# Patient Record
Sex: MALE | Race: WHITE | NOT HISPANIC OR LATINO | Employment: OTHER | URBAN - NONMETROPOLITAN AREA
[De-identification: names, ages, dates, MRNs, and addresses within clinical notes are randomized per-mention and may not be internally consistent; named-entity substitution may affect disease eponyms.]

---

## 2017-02-01 ENCOUNTER — LAB (OUTPATIENT)
Dept: LAB | Facility: HOSPITAL | Age: 62
End: 2017-02-01

## 2017-02-01 DIAGNOSIS — K74.69 OTHER CIRRHOSIS OF LIVER (HCC): ICD-10-CM

## 2017-02-01 DIAGNOSIS — K74.69 OTHER CIRRHOSIS OF LIVER (HCC): Primary | ICD-10-CM

## 2017-02-01 DIAGNOSIS — B18.2 CHRONIC HEPATITIS C WITHOUT HEPATIC COMA (HCC): ICD-10-CM

## 2017-02-01 LAB
ALBUMIN SERPL-MCNC: 4.3 G/DL (ref 3.4–4.8)
ALBUMIN/GLOB SERPL: 1.5 G/DL (ref 1.1–1.8)
ALP SERPL-CCNC: 53 U/L (ref 38–126)
ALT SERPL W P-5'-P-CCNC: 23 U/L (ref 21–72)
ANION GAP SERPL CALCULATED.3IONS-SCNC: 9 MMOL/L (ref 5–15)
AST SERPL-CCNC: 34 U/L (ref 17–59)
BILIRUB SERPL-MCNC: 0.8 MG/DL (ref 0.2–1.3)
BUN BLD-MCNC: 10 MG/DL (ref 7–21)
BUN/CREAT SERPL: 9.7 (ref 7–25)
CALCIUM SPEC-SCNC: 9.1 MG/DL (ref 8.4–10.2)
CHLORIDE SERPL-SCNC: 98 MMOL/L (ref 95–110)
CO2 SERPL-SCNC: 31 MMOL/L (ref 22–31)
CREAT BLD-MCNC: 1.03 MG/DL (ref 0.7–1.3)
DEPRECATED RDW RBC AUTO: 44.3 FL (ref 35.1–43.9)
ERYTHROCYTE [DISTWIDTH] IN BLOOD BY AUTOMATED COUNT: 13.5 % (ref 11.5–14.5)
GFR SERPL CREATININE-BSD FRML MDRD: 74 ML/MIN/1.73 (ref 56–130)
GLOBULIN UR ELPH-MCNC: 2.9 GM/DL (ref 2.3–3.5)
GLUCOSE BLD-MCNC: 115 MG/DL (ref 60–100)
HCT VFR BLD AUTO: 47.5 % (ref 39–49)
HGB BLD-MCNC: 16.6 G/DL (ref 13.7–17.3)
INR PPP: 1.08 (ref 0.8–1.2)
MCH RBC QN AUTO: 31.5 PG (ref 26.5–34)
MCHC RBC AUTO-ENTMCNC: 34.9 G/DL (ref 31.5–36.3)
MCV RBC AUTO: 90.1 FL (ref 80–98)
PLATELET # BLD AUTO: 126 10*3/MM3 (ref 150–450)
PMV BLD AUTO: 11.5 FL (ref 8–12)
POTASSIUM BLD-SCNC: 3.6 MMOL/L (ref 3.5–5.1)
PROT SERPL-MCNC: 7.2 G/DL (ref 6.3–8.6)
PROTHROMBIN TIME: 14 SECONDS (ref 11.1–15.3)
RBC # BLD AUTO: 5.27 10*6/MM3 (ref 4.37–5.74)
SODIUM BLD-SCNC: 138 MMOL/L (ref 137–145)
WBC NRBC COR # BLD: 7.42 10*3/MM3 (ref 3.2–9.8)

## 2017-02-01 PROCEDURE — 36415 COLL VENOUS BLD VENIPUNCTURE: CPT

## 2017-02-01 PROCEDURE — 85027 COMPLETE CBC AUTOMATED: CPT | Performed by: PHYSICIAN ASSISTANT

## 2017-02-01 PROCEDURE — 82105 ALPHA-FETOPROTEIN SERUM: CPT | Performed by: PHYSICIAN ASSISTANT

## 2017-02-01 PROCEDURE — 82977 ASSAY OF GGT: CPT | Performed by: PHYSICIAN ASSISTANT

## 2017-02-01 PROCEDURE — 82247 BILIRUBIN TOTAL: CPT | Performed by: PHYSICIAN ASSISTANT

## 2017-02-01 PROCEDURE — 83010 ASSAY OF HAPTOGLOBIN QUANT: CPT | Performed by: PHYSICIAN ASSISTANT

## 2017-02-01 PROCEDURE — 85610 PROTHROMBIN TIME: CPT | Performed by: PHYSICIAN ASSISTANT

## 2017-02-01 PROCEDURE — 84460 ALANINE AMINO (ALT) (SGPT): CPT | Performed by: PHYSICIAN ASSISTANT

## 2017-02-01 PROCEDURE — 83883 ASSAY NEPHELOMETRY NOT SPEC: CPT | Performed by: PHYSICIAN ASSISTANT

## 2017-02-01 PROCEDURE — 80053 COMPREHEN METABOLIC PANEL: CPT | Performed by: PHYSICIAN ASSISTANT

## 2017-02-03 LAB — SPECIMEN STATUS: NORMAL

## 2017-02-04 LAB — AFP-TM SERPL-MCNC: 2.6 NG/ML (ref 0–8.3)

## 2017-02-06 LAB
A2 MACROGLOB SERPL-MCNC: 483 MG/DL (ref 110–276)
ALT SERPL W P-5'-P-CCNC: 12 IU/L (ref 0–55)
APO A-I SERPL-MCNC: 108 MG/DL (ref 101–178)
BILIRUB SERPL-MCNC: 0.6 MG/DL (ref 0–1.2)
FIBROSIS SCORING:: ABNORMAL
FIBROSIS STAGE SERPL QL: ABNORMAL
GGT SERPL-CCNC: 11 IU/L (ref 0–65)
HAPTOGLOB SERPL-MCNC: 75 MG/DL (ref 34–200)
HCV AB SER QL: ABNORMAL
LABORATORY COMMENT REPORT: ABNORMAL
LIMITATIONS:: ABNORMAL
LIVER FIBR SCORE SERPL CALC.FIBROSURE: 0.77 (ref 0–0.21)
NECROINFLAMM ACTIVITY SCORING:: ABNORMAL
NECROINFLAMMATORY ACT GRADE SERPL QL: ABNORMAL
NECROINFLAMMATORY ACT SCORE SERPL: 0.07 (ref 0–0.17)

## 2017-02-27 ENCOUNTER — OFFICE VISIT (OUTPATIENT)
Dept: GASTROENTEROLOGY | Facility: CLINIC | Age: 62
End: 2017-02-27

## 2017-02-27 VITALS
WEIGHT: 181.8 LBS | HEART RATE: 90 BPM | BODY MASS INDEX: 32.21 KG/M2 | DIASTOLIC BLOOD PRESSURE: 97 MMHG | SYSTOLIC BLOOD PRESSURE: 163 MMHG | HEIGHT: 63 IN

## 2017-02-27 DIAGNOSIS — Z86.010 HISTORY OF COLON POLYPS: ICD-10-CM

## 2017-02-27 DIAGNOSIS — K74.60 HEPATIC CIRRHOSIS, UNSPECIFIED HEPATIC CIRRHOSIS TYPE (HCC): ICD-10-CM

## 2017-02-27 DIAGNOSIS — D69.6 THROMBOCYTOPENIA (HCC): ICD-10-CM

## 2017-02-27 DIAGNOSIS — K74.00 HEPATIC FIBROSIS: Primary | ICD-10-CM

## 2017-02-27 PROCEDURE — 99214 OFFICE O/P EST MOD 30 MIN: CPT | Performed by: PHYSICIAN ASSISTANT

## 2017-04-12 ENCOUNTER — TELEPHONE (OUTPATIENT)
Dept: GASTROENTEROLOGY | Facility: CLINIC | Age: 62
End: 2017-04-12

## 2017-04-12 ENCOUNTER — HOSPITAL ENCOUNTER (OUTPATIENT)
Dept: ULTRASOUND IMAGING | Facility: HOSPITAL | Age: 62
Discharge: HOME | End: 2017-04-12
Admitting: PHYSICIAN ASSISTANT

## 2017-04-12 ENCOUNTER — OFFICE VISIT (OUTPATIENT)
Dept: GASTROENTEROLOGY | Facility: CLINIC | Age: 62
End: 2017-04-12

## 2017-04-12 VITALS
WEIGHT: 178.1 LBS | DIASTOLIC BLOOD PRESSURE: 82 MMHG | SYSTOLIC BLOOD PRESSURE: 152 MMHG | HEART RATE: 66 BPM | BODY MASS INDEX: 31.55 KG/M2 | HEIGHT: 63 IN

## 2017-04-12 DIAGNOSIS — K74.60 HEPATIC CIRRHOSIS, UNSPECIFIED HEPATIC CIRRHOSIS TYPE (HCC): ICD-10-CM

## 2017-04-12 DIAGNOSIS — K74.69 OTHER CIRRHOSIS OF LIVER (HCC): Primary | ICD-10-CM

## 2017-04-12 PROCEDURE — 99213 OFFICE O/P EST LOW 20 MIN: CPT | Performed by: PHYSICIAN ASSISTANT

## 2017-04-12 PROCEDURE — 76705 ECHO EXAM OF ABDOMEN: CPT

## 2017-04-12 NOTE — TELEPHONE ENCOUNTER
----- Message from Deny Heller PA-C sent at 4/12/2017  5:12 PM CDT -----  Please call the patient regarding his abnormal result.

## 2017-04-12 NOTE — TELEPHONE ENCOUNTER
Patient has been contacted and made aware of his Ultrasound results. Patient voiced understanding.

## 2017-04-12 NOTE — PROGRESS NOTES
Chief Complaint   Patient presents with   • Cirrhosis   • Heartburn   • Hepatitis C       ENDO PROCEDURE ORDERED:    Subjective    Wong Patrick is a 62 y.o. male. he is here today for follow-up.    History of Present Illness    Patient seen on a recheck of his GERD, chronic hepatitis C, cirrhosis.  Last seen 2/27/17.  Genotype 1A.  F4.  He completed 12 weeks Harvoni on 2/25/16.  He currently denies abdominal pain, GERD is well-controlled on Prilosec.  No nausea, vomiting, dysphagia.  Bowels are moving without blood.  Weight is down 4 pounds since last visit.  Last colonoscopy showed colon polyps on 6/3/13.    Patient had right upper quadrant ultrasound today which showed cholelithiasis, otherwise normal-appearing liver.    A/P: Cirrhosis post treatment.  He will be due for hepatoma screening in October.  We'll plan follow-up with laboratories prior in 3 months.     The following portions of the patient's history were reviewed and updated as appropriate:   Past Medical History:   Diagnosis Date   • Abnormal weight loss    • Acquired thrombocytopenia    • Adenomatous polyp of colon    • Anxiety    • Biliary calculus     denies N/V, abd pain   • Chronic hepatitis C    • Cirrhosis of liver    • Colon polyp    • Elevated levels of transaminase & lactic acid dehydrogenase    • Generalized abdominal pain     known gallstones   • GERD (gastroesophageal reflux disease)    • Hypertension    • IBS (irritable bowel syndrome)    • Impacted cerumen    • Kidney disease     US suggest renal dz, elevated alpha 2 macroglobulins   • Nausea and vomiting    • Patient noncompliance, general    • Rectal hemorrhage    • Villous adenoma of colon    • Wheezing      Past Surgical History:   Procedure Laterality Date   • COLONOSCOPY  06/03/2013   • ENDOSCOPY AND COLONOSCOPY     • ESOPHAGOSCOPY / EGD      with tube   • UPPER GASTROINTESTINAL ENDOSCOPY  06/03/2013     Family History   Problem Relation Age of Onset   • Heart disease Other   "  • Diabetes Other        Allergies   Allergen Reactions   • Penicillins      Social History     Social History   • Marital status:      Spouse name: N/A   • Number of children: N/A   • Years of education: N/A     Social History Main Topics   • Smoking status: Current Every Day Smoker     Packs/day: 1.00     Types: Cigarettes   • Smokeless tobacco: Never Used   • Alcohol use No   • Drug use: No   • Sexual activity: Not Asked     Other Topics Concern   • None     Social History Narrative       Current Outpatient Prescriptions:   •  albuterol (PROVENTIL HFA;VENTOLIN HFA) 108 (90 BASE) MCG/ACT inhaler, Inhale 2 puffs 2 (two) times a day as needed for wheezing., Disp: , Rfl:   •  busPIRone (BUSPAR) 15 MG tablet, Take 15 mg by mouth 2 (two) times a day., Disp: , Rfl:   •  losartan (COZAAR) 50 MG tablet, Take 50 mg by mouth daily., Disp: , Rfl:   •  meclizine (ANTIVERT) 25 MG tablet, Take 25 mg by mouth 2 (Two) Times a Day As Needed., Disp: , Rfl:   •  metoprolol succinate XL (TOPROL-XL) 50 MG 24 hr tablet, Take 50 mg by mouth daily., Disp: , Rfl:   •  omeprazole (PriLOSEC) 20 MG capsule, Take 20 mg by mouth daily., Disp: , Rfl:   •  QUEtiapine (SEROquel) 100 MG tablet, Take 100 mg by mouth every night., Disp: , Rfl:   Review of Systems  Review of Systems       Objective    /82 (BP Location: Left arm)  Pulse 66  Ht 63\" (160 cm)  Wt 178 lb 1.6 oz (80.8 kg)  BMI 31.55 kg/m2  Physical Exam   Constitutional: He is oriented to person, place, and time. He appears well-developed and well-nourished. No distress.   HENT:   Head: Normocephalic and atraumatic.   Eyes: EOM are normal. Pupils are equal, round, and reactive to light.   Neck: Normal range of motion.   Cardiovascular: Normal rate, regular rhythm and normal heart sounds.    Pulmonary/Chest: Effort normal and breath sounds normal.   Abdominal: Soft. Bowel sounds are normal. He exhibits no shifting dullness, no distension, no abdominal bruit, no ascites " and no mass. There is no hepatosplenomegaly. There is no tenderness. There is no rigidity, no rebound, no guarding and no CVA tenderness. No hernia. Hernia confirmed negative in the ventral area.   obese   Musculoskeletal: Normal range of motion.   Neurological: He is alert and oriented to person, place, and time.   Skin: Skin is warm and dry.   Psychiatric: He has a normal mood and affect. His behavior is normal. Judgment and thought content normal.   Nursing note and vitals reviewed.    Assessment/Plan      1. Other cirrhosis of liver    2. Hepatic cirrhosis, unspecified hepatic cirrhosis type    .   Wong was seen today for cirrhosis, heartburn and hepatitis c.    Diagnoses and all orders for this visit:    Other cirrhosis of liver  Comments:  F4  Orders:  -     Hepatic Function Panel; Future  -     Protime-INR; Future    Hepatic cirrhosis, unspecified hepatic cirrhosis type  -     Hepatic Function Panel; Future  -     Protime-INR; Future        Orders placed during this encounter include:  Orders Placed This Encounter   Procedures   • Hepatic Function Panel     Due before follow up in July     Standing Status:   Future     Standing Expiration Date:   7/28/2017   • Protime-INR     Due before follow up in July     Standing Status:   Future     Standing Expiration Date:   7/28/2017       Medications prescribed:  No orders of the defined types were placed in this encounter.      Requested Prescriptions      No prescriptions requested or ordered in this encounter       Review and/or summary of lab tests, radiology, procedures, medications. Review and summary of old records and obtaining of history. The risks and benefits of my recommendations, as well as other treatment options were discussed with the patient today. Questions were answered.    Follow-up: Return in about 3 months (around 7/12/2017), or if symptoms worsen or fail to improve, for lab prior.     * Surgery not found *      This document has been  electronically signed by Deny Heller PA-C on April 24, 2017 5:46 PM      Results for orders placed or performed in visit on 02/01/17   Specimen Status Report   Result Value Ref Range    Specimen Status Comment    HCV FibroSURE   Result Value Ref Range    Fibrosis Score 0.77 (H) 0.00 - 0.21    Fibrosis Stage Comment     Necroinflammat Activity Score 0.07 0.00 - 0.17    Necroinflammat Activity Grade A0-No activity     Alpha 2-Macroglobulins, Qn 483 (H) 110 - 276 mg/dL    Haptoglobin 75 34 - 200 mg/dL    Apolipoprotein A-1 108 101 - 178 mg/dL    Total Bilirubin 0.6 0.0 - 1.2 mg/dL    GGT 11 0 - 65 IU/L    ALT (SGPT) 12 0 - 55 IU/L    HCV Qual Interp Comment     Fibrosis Scoring: Comment     Necroinflamm Activity Scoring: Comment     Limitations: Comment     Comment Comment    AFP Tumor Marker   Result Value Ref Range    AFP Tumor Marker 2.6 0.0 - 8.3 ng/mL   Protime-INR   Result Value Ref Range    Protime 14.0 11.1 - 15.3 Seconds    INR 1.08 0.80 - 1.20   CBC (No Diff)   Result Value Ref Range    WBC 7.42 3.20 - 9.80 10*3/mm3    RBC 5.27 4.37 - 5.74 10*6/mm3    Hemoglobin 16.6 13.7 - 17.3 g/dL    Hematocrit 47.5 39.0 - 49.0 %    MCV 90.1 80.0 - 98.0 fL    MCH 31.5 26.5 - 34.0 pg    MCHC 34.9 31.5 - 36.3 g/dL    RDW 13.5 11.5 - 14.5 %    RDW-SD 44.3 (H) 35.1 - 43.9 fl    MPV 11.5 8.0 - 12.0 fL    Platelets 126 (L) 150 - 450 10*3/mm3   Comprehensive Metabolic Panel   Result Value Ref Range    Glucose 115 (H) 60 - 100 mg/dL    BUN 10 7 - 21 mg/dL    Creatinine 1.03 0.70 - 1.30 mg/dL    Sodium 138 137 - 145 mmol/L    Potassium 3.6 3.5 - 5.1 mmol/L    Chloride 98 95 - 110 mmol/L    CO2 31.0 22.0 - 31.0 mmol/L    Calcium 9.1 8.4 - 10.2 mg/dL    Total Protein 7.2 6.3 - 8.6 g/dL    Albumin 4.30 3.40 - 4.80 g/dL    ALT (SGPT) 23 21 - 72 U/L    AST (SGOT) 34 17 - 59 U/L    Alkaline Phosphatase 53 38 - 126 U/L    Total Bilirubin 0.8 0.2 - 1.3 mg/dL    eGFR Non  Amer 74 56 - 130 mL/min/1.73    Globulin 2.9 2.3 - 3.5  gm/dL    A/G Ratio 1.5 1.1 - 1.8 g/dL    BUN/Creatinine Ratio 9.7 7.0 - 25.0    Anion Gap 9.0 5.0 - 15.0 mmol/L   Results for orders placed or performed in visit on 07/13/16    COLONOSCOPY   Result Value Ref Range     Colonoscopy completed    Results for orders placed or performed during the hospital encounter of 06/03/16   AFP tumor marker   Result Value Ref Range    AFP Tumor Marker 3.3 0.0 - 8.3 ng/mL   Hepatitis C RNA, quantitative, PCR (graph)   Result Value Ref Range    Hepatitis C Quantitation HCV Not Detected IU/mL    Test Information Comment    CBC and Differential   Result Value Ref Range    WBC 8.2 3.2 - 9.8 x1000/uL    RBC 5.46 4.37 - 5.74 altaf/mm3    Hemoglobin 17.3 13.7 - 17.3 gm/dl    Hematocrit 49.2 (H) 39.0 - 49.0 %    MCV 90.1 80.0 - 98.0 fl    MCH 31.7 26.0 - 34.0 pg    MCHC 35.2 31.5 - 36.3 gm/dl    RDW 13.9 11.5 - 14.5 %    Platelets 134 (L) 150 - 450 x1000/mm3    MPV 12.3 (H) 8.0 - 12.0 fl    Neutrophil Rel % 47.2 37.0 - 80.0 %    Lymphocyte Rel % 40.0 10.0 - 50.0 %    Monocyte Rel % 10.5 0.0 - 12.0 %    Eosinophil Rel % 1.6 0.0 - 7.0 %    Basophil Rel % 0.5 0.0 - 2.0 %    Immature Granulocyte Rel % 0.20 0.00 - 0.50 %    Neutrophils Absolute 3.88 2.00 - 8.60 x1000/uL    Lymphocytes Absolute 3.29 0.60 - 4.20 x1000/uL    Monocytes Absolute 0.86 0.00 - 0.90 x1000/uL    Eosinophils Absolute 0.13 0.00 - 0.70 x1000/uL    Basophils Absolute 0.04 0.00 - 0.20 x1000/uL    Immature Granulocytes Absolute 0.020 0.005 - 0.022 x1000/uL    nRBC 0.0 0.0 - 0.2 %    nRBC 0.000 x1000/uL   Comprehensive metabolic panel   Result Value Ref Range    Sodium 143 137 - 145 mmol/L    Potassium 4.6 3.5 - 5.1 mmol/L    Chloride 100 95 - 110 mmol/L    CO2 31 22 - 31 mmol/L    Anion Gap 12.0 5.0 - 15.0 mmol/L    Glucose 109 (H) 60 - 100 mg/dl    BUN 14 7 - 21 mg/dl    Creatinine 1.1 0.7 - 1.3 mg/dl    GFR MDRD Non  69 56 - 130 mL/min/1.73 sq.M    GFR MDRD  83 56 - 130 mL/min/1.73 sq.M     Calcium 9.4 8.4 - 10.2 mg/dl    Total Protein 7.0 6.3 - 8.6 gm/dl    Albumin 4.2 3.4 - 4.8 gm/dl    Total Bilirubin 0.7 0.2 - 1.3 mg/dl    Alkaline Phosphatase 51 38 - 126 U/L    AST (SGOT) 22 17 - 59 U/L    ALT (SGPT) 33 21 - 72 U/L   Results for orders placed or performed during the hospital encounter of 03/03/16   CBC and Differential   Result Value Ref Range    WBC 7.4 3.2 - 9.8 x1000/uL    RBC 5.32 4.37 - 5.74 altaf/mm3    Hemoglobin 16.8 13.7 - 17.3 gm/dl    Hematocrit 48.0 39.0 - 49.0 %    MCV 90.2 80.0 - 98.0 fl    MCH 31.6 26.0 - 34.0 pg    MCHC 35.0 31.5 - 36.3 gm/dl    RDW 13.8 11.5 - 14.5 %    Platelets 118 (L) 150 - 450 x1000/mm3    MPV 12.2 (H) 8.0 - 12.0 fl    Neutrophil Rel % 48.4 37.0 - 80.0 %    Lymphocyte Rel % 41.5 10.0 - 50.0 %    Monocyte Rel % 8.2 0.0 - 12.0 %    Eosinophil Rel % 1.2 0.0 - 7.0 %    Basophil Rel % 0.4 0.0 - 2.0 %    Immature Granulocyte Rel % 0.30 0.00 - 0.50 %    Neutrophils Absolute 3.59 2.00 - 8.60 x1000/uL    Lymphocytes Absolute 3.08 0.60 - 4.20 x1000/uL    Monocytes Absolute 0.61 0.00 - 0.90 x1000/uL    Eosinophils Absolute 0.09 0.00 - 0.70 x1000/uL    Basophils Absolute 0.03 0.00 - 0.20 x1000/uL    Immature Granulocytes Absolute 0.020 0.005 - 0.022 x1000/uL     *Note: Due to a large number of results and/or encounters for the requested time period, some results have not been displayed. A complete set of results can be found in Results Review.       Some portions of this note have been dictated using voice recognition software and may contain errors and/or omissions.

## 2017-07-20 ENCOUNTER — OFFICE VISIT (OUTPATIENT)
Dept: GASTROENTEROLOGY | Facility: CLINIC | Age: 62
End: 2017-07-20

## 2017-07-20 ENCOUNTER — LAB (OUTPATIENT)
Dept: LAB | Facility: HOSPITAL | Age: 62
End: 2017-07-20

## 2017-07-20 VITALS
WEIGHT: 176.9 LBS | DIASTOLIC BLOOD PRESSURE: 82 MMHG | SYSTOLIC BLOOD PRESSURE: 128 MMHG | HEART RATE: 76 BPM | BODY MASS INDEX: 31.34 KG/M2 | HEIGHT: 63 IN

## 2017-07-20 DIAGNOSIS — K21.00 GASTROESOPHAGEAL REFLUX DISEASE WITH ESOPHAGITIS: ICD-10-CM

## 2017-07-20 DIAGNOSIS — K74.60 HEPATIC CIRRHOSIS, UNSPECIFIED HEPATIC CIRRHOSIS TYPE (HCC): Primary | ICD-10-CM

## 2017-07-20 DIAGNOSIS — E71.30 DISORDER OF FATTY-ACID METABOLISM: ICD-10-CM

## 2017-07-20 DIAGNOSIS — K74.69 OTHER CIRRHOSIS OF LIVER (HCC): ICD-10-CM

## 2017-07-20 DIAGNOSIS — B18.2 CHRONIC HEPATITIS C WITHOUT HEPATIC COMA (HCC): ICD-10-CM

## 2017-07-20 DIAGNOSIS — K74.60 HEPATIC CIRRHOSIS, UNSPECIFIED HEPATIC CIRRHOSIS TYPE (HCC): ICD-10-CM

## 2017-07-20 LAB
ALBUMIN SERPL-MCNC: 4.3 G/DL (ref 3.4–4.8)
ALP SERPL-CCNC: 60 U/L (ref 38–126)
ALT SERPL W P-5'-P-CCNC: 32 U/L (ref 21–72)
AST SERPL-CCNC: 24 U/L (ref 17–59)
BILIRUB CONJ SERPL-MCNC: 0 MG/DL (ref 0–0.3)
BILIRUB INDIRECT SERPL-MCNC: 0.1 MG/DL (ref 0–1.1)
BILIRUB SERPL-MCNC: 0.3 MG/DL (ref 0.2–1.3)
INR PPP: 0.93 (ref 0.8–1.2)
PROT SERPL-MCNC: 7 G/DL (ref 6.3–8.6)
PROTHROMBIN TIME: 12.4 SECONDS (ref 11.1–15.3)

## 2017-07-20 PROCEDURE — 36415 COLL VENOUS BLD VENIPUNCTURE: CPT

## 2017-07-20 PROCEDURE — 80076 HEPATIC FUNCTION PANEL: CPT

## 2017-07-20 PROCEDURE — 85610 PROTHROMBIN TIME: CPT

## 2017-07-20 PROCEDURE — 99214 OFFICE O/P EST MOD 30 MIN: CPT | Performed by: PHYSICIAN ASSISTANT

## 2017-07-20 RX ORDER — LORATADINE 10 MG/1
10 TABLET ORAL DAILY
Refills: 3 | COMMUNITY
Start: 2017-05-11

## 2017-07-20 RX ORDER — FLUTICASONE PROPIONATE 50 MCG
SPRAY, SUSPENSION (ML) NASAL
Refills: 0 | COMMUNITY
Start: 2017-05-11 | End: 2018-04-23

## 2017-10-16 ENCOUNTER — HOSPITAL ENCOUNTER (OUTPATIENT)
Dept: ULTRASOUND IMAGING | Facility: HOSPITAL | Age: 62
Discharge: HOME | End: 2017-10-16
Admitting: PHYSICIAN ASSISTANT

## 2017-10-16 DIAGNOSIS — B18.2 CHRONIC HEPATITIS C WITHOUT HEPATIC COMA (HCC): ICD-10-CM

## 2017-10-16 DIAGNOSIS — K74.69 OTHER CIRRHOSIS OF LIVER (HCC): ICD-10-CM

## 2017-10-16 DIAGNOSIS — K74.60 HEPATIC CIRRHOSIS, UNSPECIFIED HEPATIC CIRRHOSIS TYPE (HCC): ICD-10-CM

## 2017-10-16 PROCEDURE — 76705 ECHO EXAM OF ABDOMEN: CPT

## 2017-10-23 ENCOUNTER — OFFICE VISIT (OUTPATIENT)
Dept: GASTROENTEROLOGY | Facility: CLINIC | Age: 62
End: 2017-10-23

## 2017-10-23 ENCOUNTER — LAB (OUTPATIENT)
Dept: LAB | Facility: HOSPITAL | Age: 62
End: 2017-10-23

## 2017-10-23 VITALS
HEART RATE: 73 BPM | SYSTOLIC BLOOD PRESSURE: 129 MMHG | DIASTOLIC BLOOD PRESSURE: 83 MMHG | BODY MASS INDEX: 31.63 KG/M2 | WEIGHT: 178.5 LBS | HEIGHT: 63 IN

## 2017-10-23 DIAGNOSIS — K21.00 GASTROESOPHAGEAL REFLUX DISEASE WITH ESOPHAGITIS: ICD-10-CM

## 2017-10-23 DIAGNOSIS — B18.2 CHRONIC HEPATITIS C WITHOUT HEPATIC COMA (HCC): ICD-10-CM

## 2017-10-23 DIAGNOSIS — E71.30 DISORDER OF FATTY-ACID METABOLISM: ICD-10-CM

## 2017-10-23 DIAGNOSIS — K74.60 HEPATIC CIRRHOSIS, UNSPECIFIED HEPATIC CIRRHOSIS TYPE (HCC): Primary | ICD-10-CM

## 2017-10-23 DIAGNOSIS — K74.69 OTHER CIRRHOSIS OF LIVER (HCC): ICD-10-CM

## 2017-10-23 DIAGNOSIS — K80.20 GALLSTONES: ICD-10-CM

## 2017-10-23 DIAGNOSIS — K74.60 HEPATIC CIRRHOSIS, UNSPECIFIED HEPATIC CIRRHOSIS TYPE (HCC): ICD-10-CM

## 2017-10-23 LAB
ALBUMIN SERPL-MCNC: 4.6 G/DL (ref 3.4–4.8)
ALBUMIN/GLOB SERPL: 1.4 G/DL (ref 1.1–1.8)
ALP SERPL-CCNC: 55 U/L (ref 38–126)
ALT SERPL W P-5'-P-CCNC: 26 U/L (ref 21–72)
ANION GAP SERPL CALCULATED.3IONS-SCNC: 15 MMOL/L (ref 5–15)
AST SERPL-CCNC: 28 U/L (ref 17–59)
BASOPHILS # BLD AUTO: 0.04 10*3/MM3 (ref 0–0.2)
BASOPHILS NFR BLD AUTO: 0.4 % (ref 0–2)
BILIRUB SERPL-MCNC: 0.5 MG/DL (ref 0.2–1.3)
BUN BLD-MCNC: 16 MG/DL (ref 7–21)
BUN/CREAT SERPL: 15.2 (ref 7–25)
CALCIUM SPEC-SCNC: 9.8 MG/DL (ref 8.4–10.2)
CHLORIDE SERPL-SCNC: 98 MMOL/L (ref 95–110)
CO2 SERPL-SCNC: 32 MMOL/L (ref 22–31)
CREAT BLD-MCNC: 1.05 MG/DL (ref 0.7–1.3)
DEPRECATED RDW RBC AUTO: 44.3 FL (ref 35.1–43.9)
EOSINOPHIL # BLD AUTO: 0.16 10*3/MM3 (ref 0–0.7)
EOSINOPHIL NFR BLD AUTO: 1.7 % (ref 0–7)
ERYTHROCYTE [DISTWIDTH] IN BLOOD BY AUTOMATED COUNT: 13.4 % (ref 11.5–14.5)
GFR SERPL CREATININE-BSD FRML MDRD: 72 ML/MIN/1.73 (ref 49–113)
GLOBULIN UR ELPH-MCNC: 3.2 GM/DL (ref 2.3–3.5)
GLUCOSE BLD-MCNC: 102 MG/DL (ref 60–100)
HCT VFR BLD AUTO: 49.9 % (ref 39–49)
HGB BLD-MCNC: 17.3 G/DL (ref 13.7–17.3)
IMM GRANULOCYTES # BLD: 0.03 10*3/MM3 (ref 0–0.02)
IMM GRANULOCYTES NFR BLD: 0.3 % (ref 0–0.5)
INR PPP: 0.99 (ref 0.8–1.2)
LYMPHOCYTES # BLD AUTO: 3.61 10*3/MM3 (ref 0.6–4.2)
LYMPHOCYTES NFR BLD AUTO: 37.5 % (ref 10–50)
MCH RBC QN AUTO: 31.7 PG (ref 26.5–34)
MCHC RBC AUTO-ENTMCNC: 34.7 G/DL (ref 31.5–36.3)
MCV RBC AUTO: 91.6 FL (ref 80–98)
MONOCYTES # BLD AUTO: 0.88 10*3/MM3 (ref 0–0.9)
MONOCYTES NFR BLD AUTO: 9.1 % (ref 0–12)
NEUTROPHILS # BLD AUTO: 4.9 10*3/MM3 (ref 2–8.6)
NEUTROPHILS NFR BLD AUTO: 51 % (ref 37–80)
PLATELET # BLD AUTO: 157 10*3/MM3 (ref 150–450)
PMV BLD AUTO: 12.1 FL (ref 8–12)
POTASSIUM BLD-SCNC: 4.7 MMOL/L (ref 3.5–5.1)
PROT SERPL-MCNC: 7.8 G/DL (ref 6.3–8.6)
PROTHROMBIN TIME: 13 SECONDS (ref 11.1–15.3)
RBC # BLD AUTO: 5.45 10*6/MM3 (ref 4.37–5.74)
SODIUM BLD-SCNC: 145 MMOL/L (ref 137–145)
WBC NRBC COR # BLD: 9.62 10*3/MM3 (ref 3.2–9.8)

## 2017-10-23 PROCEDURE — 99213 OFFICE O/P EST LOW 20 MIN: CPT | Performed by: PHYSICIAN ASSISTANT

## 2017-10-23 PROCEDURE — 82105 ALPHA-FETOPROTEIN SERUM: CPT | Performed by: PHYSICIAN ASSISTANT

## 2017-10-23 PROCEDURE — 82247 BILIRUBIN TOTAL: CPT | Performed by: PHYSICIAN ASSISTANT

## 2017-10-23 PROCEDURE — 85610 PROTHROMBIN TIME: CPT | Performed by: PHYSICIAN ASSISTANT

## 2017-10-23 PROCEDURE — 83883 ASSAY NEPHELOMETRY NOT SPEC: CPT | Performed by: PHYSICIAN ASSISTANT

## 2017-10-23 PROCEDURE — 36415 COLL VENOUS BLD VENIPUNCTURE: CPT

## 2017-10-23 PROCEDURE — 85025 COMPLETE CBC W/AUTO DIFF WBC: CPT | Performed by: PHYSICIAN ASSISTANT

## 2017-10-23 PROCEDURE — 84460 ALANINE AMINO (ALT) (SGPT): CPT | Performed by: PHYSICIAN ASSISTANT

## 2017-10-23 PROCEDURE — 83010 ASSAY OF HAPTOGLOBIN QUANT: CPT | Performed by: PHYSICIAN ASSISTANT

## 2017-10-23 PROCEDURE — 80053 COMPREHEN METABOLIC PANEL: CPT | Performed by: PHYSICIAN ASSISTANT

## 2017-10-23 PROCEDURE — 82977 ASSAY OF GGT: CPT | Performed by: PHYSICIAN ASSISTANT

## 2017-10-24 LAB — AFP-TM SERPL-MCNC: 3.3 NG/ML (ref 0–8.3)

## 2017-10-26 LAB
A2 MACROGLOB SERPL-MCNC: 519 MG/DL (ref 110–276)
ALT SERPL W P-5'-P-CCNC: 18 IU/L (ref 0–55)
APO A-I SERPL-MCNC: 141 MG/DL (ref 101–178)
BILIRUB SERPL-MCNC: 0.4 MG/DL (ref 0–1.2)
FIBROSIS STAGE SERPL QL: ABNORMAL
GGT SERPL-CCNC: 16 IU/L (ref 0–65)
HAPTOGLOB SERPL-MCNC: 88 MG/DL (ref 34–200)
LABORATORY COMMENT REPORT: ABNORMAL
LIMITATIONS:: ABNORMAL

## 2018-04-23 ENCOUNTER — OFFICE VISIT (OUTPATIENT)
Dept: GASTROENTEROLOGY | Facility: CLINIC | Age: 63
End: 2018-04-23

## 2018-04-23 VITALS
WEIGHT: 175 LBS | HEART RATE: 87 BPM | SYSTOLIC BLOOD PRESSURE: 136 MMHG | BODY MASS INDEX: 29.88 KG/M2 | HEIGHT: 64 IN | DIASTOLIC BLOOD PRESSURE: 82 MMHG

## 2018-04-23 DIAGNOSIS — K21.00 GASTROESOPHAGEAL REFLUX DISEASE WITH ESOPHAGITIS: ICD-10-CM

## 2018-04-23 DIAGNOSIS — E71.30 DISORDER OF FATTY-ACID METABOLISM: ICD-10-CM

## 2018-04-23 DIAGNOSIS — Z86.19 HISTORY OF HEPATITIS C: ICD-10-CM

## 2018-04-23 DIAGNOSIS — K76.0 FATTY (CHANGE OF) LIVER, NOT ELSEWHERE CLASSIFIED: ICD-10-CM

## 2018-04-23 DIAGNOSIS — K80.20 GALLSTONES: ICD-10-CM

## 2018-04-23 DIAGNOSIS — K74.60 HEPATIC CIRRHOSIS, UNSPECIFIED HEPATIC CIRRHOSIS TYPE (HCC): Primary | ICD-10-CM

## 2018-04-23 PROCEDURE — 99214 OFFICE O/P EST MOD 30 MIN: CPT | Performed by: PHYSICIAN ASSISTANT

## 2018-04-23 NOTE — PATIENT INSTRUCTIONS
MyPlate from [employer]  The general, healthful diet is based on the 2010 Dietary Guidelines for Americans. The amount of food you need to eat from each food group depends on your age, sex, and level of physical activity and can be individualized by a dietitian. Go to ChooseMyPlate.gov for more information.  What do I need to know about the MyPlate plan?  · Enjoy your food, but eat less.  · Avoid oversized portions.  ¨ ½ of your plate should include fruits and vegetables.  ¨ ¼ of your plate should be grains.  ¨ ¼ of your plate should be protein.  Grains   · Make at least half of your grains whole grains.  · For a 2,000 calorie daily food plan, eat 6 oz every day.  · 1 oz is about 1 slice bread, 1 cup cereal, or ½ cup cooked rice, cereal, or pasta.  Vegetables   · Make half your plate fruits and vegetables.  · For a 2,000 calorie daily food plan, eat 2½ cups every day.  · 1 cup is about 1 cup raw or cooked vegetables or vegetable juice or 2 cups raw leafy greens.  Fruits   · Make half your plate fruits and vegetables.  · For a 2,000 calorie daily food plan, eat 2 cups every day.  · 1 cup is about 1 cup fruit or 100% fruit juice or ½ cup dried fruit.  Protein   · For a 2,000 calorie daily food plan, eat 5½ oz every day.  · 1 oz is about 1 oz meat, poultry, or fish, ¼ cup cooked beans, 1 egg, 1 Tbsp peanut butter, or ½ oz nuts or seeds.  Dairy   · Switch to fat-free or low-fat (1%) milk.  · For a 2,000 calorie daily food plan, eat 3 cups every day.  · 1 cup is about 1 cup milk or yogurt or soy milk (soy beverage), 1½ oz natural cheese, or 2 oz processed cheese.  Fats, Oils, and Empty Calories   · Only small amounts of oils are recommended.  · Empty calories are calories from solid fats or added sugars.  · Compare sodium in foods like soup, bread, and frozen meals. Choose the foods with lower numbers.  · Drink water instead of sugary drinks.  What foods can I eat?  Grains   Whole grains such as whole wheat, quinoa, millet,  and bulgur. Bread, rolls, and pasta made from whole grains. Brown or wild rice. Hot or cold cereals made from whole grains and without added sugar.  Vegetables   All fresh vegetables, especially fresh red, dark green, or orange vegetables. Peas and beans. Low-sodium frozen or canned vegetables prepared without added salt. Low-sodium vegetable juices.  Fruits   All fresh, frozen, and dried fruits. Canned fruit packed in water or fruit juice without added sugar. Fruit juices without added sugar.  Meats and Other Protein Sources   Boiled, baked, or grilled lean meat trimmed of fat. Skinless poultry. Fresh seafood and shellfish. Canned seafood packed in water. Unsalted nuts and unsalted nut butters. Tofu. Dried beans and pea. Eggs.  Dairy   Low-fat or fat-free milk, yogurt, and cheeses.  Sweets and Desserts   Frozen desserts made from low-fat milk.  Fats and Oils   Olive, peanut, and canola oils and margarine. Salad dressing and mayonnaise made from these oils.  Other   Soups and casseroles made from allowed ingredients and without added fat or salt.  The items listed above may not be a complete list of recommended foods or beverages. Contact your dietitian for more options.   What foods are not recommended?  Grains   Sweetened, low-fiber cereals. Packaged baked goods. Snack crackers and chips. Cheese crackers, butter crackers, and biscuits. Frozen waffles, sweet breads, doughnuts, pastries, packaged baking mixes, pancakes, cakes, and cookies.  Vegetables   Regular canned or frozen vegetables or vegetables prepared with salt. Canned tomatoes. Canned tomato sauce. Fried vegetables. Vegetables in cream sauce or cheese sauce.  Fruits   Fruits packed in syrup or made with added sugar.  Meats and Other Protein Sources   Marbled or fatty meats such as ribs. Poultry with skin. Fried meats, poultry, eggs, or fish. Sausages, hot dogs, and deli meats such as pastrami, bologna, or salami.  Dairy   Whole milk, cream, cheeses made  from whole milk, sour cream. Ice cream or yogurt made from whole milk or with added sugar.  Beverages   For adults, no more than one alcoholic drink per day. Regular soft drinks or other sugary beverages. Juice drinks.  Sweets and Desserts   Sugary or fatty desserts, candy, and other sweets.  Fats and Oils   Solid shortening or partially hydrogenated oils. Solid margarine. Margarine that contains trans fats. Butter.  The items listed above may not be a complete list of foods and beverages to avoid. Contact your dietitian for more information.   This information is not intended to replace advice given to you by your health care provider. Make sure you discuss any questions you have with your health care provider.  Document Released: 01/06/2009 Document Revised: 05/25/2017 Document Reviewed: 11/26/2014  [employer] Interactive Patient Education © 2017 [employer]  BMI for Adults  Body mass index (BMI) is a number that is calculated from a person's weight and height. In most adults, the number is used to find how much of an adult's weight is made up of fat. BMI is not as accurate as a direct measure of body fat.  How is BMI calculated?  BMI is calculated by dividing weight in kilograms by height in meters squared. It can also be calculated by dividing weight in pounds by height in inches squared, then multiplying the resulting number by 703. Charts are available to help you find your BMI quickly and easily without doing this calculation.  How is BMI interpreted?  Health care professionals use BMI charts to identify whether an adult is underweight, at a normal weight, or overweight based on the following guidelines:  · Underweight: BMI less than 18.5.  · Normal weight: BMI between 18.5 and 24.9.  · Overweight: BMI between 25 and 29.9.  · Obese: BMI of 30 and above.  BMI is usually interpreted the same for males and females.  Weight includes both fat and muscle, so someone with a muscular build, such as an athlete, may  have a BMI that is higher than 24.9. In cases like these, BMI may not accurately depict body fat. To determine if excess body fat is the cause of a BMI of 25 or higher, further assessments may need to be done by a health care provider.  Why is BMI a useful tool?  BMI is used to identify a possible weight problem that may be related to a medical problem or may increase the risk for medical problems. BMI can also be used to promote changes to reach a healthy weight.  This information is not intended to replace advice given to you by your health care provider. Make sure you discuss any questions you have with your health care provider.  Document Released: 08/29/2005 Document Revised: 04/27/2017 Document Reviewed: 05/15/2015  [employer] Interactive Patient Education © 2017 [employer]

## 2018-04-23 NOTE — PROGRESS NOTES
Chief Complaint   Patient presents with   • Hepatic Fibrosis   • Hx Of Hepatitis C       ENDO PROCEDURE ORDERED:    Subjective    Wong Patrick is a 63 y.o. male. he is here today for follow-up.    History of Present Illness    The patient is seen on a recheck of his hepatic fibrosis, cirrhosis, asymptomatic gallstones, history of hepatitis C and GERD. Last seen 10/23/2017. Genotype 1A/F4. HCV FibroSure could not be calculated at his last visit. He did not return to have that repeated before this appointment. He states that he has been doing fairly well. He denied abdominal pain. His GERD is well controlled on Prilosec 20 mg daily. He denied nausea, vomiting, dysphagia. Bowel movements are regular without blood or mucus. Weight is down 3.8 pounds since last visit. Last colonoscopy 06/03/2013.    Laboratories from by his primary care done on 11/09/2017 showed CBC, CMP, PSA, cholesterol panel.    ASSESSMENT/PLAN: Patient with cirrhosis secondary to hepatitis C, his last documented study showed cirrhosis. He is due for hepatoma screening. Recommend right upper quadrant ultrasound, AFP, GARVEY, FibroSure, INR, LFTs. Will see him in followup after the above, further pending clinical course and the results of the above.       The following portions of the patient's history were reviewed and updated as appropriate:   Past Medical History:   Diagnosis Date   • Abnormal weight loss    • Acquired thrombocytopenia    • Adenomatous polyp of colon    • Anxiety    • Biliary calculus     denies N/V, abd pain   • Chronic hepatitis C    • Cirrhosis of liver    • Colon polyp    • Elevated levels of transaminase & lactic acid dehydrogenase    • Generalized abdominal pain     known gallstones   • GERD (gastroesophageal reflux disease)    • Hypertension    • IBS (irritable bowel syndrome)    • Impacted cerumen    • Kidney disease     US suggest renal dz, elevated alpha 2 macroglobulins   • Nausea and vomiting    • Patient  "noncompliance, general    • Rectal hemorrhage    • Villous adenoma of colon    • Wheezing      Past Surgical History:   Procedure Laterality Date   • COLONOSCOPY  06/03/2013   • ENDOSCOPY AND COLONOSCOPY     • ESOPHAGOSCOPY / EGD      with tube   • UPPER GASTROINTESTINAL ENDOSCOPY  06/03/2013     Family History   Problem Relation Age of Onset   • Heart disease Other    • Diabetes Other        Allergies   Allergen Reactions   • Penicillins Hives     Social History     Social History   • Marital status:      Social History Main Topics   • Smoking status: Current Every Day Smoker     Packs/day: 1.00     Types: Cigarettes   • Smokeless tobacco: Never Used   • Alcohol use No   • Drug use: No     Other Topics Concern   • Not on file       Current Outpatient Prescriptions:   •  albuterol (PROVENTIL HFA;VENTOLIN HFA) 108 (90 BASE) MCG/ACT inhaler, Inhale 2 puffs 2 (two) times a day as needed for wheezing., Disp: , Rfl:   •  busPIRone (BUSPAR) 15 MG tablet, Take 15 mg by mouth 2 (two) times a day., Disp: , Rfl:   •  loratadine (CLARITIN) 10 MG tablet, TAKE 1 TABLET BY MOUTH EVERY DAY, Disp: , Rfl: 3  •  losartan (COZAAR) 50 MG tablet, Take 50 mg by mouth daily., Disp: , Rfl:   •  meclizine (ANTIVERT) 25 MG tablet, Take 25 mg by mouth 2 (Two) Times a Day As Needed., Disp: , Rfl:   •  omeprazole (PriLOSEC) 20 MG capsule, Take 20 mg by mouth daily., Disp: , Rfl:   •  QUEtiapine (SEROquel) 100 MG tablet, Take 100 mg by mouth every night., Disp: , Rfl:   Review of Systems  Review of Systems       Objective    /82 (BP Location: Left arm)   Pulse 87   Ht 162.6 cm (64\")   Wt 79.4 kg (175 lb)   BMI 30.04 kg/m²   Physical Exam   Constitutional: He is oriented to person, place, and time. He appears well-developed and well-nourished. No distress.   HENT:   Head: Normocephalic and atraumatic.   Eyes: EOM are normal. Pupils are equal, round, and reactive to light.   Neck: Normal range of motion.   Cardiovascular: Normal " rate, regular rhythm and normal heart sounds.    Pulmonary/Chest: Effort normal and breath sounds normal.   Abdominal: Soft. Bowel sounds are normal. He exhibits no shifting dullness, no distension, no abdominal bruit, no ascites and no mass. There is no hepatosplenomegaly. There is tenderness. There is no rigidity, no rebound, no guarding and no CVA tenderness. No hernia. Hernia confirmed negative in the ventral area.   obese   Musculoskeletal: Normal range of motion.   Neurological: He is alert and oriented to person, place, and time.   Skin: Skin is warm and dry.   Psychiatric: He has a normal mood and affect. His behavior is normal. Judgment and thought content normal.   Nursing note and vitals reviewed.    Assessment/Plan      1. Hepatic cirrhosis, unspecified hepatic cirrhosis type    2. Gastroesophageal reflux disease with esophagitis    3. Gallstones    4. History of hepatitis C    5. Fatty (change of) liver, not elsewhere classified     6. Disorder of fatty-acid metabolism     .   Wong was seen today for hepatic fibrosis and hx of hepatitis c.    Diagnoses and all orders for this visit:    Hepatic cirrhosis, unspecified hepatic cirrhosis type  -     AFP Tumor Marker  -     Protime-INR  -     GARVEY Fibrosure  -     US Liver  -     Hepatic Function Panel    Gastroesophageal reflux disease with esophagitis    Gallstones    History of hepatitis C  -     AFP Tumor Marker  -     Protime-INR  -     GARVEY Fibrosure  -     US Liver  -     Hepatic Function Panel    Fatty (change of) liver, not elsewhere classified   -     GARVEY Fibrosure    Disorder of fatty-acid metabolism   -     GARVEY Fibrosure        Orders placed during this encounter include:  Orders Placed This Encounter   Procedures   • US Liver     Scheduling Instructions:      RUQ     Order Specific Question:   Reason for Exam:     Answer:   cirrhosis     Order Specific Question:   Will this be performed with Elastography? ([facility] and [facility]ORIN[facility] ONLY)      Answer:   No   • AFP Tumor Marker   • Protime-INR   • GARVEY Fibrosure   • Hepatic Function Panel       Medications prescribed:  No orders of the defined types were placed in this encounter.    Discontinued Medications       Reason for Discontinue    fluticasone (FLONASE) 50 MCG/ACT nasal spray *Therapy completed    metoprolol succinate XL (TOPROL-XL) 50 MG 24 hr tablet Discontinued by another clinician        Requested Prescriptions      No prescriptions requested or ordered in this encounter       Review and/or summary of lab tests, radiology, procedures, medications. Review and summary of old records and obtaining of history. The risks and benefits of my recommendations, as well as other treatment options were discussed with the patient today. Questions were answered.    Follow-up: Return in about 1 month (around 5/23/2018), or if symptoms worsen or fail to improve.     * Surgery not found *      This document has been electronically signed by Deny Heller PA-C on April 23, 2018 6:29 PM      Results for orders placed or performed in visit on 10/23/17   HCV FibroSURE   Result Value Ref Range    Fibrosis Stage Comment     Alpha 2-Macroglobulins, Qn 519 (H) 110 - 276 mg/dL    Haptoglobin 88 34 - 200 mg/dL    Apolipoprotein A-1 141 101 - 178 mg/dL    Total Bilirubin 0.4 0.0 - 1.2 mg/dL    GGT 16 0 - 65 IU/L    ALT (SGPT) 18 0 - 55 IU/L    Limitations: Comment     Comment Comment    CBC Auto Differential   Result Value Ref Range    WBC 9.62 3.20 - 9.80 10*3/mm3    RBC 5.45 4.37 - 5.74 10*6/mm3    Hemoglobin 17.3 13.7 - 17.3 g/dL    Hematocrit 49.9 (H) 39.0 - 49.0 %    MCV 91.6 80.0 - 98.0 fL    MCH 31.7 26.5 - 34.0 pg    MCHC 34.7 31.5 - 36.3 g/dL    RDW 13.4 11.5 - 14.5 %    RDW-SD 44.3 (H) 35.1 - 43.9 fl    MPV 12.1 (H) 8.0 - 12.0 fL    Platelets 157 150 - 450 10*3/mm3    Neutrophil % 51.0 37.0 - 80.0 %    Lymphocyte % 37.5 10.0 - 50.0 %    Monocyte % 9.1 0.0 - 12.0 %    Eosinophil % 1.7 0.0 - 7.0 %    Basophil %  0.4 0.0 - 2.0 %    Immature Grans % 0.3 0.0 - 0.5 %    Neutrophils, Absolute 4.90 2.00 - 8.60 10*3/mm3    Lymphocytes, Absolute 3.61 0.60 - 4.20 10*3/mm3    Monocytes, Absolute 0.88 0.00 - 0.90 10*3/mm3    Eosinophils, Absolute 0.16 0.00 - 0.70 10*3/mm3    Basophils, Absolute 0.04 0.00 - 0.20 10*3/mm3    Immature Grans, Absolute 0.03 (H) 0.00 - 0.02 10*3/mm3   AFP Tumor Marker   Result Value Ref Range    AFP Tumor Marker 3.3 0.0 - 8.3 ng/mL   Protime-INR   Result Value Ref Range    Protime 13.0 11.1 - 15.3 Seconds    INR 0.99 0.80 - 1.20   Comprehensive Metabolic Panel   Result Value Ref Range    Glucose 102 (H) 60 - 100 mg/dL    BUN 16 7 - 21 mg/dL    Creatinine 1.05 0.70 - 1.30 mg/dL    Sodium 145 137 - 145 mmol/L    Potassium 4.7 3.5 - 5.1 mmol/L    Chloride 98 95 - 110 mmol/L    CO2 32.0 (H) 22.0 - 31.0 mmol/L    Calcium 9.8 8.4 - 10.2 mg/dL    Total Protein 7.8 6.3 - 8.6 g/dL    Albumin 4.60 3.40 - 4.80 g/dL    ALT (SGPT) 26 21 - 72 U/L    AST (SGOT) 28 17 - 59 U/L    Alkaline Phosphatase 55 38 - 126 U/L    Total Bilirubin 0.5 0.2 - 1.3 mg/dL    eGFR Non  Amer 72 49 - 113 mL/min/1.73    Globulin 3.2 2.3 - 3.5 gm/dL    A/G Ratio 1.4 1.1 - 1.8 g/dL    BUN/Creatinine Ratio 15.2 7.0 - 25.0    Anion Gap 15.0 5.0 - 15.0 mmol/L   Results for orders placed or performed in visit on 07/20/17   Protime-INR   Result Value Ref Range    Protime 12.4 11.1 - 15.3 Seconds    INR 0.93 0.80 - 1.20   Hepatic Function Panel   Result Value Ref Range    Total Protein 7.0 6.3 - 8.6 g/dL    Albumin 4.30 3.40 - 4.80 g/dL    ALT (SGPT) 32 21 - 72 U/L    AST (SGOT) 24 17 - 59 U/L    Alkaline Phosphatase 60 38 - 126 U/L    Total Bilirubin 0.3 0.2 - 1.3 mg/dL    Bilirubin, Direct 0.0 0.0 - 0.3 mg/dL    Bilirubin, Indirect 0.1 0.0 - 1.1 mg/dL   Results for orders placed or performed in visit on 02/01/17   Specimen Status Report   Result Value Ref Range    Specimen Status Comment    HCV FibroSURE   Result Value Ref Range    Fibrosis  Score 0.77 (H) 0.00 - 0.21    Fibrosis Stage Comment     Necroinflammat Activity Score 0.07 0.00 - 0.17    Necroinflammat Activity Grade A0-No activity     Alpha 2-Macroglobulins, Qn 483 (H) 110 - 276 mg/dL    Haptoglobin 75 34 - 200 mg/dL    Apolipoprotein A-1 108 101 - 178 mg/dL    Total Bilirubin 0.6 0.0 - 1.2 mg/dL    GGT 11 0 - 65 IU/L    ALT (SGPT) 12 0 - 55 IU/L    HCV Qual Interp Comment     Fibrosis Scoring: Comment     Necroinflamm Activity Scoring: Comment     Limitations: Comment     Comment Comment    AFP Tumor Marker   Result Value Ref Range    AFP Tumor Marker 2.6 0.0 - 8.3 ng/mL   Protime-INR   Result Value Ref Range    Protime 14.0 11.1 - 15.3 Seconds    INR 1.08 0.80 - 1.20   CBC (No Diff)   Result Value Ref Range    WBC 7.42 3.20 - 9.80 10*3/mm3    RBC 5.27 4.37 - 5.74 10*6/mm3    Hemoglobin 16.6 13.7 - 17.3 g/dL    Hematocrit 47.5 39.0 - 49.0 %    MCV 90.1 80.0 - 98.0 fL    MCH 31.5 26.5 - 34.0 pg    MCHC 34.9 31.5 - 36.3 g/dL    RDW 13.5 11.5 - 14.5 %    RDW-SD 44.3 (H) 35.1 - 43.9 fl    MPV 11.5 8.0 - 12.0 fL    Platelets 126 (L) 150 - 450 10*3/mm3   Comprehensive Metabolic Panel   Result Value Ref Range    Glucose 115 (H) 60 - 100 mg/dL    BUN 10 7 - 21 mg/dL    Creatinine 1.03 0.70 - 1.30 mg/dL    Sodium 138 137 - 145 mmol/L    Potassium 3.6 3.5 - 5.1 mmol/L    Chloride 98 95 - 110 mmol/L    CO2 31.0 22.0 - 31.0 mmol/L    Calcium 9.1 8.4 - 10.2 mg/dL    Total Protein 7.2 6.3 - 8.6 g/dL    Albumin 4.30 3.40 - 4.80 g/dL    ALT (SGPT) 23 21 - 72 U/L    AST (SGOT) 34 17 - 59 U/L    Alkaline Phosphatase 53 38 - 126 U/L    Total Bilirubin 0.8 0.2 - 1.3 mg/dL    eGFR Non  Amer 74 56 - 130 mL/min/1.73    Globulin 2.9 2.3 - 3.5 gm/dL    A/G Ratio 1.5 1.1 - 1.8 g/dL    BUN/Creatinine Ratio 9.7 7.0 - 25.0    Anion Gap 9.0 5.0 - 15.0 mmol/L     *Note: Due to a large number of results and/or encounters for the requested time period, some results have not been displayed. A complete set of results  can be found in Results Review.       Some portions of this note have been dictated using voice recognition software and may contain errors and/or omissions.

## 2018-05-09 RX ORDER — OMEPRAZOLE 20 MG/1
20 CAPSULE, DELAYED RELEASE ORAL DAILY
Qty: 30 CAPSULE | Refills: 1 | Status: SHIPPED | OUTPATIENT
Start: 2018-05-09 | End: 2018-06-08 | Stop reason: SDUPTHER

## 2018-05-14 ENCOUNTER — APPOINTMENT (OUTPATIENT)
Dept: LAB | Facility: HOSPITAL | Age: 63
End: 2018-05-14

## 2018-05-14 ENCOUNTER — HOSPITAL ENCOUNTER (OUTPATIENT)
Dept: ULTRASOUND IMAGING | Facility: HOSPITAL | Age: 63
Discharge: HOME | End: 2018-05-14
Admitting: PHYSICIAN ASSISTANT

## 2018-05-14 LAB
ALBUMIN SERPL-MCNC: 4.5 G/DL (ref 3.4–4.8)
ALP SERPL-CCNC: 57 U/L (ref 38–126)
ALT SERPL W P-5'-P-CCNC: 29 U/L (ref 21–72)
AST SERPL-CCNC: 17 U/L (ref 17–59)
BILIRUB CONJ SERPL-MCNC: 0 MG/DL (ref 0–0.3)
BILIRUB INDIRECT SERPL-MCNC: 0.5 MG/DL (ref 0–1.1)
BILIRUB SERPL-MCNC: 0.8 MG/DL (ref 0.2–1.3)
INR PPP: 1.03 (ref 0.8–1.2)
PROT SERPL-MCNC: 7.4 G/DL (ref 6.3–8.6)
PROTHROMBIN TIME: 13.3 SECONDS (ref 11.1–15.3)

## 2018-05-14 PROCEDURE — 82977 ASSAY OF GGT: CPT | Performed by: PHYSICIAN ASSISTANT

## 2018-05-14 PROCEDURE — 83883 ASSAY NEPHELOMETRY NOT SPEC: CPT | Performed by: PHYSICIAN ASSISTANT

## 2018-05-14 PROCEDURE — 36415 COLL VENOUS BLD VENIPUNCTURE: CPT | Performed by: PHYSICIAN ASSISTANT

## 2018-05-14 PROCEDURE — 80076 HEPATIC FUNCTION PANEL: CPT | Performed by: PHYSICIAN ASSISTANT

## 2018-05-14 PROCEDURE — 84478 ASSAY OF TRIGLYCERIDES: CPT | Performed by: PHYSICIAN ASSISTANT

## 2018-05-14 PROCEDURE — 84450 TRANSFERASE (AST) (SGOT): CPT | Performed by: PHYSICIAN ASSISTANT

## 2018-05-14 PROCEDURE — 82947 ASSAY GLUCOSE BLOOD QUANT: CPT | Performed by: PHYSICIAN ASSISTANT

## 2018-05-14 PROCEDURE — 84460 ALANINE AMINO (ALT) (SGPT): CPT | Performed by: PHYSICIAN ASSISTANT

## 2018-05-14 PROCEDURE — 82247 BILIRUBIN TOTAL: CPT | Performed by: PHYSICIAN ASSISTANT

## 2018-05-14 PROCEDURE — 83010 ASSAY OF HAPTOGLOBIN QUANT: CPT | Performed by: PHYSICIAN ASSISTANT

## 2018-05-14 PROCEDURE — 82465 ASSAY BLD/SERUM CHOLESTEROL: CPT | Performed by: PHYSICIAN ASSISTANT

## 2018-05-14 PROCEDURE — 85610 PROTHROMBIN TIME: CPT | Performed by: PHYSICIAN ASSISTANT

## 2018-05-14 PROCEDURE — 82105 ALPHA-FETOPROTEIN SERUM: CPT | Performed by: PHYSICIAN ASSISTANT

## 2018-05-14 PROCEDURE — 82172 ASSAY OF APOLIPOPROTEIN: CPT | Performed by: PHYSICIAN ASSISTANT

## 2018-05-14 PROCEDURE — 76705 ECHO EXAM OF ABDOMEN: CPT

## 2018-05-15 LAB — AFP-TM SERPL-MCNC: 2.6 NG/ML (ref 0–8.3)

## 2018-05-16 LAB
A2 MACROGLOB SERPL-MCNC: 495 MG/DL (ref 110–276)
ALT SERPL W P-5'-P-CCNC: 15 IU/L (ref 0–55)
APO A-I SERPL-MCNC: 121 MG/DL (ref 101–178)
AST SERPL W P-5'-P-CCNC: 16 IU/L (ref 0–40)
BILIRUB SERPL-MCNC: 0.6 MG/DL (ref 0–1.2)
CHOLEST SERPL-MCNC: 144 MG/DL (ref 100–199)
FIBROSIS SCORING:: ABNORMAL
FIBROSIS STAGE SERPL QL: ABNORMAL
GGT SERPL-CCNC: 13 IU/L (ref 0–65)
GLUCOSE SERPL-MCNC: 95 MG/DL (ref 65–99)
HAPTOGLOB SERPL-MCNC: 83 MG/DL (ref 34–200)
INTERPRETATIONS: (REFERENCE): ABNORMAL
LABORATORY COMMENT REPORT: ABNORMAL
LIMITATIONS: (REFERENCE): ABNORMAL
LIVER FIBR SCORE SERPL CALC.FIBROSURE: 0.76 (ref 0–0.21)
NASH GRADE (REFERENCE): ABNORMAL
NASH SCORE (REFERENCE): 0.25
NASH SCORING (REFERENCE): ABNORMAL
STEATOSIS GRADE (REFERENCE): ABNORMAL
STEATOSIS GRADING (REFERENCE): ABNORMAL
STEATOSIS SCORE (REFERENCE): 0.24 (ref 0–0.3)
TRIGL SERPL-MCNC: 150 MG/DL (ref 0–149)
WEIGHT: (REFERENCE): 175 LBS

## 2018-06-06 ENCOUNTER — OFFICE VISIT (OUTPATIENT)
Dept: GASTROENTEROLOGY | Facility: CLINIC | Age: 63
End: 2018-06-06

## 2018-06-06 VITALS
HEIGHT: 64 IN | DIASTOLIC BLOOD PRESSURE: 86 MMHG | HEART RATE: 75 BPM | WEIGHT: 178.8 LBS | BODY MASS INDEX: 30.52 KG/M2 | SYSTOLIC BLOOD PRESSURE: 134 MMHG

## 2018-06-06 DIAGNOSIS — K74.60 HEPATIC CIRRHOSIS, UNSPECIFIED HEPATIC CIRRHOSIS TYPE (HCC): Primary | ICD-10-CM

## 2018-06-06 DIAGNOSIS — K21.00 GASTROESOPHAGEAL REFLUX DISEASE WITH ESOPHAGITIS: ICD-10-CM

## 2018-06-06 DIAGNOSIS — Z86.010 HISTORY OF COLON POLYPS: ICD-10-CM

## 2018-06-06 DIAGNOSIS — Z86.19 HISTORY OF HEPATITIS C: ICD-10-CM

## 2018-06-06 DIAGNOSIS — K80.20 GALLSTONES: ICD-10-CM

## 2018-06-06 PROCEDURE — 99213 OFFICE O/P EST LOW 20 MIN: CPT | Performed by: PHYSICIAN ASSISTANT

## 2018-06-06 RX ORDER — SODIUM, POTASSIUM,MAG SULFATES 17.5-3.13G
1 SOLUTION, RECONSTITUTED, ORAL ORAL ONCE
Qty: 1 BOTTLE | Refills: 0 | Status: SHIPPED | OUTPATIENT
Start: 2018-06-06 | End: 2018-06-07

## 2018-06-06 RX ORDER — FLUTICASONE PROPIONATE 50 MCG
2 SPRAY, SUSPENSION (ML) NASAL DAILY
COMMUNITY
End: 2020-01-01

## 2018-06-06 RX ORDER — DEXTROSE AND SODIUM CHLORIDE 5; .45 G/100ML; G/100ML
30 INJECTION, SOLUTION INTRAVENOUS CONTINUOUS PRN
Status: CANCELLED | OUTPATIENT
Start: 2018-08-22

## 2018-06-06 NOTE — PROGRESS NOTES
Chief Complaint   Patient presents with   • Hepatic Cirrhosis   • Heartburn   • Fatty Liver   • Hx Of Hepatitis C       ENDO PROCEDURE ORDERED: COLON, hx colon polyps    Subjective    Wong Patrick is a 63 y.o. male. he is here today for follow-up.    History of Present Illness    Patient seen on a recheck of her cirrhosis, GERD, fatty liver, history of hepatitis C. Last seen 04/23/2018, F4. Patient currently denies abdominal pain, GERD is well controlled on Prilosec 20 mg daily, he denied nausea, vomiting, dysphagia, bowel movements are regular without blood or mucus. Weight is up 3.8 pounds since last visit. Last colonoscopy 06/03/2013 did show polyps.       Studies on 05/14/2018, abdominal ultrasound of the right upper quadrant showed irregularity of the liver consistent with cirrhosis, gallstones, 4.2 mm common bile duct, 1.9 cm right renal cyst, this is consistent with previous findings. LFTs were normal, AFP was normal. INR 1.03. GARVEY FibroSure 0.76/F4, inflammation 0.24/S0, 0.25/N0.       A/P: Patient due for a screening colonoscopy with history of colon polyps. This will be scheduled. Will plan follow-up in 6 months with CBC, CMP, AFP, INR, right upper quadrant ultrasound prior. He will continue on the Prilosec. Further pending clinical course and the results of the above.             The following portions of the patient's history were reviewed and updated as appropriate:   Past Medical History:   Diagnosis Date   • Abnormal weight loss    • Acquired thrombocytopenia    • Adenomatous polyp of colon    • Anxiety    • Biliary calculus     denies N/V, abd pain   • Chronic hepatitis C    • Cirrhosis of liver    • Colon polyp    • Elevated levels of transaminase & lactic acid dehydrogenase    • Generalized abdominal pain     known gallstones   • GERD (gastroesophageal reflux disease)    • Hypertension    • IBS (irritable bowel syndrome)    • Impacted cerumen    • Kidney disease     US suggest renal dz,  "elevated alpha 2 macroglobulins   • Nausea and vomiting    • Patient noncompliance, general    • Rectal hemorrhage    • Villous adenoma of colon    • Wheezing      Past Surgical History:   Procedure Laterality Date   • COLONOSCOPY  06/03/2013   • ENDOSCOPY AND COLONOSCOPY     • ESOPHAGOSCOPY / EGD      with tube   • UPPER GASTROINTESTINAL ENDOSCOPY  06/03/2013     Family History   Problem Relation Age of Onset   • Heart disease Other    • Diabetes Other        Allergies   Allergen Reactions   • Penicillins Hives     Social History     Social History   • Marital status:      Social History Main Topics   • Smoking status: Current Every Day Smoker     Packs/day: 1.00     Types: Cigarettes   • Smokeless tobacco: Never Used   • Alcohol use No   • Drug use: No     Other Topics Concern   • Not on file       Current Outpatient Prescriptions:   •  albuterol (PROVENTIL HFA;VENTOLIN HFA) 108 (90 BASE) MCG/ACT inhaler, Inhale 2 puffs 2 (two) times a day as needed for wheezing., Disp: , Rfl:   •  busPIRone (BUSPAR) 15 MG tablet, Take 15 mg by mouth 2 (two) times a day., Disp: , Rfl:   •  fluticasone (FLONASE) 50 MCG/ACT nasal spray, 2 sprays into each nostril Daily., Disp: , Rfl:   •  loratadine (CLARITIN) 10 MG tablet, TAKE 1 TABLET BY MOUTH EVERY DAY, Disp: , Rfl: 3  •  losartan (COZAAR) 50 MG tablet, Take 50 mg by mouth daily., Disp: , Rfl:   •  meclizine (ANTIVERT) 25 MG tablet, Take 25 mg by mouth 2 (Two) Times a Day As Needed., Disp: , Rfl:   •  omeprazole (priLOSEC) 20 MG capsule, Take 1 capsule by mouth Daily., Disp: 30 capsule, Rfl: 1  •  QUEtiapine (SEROquel) 100 MG tablet, Take 100 mg by mouth every night., Disp: , Rfl:   •  polyethylene glycol (GoLYTELY) 236 g solution, AS DIRECTED PER INSTRUCTION SHEET FOR COLONOSCOPY, Disp: 4000 mL, Rfl: 0  Review of Systems  Review of Systems       Objective    /86 (BP Location: Left arm)   Pulse 75   Ht 162.6 cm (64\")   Wt 81.1 kg (178 lb 12.8 oz)   BMI 30.69 " kg/m²   Physical Exam   Constitutional: He is oriented to person, place, and time. He appears well-developed and well-nourished. No distress.   HENT:   Head: Normocephalic and atraumatic.   Eyes: EOM are normal. Pupils are equal, round, and reactive to light.   Neck: Normal range of motion.   Cardiovascular: Normal rate, regular rhythm and normal heart sounds.    Pulmonary/Chest: Effort normal and breath sounds normal.   Abdominal: Soft. Bowel sounds are normal. He exhibits no shifting dullness, no distension, no abdominal bruit, no ascites and no mass. There is no hepatosplenomegaly. There is tenderness. There is no rigidity, no rebound, no guarding and no CVA tenderness. No hernia. Hernia confirmed negative in the ventral area.   obese   Musculoskeletal: Normal range of motion.   Neurological: He is alert and oriented to person, place, and time.   Skin: Skin is warm and dry.   Psychiatric: He has a normal mood and affect. His behavior is normal. Judgment and thought content normal.   Nursing note and vitals reviewed.    Assessment/Plan      1. Hepatic cirrhosis, unspecified hepatic cirrhosis type    2. Gastroesophageal reflux disease with esophagitis    3. Gallstones    4. History of hepatitis C    5. History of colon polyps    .   Wong was seen today for hepatic cirrhosis, heartburn, fatty liver and hx of hepatitis c.    Diagnoses and all orders for this visit:    Hepatic cirrhosis, unspecified hepatic cirrhosis type  Comments:  F4  Orders:  -     CBC Auto Differential; Future  -     Comprehensive Metabolic Panel; Future  -     AFP Tumor Marker; Future  -     Protime-INR; Future  -     US Abdomen Limited; Future    Gastroesophageal reflux disease with esophagitis  -     CBC Auto Differential; Future  -     Comprehensive Metabolic Panel; Future  -     AFP Tumor Marker; Future  -     Protime-INR; Future  -     US Abdomen Limited; Future    Gallstones  -     CBC Auto Differential; Future  -     Comprehensive  Metabolic Panel; Future  -     AFP Tumor Marker; Future  -     Protime-INR; Future  -     US Abdomen Limited; Future    History of hepatitis C  -     CBC Auto Differential; Future  -     Comprehensive Metabolic Panel; Future  -     AFP Tumor Marker; Future  -     Protime-INR; Future  -     US Abdomen Limited; Future    History of colon polyps  -     Case Request; Standing  -     dextrose 5 % and sodium chloride 0.45 % infusion; Infuse 30 mL/hr into a venous catheter Continuous As Needed (Start Prior to Procedure).  -     Case Request    Other orders  -     Discontinue: sodium-potassium-magnesium sulfates (SUPREP BOWEL PREP KIT) 17.5-3.13-1.6 GM/180ML solution oral solution; Take 1 bottle by mouth 1 (One) Time for 1 dose. Take as per instruction sheet for colonoscopy prep.  -     Obtain Informed Consent; Standing  -     POC Glucose Once; Standing        Orders placed during this encounter include:  Orders Placed This Encounter   Procedures   • US Abdomen Limited     Due in December     Standing Status:   Future     Standing Expiration Date:   12/28/2018     Scheduling Instructions:      RUQ--schedule in December     Order Specific Question:   Reason for Exam:     Answer:   cirrhosis   • CBC Auto Differential     Due in December     Standing Status:   Future     Standing Expiration Date:   12/28/2018   • Comprehensive Metabolic Panel     Due in December     Standing Status:   Future     Standing Expiration Date:   12/28/2018   • AFP Tumor Marker     Due in December     Standing Status:   Future     Standing Expiration Date:   12/28/2018   • Protime-INR     Due in December     Standing Status:   Future     Standing Expiration Date:   12/28/2018       Medications prescribed:  No orders of the defined types were placed in this encounter.      Requested Prescriptions      No prescriptions requested or ordered in this encounter       Review and/or summary of lab tests, radiology, procedures, medications. Review and summary  of old records and obtaining of history. The risks and benefits of my recommendations, as well as other treatment options were discussed with the patient today. Questions were answered.    Follow-up: Return in about 6 months (around 12/6/2018), or if symptoms worsen or fail to improve, for lab/US prior.     COLONOSCOPY (N/A)      This document has been electronically signed by Deny Heller PA-C on June 7, 2018 6:04 PM      Results for orders placed or performed in visit on 04/23/18   GARVEY Fibrosure   Result Value Ref Range    Fibrosis Score (References) 0.76 (H) 0.00 - 0.21    Fibrosis Stage (Reference) Comment     Steatosis Score (Reference) 0.24 0.00 - 0.30    Steatosis Grade (Reference) Comment     GARVEY Score (Reference) 0.25 0.25    Garvey Grade (Reference) Comment     Height: (Reference) 64 in    Weight: (Reference) 175 LBS    Alpha 2-Macroglobulins, Qn 495 (H) 110 - 276 mg/dL    Haptoglobin 83 34 - 200 mg/dL    Apolipoprotein A-1 121 101 - 178 mg/dL    Total Bilirubin 0.6 0.0 - 1.2 mg/dL    GGT 13 0 - 65 IU/L    ALT (SGPT) 15 0 - 55 IU/L    AST (SGOT) P5P (Reference) 16 0 - 40 IU/L    Cholesterol, Total (Reference) 144 100 - 199 mg/dL    Glucose, Serum (Reference) 95 65 - 99 mg/dL    Triglycerides 150 (H) 0 - 149 mg/dL    Interpretations: (Reference) Comment     Fibrosis Scoring: Comment     Steatosis Grading (Reference) Comment     Garvey Scoring (Reference) Comment     Limitations: (Reference) Comment     Comment (Reference) Comment    AFP Tumor Marker   Result Value Ref Range    AFP Tumor Marker 2.6 0.0 - 8.3 ng/mL   Protime-INR   Result Value Ref Range    Protime 13.3 11.1 - 15.3 Seconds    INR 1.03 0.80 - 1.20   Hepatic Function Panel   Result Value Ref Range    Total Protein 7.4 6.3 - 8.6 g/dL    Albumin 4.50 3.40 - 4.80 g/dL    ALT (SGPT) 29 21 - 72 U/L    AST (SGOT) 17 17 - 59 U/L    Alkaline Phosphatase 57 38 - 126 U/L    Total Bilirubin 0.8 0.2 - 1.3 mg/dL    Bilirubin, Direct 0.0 0.0 - 0.3 mg/dL     Bilirubin, Indirect 0.5 0.0 - 1.1 mg/dL   Results for orders placed or performed in visit on 10/23/17   HCV FibroSURE   Result Value Ref Range    Fibrosis Stage Comment     Alpha 2-Macroglobulins, Qn 519 (H) 110 - 276 mg/dL    Haptoglobin 88 34 - 200 mg/dL    Apolipoprotein A-1 141 101 - 178 mg/dL    Total Bilirubin 0.4 0.0 - 1.2 mg/dL    GGT 16 0 - 65 IU/L    ALT (SGPT) 18 0 - 55 IU/L    Limitations: Comment     Comment Comment    CBC Auto Differential   Result Value Ref Range    WBC 9.62 3.20 - 9.80 10*3/mm3    RBC 5.45 4.37 - 5.74 10*6/mm3    Hemoglobin 17.3 13.7 - 17.3 g/dL    Hematocrit 49.9 (H) 39.0 - 49.0 %    MCV 91.6 80.0 - 98.0 fL    MCH 31.7 26.5 - 34.0 pg    MCHC 34.7 31.5 - 36.3 g/dL    RDW 13.4 11.5 - 14.5 %    RDW-SD 44.3 (H) 35.1 - 43.9 fl    MPV 12.1 (H) 8.0 - 12.0 fL    Platelets 157 150 - 450 10*3/mm3    Neutrophil % 51.0 37.0 - 80.0 %    Lymphocyte % 37.5 10.0 - 50.0 %    Monocyte % 9.1 0.0 - 12.0 %    Eosinophil % 1.7 0.0 - 7.0 %    Basophil % 0.4 0.0 - 2.0 %    Immature Grans % 0.3 0.0 - 0.5 %    Neutrophils, Absolute 4.90 2.00 - 8.60 10*3/mm3    Lymphocytes, Absolute 3.61 0.60 - 4.20 10*3/mm3    Monocytes, Absolute 0.88 0.00 - 0.90 10*3/mm3    Eosinophils, Absolute 0.16 0.00 - 0.70 10*3/mm3    Basophils, Absolute 0.04 0.00 - 0.20 10*3/mm3    Immature Grans, Absolute 0.03 (H) 0.00 - 0.02 10*3/mm3   AFP Tumor Marker   Result Value Ref Range    AFP Tumor Marker 3.3 0.0 - 8.3 ng/mL   Protime-INR   Result Value Ref Range    Protime 13.0 11.1 - 15.3 Seconds    INR 0.99 0.80 - 1.20   Comprehensive Metabolic Panel   Result Value Ref Range    Glucose 102 (H) 60 - 100 mg/dL    BUN 16 7 - 21 mg/dL    Creatinine 1.05 0.70 - 1.30 mg/dL    Sodium 145 137 - 145 mmol/L    Potassium 4.7 3.5 - 5.1 mmol/L    Chloride 98 95 - 110 mmol/L    CO2 32.0 (H) 22.0 - 31.0 mmol/L    Calcium 9.8 8.4 - 10.2 mg/dL    Total Protein 7.8 6.3 - 8.6 g/dL    Albumin 4.60 3.40 - 4.80 g/dL    ALT (SGPT) 26 21 - 72 U/L    AST (SGOT)  28 17 - 59 U/L    Alkaline Phosphatase 55 38 - 126 U/L    Total Bilirubin 0.5 0.2 - 1.3 mg/dL    eGFR Non  Amer 72 49 - 113 mL/min/1.73    Globulin 3.2 2.3 - 3.5 gm/dL    A/G Ratio 1.4 1.1 - 1.8 g/dL    BUN/Creatinine Ratio 15.2 7.0 - 25.0    Anion Gap 15.0 5.0 - 15.0 mmol/L   Results for orders placed or performed in visit on 07/20/17   Protime-INR   Result Value Ref Range    Protime 12.4 11.1 - 15.3 Seconds    INR 0.93 0.80 - 1.20   Hepatic Function Panel   Result Value Ref Range    Total Protein 7.0 6.3 - 8.6 g/dL    Albumin 4.30 3.40 - 4.80 g/dL    ALT (SGPT) 32 21 - 72 U/L    AST (SGOT) 24 17 - 59 U/L    Alkaline Phosphatase 60 38 - 126 U/L    Total Bilirubin 0.3 0.2 - 1.3 mg/dL    Bilirubin, Direct 0.0 0.0 - 0.3 mg/dL    Bilirubin, Indirect 0.1 0.0 - 1.1 mg/dL   Results for orders placed or performed in visit on 02/01/17   Specimen Status Report   Result Value Ref Range    Specimen Status Comment      *Note: Due to a large number of results and/or encounters for the requested time period, some results have not been displayed. A complete set of results can be found in Results Review.       Some portions of this note have been dictated using voice recognition software and may contain errors and/or omissions.

## 2018-06-06 NOTE — PATIENT INSTRUCTIONS
MyPlate from [employer]  The general, healthful diet is based on the 2010 Dietary Guidelines for Americans. The amount of food you need to eat from each food group depends on your age, sex, and level of physical activity and can be individualized by a dietitian. Go to ChooseMyPlate.gov for more information.  What do I need to know about the MyPlate plan?  · Enjoy your food, but eat less.  · Avoid oversized portions.  ¨ ½ of your plate should include fruits and vegetables.  ¨ ¼ of your plate should be grains.  ¨ ¼ of your plate should be protein.  Grains   · Make at least half of your grains whole grains.  · For a 2,000 calorie daily food plan, eat 6 oz every day.  · 1 oz is about 1 slice bread, 1 cup cereal, or ½ cup cooked rice, cereal, or pasta.  Vegetables   · Make half your plate fruits and vegetables.  · For a 2,000 calorie daily food plan, eat 2½ cups every day.  · 1 cup is about 1 cup raw or cooked vegetables or vegetable juice or 2 cups raw leafy greens.  Fruits   · Make half your plate fruits and vegetables.  · For a 2,000 calorie daily food plan, eat 2 cups every day.  · 1 cup is about 1 cup fruit or 100% fruit juice or ½ cup dried fruit.  Protein   · For a 2,000 calorie daily food plan, eat 5½ oz every day.  · 1 oz is about 1 oz meat, poultry, or fish, ¼ cup cooked beans, 1 egg, 1 Tbsp peanut butter, or ½ oz nuts or seeds.  Dairy   · Switch to fat-free or low-fat (1%) milk.  · For a 2,000 calorie daily food plan, eat 3 cups every day.  · 1 cup is about 1 cup milk or yogurt or soy milk (soy beverage), 1½ oz natural cheese, or 2 oz processed cheese.  Fats, Oils, and Empty Calories   · Only small amounts of oils are recommended.  · Empty calories are calories from solid fats or added sugars.  · Compare sodium in foods like soup, bread, and frozen meals. Choose the foods with lower numbers.  · Drink water instead of sugary drinks.  What foods can I eat?  Grains   Whole grains such as whole wheat, quinoa, millet,  and bulgur. Bread, rolls, and pasta made from whole grains. Brown or wild rice. Hot or cold cereals made from whole grains and without added sugar.  Vegetables   All fresh vegetables, especially fresh red, dark green, or orange vegetables. Peas and beans. Low-sodium frozen or canned vegetables prepared without added salt. Low-sodium vegetable juices.  Fruits   All fresh, frozen, and dried fruits. Canned fruit packed in water or fruit juice without added sugar. Fruit juices without added sugar.  Meats and Other Protein Sources   Boiled, baked, or grilled lean meat trimmed of fat. Skinless poultry. Fresh seafood and shellfish. Canned seafood packed in water. Unsalted nuts and unsalted nut butters. Tofu. Dried beans and pea. Eggs.  Dairy   Low-fat or fat-free milk, yogurt, and cheeses.  Sweets and Desserts   Frozen desserts made from low-fat milk.  Fats and Oils   Olive, peanut, and canola oils and margarine. Salad dressing and mayonnaise made from these oils.  Other   Soups and casseroles made from allowed ingredients and without added fat or salt.  The items listed above may not be a complete list of recommended foods or beverages. Contact your dietitian for more options.   What foods are not recommended?  Grains   Sweetened, low-fiber cereals. Packaged baked goods. Snack crackers and chips. Cheese crackers, butter crackers, and biscuits. Frozen waffles, sweet breads, doughnuts, pastries, packaged baking mixes, pancakes, cakes, and cookies.  Vegetables   Regular canned or frozen vegetables or vegetables prepared with salt. Canned tomatoes. Canned tomato sauce. Fried vegetables. Vegetables in cream sauce or cheese sauce.  Fruits   Fruits packed in syrup or made with added sugar.  Meats and Other Protein Sources   Marbled or fatty meats such as ribs. Poultry with skin. Fried meats, poultry, eggs, or fish. Sausages, hot dogs, and deli meats such as pastrami, bologna, or salami.  Dairy   Whole milk, cream, cheeses made  from whole milk, sour cream. Ice cream or yogurt made from whole milk or with added sugar.  Beverages   For adults, no more than one alcoholic drink per day. Regular soft drinks or other sugary beverages. Juice drinks.  Sweets and Desserts   Sugary or fatty desserts, candy, and other sweets.  Fats and Oils   Solid shortening or partially hydrogenated oils. Solid margarine. Margarine that contains trans fats. Butter.  The items listed above may not be a complete list of foods and beverages to avoid. Contact your dietitian for more information.   This information is not intended to replace advice given to you by your health care provider. Make sure you discuss any questions you have with your health care provider.  Document Released: 01/06/2009 Document Revised: 05/25/2017 Document Reviewed: 11/26/2014  [employer] Interactive Patient Education © 2017 [employer]  BMI for Adults  Body mass index (BMI) is a number that is calculated from a person's weight and height. In most adults, the number is used to find how much of an adult's weight is made up of fat. BMI is not as accurate as a direct measure of body fat.  How is BMI calculated?  BMI is calculated by dividing weight in kilograms by height in meters squared. It can also be calculated by dividing weight in pounds by height in inches squared, then multiplying the resulting number by 703. Charts are available to help you find your BMI quickly and easily without doing this calculation.  How is BMI interpreted?  Health care professionals use BMI charts to identify whether an adult is underweight, at a normal weight, or overweight based on the following guidelines:  · Underweight: BMI less than 18.5.  · Normal weight: BMI between 18.5 and 24.9.  · Overweight: BMI between 25 and 29.9.  · Obese: BMI of 30 and above.  BMI is usually interpreted the same for males and females.  Weight includes both fat and muscle, so someone with a muscular build, such as an athlete, may  have a BMI that is higher than 24.9. In cases like these, BMI may not accurately depict body fat. To determine if excess body fat is the cause of a BMI of 25 or higher, further assessments may need to be done by a health care provider.  Why is BMI a useful tool?  BMI is used to identify a possible weight problem that may be related to a medical problem or may increase the risk for medical problems. BMI can also be used to promote changes to reach a healthy weight.  This information is not intended to replace advice given to you by your health care provider. Make sure you discuss any questions you have with your health care provider.  Document Released: 08/29/2005 Document Revised: 04/27/2017 Document Reviewed: 05/15/2015  [employer] Interactive Patient Education © 2017 [employer]

## 2018-06-07 PROBLEM — Z86.010 HISTORY OF COLON POLYPS: Status: ACTIVE | Noted: 2018-06-07

## 2018-06-11 RX ORDER — OMEPRAZOLE 20 MG/1
CAPSULE, DELAYED RELEASE ORAL
Qty: 30 CAPSULE | Refills: 5 | Status: SHIPPED | OUTPATIENT
Start: 2018-06-11 | End: 2018-11-20 | Stop reason: SDUPTHER

## 2018-08-22 ENCOUNTER — ANESTHESIA (OUTPATIENT)
Dept: GASTROENTEROLOGY | Facility: HOSPITAL | Age: 63
End: 2018-08-22

## 2018-08-22 ENCOUNTER — HOSPITAL ENCOUNTER (OUTPATIENT)
Facility: HOSPITAL | Age: 63
Setting detail: HOSPITAL OUTPATIENT SURGERY
Discharge: HOME | End: 2018-08-22
Attending: INTERNAL MEDICINE | Admitting: INTERNAL MEDICINE

## 2018-08-22 ENCOUNTER — ANESTHESIA EVENT (OUTPATIENT)
Dept: GASTROENTEROLOGY | Facility: HOSPITAL | Age: 63
End: 2018-08-22

## 2018-08-22 VITALS
OXYGEN SATURATION: 98 % | BODY MASS INDEX: 29.32 KG/M2 | RESPIRATION RATE: 19 BRPM | HEART RATE: 72 BPM | WEIGHT: 171.74 LBS | DIASTOLIC BLOOD PRESSURE: 55 MMHG | TEMPERATURE: 97.2 F | SYSTOLIC BLOOD PRESSURE: 107 MMHG | HEIGHT: 64 IN

## 2018-08-22 DIAGNOSIS — Z86.010 HISTORY OF COLON POLYPS: ICD-10-CM

## 2018-08-22 PROCEDURE — 88305 TISSUE EXAM BY PATHOLOGIST: CPT | Performed by: PATHOLOGY

## 2018-08-22 PROCEDURE — 25010000002 PROPOFOL 10 MG/ML EMULSION: Performed by: NURSE ANESTHETIST, CERTIFIED REGISTERED

## 2018-08-22 PROCEDURE — 45385 COLONOSCOPY W/LESION REMOVAL: CPT | Performed by: INTERNAL MEDICINE

## 2018-08-22 PROCEDURE — 88305 TISSUE EXAM BY PATHOLOGIST: CPT | Performed by: INTERNAL MEDICINE

## 2018-08-22 RX ORDER — DEXTROSE AND SODIUM CHLORIDE 5; .45 G/100ML; G/100ML
30 INJECTION, SOLUTION INTRAVENOUS CONTINUOUS PRN
Status: DISCONTINUED | OUTPATIENT
Start: 2018-08-22 | End: 2018-08-22 | Stop reason: HOSPADM

## 2018-08-22 RX ORDER — PROPOFOL 10 MG/ML
VIAL (ML) INTRAVENOUS AS NEEDED
Status: DISCONTINUED | OUTPATIENT
Start: 2018-08-22 | End: 2018-08-22 | Stop reason: SURG

## 2018-08-22 RX ADMIN — PROPOFOL 30 MG: 10 INJECTION, EMULSION INTRAVENOUS at 10:16

## 2018-08-22 RX ADMIN — DEXTROSE AND SODIUM CHLORIDE 30 ML/HR: 5; 450 INJECTION, SOLUTION INTRAVENOUS at 09:48

## 2018-08-22 RX ADMIN — PROPOFOL 30 MG: 10 INJECTION, EMULSION INTRAVENOUS at 10:20

## 2018-08-22 RX ADMIN — PROPOFOL 30 MG: 10 INJECTION, EMULSION INTRAVENOUS at 10:14

## 2018-08-22 RX ADMIN — PROPOFOL 90 MG: 10 INJECTION, EMULSION INTRAVENOUS at 10:12

## 2018-08-22 RX ADMIN — PROPOFOL 20 MG: 10 INJECTION, EMULSION INTRAVENOUS at 10:18

## 2018-08-22 RX ADMIN — PROPOFOL 30 MG: 10 INJECTION, EMULSION INTRAVENOUS at 10:25

## 2018-08-22 NOTE — ANESTHESIA POSTPROCEDURE EVALUATION
Patient: Wong Patrick    Procedure Summary     Date:  08/22/18 Room / Location:  [facility] ENDOSCOPY 1 / [facility] ENDOSCOPY    Anesthesia Start:  1010 Anesthesia Stop:  1028    Procedure:  COLONOSCOPY (N/A ) Diagnosis:       History of colon polyps      (History of colon polyps [Z86.010])    Surgeon:  Charles Pham MD Provider:  Rabia Louis CRNA    Anesthesia Type:  MAC ASA Status:  3          Anesthesia Type: MAC  Last vitals  BP   129/77 (08/22/18 0925)   Temp   97.3 °F (36.3 °C) (08/22/18 0925)   Pulse   86 (08/22/18 0925)   Resp   18 (08/22/18 0925)     SpO2   97 % (08/22/18 0925)     Post Anesthesia Care and Evaluation    Patient location during evaluation: bedside  Patient participation: complete - patient participated  Level of consciousness: sleepy but conscious  Pain management: adequate  Airway patency: patent  Anesthetic complications: No anesthetic complications  PONV Status: none  Cardiovascular status: acceptable  Respiratory status: acceptable  Hydration status: acceptable

## 2018-08-22 NOTE — H&P
Progress Notes  Encounter Date:8/22/2018  Charles samuel   Gastroenterology   Expand All Collapse All    []Manual[]Template  []Copied  Chief Complaint   Patient presents with   • Hepatic Cirrhosis   • Heartburn   • Fatty Liver   • Hx Of Hepatitis C         ENDO PROCEDURE ORDERED: COLON, hx colon polyps        Subjective       Wong Patrick is a 63 y.o. male. he is here today for follow-up.     History of Present Illness     Patient seen on a recheck of her cirrhosis, GERD, fatty liver, history of hepatitis C. Last seen 04/23/2018, F4. Patient currently denies abdominal pain, GERD is well controlled on Prilosec 20 mg daily, he denied nausea, vomiting, dysphagia, bowel movements are regular without blood or mucus. Weight is up 3.8 pounds since last visit. Last colonoscopy 06/03/2013 did show polyps.        Studies on 05/14/2018, abdominal ultrasound of the right upper quadrant showed irregularity of the liver consistent with cirrhosis, gallstones, 4.2 mm common bile duct, 1.9 cm right renal cyst, this is consistent with previous findings. LFTs were normal, AFP was normal. INR 1.03. GARVEY FibroSure 0.76/F4, inflammation 0.24/S0, 0.25/N0.        A/P: Patient due for a screening colonoscopy with history of colon polyps. This will be scheduled. Will plan follow-up in 6 months with CBC, CMP, AFP, INR, right upper quadrant ultrasound prior. He will continue on the Prilosec. Further pending clinical course and the results of the above.           The following portions of the patient's history were reviewed and updated as appropriate:   Medical History        Past Medical History:   Diagnosis Date   • Abnormal weight loss     • Acquired thrombocytopenia     • Adenomatous polyp of colon     • Anxiety     • Biliary calculus       denies N/V, abd pain   • Chronic hepatitis C     • Cirrhosis of liver     • Colon polyp     • Elevated levels of transaminase & lactic acid dehydrogenase     • Generalized abdominal pain       known  gallstones   • GERD (gastroesophageal reflux disease)     • Hypertension     • IBS (irritable bowel syndrome)     • Impacted cerumen     • Kidney disease       US suggest renal dz, elevated alpha 2 macroglobulins   • Nausea and vomiting     • Patient noncompliance, general     • Rectal hemorrhage     • Villous adenoma of colon     • Wheezing           Surgical History         Past Surgical History:   Procedure Laterality Date   • COLONOSCOPY   06/03/2013   • ENDOSCOPY AND COLONOSCOPY       • ESOPHAGOSCOPY / EGD         with tube   • UPPER GASTROINTESTINAL ENDOSCOPY   06/03/2013               Family History   Problem Relation Age of Onset   • Heart disease Other     • Diabetes Other             Allergies   Allergen Reactions   • Penicillins Hives      Social History   Social History           Social History   • Marital status:             Social History Main Topics   • Smoking status: Current Every Day Smoker       Packs/day: 1.00       Types: Cigarettes   • Smokeless tobacco: Never Used   • Alcohol use No   • Drug use: No           Other Topics Concern   • Not on file            Current Outpatient Prescriptions:   •  albuterol (PROVENTIL HFA;VENTOLIN HFA) 108 (90 BASE) MCG/ACT inhaler, Inhale 2 puffs 2 (two) times a day as needed for wheezing., Disp: , Rfl:   •  busPIRone (BUSPAR) 15 MG tablet, Take 15 mg by mouth 2 (two) times a day., Disp: , Rfl:   •  fluticasone (FLONASE) 50 MCG/ACT nasal spray, 2 sprays into each nostril Daily., Disp: , Rfl:   •  loratadine (CLARITIN) 10 MG tablet, TAKE 1 TABLET BY MOUTH EVERY DAY, Disp: , Rfl: 3  •  losartan (COZAAR) 50 MG tablet, Take 50 mg by mouth daily., Disp: , Rfl:   •  meclizine (ANTIVERT) 25 MG tablet, Take 25 mg by mouth 2 (Two) Times a Day As Needed., Disp: , Rfl:   •  omeprazole (priLOSEC) 20 MG capsule, Take 1 capsule by mouth Daily., Disp: 30 capsule, Rfl: 1  •  QUEtiapine (SEROquel) 100 MG tablet, Take 100 mg by mouth every night., Disp: , Rfl:   •   "polyethylene glycol (GoLYTELY) 236 g solution, AS DIRECTED PER INSTRUCTION SHEET FOR COLONOSCOPY, Disp: 4000 mL, Rfl: 0  Review of Systems  Review of Systems              Objective       /86 (BP Location: Left arm)   Pulse 75   Ht 162.6 cm (64\")   Wt 81.1 kg (178 lb 12.8 oz)   BMI 30.69 kg/m²   Physical Exam   Constitutional: He is oriented to person, place, and time. He appears well-developed and well-nourished. No distress.   HENT:   Head: Normocephalic and atraumatic.   Eyes: EOM are normal. Pupils are equal, round, and reactive to light.   Neck: Normal range of motion.   Cardiovascular: Normal rate, regular rhythm and normal heart sounds.    Pulmonary/Chest: Effort normal and breath sounds normal.   Abdominal: Soft. Bowel sounds are normal. He exhibits no shifting dullness, no distension, no abdominal bruit, no ascites and no mass. There is no hepatosplenomegaly. There is tenderness. There is no rigidity, no rebound, no guarding and no CVA tenderness. No hernia. Hernia confirmed negative in the ventral area.   obese   Musculoskeletal: Normal range of motion.   Neurological: He is alert and oriented to person, place, and time.   Skin: Skin is warm and dry.   Psychiatric: He has a normal mood and affect. His behavior is normal. Judgment and thought content normal.   Nursing note and vitals reviewed.        Assessment/Plan          1. Hepatic cirrhosis, unspecified hepatic cirrhosis type    2. Gastroesophageal reflux disease with esophagitis    3. Gallstones    4. History of hepatitis C    5. History of colon polyps    .   Wong was seen today for hepatic cirrhosis, heartburn, fatty liver and hx of hepatitis c.     Diagnoses and all orders for this visit:     Hepatic cirrhosis, unspecified hepatic cirrhosis type  Comments:  F4  Orders:  -     CBC Auto Differential; Future  -     Comprehensive Metabolic Panel; Future  -     AFP Tumor Marker; Future  -     Protime-INR; Future  -     US Abdomen Limited; " Future     Gastroesophageal reflux disease with esophagitis  -     CBC Auto Differential; Future  -     Comprehensive Metabolic Panel; Future  -     AFP Tumor Marker; Future  -     Protime-INR; Future  -     US Abdomen Limited; Future     Gallstones  -     CBC Auto Differential; Future  -     Comprehensive Metabolic Panel; Future  -     AFP Tumor Marker; Future  -     Protime-INR; Future  -     US Abdomen Limited; Future     History of hepatitis C  -     CBC Auto Differential; Future  -     Comprehensive Metabolic Panel; Future  -     AFP Tumor Marker; Future  -     Protime-INR; Future  -     US Abdomen Limited; Future     History of colon polyps  -     Case Request; Standing  -     dextrose 5 % and sodium chloride 0.45 % infusion; Infuse 30 mL/hr into a venous catheter Continuous As Needed (Start Prior to Procedure).  -     Case Request     Other orders  -     Discontinue: sodium-potassium-magnesium sulfates (SUPREP BOWEL PREP KIT) 17.5-3.13-1.6 GM/180ML solution oral solution; Take 1 bottle by mouth 1 (One) Time for 1 dose. Take as per instruction sheet for colonoscopy prep.  -     Obtain Informed Consent; Standing  -     POC Glucose Once; Standing           Orders placed during this encounter include:         Orders Placed This Encounter   Procedures   • US Abdomen Limited       Due in December       Standing Status:   Future       Standing Expiration Date:   12/28/2018       Scheduling Instructions:         RUQ--schedule in December       Order Specific Question:   Reason for Exam:       Answer:   cirrhosis   • CBC Auto Differential       Due in December       Standing Status:   Future       Standing Expiration Date:   12/28/2018   • Comprehensive Metabolic Panel       Due in December       Standing Status:   Future       Standing Expiration Date:   12/28/2018   • AFP Tumor Marker       Due in December       Standing Status:   Future       Standing Expiration Date:   12/28/2018   • Protime-INR       Due in  December       Standing Status:   Future       Standing Expiration Date:   12/28/2018         Medications prescribed:  No orders of the defined types were placed in this encounter.        Requested Prescriptions        No prescriptions requested or ordered in this encounter         Review and/or summary of lab tests, radiology, procedures, medications. Review and summary of old records and obtaining of history. The risks and benefits of my recommendations, as well as other treatment options were discussed with the patient today. Questions were answered.     Follow-up: Return in about 6 months (around 12/6/2018), or if symptoms worsen or fail to improve, for lab/US prior.     COLONOSCOPY (N/A)              This document has been electronically signed by Charles Pham MD on August 22, 2018 9:35 AM            Results for orders placed or performed in visit on 04/23/18   GARVEY Fibrosure   Result Value Ref Range     Fibrosis Score (References) 0.76 (H) 0.00 - 0.21     Fibrosis Stage (Reference) Comment       Steatosis Score (Reference) 0.24 0.00 - 0.30     Steatosis Grade (Reference) Comment       GARVEY Score (Reference) 0.25 0.25     Garvey Grade (Reference) Comment       Height: (Reference) 64 in     Weight: (Reference) 175 LBS     Alpha 2-Macroglobulins, Qn 495 (H) 110 - 276 mg/dL     Haptoglobin 83 34 - 200 mg/dL     Apolipoprotein A-1 121 101 - 178 mg/dL     Total Bilirubin 0.6 0.0 - 1.2 mg/dL     GGT 13 0 - 65 IU/L     ALT (SGPT) 15 0 - 55 IU/L     AST (SGOT) P5P (Reference) 16 0 - 40 IU/L     Cholesterol, Total (Reference) 144 100 - 199 mg/dL     Glucose, Serum (Reference) 95 65 - 99 mg/dL     Triglycerides 150 (H) 0 - 149 mg/dL     Interpretations: (Reference) Comment       Fibrosis Scoring: Comment       Steatosis Grading (Reference) Comment       Garvey Scoring (Reference) Comment       Limitations: (Reference) Comment       Comment (Reference) Comment     AFP Tumor Marker   Result Value Ref Range     AFP Tumor  Marker 2.6 0.0 - 8.3 ng/mL   Protime-INR   Result Value Ref Range     Protime 13.3 11.1 - 15.3 Seconds     INR 1.03 0.80 - 1.20   Hepatic Function Panel   Result Value Ref Range     Total Protein 7.4 6.3 - 8.6 g/dL     Albumin 4.50 3.40 - 4.80 g/dL     ALT (SGPT) 29 21 - 72 U/L     AST (SGOT) 17 17 - 59 U/L     Alkaline Phosphatase 57 38 - 126 U/L     Total Bilirubin 0.8 0.2 - 1.3 mg/dL     Bilirubin, Direct 0.0 0.0 - 0.3 mg/dL     Bilirubin, Indirect 0.5 0.0 - 1.1 mg/dL   Results for orders placed or performed in visit on 10/23/17   HCV FibroSURE   Result Value Ref Range     Fibrosis Stage Comment       Alpha 2-Macroglobulins, Qn 519 (H) 110 - 276 mg/dL     Haptoglobin 88 34 - 200 mg/dL     Apolipoprotein A-1 141 101 - 178 mg/dL     Total Bilirubin 0.4 0.0 - 1.2 mg/dL     GGT 16 0 - 65 IU/L     ALT (SGPT) 18 0 - 55 IU/L     Limitations: Comment       Comment Comment     CBC Auto Differential   Result Value Ref Range     WBC 9.62 3.20 - 9.80 10*3/mm3     RBC 5.45 4.37 - 5.74 10*6/mm3     Hemoglobin 17.3 13.7 - 17.3 g/dL     Hematocrit 49.9 (H) 39.0 - 49.0 %     MCV 91.6 80.0 - 98.0 fL     MCH 31.7 26.5 - 34.0 pg     MCHC 34.7 31.5 - 36.3 g/dL     RDW 13.4 11.5 - 14.5 %     RDW-SD 44.3 (H) 35.1 - 43.9 fl     MPV 12.1 (H) 8.0 - 12.0 fL     Platelets 157 150 - 450 10*3/mm3     Neutrophil % 51.0 37.0 - 80.0 %     Lymphocyte % 37.5 10.0 - 50.0 %     Monocyte % 9.1 0.0 - 12.0 %     Eosinophil % 1.7 0.0 - 7.0 %     Basophil % 0.4 0.0 - 2.0 %     Immature Grans % 0.3 0.0 - 0.5 %     Neutrophils, Absolute 4.90 2.00 - 8.60 10*3/mm3     Lymphocytes, Absolute 3.61 0.60 - 4.20 10*3/mm3     Monocytes, Absolute 0.88 0.00 - 0.90 10*3/mm3     Eosinophils, Absolute 0.16 0.00 - 0.70 10*3/mm3     Basophils, Absolute 0.04 0.00 - 0.20 10*3/mm3     Immature Grans, Absolute 0.03 (H) 0.00 - 0.02 10*3/mm3   AFP Tumor Marker   Result Value Ref Range     AFP Tumor Marker 3.3 0.0 - 8.3 ng/mL   Protime-INR   Result Value Ref Range     Protime  13.0 11.1 - 15.3 Seconds     INR 0.99 0.80 - 1.20   Comprehensive Metabolic Panel   Result Value Ref Range     Glucose 102 (H) 60 - 100 mg/dL     BUN 16 7 - 21 mg/dL     Creatinine 1.05 0.70 - 1.30 mg/dL     Sodium 145 137 - 145 mmol/L     Potassium 4.7 3.5 - 5.1 mmol/L     Chloride 98 95 - 110 mmol/L     CO2 32.0 (H) 22.0 - 31.0 mmol/L     Calcium 9.8 8.4 - 10.2 mg/dL     Total Protein 7.8 6.3 - 8.6 g/dL     Albumin 4.60 3.40 - 4.80 g/dL     ALT (SGPT) 26 21 - 72 U/L     AST (SGOT) 28 17 - 59 U/L     Alkaline Phosphatase 55 38 - 126 U/L     Total Bilirubin 0.5 0.2 - 1.3 mg/dL     eGFR Non  Amer 72 49 - 113 mL/min/1.73     Globulin 3.2 2.3 - 3.5 gm/dL     A/G Ratio 1.4 1.1 - 1.8 g/dL     BUN/Creatinine Ratio 15.2 7.0 - 25.0     Anion Gap 15.0 5.0 - 15.0 mmol/L   Results for orders placed or performed in visit on 07/20/17   Protime-INR   Result Value Ref Range     Protime 12.4 11.1 - 15.3 Seconds     INR 0.93 0.80 - 1.20   Hepatic Function Panel   Result Value Ref Range     Total Protein 7.0 6.3 - 8.6 g/dL     Albumin 4.30 3.40 - 4.80 g/dL     ALT (SGPT) 32 21 - 72 U/L     AST (SGOT) 24 17 - 59 U/L     Alkaline Phosphatase 60 38 - 126 U/L     Total Bilirubin 0.3 0.2 - 1.3 mg/dL     Bilirubin, Direct 0.0 0.0 - 0.3 mg/dL     Bilirubin, Indirect 0.1 0.0 - 1.1 mg/dL   Results for orders placed or performed in visit on 02/01/17   Specimen Status Report   Result Value Ref Range     Specimen Status Comment        *Note: Due to a large number of results and/or encounters for the requested time period, some results have not been displayed. A complete set of results can be found in Results Review.         Some portions of this note have been dictated using voice recognition software and may contain errors and/or omissions.           Office Visit on 6/6/2018            Revision History            Detailed Report

## 2018-08-22 NOTE — DISCHARGE INSTRUCTIONS
Outpatient Instructions for Monitored Anesthesia Care (MAC)    1. You will be released from the hospital in the care of a responsible adult who should remain with you for at least 6 hours.    2. You are at an increased risk for falls following anesthesia. Use care when changing from a lying to a sitting position. Use your assistive devices ( example: cane, walker or family member).    3. You must NOT drive a car, climb high places such as a ladder, or operate equipment such as electric knives,stoves etc...for at least 12 hours. If you are dizzy for longer than 24 hours, notify your doctor.    4. DO NOT drink any alcoholic beverages for at least 24 hours. Anesthesia may impair your judgement.    5. If you smoke, do not smoke alone due to increased risk of burns/fires.    6. DO NOT undertake any legally binding commitment for at least 24 hours. Anesthesia may impair your judgement.   Colon Polyps  Polyps are tissue growths inside the body. Polyps can grow in many places, including the large intestine (colon). A polyp may be a round bump or a mushroom-shaped growth. You could have one polyp or several.  Most colon polyps are noncancerous (benign). However, some colon polyps can become cancerous over time.  What are the causes?  The exact cause of colon polyps is not known.  What increases the risk?  This condition is more likely to develop in people who:  · Have a family history of colon cancer or colon polyps.  · Are older than 50 or older than 45 if they are .  · Have inflammatory bowel disease, such as ulcerative colitis or Crohn disease.  · Are overweight.  · Smoke cigarettes.  · Do not get enough exercise.  · Drink too much alcohol.  · Eat a diet that is:  ? High in fat and red meat.  ? Low in fiber.  · Had childhood cancer that was treated with abdominal radiation.    What are the signs or symptoms?  Most polyps do not cause symptoms. If you have symptoms, they may include:  · Blood coming from  your rectum when having a bowel movement.  · Blood in your stool. The stool may look dark red or black.  · A change in bowel habits, such as constipation or diarrhea.    How is this diagnosed?  This condition is diagnosed with a colonoscopy. This is a procedure that uses a lighted, flexible scope to look at the inside of your colon.  How is this treated?  Treatment for this condition involves removing any polyps that are found. Those polyps will then be tested for cancer. If cancer is found, your health care provider will talk to you about options for colon cancer treatment.  Follow these instructions at home:  Diet  · Eat plenty of fiber, such as fruits, vegetables, and whole grains.  · Eat foods that are high in calcium and vitamin D, such as milk, cheese, yogurt, eggs, liver, fish, and broccoli.  · Limit foods high in fat, red meats, and processed meats, such as hot dogs, sausage, smith, and lunch meats.  · Maintain a healthy weight, or lose weight if recommended by your health care provider.  General instructions  · Do not smoke cigarettes.  · Do not drink alcohol excessively.  · Keep all follow-up visits as told by your health care provider. This is important. This includes keeping regularly scheduled colonoscopies. Talk to your health care provider about when you need a colonoscopy.  · Exercise every day or as told by your health care provider.  Contact a health care provider if:  · You have new or worsening bleeding during a bowel movement.  · You have new or increased blood in your stool.  · You have a change in bowel habits.  · You unexpectedly lose weight.  This information is not intended to replace advice given to you by your health care provider. Make sure you discuss any questions you have with your health care provider.  Document Released: 09/13/2005 Document Revised: 05/25/2017 Document Reviewed: 11/07/2016  [employer] Interactive Patient Education © 2018 [employer]    Colonoscopy, Adult, Care  After  This sheet gives you information about how to care for yourself after your procedure. Your doctor may also give you more specific instructions. If you have problems or questions, call your doctor.  Follow these instructions at home:  General instructions    · For the first 24 hours after the procedure:  ? Do not drive or use machinery.  ? Do not sign important documents.  ? Do not drink alcohol.  ? Do your daily activities more slowly than normal.  ? Eat foods that are soft and easy to digest.  ? Rest often.  · Take over-the-counter or prescription medicines only as told by your doctor.  · It is up to you to get the results of your procedure. Ask your doctor, or the department performing the procedure, when your results will be ready.  To help cramping and bloating:  · Try walking around.  · Put heat on your belly (abdomen) as told by your doctor. Use a heat source that your doctor recommends, such as a moist heat pack or a heating pad.  ? Put a towel between your skin and the heat source.  ? Leave the heat on for 20-30 minutes.  ? Remove the heat if your skin turns bright red. This is especially important if you cannot feel pain, heat, or cold. You can get burned.  Eating and drinking  · Drink enough fluid to keep your pee (urine) clear or pale yellow.  · Return to your normal diet as told by your doctor. Avoid heavy or fried foods that are hard to digest.  · Avoid drinking alcohol for as long as told by your doctor.  Contact a doctor if:  · You have blood in your poop (stool) 2-3 days after the procedure.  Get help right away if:  · You have more than a small amount of blood in your poop.  · You see large clumps of tissue (blood clots) in your poop.  · Your belly is swollen.  · You feel sick to your stomach (nauseous).  · You throw up (vomit).  · You have a fever.  · You have belly pain that gets worse, and medicine does not help your pain.  This information is not intended to replace advice given to you by  your health care provider. Make sure you discuss any questions you have with your health care provider.  Document Released: 01/20/2012 Document Revised: 09/11/2017 Document Reviewed: 09/11/2017  [employer] Interactive Patient Education © 2017 [employer]

## 2018-08-22 NOTE — ANESTHESIA PREPROCEDURE EVALUATION
Anesthesia Evaluation     no history of anesthetic complications:  NPO Solid Status: > 8 hours  NPO Liquid Status: > 2 hours           Airway   Mallampati: II  TM distance: >3 FB  Neck ROM: full  No difficulty expected  Dental    (+) edentulous    Pulmonary - normal exam   (+) a smoker Current Abstained day of surgery,   Cardiovascular - normal exam    (+) hypertension well controlled less than 2 medications,       Neuro/Psych  (+) psychiatric history Anxiety,     GI/Hepatic/Renal/Endo    (+)  GERD, GI bleeding, hepatitis C, liver disease,     ROS Comment: cirrhosis    Musculoskeletal     Abdominal    Substance History      OB/GYN          Other      history of cancer      Other Comment: Hx colon cancer                  Anesthesia Plan    ASA 3     MAC     intravenous induction   Anesthetic plan and risks discussed with patient.

## 2018-08-23 LAB
LAB AP CASE REPORT: NORMAL
PATH REPORT.FINAL DX SPEC: NORMAL
PATH REPORT.GROSS SPEC: NORMAL

## 2018-11-20 RX ORDER — OMEPRAZOLE 20 MG/1
CAPSULE, DELAYED RELEASE ORAL
Qty: 30 CAPSULE | Refills: 5 | Status: SHIPPED | OUTPATIENT
Start: 2018-11-20 | End: 2019-04-25 | Stop reason: SDUPTHER

## 2018-12-04 ENCOUNTER — HOSPITAL ENCOUNTER (OUTPATIENT)
Dept: ULTRASOUND IMAGING | Facility: HOSPITAL | Age: 63
Discharge: HOME | End: 2018-12-04
Admitting: PHYSICIAN ASSISTANT

## 2018-12-04 DIAGNOSIS — K74.60 HEPATIC CIRRHOSIS, UNSPECIFIED HEPATIC CIRRHOSIS TYPE (HCC): ICD-10-CM

## 2018-12-04 DIAGNOSIS — K80.20 GALLSTONES: ICD-10-CM

## 2018-12-04 DIAGNOSIS — K21.00 GASTROESOPHAGEAL REFLUX DISEASE WITH ESOPHAGITIS: ICD-10-CM

## 2018-12-04 DIAGNOSIS — Z86.19 HISTORY OF HEPATITIS C: ICD-10-CM

## 2018-12-04 PROCEDURE — 76705 ECHO EXAM OF ABDOMEN: CPT

## 2018-12-13 ENCOUNTER — OFFICE VISIT (OUTPATIENT)
Dept: GASTROENTEROLOGY | Facility: CLINIC | Age: 63
End: 2018-12-13

## 2018-12-13 ENCOUNTER — LAB (OUTPATIENT)
Dept: LAB | Facility: HOSPITAL | Age: 63
End: 2018-12-13

## 2018-12-13 VITALS
HEIGHT: 64 IN | WEIGHT: 173.2 LBS | HEART RATE: 62 BPM | SYSTOLIC BLOOD PRESSURE: 134 MMHG | DIASTOLIC BLOOD PRESSURE: 82 MMHG | BODY MASS INDEX: 29.57 KG/M2

## 2018-12-13 DIAGNOSIS — K74.60 HEPATIC CIRRHOSIS, UNSPECIFIED HEPATIC CIRRHOSIS TYPE (HCC): ICD-10-CM

## 2018-12-13 DIAGNOSIS — K80.20 GALLSTONES: ICD-10-CM

## 2018-12-13 DIAGNOSIS — Z86.19 HISTORY OF HEPATITIS C: ICD-10-CM

## 2018-12-13 DIAGNOSIS — D12.6 TUBULAR ADENOMA OF COLON: ICD-10-CM

## 2018-12-13 DIAGNOSIS — K21.00 GASTROESOPHAGEAL REFLUX DISEASE WITH ESOPHAGITIS: ICD-10-CM

## 2018-12-13 DIAGNOSIS — K74.60 CIRRHOSIS OF LIVER WITHOUT ASCITES, UNSPECIFIED HEPATIC CIRRHOSIS TYPE (HCC): Primary | ICD-10-CM

## 2018-12-13 LAB
ALBUMIN SERPL-MCNC: 4.5 G/DL (ref 3.4–4.8)
ALBUMIN/GLOB SERPL: 1.4 G/DL (ref 1.1–1.8)
ALP SERPL-CCNC: 68 U/L (ref 38–126)
ALT SERPL W P-5'-P-CCNC: 16 U/L (ref 21–72)
ANION GAP SERPL CALCULATED.3IONS-SCNC: 9 MMOL/L (ref 5–15)
AST SERPL-CCNC: 20 U/L (ref 17–59)
BASOPHILS # BLD AUTO: 0.03 10*3/MM3 (ref 0–0.2)
BASOPHILS NFR BLD AUTO: 0.4 % (ref 0–2)
BILIRUB SERPL-MCNC: 0.8 MG/DL (ref 0.2–1.3)
BUN BLD-MCNC: 8 MG/DL (ref 7–21)
BUN/CREAT SERPL: 7 (ref 7–25)
CALCIUM SPEC-SCNC: 9.3 MG/DL (ref 8.4–10.2)
CHLORIDE SERPL-SCNC: 96 MMOL/L (ref 95–110)
CO2 SERPL-SCNC: 33 MMOL/L (ref 22–31)
CREAT BLD-MCNC: 1.15 MG/DL (ref 0.7–1.3)
DEPRECATED RDW RBC AUTO: 43 FL (ref 35.1–43.9)
EOSINOPHIL # BLD AUTO: 0.08 10*3/MM3 (ref 0–0.7)
EOSINOPHIL NFR BLD AUTO: 1 % (ref 0–7)
ERYTHROCYTE [DISTWIDTH] IN BLOOD BY AUTOMATED COUNT: 13 % (ref 11.5–14.5)
GFR SERPL CREATININE-BSD FRML MDRD: 65 ML/MIN/1.73 (ref 49–113)
GLOBULIN UR ELPH-MCNC: 3.2 GM/DL (ref 2.3–3.5)
GLUCOSE BLD-MCNC: 98 MG/DL (ref 60–100)
HCT VFR BLD AUTO: 44.4 % (ref 39–49)
HGB BLD-MCNC: 15.4 G/DL (ref 13.7–17.3)
IMM GRANULOCYTES # BLD: 0.02 10*3/MM3 (ref 0–0.02)
IMM GRANULOCYTES NFR BLD: 0.3 % (ref 0–0.5)
INR PPP: 1.1 (ref 0.8–1.2)
LYMPHOCYTES # BLD AUTO: 3.09 10*3/MM3 (ref 0.6–4.2)
LYMPHOCYTES NFR BLD AUTO: 38.8 % (ref 10–50)
MCH RBC QN AUTO: 31.5 PG (ref 26.5–34)
MCHC RBC AUTO-ENTMCNC: 34.7 G/DL (ref 31.5–36.3)
MCV RBC AUTO: 90.8 FL (ref 80–98)
MONOCYTES # BLD AUTO: 0.92 10*3/MM3 (ref 0–0.9)
MONOCYTES NFR BLD AUTO: 11.6 % (ref 0–12)
NEUTROPHILS # BLD AUTO: 3.82 10*3/MM3 (ref 2–8.6)
NEUTROPHILS NFR BLD AUTO: 47.9 % (ref 37–80)
PLATELET # BLD AUTO: 190 10*3/MM3 (ref 150–450)
PMV BLD AUTO: 11.6 FL (ref 8–12)
POTASSIUM BLD-SCNC: 4.3 MMOL/L (ref 3.5–5.1)
PROT SERPL-MCNC: 7.7 G/DL (ref 6.3–8.6)
PROTHROMBIN TIME: 14 SECONDS (ref 11.1–15.3)
RBC # BLD AUTO: 4.89 10*6/MM3 (ref 4.37–5.74)
SODIUM BLD-SCNC: 138 MMOL/L (ref 137–145)
WBC NRBC COR # BLD: 7.96 10*3/MM3 (ref 3.2–9.8)

## 2018-12-13 PROCEDURE — 85025 COMPLETE CBC W/AUTO DIFF WBC: CPT

## 2018-12-13 PROCEDURE — 80053 COMPREHEN METABOLIC PANEL: CPT

## 2018-12-13 PROCEDURE — 82105 ALPHA-FETOPROTEIN SERUM: CPT

## 2018-12-13 PROCEDURE — 36415 COLL VENOUS BLD VENIPUNCTURE: CPT

## 2018-12-13 PROCEDURE — 99213 OFFICE O/P EST LOW 20 MIN: CPT | Performed by: PHYSICIAN ASSISTANT

## 2018-12-13 PROCEDURE — 85610 PROTHROMBIN TIME: CPT

## 2018-12-13 NOTE — PATIENT INSTRUCTIONS

## 2018-12-13 NOTE — PROGRESS NOTES
Chief Complaint   Patient presents with   • Hepatic Cirrhosis   • Heartburn   • Hx Of Hep C       ENDO PROCEDURE ORDERED:    Subjective    Wong Patrick is a 63 y.o. male. he is here today for follow-up.    History of Present Illness    The patient is seen on a recheck of his GERD, cirrhosis with history of hepatitis C. Last seen 06/06/2018 at 4.  He completed treatment of his hepatitis C some time ago. He again did not get his laboratories drawn until today.  I asked him why he did not get them done at the time of his ultrasound but he states that they did not tell him to go to the laboratory.  He thought they would do everything at once.  Right upper quadrant ultrasound on 12/04/2018 showed a fatty liver with nodular appearance, gallstones, 3.7 mm common bile duct.   Laboratories drawn today showed normal CBC. CMP showed a CO2 of 33, otherwise normal.   INR 1.1.  AFP is pending.   He currently denies abdominal pain.  GERD is doing good on Prilosec 20 mg daily.  He denied nausea, vomiting or dysphagia.  He continues to smoke.  Bowels are moving without blood or mucus.  Weight is down 5.5 pounds since last visit.  Last colonoscopy showed polyps in 2013.      Patient underwent colonoscopy on 08/22/2018 that showed 3 polyps in the sigmoid, descending, and transverse colon.   These were all tubular adenomas.  Also showed internal and external hemorrhoids.  Recommended repeat colonoscopy in 3 years.      A/P:  Patient with nodularity of the liver with probable persistent cirrhosis, encouraged continued dietary modification and weight loss.  I encouraged him to abstain from tobacco use.  Instructed on a high-fiber diet.  We will plan followup in 6 months with right upper quadrant ultrasound, AFP, CBC, CMP, HCV FibroSure, and INR prior.  Further pending clinical course and the results of the above.        The following portions of the patient's history were reviewed and updated as appropriate:   Past Medical History:    Diagnosis Date   • Abnormal weight loss    • Acquired thrombocytopenia (CMS/HCC)    • Adenomatous polyp of colon    • Anxiety    • Biliary calculus     denies N/V, abd pain   • Chronic hepatitis C (CMS/HCC)    • Cirrhosis of liver (CMS/HCC)    • Colon polyp    • Elevated levels of transaminase & lactic acid dehydrogenase    • Generalized abdominal pain     known gallstones   • GERD (gastroesophageal reflux disease)    • Hypertension    • IBS (irritable bowel syndrome)    • Impacted cerumen    • Kidney disease     US suggest renal dz, elevated alpha 2 macroglobulins   • Nausea and vomiting    • Patient noncompliance, general    • Rectal hemorrhage    • Villous adenoma of colon    • Wheezing      Past Surgical History:   Procedure Laterality Date   • COLONOSCOPY  06/03/2013   • ENDOSCOPY AND COLONOSCOPY     • ESOPHAGOSCOPY / EGD      with tube   • UPPER GASTROINTESTINAL ENDOSCOPY  06/03/2013     Family History   Problem Relation Age of Onset   • Heart disease Other    • Diabetes Other        Allergies   Allergen Reactions   • Penicillins Hives     Social History     Socioeconomic History   • Marital status: Single     Spouse name: Not on file   • Number of children: Not on file   • Years of education: Not on file   • Highest education level: Not on file   Tobacco Use   • Smoking status: Current Every Day Smoker     Packs/day: 1.00     Types: Cigarettes   • Smokeless tobacco: Never Used   Substance and Sexual Activity   • Alcohol use: No   • Drug use: No       Current Outpatient Medications:   •  albuterol (PROVENTIL HFA;VENTOLIN HFA) 108 (90 BASE) MCG/ACT inhaler, Inhale 2 puffs 2 (two) times a day as needed for wheezing., Disp: , Rfl:   •  busPIRone (BUSPAR) 15 MG tablet, Take 15 mg by mouth 2 (two) times a day., Disp: , Rfl:   •  fluticasone (FLONASE) 50 MCG/ACT nasal spray, 2 sprays into each nostril Daily., Disp: , Rfl:   •  loratadine (CLARITIN) 10 MG tablet, TAKE 1 TABLET BY MOUTH EVERY DAY, Disp: , Rfl: 3  •   "losartan (COZAAR) 50 MG tablet, Take 50 mg by mouth daily., Disp: , Rfl:   •  meclizine (ANTIVERT) 25 MG tablet, Take 25 mg by mouth 2 (Two) Times a Day As Needed., Disp: , Rfl:   •  omeprazole (priLOSEC) 20 MG capsule, TAKE ONE CAPSULE BY MOUTH EVERY DAY, Disp: 30 capsule, Rfl: 5  •  polyethylene glycol (GoLYTELY) 236 g solution, AS DIRECTED PER INSTRUCTION SHEET FOR COLONOSCOPY, Disp: 4000 mL, Rfl: 0  •  QUEtiapine (SEROquel) 100 MG tablet, Take 100 mg by mouth every night., Disp: , Rfl:   Review of Systems  Review of Systems       Objective    /82 (BP Location: Left arm)   Pulse 62   Ht 162.6 cm (64\")   Wt 78.6 kg (173 lb 3.2 oz)   BMI 29.73 kg/m²   Physical Exam   Constitutional: He is oriented to person, place, and time. He appears well-developed and well-nourished. No distress.   HENT:   Head: Normocephalic and atraumatic.   Eyes: EOM are normal. Pupils are equal, round, and reactive to light.   Neck: Normal range of motion.   Cardiovascular: Normal rate, regular rhythm and normal heart sounds.   Pulmonary/Chest: Effort normal and breath sounds normal.   Abdominal: Soft. Bowel sounds are normal. He exhibits no shifting dullness, no distension, no abdominal bruit, no ascites and no mass. There is no hepatosplenomegaly. There is tenderness. There is no rigidity, no rebound, no guarding and no CVA tenderness. No hernia. Hernia confirmed negative in the ventral area.   obese   Musculoskeletal: Normal range of motion.   Neurological: He is alert and oriented to person, place, and time.   Skin: Skin is warm and dry.   Psychiatric: He has a normal mood and affect. His behavior is normal. Judgment and thought content normal.   Nursing note and vitals reviewed.    Assessment/Plan      1. Cirrhosis of liver without ascites, unspecified hepatic cirrhosis type (CMS/HCC)    2. Gastroesophageal reflux disease with esophagitis    3. Gallstones    4. Tubular adenoma of colon    .   Wong was seen today for hepatic " cirrhosis, heartburn and hx of hep c.    Diagnoses and all orders for this visit:    Cirrhosis of liver without ascites, unspecified hepatic cirrhosis type (CMS/HCC)  -     CBC Auto Differential; Future  -     Comprehensive Metabolic Panel; Future  -     AFP Tumor Marker; Future  -     Protime-INR; Future  -     HCV FibroSURE; Future  -     US Liver; Future    Gastroesophageal reflux disease with esophagitis  -     CBC Auto Differential; Future  -     Comprehensive Metabolic Panel; Future  -     AFP Tumor Marker; Future  -     Protime-INR; Future  -     HCV FibroSURE; Future  -     US Liver; Future    Gallstones  -     CBC Auto Differential; Future  -     Comprehensive Metabolic Panel; Future  -     AFP Tumor Marker; Future  -     Protime-INR; Future  -     HCV FibroSURE; Future  -     US Liver; Future    Tubular adenoma of colon  Comments:  x3        Orders placed during this encounter include:  Orders Placed This Encounter   Procedures   • US Liver     Due prior to follow up in June     Standing Status:   Future     Standing Expiration Date:   6/29/2019     Scheduling Instructions:      RUQ     Order Specific Question:   Reason for Exam:     Answer:   cirrhosis, hx HCV     Order Specific Question:   Will this be performed with Elastography? ([place] and [place] ONLY)     Answer:   No   • CBC Auto Differential     Due prior to follow up in June     Standing Status:   Future     Standing Expiration Date:   6/29/2019   • Comprehensive Metabolic Panel     Due prior to follow up in June     Standing Status:   Future     Standing Expiration Date:   6/29/2019   • AFP Tumor Marker     Due prior to follow up in June     Standing Status:   Future     Standing Expiration Date:   6/29/2019   • Protime-INR     Due prior to follow up in June     Standing Status:   Future     Standing Expiration Date:   6/29/2019   • HCV FibroSURE     Due prior to follow up in June     Standing Status:   Future     Standing Expiration Date:    6/29/2019       Medications prescribed:  No orders of the defined types were placed in this encounter.      Requested Prescriptions      No prescriptions requested or ordered in this encounter       Review and/or summary of lab tests, radiology, procedures, medications. Review and summary of old records and obtaining of history. The risks and benefits of my recommendations, as well as other treatment options were discussed with the patient today. Questions were answered.    Follow-up: Return in about 6 months (around 6/13/2019), or if symptoms worsen or fail to improve, for Lab/US prior.     * Surgery not found *      This document has been electronically signed by Deny Heller PA-C on December 18, 2018 7:33 PM      Results for orders placed or performed in visit on 12/13/18   CBC Auto Differential   Result Value Ref Range    WBC 7.96 3.20 - 9.80 10*3/mm3    RBC 4.89 4.37 - 5.74 10*6/mm3    Hemoglobin 15.4 13.7 - 17.3 g/dL    Hematocrit 44.4 39.0 - 49.0 %    MCV 90.8 80.0 - 98.0 fL    MCH 31.5 26.5 - 34.0 pg    MCHC 34.7 31.5 - 36.3 g/dL    RDW 13.0 11.5 - 14.5 %    RDW-SD 43.0 35.1 - 43.9 fl    MPV 11.6 8.0 - 12.0 fL    Platelets 190 150 - 450 10*3/mm3    Neutrophil % 47.9 37.0 - 80.0 %    Lymphocyte % 38.8 10.0 - 50.0 %    Monocyte % 11.6 0.0 - 12.0 %    Eosinophil % 1.0 0.0 - 7.0 %    Basophil % 0.4 0.0 - 2.0 %    Immature Grans % 0.3 0.0 - 0.5 %    Neutrophils, Absolute 3.82 2.00 - 8.60 10*3/mm3    Lymphocytes, Absolute 3.09 0.60 - 4.20 10*3/mm3    Monocytes, Absolute 0.92 (H) 0.00 - 0.90 10*3/mm3    Eosinophils, Absolute 0.08 0.00 - 0.70 10*3/mm3    Basophils, Absolute 0.03 0.00 - 0.20 10*3/mm3    Immature Grans, Absolute 0.02 0.00 - 0.02 10*3/mm3   AFP Tumor Marker   Result Value Ref Range    AFP Tumor Marker 2.6 0.0 - 8.3 ng/mL   Protime-INR   Result Value Ref Range    Protime 14.0 11.1 - 15.3 Seconds    INR 1.10 0.80 - 1.20   Comprehensive Metabolic Panel   Result Value Ref Range    Glucose 98 60 - 100  mg/dL    BUN 8 7 - 21 mg/dL    Creatinine 1.15 0.70 - 1.30 mg/dL    Sodium 138 137 - 145 mmol/L    Potassium 4.3 3.5 - 5.1 mmol/L    Chloride 96 95 - 110 mmol/L    CO2 33.0 (H) 22.0 - 31.0 mmol/L    Calcium 9.3 8.4 - 10.2 mg/dL    Total Protein 7.7 6.3 - 8.6 g/dL    Albumin 4.50 3.40 - 4.80 g/dL    ALT (SGPT) 16 (L) 21 - 72 U/L    AST (SGOT) 20 17 - 59 U/L    Alkaline Phosphatase 68 38 - 126 U/L    Total Bilirubin 0.8 0.2 - 1.3 mg/dL    eGFR Non  Amer 65 49 - 113 mL/min/1.73    Globulin 3.2 2.3 - 3.5 gm/dL    A/G Ratio 1.4 1.1 - 1.8 g/dL    BUN/Creatinine Ratio 7.0 7.0 - 25.0    Anion Gap 9.0 5.0 - 15.0 mmol/L   Results for orders placed or performed during the hospital encounter of 08/22/18   Tissue Pathology Exam   Result Value Ref Range    Case Report       Surgical Pathology Report                         Case: [id number]                                  Authorizing Provider:  Charles Pham MD        Collected:           08/22/2018 10:30 AM          Ordering Location:     [facility]             Received:            08/22/2018 11:22 AM                                 [facility]                                                     Pathologist:           Jericho Fragoso MD                                                         Specimens:   1) - Large Intestine, Sigmoid Colon, polyp                                                          2) - Large Intestine, Transverse Colon, polyp                                                       3) - Large Intestine, Left / Descending Colon, polyp                                       Final Diagnosis       1.  POLYP, SIGMOID COLON:   TUBULAR ADENOMA.     2.  POLYP, TRANSVERSE COLON:   TUBULAR ADENOMA.     3.  POLYP, DESCENDING COLON:   TUBULAR ADENOMA.       Gross Description       Received for examination are 3 containers, each of which have nodular bits of white soft tissue measuring 0.3-0.8 cm in greatest dimension.  All specimens are  embedded as labeled:  1A polyp, sigmoid colon; 2A polyp, transverse colon; 3A polyp, descending colon.      Results for orders placed or performed in visit on 04/23/18   GARVEY Fibrosure   Result Value Ref Range    Fibrosis Score (References) 0.76 (H) 0.00 - 0.21    Fibrosis Stage (Reference) Comment     Steatosis Score (Reference) 0.24 0.00 - 0.30    Steatosis Grade (Reference) Comment     GARVEY Score (Reference) 0.25 0.25    Garvey Grade (Reference) Comment     Height: (Reference) 64 in    Weight: (Reference) 175 LBS    Alpha 2-Macroglobulins, Qn 495 (H) 110 - 276 mg/dL    Haptoglobin 83 34 - 200 mg/dL    Apolipoprotein A-1 121 101 - 178 mg/dL    Total Bilirubin 0.6 0.0 - 1.2 mg/dL    GGT 13 0 - 65 IU/L    ALT (SGPT) 15 0 - 55 IU/L    AST (SGOT) P5P (Reference) 16 0 - 40 IU/L    Cholesterol, Total (Reference) 144 100 - 199 mg/dL    Glucose, Serum (Reference) 95 65 - 99 mg/dL    Triglycerides 150 (H) 0 - 149 mg/dL    Interpretations: (Reference) Comment     Fibrosis Scoring: Comment     Steatosis Grading (Reference) Comment     Garvey Scoring (Reference) Comment     Limitations: (Reference) Comment     Comment (Reference) Comment    AFP Tumor Marker   Result Value Ref Range    AFP Tumor Marker 2.6 0.0 - 8.3 ng/mL   Protime-INR   Result Value Ref Range    Protime 13.3 11.1 - 15.3 Seconds    INR 1.03 0.80 - 1.20   Hepatic Function Panel   Result Value Ref Range    Total Protein 7.4 6.3 - 8.6 g/dL    Albumin 4.50 3.40 - 4.80 g/dL    ALT (SGPT) 29 21 - 72 U/L    AST (SGOT) 17 17 - 59 U/L    Alkaline Phosphatase 57 38 - 126 U/L    Total Bilirubin 0.8 0.2 - 1.3 mg/dL    Bilirubin, Direct 0.0 0.0 - 0.3 mg/dL    Bilirubin, Indirect 0.5 0.0 - 1.1 mg/dL   Results for orders placed or performed in visit on 10/23/17   HCV FibroSURE   Result Value Ref Range    Fibrosis Stage Comment     Alpha 2-Macroglobulins, Qn 519 (H) 110 - 276 mg/dL    Haptoglobin 88 34 - 200 mg/dL    Apolipoprotein A-1 141 101 - 178 mg/dL    Total Bilirubin 0.4  0.0 - 1.2 mg/dL    GGT 16 0 - 65 IU/L    ALT (SGPT) 18 0 - 55 IU/L    Limitations: Comment     Comment Comment    CBC Auto Differential   Result Value Ref Range    WBC 9.62 3.20 - 9.80 10*3/mm3    RBC 5.45 4.37 - 5.74 10*6/mm3    Hemoglobin 17.3 13.7 - 17.3 g/dL    Hematocrit 49.9 (H) 39.0 - 49.0 %    MCV 91.6 80.0 - 98.0 fL    MCH 31.7 26.5 - 34.0 pg    MCHC 34.7 31.5 - 36.3 g/dL    RDW 13.4 11.5 - 14.5 %    RDW-SD 44.3 (H) 35.1 - 43.9 fl    MPV 12.1 (H) 8.0 - 12.0 fL    Platelets 157 150 - 450 10*3/mm3    Neutrophil % 51.0 37.0 - 80.0 %    Lymphocyte % 37.5 10.0 - 50.0 %    Monocyte % 9.1 0.0 - 12.0 %    Eosinophil % 1.7 0.0 - 7.0 %    Basophil % 0.4 0.0 - 2.0 %    Immature Grans % 0.3 0.0 - 0.5 %    Neutrophils, Absolute 4.90 2.00 - 8.60 10*3/mm3    Lymphocytes, Absolute 3.61 0.60 - 4.20 10*3/mm3    Monocytes, Absolute 0.88 0.00 - 0.90 10*3/mm3    Eosinophils, Absolute 0.16 0.00 - 0.70 10*3/mm3    Basophils, Absolute 0.04 0.00 - 0.20 10*3/mm3    Immature Grans, Absolute 0.03 (H) 0.00 - 0.02 10*3/mm3     *Note: Due to a large number of results and/or encounters for the requested time period, some results have not been displayed. A complete set of results can be found in Results Review.       Some portions of this note have been dictated using voice recognition software and may contain errors and/or omissions.

## 2018-12-14 LAB — AFP-TM SERPL-MCNC: 2.6 NG/ML (ref 0–8.3)

## 2019-04-25 RX ORDER — OMEPRAZOLE 20 MG/1
CAPSULE, DELAYED RELEASE ORAL
Qty: 30 CAPSULE | Refills: 1 | Status: SHIPPED | OUTPATIENT
Start: 2019-04-25 | End: 2019-06-20 | Stop reason: SDUPTHER

## 2019-06-03 ENCOUNTER — LAB (OUTPATIENT)
Dept: LAB | Facility: HOSPITAL | Age: 64
End: 2019-06-03

## 2019-06-03 ENCOUNTER — HOSPITAL ENCOUNTER (OUTPATIENT)
Dept: ULTRASOUND IMAGING | Facility: HOSPITAL | Age: 64
Discharge: HOME | End: 2019-06-03
Admitting: PHYSICIAN ASSISTANT

## 2019-06-03 DIAGNOSIS — K74.60 CIRRHOSIS OF LIVER WITHOUT ASCITES, UNSPECIFIED HEPATIC CIRRHOSIS TYPE (HCC): ICD-10-CM

## 2019-06-03 DIAGNOSIS — K21.00 GASTROESOPHAGEAL REFLUX DISEASE WITH ESOPHAGITIS: ICD-10-CM

## 2019-06-03 DIAGNOSIS — K80.20 GALLSTONES: ICD-10-CM

## 2019-06-03 LAB
ALBUMIN SERPL-MCNC: 4.3 G/DL (ref 3.5–5.2)
ALBUMIN/GLOB SERPL: 1.5 G/DL
ALP SERPL-CCNC: 73 U/L (ref 39–117)
ALPHA-FETOPROTEIN: 2.42 NG/ML (ref 0–8.3)
ALT SERPL W P-5'-P-CCNC: 9 U/L (ref 1–41)
ANION GAP SERPL CALCULATED.3IONS-SCNC: 10 MMOL/L
AST SERPL-CCNC: 13 U/L (ref 1–40)
BASOPHILS # BLD AUTO: 0.08 10*3/MM3 (ref 0–0.2)
BASOPHILS NFR BLD AUTO: 0.7 % (ref 0–1.5)
BILIRUB SERPL-MCNC: 0.7 MG/DL (ref 0.2–1.2)
BUN BLD-MCNC: 12 MG/DL (ref 8–23)
BUN/CREAT SERPL: 10.9 (ref 7–25)
CALCIUM SPEC-SCNC: 9.6 MG/DL (ref 8.6–10.5)
CHLORIDE SERPL-SCNC: 93 MMOL/L (ref 98–107)
CO2 SERPL-SCNC: 31 MMOL/L (ref 22–29)
CREAT BLD-MCNC: 1.1 MG/DL (ref 0.76–1.27)
DEPRECATED RDW RBC AUTO: 43.9 FL (ref 37–54)
EOSINOPHIL # BLD AUTO: 0.11 10*3/MM3 (ref 0–0.4)
EOSINOPHIL NFR BLD AUTO: 0.9 % (ref 0.3–6.2)
ERYTHROCYTE [DISTWIDTH] IN BLOOD BY AUTOMATED COUNT: 13.1 % (ref 12.3–15.4)
GFR SERPL CREATININE-BSD FRML MDRD: 68 ML/MIN/1.73
GLOBULIN UR ELPH-MCNC: 2.8 GM/DL
GLUCOSE BLD-MCNC: 86 MG/DL (ref 65–99)
HCT VFR BLD AUTO: 45 % (ref 37.5–51)
HGB BLD-MCNC: 15.4 G/DL (ref 13–17.7)
IMM GRANULOCYTES # BLD AUTO: 0.04 10*3/MM3 (ref 0–0.05)
IMM GRANULOCYTES NFR BLD AUTO: 0.3 % (ref 0–0.5)
INR PPP: 1.06 (ref 0.8–1.2)
LYMPHOCYTES # BLD AUTO: 4.16 10*3/MM3 (ref 0.7–3.1)
LYMPHOCYTES NFR BLD AUTO: 33.9 % (ref 19.6–45.3)
MCH RBC QN AUTO: 31.1 PG (ref 26.6–33)
MCHC RBC AUTO-ENTMCNC: 34.2 G/DL (ref 31.5–35.7)
MCV RBC AUTO: 90.9 FL (ref 79–97)
MONOCYTES # BLD AUTO: 1.37 10*3/MM3 (ref 0.1–0.9)
MONOCYTES NFR BLD AUTO: 11.2 % (ref 5–12)
NEUTROPHILS # BLD AUTO: 6.52 10*3/MM3 (ref 1.7–7)
NEUTROPHILS NFR BLD AUTO: 53 % (ref 42.7–76)
NRBC BLD AUTO-RTO: 0 /100 WBC (ref 0–0.2)
PLAT MORPH BLD: NORMAL
PLATELET # BLD AUTO: 199 10*3/MM3 (ref 140–450)
PMV BLD AUTO: 11 FL (ref 6–12)
POTASSIUM BLD-SCNC: 4.2 MMOL/L (ref 3.5–5.2)
PROT SERPL-MCNC: 7.1 G/DL (ref 6–8.5)
PROTHROMBIN TIME: 13.6 SECONDS (ref 11.1–15.3)
RBC # BLD AUTO: 4.95 10*6/MM3 (ref 4.14–5.8)
RBC MORPH BLD: NORMAL
SODIUM BLD-SCNC: 134 MMOL/L (ref 136–145)
WBC MORPH BLD: NORMAL
WBC NRBC COR # BLD: 12.28 10*3/MM3 (ref 3.4–10.8)

## 2019-06-03 PROCEDURE — 81596 NFCT DS CHRNC HCV 6 ASSAYS: CPT

## 2019-06-03 PROCEDURE — 85007 BL SMEAR W/DIFF WBC COUNT: CPT

## 2019-06-03 PROCEDURE — 82105 ALPHA-FETOPROTEIN SERUM: CPT

## 2019-06-03 PROCEDURE — 85610 PROTHROMBIN TIME: CPT

## 2019-06-03 PROCEDURE — 80053 COMPREHEN METABOLIC PANEL: CPT

## 2019-06-03 PROCEDURE — 76705 ECHO EXAM OF ABDOMEN: CPT

## 2019-06-03 PROCEDURE — 85025 COMPLETE CBC W/AUTO DIFF WBC: CPT

## 2019-06-03 PROCEDURE — 36415 COLL VENOUS BLD VENIPUNCTURE: CPT

## 2019-06-05 LAB
A2 MACROGLOB SERPL-MCNC: 376 MG/DL (ref 110–276)
ALT SERPL W P-5'-P-CCNC: 13 IU/L (ref 0–55)
APO A-I SERPL-MCNC: 105 MG/DL (ref 101–178)
BILIRUB SERPL-MCNC: 0.7 MG/DL (ref 0–1.2)
FIBROSIS SCORING:: ABNORMAL
FIBROSIS STAGE SERPL QL: ABNORMAL
GGT SERPL-CCNC: 11 IU/L (ref 0–65)
HAPTOGLOB SERPL-MCNC: 183 MG/DL (ref 34–200)
HCV AB SER QL: ABNORMAL
LABORATORY COMMENT REPORT: ABNORMAL
LIMITATIONS:: ABNORMAL
LIVER FIBR SCORE SERPL CALC.FIBROSURE: 0.6 (ref 0–0.21)
NECROINFLAMM ACTIVITY SCORING:: ABNORMAL
NECROINFLAMMATORY ACT GRADE SERPL QL: ABNORMAL
NECROINFLAMMATORY ACT SCORE SERPL: 0.06 (ref 0–0.17)

## 2019-06-13 ENCOUNTER — OFFICE VISIT (OUTPATIENT)
Dept: GASTROENTEROLOGY | Facility: CLINIC | Age: 64
End: 2019-06-13

## 2019-06-13 ENCOUNTER — OFFICE VISIT (OUTPATIENT)
Dept: SURGERY | Facility: CLINIC | Age: 64
End: 2019-06-13

## 2019-06-13 VITALS
HEIGHT: 65 IN | SYSTOLIC BLOOD PRESSURE: 122 MMHG | HEART RATE: 80 BPM | BODY MASS INDEX: 28.49 KG/M2 | DIASTOLIC BLOOD PRESSURE: 74 MMHG | WEIGHT: 171 LBS | TEMPERATURE: 97.5 F

## 2019-06-13 VITALS
SYSTOLIC BLOOD PRESSURE: 122 MMHG | WEIGHT: 170.2 LBS | BODY MASS INDEX: 29.06 KG/M2 | TEMPERATURE: 98.1 F | HEIGHT: 64 IN | DIASTOLIC BLOOD PRESSURE: 84 MMHG | HEART RATE: 73 BPM

## 2019-06-13 DIAGNOSIS — K80.20 GALLSTONES: ICD-10-CM

## 2019-06-13 DIAGNOSIS — K74.60 CIRRHOSIS OF LIVER WITHOUT ASCITES, UNSPECIFIED HEPATIC CIRRHOSIS TYPE (HCC): ICD-10-CM

## 2019-06-13 DIAGNOSIS — D72.820 LYMPHOCYTOSIS: ICD-10-CM

## 2019-06-13 DIAGNOSIS — K21.00 GASTROESOPHAGEAL REFLUX DISEASE WITH ESOPHAGITIS: ICD-10-CM

## 2019-06-13 DIAGNOSIS — R10.10 PAIN OF UPPER ABDOMEN: ICD-10-CM

## 2019-06-13 DIAGNOSIS — R11.2 NAUSEA AND VOMITING, INTRACTABILITY OF VOMITING NOT SPECIFIED, UNSPECIFIED VOMITING TYPE: Primary | ICD-10-CM

## 2019-06-13 PROCEDURE — 99213 OFFICE O/P EST LOW 20 MIN: CPT | Performed by: PHYSICIAN ASSISTANT

## 2019-06-13 PROCEDURE — 99204 OFFICE O/P NEW MOD 45 MIN: CPT | Performed by: SURGERY

## 2019-06-13 RX ORDER — DEXTROSE AND SODIUM CHLORIDE 5; .45 G/100ML; G/100ML
30 INJECTION, SOLUTION INTRAVENOUS CONTINUOUS PRN
Status: CANCELLED | OUTPATIENT
Start: 2019-07-09

## 2019-06-13 NOTE — PROGRESS NOTES
Chief Complaint   Patient presents with   • Cirrhosis   • Heartburn       ENDO PROCEDURE ORDERED:    Subjective    Wong Patrick is a 64 y.o. male. he is here today for follow-up.    History of Present Illness    Patient is seen on a recheck of his cirrhosis with history of hepatitis C, GERD. Last seen 12/13/2018. Laboratory at that time showed normal CBC, AFP, INR 1.1. CMP showed CO2 of 33, otherwise normal.     The patient had studies on 06/03/2019: Liver ultrasound showed grossly negative with a lobulated liver, multiple gallstones, 4.6 mm common bile duct, right renal cyst. CBC did show a slight increased white count of 12.28. AFP was normal. INR 1.06. CMP showed sodium 134, chloride 93, CO2 of 31, otherwise normal. HCV FibroSure 0.60/F3, inflammation 0.06/A0. Last colonoscopy showed tubular adenoma removed 08/22/2018 with a recommended followup in 3 years. The patient has had previous EGD.     Patient states the Prilosec 20 mg generally does well for his heartburn. He states since he got his medications renewed around the 3rd of this month he has been having nausea and vomiting. He states he gets epigastric and right upper quadrant pain. He has been having some night sweats and felt feverish. He has hiccups today. He denied dysphagia. Bowel movements have been regular without blood or mucus. Weight is down 3 pounds since last visit. When I entered the room he did have a blue emesis bag and was hiccupping throughout his visit.    ASSESSMENT AND PLAN: Patient with right upper quadrant tenderness, nausea, vomiting, known gallstones, known hepatic fibrosis secondary to hepatitis C. His liver does appear to be improving following his treatment. Given his slightly elevated white count, nausea, vomiting, and right upper quadrant pain I did suggest surgical consultation for possible gallbladder disease. May need to consider EGD as it has been some time since this was evaluated. I believe he may have had H pylori in  the past. I did ask for Surgery to see the patient today as he sometimes has difficulty with transportation. He was able to be seen by Dr. Ibarra today. We will plan further imaging and hepatoma screening in June of next year, otherwise we will plan followup in 6 months with CBC, CMP, and INR prior. Further pending clinical course and the results of the above.       The following portions of the patient's history were reviewed and updated as appropriate:   Past Medical History:   Diagnosis Date   • Abnormal weight loss    • Acquired thrombocytopenia (CMS/HCC)    • Adenomatous polyp of colon    • Anxiety    • Biliary calculus     denies N/V, abd pain   • Chronic hepatitis C (CMS/HCC)    • Cirrhosis of liver (CMS/HCC)    • Colon polyp    • Elevated levels of transaminase & lactic acid dehydrogenase    • Generalized abdominal pain     known gallstones   • GERD (gastroesophageal reflux disease)    • Hypertension    • IBS (irritable bowel syndrome)    • Impacted cerumen    • Kidney disease     US suggest renal dz, elevated alpha 2 macroglobulins   • Nausea and vomiting    • Patient noncompliance, general    • Rectal hemorrhage    • Villous adenoma of colon    • Wheezing      Past Surgical History:   Procedure Laterality Date   • COLONOSCOPY  06/03/2013   • COLONOSCOPY N/A 8/22/2018    Procedure: COLONOSCOPY;  Surgeon: Charles Pham MD;  Location: [facility] ENDOSCOPY;  Service: Gastroenterology   • ENDOSCOPY AND COLONOSCOPY     • ESOPHAGOSCOPY / EGD      with tube   • UPPER GASTROINTESTINAL ENDOSCOPY  06/03/2013     Family History   Problem Relation Age of Onset   • Heart disease Other    • Diabetes Other        Allergies   Allergen Reactions   • Penicillins Hives     Social History     Socioeconomic History   • Marital status: Single     Spouse name: Not on file   • Number of children: Not on file   • Years of education: Not on file   • Highest education level: Not on file   Tobacco Use   • Smoking status: Current  "Every Day Smoker     Packs/day: 1.00     Types: Cigarettes   • Smokeless tobacco: Never Used   Substance and Sexual Activity   • Alcohol use: No   • Drug use: No     Current Medications:  Prior to Admission medications    Medication Sig Start Date End Date Taking? Authorizing Provider   albuterol (PROVENTIL HFA;VENTOLIN HFA) 108 (90 BASE) MCG/ACT inhaler Inhale 2 puffs 2 (two) times a day as needed for wheezing.   Yes Rafael Wang MD   busPIRone (BUSPAR) 15 MG tablet Take 15 mg by mouth 2 (two) times a day.   Yes Rafael Wang MD   fluticasone (FLONASE) 50 MCG/ACT nasal spray 2 sprays into each nostril Daily.   Yes Rafael Wang MD   loratadine (CLARITIN) 10 MG tablet TAKE 1 TABLET BY MOUTH EVERY DAY 5/11/17  Yes Rafael Wang MD   losartan (COZAAR) 50 MG tablet Take 50 mg by mouth daily.   Yes Rafale Wang MD   meclizine (ANTIVERT) 25 MG tablet Take 25 mg by mouth 2 (Two) Times a Day As Needed.   Yes Rafael Wang MD   omeprazole (priLOSEC) 20 MG capsule TAKE 1 CAPSULE BY MOUTH DAILY. 4/25/19  Yes Deny Heller PA-C   QUEtiapine (SEROquel) 100 MG tablet Take 100 mg by mouth every night.   Yes Rafael Wang MD   polyethylene glycol (GoLYTELY) 236 g solution AS DIRECTED PER INSTRUCTION SHEET FOR COLONOSCOPY 8/21/18 6/13/19  Deny Heller PA-C     Review of Systems  Review of Systems       Objective    /84 (BP Location: Left arm)   Pulse 73   Temp 98.1 °F (36.7 °C)   Ht 162.6 cm (64\")   Wt 77.2 kg (170 lb 3.2 oz)   BMI 29.21 kg/m²   Physical Exam   Constitutional: He is oriented to person, place, and time. He appears well-developed and well-nourished. No distress.   HENT:   Head: Normocephalic and atraumatic.   Eyes: EOM are normal. Pupils are equal, round, and reactive to light.   Neck: Normal range of motion.   Cardiovascular: Normal rate, regular rhythm and normal heart sounds.   Pulmonary/Chest: Effort normal and breath sounds normal. "   Abdominal: Soft. Bowel sounds are normal. He exhibits no shifting dullness, no distension, no abdominal bruit, no ascites and no mass. There is no hepatosplenomegaly. There is tenderness. There is no rigidity, no rebound, no guarding and no CVA tenderness. No hernia. Hernia confirmed negative in the ventral area.   obese   Musculoskeletal: Normal range of motion.   Neurological: He is alert and oriented to person, place, and time.   Skin: Skin is warm and dry.   Psychiatric: He has a normal mood and affect. His behavior is normal. Judgment and thought content normal.   Nursing note and vitals reviewed.    Assessment/Plan      1. Nausea and vomiting, intractability of vomiting not specified, unspecified vomiting type    2. Cirrhosis of liver without ascites, unspecified hepatic cirrhosis type (CMS/HCC)    3. Gallstones    4. Gastroesophageal reflux disease with esophagitis    5. Lymphocytosis    6. Pain of upper abdomen    .   Wong was seen today for cirrhosis and heartburn.    Diagnoses and all orders for this visit:    Nausea and vomiting, intractability of vomiting not specified, unspecified vomiting type  -     Comprehensive Metabolic Panel; Future  -     CBC Auto Differential; Future  -     Protime-INR; Future  -     Ambulatory Referral to General Surgery    Cirrhosis of liver without ascites, unspecified hepatic cirrhosis type (CMS/HCC)  Comments:  improved post treatment for HCV--F3/.60  Orders:  -     Comprehensive Metabolic Panel; Future  -     CBC Auto Differential; Future  -     Protime-INR; Future    Gallstones  -     Comprehensive Metabolic Panel; Future  -     CBC Auto Differential; Future  -     Protime-INR; Future  -     Ambulatory Referral to General Surgery    Gastroesophageal reflux disease with esophagitis    Lymphocytosis    Pain of upper abdomen  -     Comprehensive Metabolic Panel; Future  -     CBC Auto Differential; Future  -     Protime-INR; Future  -     Ambulatory Referral to General  Surgery        Orders placed during this encounter include:  Orders Placed This Encounter   Procedures   • Comprehensive Metabolic Panel     Standing Status:   Future     Standing Expiration Date:   12/25/2019   • CBC Auto Differential     Standing Status:   Future     Standing Expiration Date:   12/25/2019   • Protime-INR     Standing Status:   Future     Standing Expiration Date:   12/25/2019   • Ambulatory Referral to General Surgery     Referral Priority:   Routine     Referral Type:   Consultation     Referral Reason:   Specialty Services Required     Requested Specialty:   General Surgery     Number of Visits Requested:   1       Medications prescribed:  No orders of the defined types were placed in this encounter.    Discontinued Medications       Reason for Discontinue    polyethylene glycol (GoLYTELY) 236 g solution *Therapy completed        Requested Prescriptions      No prescriptions requested or ordered in this encounter       Review and/or summary of lab tests, radiology, procedures, medications. Review and summary of old records and obtaining of history. The risks and benefits of my recommendations, as well as other treatment options were discussed with the patient today. Questions were answered.    Follow-up: Return in about 6 months (around 12/13/2019), or if symptoms worsen or fail to improve, for lab prior.     * Surgery not found *      This document has been electronically signed by Deny Heller PA-C on June 14, 2019 2:07 PM      Results for orders placed or performed in visit on 06/03/19   Scan Slide   Result Value Ref Range    RBC Morphology Normal Normal    WBC Morphology Normal Normal    Platelet Morphology Normal Normal   HCV FibroSURE   Result Value Ref Range    Fibrosis Score 0.60 (H) 0.00 - 0.21    Fibrosis Stage Comment     Necroinflammat Activity Score 0.06 0.00 - 0.17    Necroinflammat Activity Grade A0-No activity     Alpha 2-Macroglobulins, Qn 376 (H) 110 - 276 mg/dL     Haptoglobin 183 34 - 200 mg/dL    Apolipoprotein A-1 105 101 - 178 mg/dL    Total Bilirubin 0.7 0.0 - 1.2 mg/dL    GGT 11 0 - 65 IU/L    ALT (SGPT) 13 0 - 55 IU/L    HCV Qual Interp Comment     Fibrosis Scoring: Comment     Necroinflamm Activity Scoring: Comment     Limitations: Comment     Comment Comment    CBC Auto Differential   Result Value Ref Range    WBC 12.28 (H) 3.40 - 10.80 10*3/mm3    RBC 4.95 4.14 - 5.80 10*6/mm3    Hemoglobin 15.4 13.0 - 17.7 g/dL    Hematocrit 45.0 37.5 - 51.0 %    MCV 90.9 79.0 - 97.0 fL    MCH 31.1 26.6 - 33.0 pg    MCHC 34.2 31.5 - 35.7 g/dL    RDW 13.1 12.3 - 15.4 %    RDW-SD 43.9 37.0 - 54.0 fl    MPV 11.0 6.0 - 12.0 fL    Platelets 199 140 - 450 10*3/mm3    Neutrophil % 53.0 42.7 - 76.0 %    Lymphocyte % 33.9 19.6 - 45.3 %    Monocyte % 11.2 5.0 - 12.0 %    Eosinophil % 0.9 0.3 - 6.2 %    Basophil % 0.7 0.0 - 1.5 %    Immature Grans % 0.3 0.0 - 0.5 %    Neutrophils, Absolute 6.52 1.70 - 7.00 10*3/mm3    Lymphocytes, Absolute 4.16 (H) 0.70 - 3.10 10*3/mm3    Monocytes, Absolute 1.37 (H) 0.10 - 0.90 10*3/mm3    Eosinophils, Absolute 0.11 0.00 - 0.40 10*3/mm3    Basophils, Absolute 0.08 0.00 - 0.20 10*3/mm3    Immature Grans, Absolute 0.04 0.00 - 0.05 10*3/mm3    nRBC 0.0 0.0 - 0.2 /100 WBC   AFP Tumor Marker   Result Value Ref Range    ALPHA-FETOPROTEIN 2.42 0 - 8.3 ng/mL   Protime-INR   Result Value Ref Range    Protime 13.6 11.1 - 15.3 Seconds    INR 1.06 0.80 - 1.20   Comprehensive Metabolic Panel   Result Value Ref Range    Glucose 86 65 - 99 mg/dL    BUN 12 8 - 23 mg/dL    Creatinine 1.10 0.76 - 1.27 mg/dL    Sodium 134 (L) 136 - 145 mmol/L    Potassium 4.2 3.5 - 5.2 mmol/L    Chloride 93 (L) 98 - 107 mmol/L    CO2 31.0 (H) 22.0 - 29.0 mmol/L    Calcium 9.6 8.6 - 10.5 mg/dL    Total Protein 7.1 6.0 - 8.5 g/dL    Albumin 4.30 3.50 - 5.20 g/dL    ALT (SGPT) 9 1 - 41 U/L    AST (SGOT) 13 1 - 40 U/L    Alkaline Phosphatase 73 39 - 117 U/L    Total Bilirubin 0.7 0.2 - 1.2 mg/dL     eGFR Non African Amer 68 >60 mL/min/1.73    Globulin 2.8 gm/dL    A/G Ratio 1.5 g/dL    BUN/Creatinine Ratio 10.9 7.0 - 25.0    Anion Gap 10.0 mmol/L   Results for orders placed or performed in visit on 12/13/18   CBC Auto Differential   Result Value Ref Range    WBC 7.96 3.20 - 9.80 10*3/mm3    RBC 4.89 4.37 - 5.74 10*6/mm3    Hemoglobin 15.4 13.7 - 17.3 g/dL    Hematocrit 44.4 39.0 - 49.0 %    MCV 90.8 80.0 - 98.0 fL    MCH 31.5 26.5 - 34.0 pg    MCHC 34.7 31.5 - 36.3 g/dL    RDW 13.0 11.5 - 14.5 %    RDW-SD 43.0 35.1 - 43.9 fl    MPV 11.6 8.0 - 12.0 fL    Platelets 190 150 - 450 10*3/mm3    Neutrophil % 47.9 37.0 - 80.0 %    Lymphocyte % 38.8 10.0 - 50.0 %    Monocyte % 11.6 0.0 - 12.0 %    Eosinophil % 1.0 0.0 - 7.0 %    Basophil % 0.4 0.0 - 2.0 %    Immature Grans % 0.3 0.0 - 0.5 %    Neutrophils, Absolute 3.82 2.00 - 8.60 10*3/mm3    Lymphocytes, Absolute 3.09 0.60 - 4.20 10*3/mm3    Monocytes, Absolute 0.92 (H) 0.00 - 0.90 10*3/mm3    Eosinophils, Absolute 0.08 0.00 - 0.70 10*3/mm3    Basophils, Absolute 0.03 0.00 - 0.20 10*3/mm3    Immature Grans, Absolute 0.02 0.00 - 0.02 10*3/mm3   AFP Tumor Marker   Result Value Ref Range    AFP Tumor Marker 2.6 0.0 - 8.3 ng/mL   Protime-INR   Result Value Ref Range    Protime 14.0 11.1 - 15.3 Seconds    INR 1.10 0.80 - 1.20   Comprehensive Metabolic Panel   Result Value Ref Range    Glucose 98 60 - 100 mg/dL    BUN 8 7 - 21 mg/dL    Creatinine 1.15 0.70 - 1.30 mg/dL    Sodium 138 137 - 145 mmol/L    Potassium 4.3 3.5 - 5.1 mmol/L    Chloride 96 95 - 110 mmol/L    CO2 33.0 (H) 22.0 - 31.0 mmol/L    Calcium 9.3 8.4 - 10.2 mg/dL    Total Protein 7.7 6.3 - 8.6 g/dL    Albumin 4.50 3.40 - 4.80 g/dL    ALT (SGPT) 16 (L) 21 - 72 U/L    AST (SGOT) 20 17 - 59 U/L    Alkaline Phosphatase 68 38 - 126 U/L    Total Bilirubin 0.8 0.2 - 1.3 mg/dL    eGFR Non  Amer 65 49 - 113 mL/min/1.73    Globulin 3.2 2.3 - 3.5 gm/dL    A/G Ratio 1.4 1.1 - 1.8 g/dL    BUN/Creatinine Ratio 7.0  7.0 - 25.0    Anion Gap 9.0 5.0 - 15.0 mmol/L     *Note: Due to a large number of results and/or encounters for the requested time period, some results have not been displayed. A complete set of results can be found in Results Review.       Some portions of this note have been dictated using voice recognition software and may contain errors and/or omissions.

## 2019-06-13 NOTE — PATIENT INSTRUCTIONS

## 2019-06-17 ENCOUNTER — TELEPHONE (OUTPATIENT)
Dept: SURGERY | Facility: CLINIC | Age: 64
End: 2019-06-17

## 2019-06-17 NOTE — TELEPHONE ENCOUNTER
PT IS SCHEDULED FOR GB SURG 6-25-19.    HE IS WANTING SOMETHING CALLED IN FOR NAUS AND VOMITTING TO [facility].   PT PH# [phone]

## 2019-06-19 NOTE — PROGRESS NOTES
CHIEF COMPLAINT:    Nausea and vomiting, abdominal pain    HISTORY OF PRESENT ILLNESS:    Wong Patrick is a 64 y.o. male who has a history of hepatitis and cirrhosis who is referred by gastroenterology for persistent nausea and vomiting.  He states that he has been having intermittent nausea and vomiting since 6/3/2019.  He states that it was worse last night which prompted his visit today.  He recently underwent an ultrasound of the liver that showed multiple gallstones and a normal common bile duct as well as changes consistent with cirrhosis.  Today he notes mild right upper quadrant abdominal pain which is 2 out of 10.  He does not typically note pain after eating.  His main concern is the continuous nausea and vomiting.    Past Medical History:   Diagnosis Date   • Abnormal weight loss    • Acquired thrombocytopenia (CMS/HCC)    • Adenomatous polyp of colon    • Anxiety    • Biliary calculus     denies N/V, abd pain   • Chronic hepatitis C (CMS/HCC)    • Cirrhosis of liver (CMS/HCC)    • Colon polyp    • Elevated levels of transaminase & lactic acid dehydrogenase    • Generalized abdominal pain     known gallstones   • GERD (gastroesophageal reflux disease)    • Hypertension    • IBS (irritable bowel syndrome)    • Impacted cerumen    • Kidney disease     US suggest renal dz, elevated alpha 2 macroglobulins   • Nausea and vomiting    • Patient noncompliance, general    • Rectal hemorrhage    • Villous adenoma of colon    • Wheezing        Past Surgical History:   Procedure Laterality Date   • COLONOSCOPY  06/03/2013   • COLONOSCOPY N/A 8/22/2018    Procedure: COLONOSCOPY;  Surgeon: Charles Phma MD;  Location: [facility] ENDOSCOPY;  Service: Gastroenterology   • ENDOSCOPY AND COLONOSCOPY     • ESOPHAGOSCOPY / EGD      with tube   • UPPER GASTROINTESTINAL ENDOSCOPY  06/03/2013       Prior to Admission medications    Medication Sig Start Date End Date Taking? Authorizing Provider   albuterol (PROVENTIL  HFA;VENTOLIN HFA) 108 (90 BASE) MCG/ACT inhaler Inhale 2 puffs 2 (two) times a day as needed for wheezing.   Yes Rafael Wang MD   busPIRone (BUSPAR) 15 MG tablet Take 15 mg by mouth 2 (two) times a day.   Yes Rafael Wang MD   fluticasone (FLONASE) 50 MCG/ACT nasal spray 2 sprays into each nostril Daily.   Yes Rafael Wang MD   loratadine (CLARITIN) 10 MG tablet TAKE 1 TABLET BY MOUTH EVERY DAY 5/11/17  Yes Rafael Wang MD   losartan (COZAAR) 50 MG tablet Take 50 mg by mouth daily.   Yes Rafael Wang MD   meclizine (ANTIVERT) 25 MG tablet Take 25 mg by mouth 2 (Two) Times a Day As Needed.   Yes Rafael Wang MD   omeprazole (priLOSEC) 20 MG capsule TAKE 1 CAPSULE BY MOUTH DAILY. 4/25/19  Yes Deny Heller PA-C   QUEtiapine (SEROquel) 100 MG tablet Take 100 mg by mouth every night.   Yes ProviderRafael MD       Allergies   Allergen Reactions   • Penicillins Hives       Family History   Problem Relation Age of Onset   • Heart disease Other    • Diabetes Other        Social History     Socioeconomic History   • Marital status: Single     Spouse name: Not on file   • Number of children: Not on file   • Years of education: Not on file   • Highest education level: Not on file   Tobacco Use   • Smoking status: Current Every Day Smoker     Packs/day: 1.00     Types: Cigarettes   • Smokeless tobacco: Never Used   Substance and Sexual Activity   • Alcohol use: No   • Drug use: No       Review of Systems   Constitutional: Negative for activity change, appetite change, chills and fever.   HENT: Negative for hearing loss, nosebleeds and trouble swallowing.    Respiratory: Negative for shortness of breath.    Cardiovascular: Negative for chest pain, palpitations and leg swelling.   Gastrointestinal: Positive for abdominal pain, nausea and vomiting. Negative for abdominal distention, anal bleeding, blood in stool, constipation, diarrhea and rectal pain.   Endocrine:  "Negative for cold intolerance, heat intolerance, polydipsia and polyuria.   Genitourinary: Negative for decreased urine volume, difficulty urinating, dysuria, enuresis, frequency, hematuria and urgency.   Musculoskeletal: Positive for arthralgias and back pain. Negative for gait problem, myalgias and neck pain.   Skin: Negative for color change, pallor, rash and wound.   Allergic/Immunologic: Negative for immunocompromised state.   Neurological: Negative for dizziness, seizures, weakness, light-headedness, numbness and headaches.   Hematological: Does not bruise/bleed easily.   Psychiatric/Behavioral: Negative for agitation and behavioral problems. The patient is not nervous/anxious.        Objective     /74   Pulse 80   Temp 97.5 °F (36.4 °C) (Oral)   Ht 165.1 cm (65\")   Wt 77.6 kg (171 lb)   BMI 28.46 kg/m²     Physical Exam   Constitutional: He is oriented to person, place, and time. He appears well-developed and well-nourished.   HENT:   Head: Normocephalic and atraumatic.   Nose: Nose normal.   Eyes: Conjunctivae and EOM are normal. Right eye exhibits no discharge. Left eye exhibits no discharge.   Neck: Trachea normal, normal range of motion and phonation normal. Neck supple. No JVD present. No tracheal deviation and no edema present. No thyromegaly present.   Cardiovascular: Normal rate, regular rhythm and normal heart sounds. Exam reveals no gallop and no friction rub.   No murmur heard.  Pulmonary/Chest: Effort normal and breath sounds normal. No accessory muscle usage. No respiratory distress. He has no decreased breath sounds. He has no wheezes. He has no rales. He exhibits no tenderness.   Abdominal: Soft. He exhibits no distension, no fluid wave, no ascites, no pulsatile midline mass and no mass. There is no tenderness. There is no rebound and no guarding. No hernia.   Musculoskeletal: Normal range of motion. He exhibits no edema, tenderness or deformity.   Lymphadenopathy:     He has no " cervical adenopathy.        Left: No supraclavicular adenopathy present.   Neurological: He is alert and oriented to person, place, and time. He has normal strength. No cranial nerve deficit.   Skin: Skin is warm and dry. No rash noted. He is not diaphoretic. No erythema. No pallor.   Psychiatric: He has a normal mood and affect. His speech is normal and behavior is normal. Judgment and thought content normal. Cognition and memory are normal.   Vitals reviewed.      DIAGNOSTIC DATA:    Ultrasound reviewed showing multiple stones within the gallbladder, normal common bile duct, changes of cirrhosis.    Labs from 6/3/2019 show a normal bilirubin, an INR of 1.06, a platelet count of 199    ASSESSMENT:    Cirrhosis, gallstones, nausea and vomiting    PLAN:    His primary complaint today is nausea and vomiting which may or may not be related to his gallstones.  I discussed this case with Deny Heller as well as with the patient and I believe that he does need an upper endoscopy prior to making any decisions about gallbladder surgery.  Given his history of cirrhosis abdominal surgery would not be without significant risks to him.  We will plan for upper endoscopy on 6/25/2019 per patient request.  The risks and benefits of esophagogastroduodenoscopy were discussed and he is agreeable to proceeding.          This document has been electronically signed by Bi Ibarra MD on June 19, 2019 11:34 AM

## 2019-06-20 ENCOUNTER — TELEPHONE (OUTPATIENT)
Dept: SURGERY | Facility: CLINIC | Age: 64
End: 2019-06-20

## 2019-06-20 RX ORDER — OMEPRAZOLE 20 MG/1
20 CAPSULE, DELAYED RELEASE ORAL DAILY
Qty: 30 CAPSULE | Refills: 5 | Status: SHIPPED | OUTPATIENT
Start: 2019-06-20

## 2019-06-20 RX ORDER — ONDANSETRON 4 MG/1
4 TABLET, FILM COATED ORAL DAILY PRN
Qty: 30 TABLET | Refills: 1 | Status: SHIPPED | OUTPATIENT
Start: 2019-06-20 | End: 2020-01-01

## 2019-06-20 NOTE — TELEPHONE ENCOUNTER
SCHEDULED FOR GB SURG 7-9   HAVING NAUS / ASKING IF YOU WOULD CALL IN RX FOR THIS TO [facility]   PT PH # [phone]

## 2019-07-09 ENCOUNTER — ANESTHESIA EVENT (OUTPATIENT)
Dept: GASTROENTEROLOGY | Facility: HOSPITAL | Age: 64
End: 2019-07-09

## 2019-07-09 ENCOUNTER — HOSPITAL ENCOUNTER (OUTPATIENT)
Facility: HOSPITAL | Age: 64
Setting detail: HOSPITAL OUTPATIENT SURGERY
Discharge: HOME | End: 2019-07-09
Attending: SURGERY | Admitting: SURGERY

## 2019-07-09 ENCOUNTER — ANESTHESIA (OUTPATIENT)
Dept: GASTROENTEROLOGY | Facility: HOSPITAL | Age: 64
End: 2019-07-09

## 2019-07-09 VITALS
RESPIRATION RATE: 16 BRPM | TEMPERATURE: 97.1 F | DIASTOLIC BLOOD PRESSURE: 67 MMHG | BODY MASS INDEX: 28.16 KG/M2 | WEIGHT: 169 LBS | HEIGHT: 65 IN | OXYGEN SATURATION: 97 % | SYSTOLIC BLOOD PRESSURE: 113 MMHG | HEART RATE: 82 BPM

## 2019-07-09 DIAGNOSIS — R11.2 NAUSEA AND VOMITING, INTRACTABILITY OF VOMITING NOT SPECIFIED, UNSPECIFIED VOMITING TYPE: ICD-10-CM

## 2019-07-09 PROCEDURE — 43239 EGD BIOPSY SINGLE/MULTIPLE: CPT | Performed by: SURGERY

## 2019-07-09 PROCEDURE — 88305 TISSUE EXAM BY PATHOLOGIST: CPT | Performed by: SURGERY

## 2019-07-09 PROCEDURE — 88305 TISSUE EXAM BY PATHOLOGIST: CPT | Performed by: PATHOLOGY

## 2019-07-09 PROCEDURE — 25010000002 PROPOFOL 10 MG/ML EMULSION: Performed by: NURSE ANESTHETIST, CERTIFIED REGISTERED

## 2019-07-09 RX ORDER — PROPOFOL 10 MG/ML
VIAL (ML) INTRAVENOUS AS NEEDED
Status: DISCONTINUED | OUTPATIENT
Start: 2019-07-09 | End: 2019-07-09 | Stop reason: SURG

## 2019-07-09 RX ORDER — DEXTROSE AND SODIUM CHLORIDE 5; .45 G/100ML; G/100ML
30 INJECTION, SOLUTION INTRAVENOUS CONTINUOUS PRN
Status: DISCONTINUED | OUTPATIENT
Start: 2019-07-09 | End: 2019-07-09 | Stop reason: HOSPADM

## 2019-07-09 RX ORDER — LIDOCAINE HYDROCHLORIDE 20 MG/ML
INJECTION, SOLUTION INTRAVENOUS AS NEEDED
Status: DISCONTINUED | OUTPATIENT
Start: 2019-07-09 | End: 2019-07-09 | Stop reason: SURG

## 2019-07-09 RX ORDER — URSODIOL 300 MG/1
300 CAPSULE ORAL
Qty: 90 CAPSULE | Refills: 2 | Status: ON HOLD | OUTPATIENT
Start: 2019-07-09 | End: 2020-01-01

## 2019-07-09 RX ADMIN — PROPOFOL 30 MG: 10 INJECTION, EMULSION INTRAVENOUS at 08:38

## 2019-07-09 RX ADMIN — PROPOFOL 80 MG: 10 INJECTION, EMULSION INTRAVENOUS at 08:36

## 2019-07-09 RX ADMIN — PROPOFOL 30 MG: 10 INJECTION, EMULSION INTRAVENOUS at 08:40

## 2019-07-09 RX ADMIN — DEXTROSE AND SODIUM CHLORIDE 30 ML/HR: 5; 450 INJECTION, SOLUTION INTRAVENOUS at 08:24

## 2019-07-09 RX ADMIN — LIDOCAINE HYDROCHLORIDE 100 MG: 20 INJECTION, SOLUTION INTRAVENOUS at 08:36

## 2019-07-09 NOTE — ANESTHESIA POSTPROCEDURE EVALUATION
Patient: Wong Patrick    Procedure Summary     Date:  07/09/19 Room / Location:  [facility] ENDOSCOPY 1 / [facility] ENDOSCOPY    Anesthesia Start:  0834 Anesthesia Stop:  0843    Procedure:  ESOPHAGOGASTRODUODENOSCOPY (N/A ) Diagnosis:       Nausea and vomiting, intractability of vomiting not specified, unspecified vomiting type      (Nausea and vomiting, intractability of vomiting not specified, unspecified vomiting type [R11.2])    Surgeon:  Bi Ibarra MD Provider:  Carlyle Arana CRNA    Anesthesia Type:  MAC ASA Status:  3          Anesthesia Type: MAC  Last vitals  BP   112/71 (07/09/19 0817)   Temp   97.1 °F (36.2 °C) (07/09/19 0817)   Pulse   75 (07/09/19 0817)   Resp   18 (07/09/19 0817)     SpO2   95 % (07/09/19 0817)     Post Anesthesia Care and Evaluation    Patient location during evaluation: bedside  Patient participation: waiting for patient participation  Level of consciousness: responsive to verbal stimuli  Pain management: adequate  Airway patency: patent  Anesthetic complications: No anesthetic complications  PONV Status: none  Cardiovascular status: acceptable  Respiratory status: acceptable  Hydration status: acceptable

## 2019-07-09 NOTE — ANESTHESIA PREPROCEDURE EVALUATION
Anesthesia Evaluation     NPO Solid Status: > 8 hours  NPO Liquid Status: > 2 hours           Airway   Mallampati: III  No difficulty expected  Dental    (+) edentulous    Pulmonary - normal exam   Cardiovascular - normal exam    (+) hypertension,       Neuro/Psych  (+) psychiatric history,     GI/Hepatic/Renal/Endo    (+)  GERD, GI bleeding, hepatitis, liver disease,     Musculoskeletal     Abdominal    Substance History      OB/GYN          Other                        Anesthesia Plan    ASA 3     MAC     intravenous induction   Anesthetic plan, all risks, benefits, and alternatives have been provided, discussed and informed consent has been obtained with: patient.

## 2019-07-09 NOTE — INTERVAL H&P NOTE
H&P reviewed. The patient was examined and there are no changes to the H&P.           This document has been electronically signed by Bi Ibarra MD on July 9, 2019 8:28 AM

## 2019-07-10 LAB
LAB AP CASE REPORT: NORMAL
PATH REPORT.FINAL DX SPEC: NORMAL
PATH REPORT.GROSS SPEC: NORMAL

## 2019-07-16 ENCOUNTER — OFFICE VISIT (OUTPATIENT)
Dept: SURGERY | Facility: CLINIC | Age: 64
End: 2019-07-16

## 2019-07-16 VITALS
SYSTOLIC BLOOD PRESSURE: 138 MMHG | TEMPERATURE: 97.4 F | BODY MASS INDEX: 29.69 KG/M2 | HEART RATE: 92 BPM | DIASTOLIC BLOOD PRESSURE: 76 MMHG | WEIGHT: 178.2 LBS | HEIGHT: 65 IN

## 2019-07-16 DIAGNOSIS — Z09 FOLLOW UP: Primary | ICD-10-CM

## 2019-07-16 PROCEDURE — 99212 OFFICE O/P EST SF 10 MIN: CPT | Performed by: SURGERY

## 2019-07-16 NOTE — PROGRESS NOTES
CHIEF COMPLAINT:    Chief Complaint   Patient presents with   • Follow-up     Endo results.       HISTORY OF PRESENT ILLNESS:    Wong Patrick is a 64 y.o. male who underwent upper endoscopy which showed bile reflux.  He was prescribed Actigall for this.  The patient is a very poor historian and it is unclear to me whether he is taking his Actigall or not.  However, he reports no further episodes of abdominal pain, nor any further nausea and vomiting.    EXAM:  Vitals:    07/16/19 1404   BP: 138/76   Pulse: 92   Temp: 97.4 °F (36.3 °C)         Abdomen soft    ASSESSMENT:    Bile reflux    PLAN:    Continue Actigall.  See me as needed.          This document has been electronically signed by Bi Ibarra MD on July 16, 2019 2:14 PM

## 2019-07-30 RX ORDER — OMEPRAZOLE 20 MG/1
CAPSULE, DELAYED RELEASE ORAL
Qty: 30 CAPSULE | Refills: 5 | Status: ON HOLD | OUTPATIENT
Start: 2019-07-30 | End: 2020-01-01 | Stop reason: SDUPTHER

## 2019-12-11 ENCOUNTER — TELEPHONE (OUTPATIENT)
Dept: GASTROENTEROLOGY | Facility: CLINIC | Age: 64
End: 2019-12-11

## 2019-12-11 NOTE — TELEPHONE ENCOUNTER
12/11/2019, 0807 - Yinka Shelton telephoned on patient's behalf requesting to cancel and reschedule clinical appointment with Deny Heller MS, AP-C as patient can not complete laboratory testing until Friday, December 13, 2019.  Per Gabby Neal, Medical Assistant with office of Deny Heller MS, PA-C, patient may proceed with clinical appointment as scheduled - Thursday, December 12, 2019 at 10:15 A.M. - without completion of laboratory testing.  Mr. Shelton opted to cancel and reschedule clinical appointment.  First available clinical appointment - Thursday, January 9, 2019 at 10:00 A.M.  Mr. Shelton in agreement and verbalized understanding.

## 2019-12-13 ENCOUNTER — LAB (OUTPATIENT)
Dept: LAB | Facility: HOSPITAL | Age: 64
End: 2019-12-13

## 2019-12-13 DIAGNOSIS — K80.20 GALLSTONES: ICD-10-CM

## 2019-12-13 DIAGNOSIS — R10.10 PAIN OF UPPER ABDOMEN: ICD-10-CM

## 2019-12-13 DIAGNOSIS — R11.2 NAUSEA AND VOMITING, INTRACTABILITY OF VOMITING NOT SPECIFIED, UNSPECIFIED VOMITING TYPE: ICD-10-CM

## 2019-12-13 DIAGNOSIS — K74.60 CIRRHOSIS OF LIVER WITHOUT ASCITES, UNSPECIFIED HEPATIC CIRRHOSIS TYPE (HCC): ICD-10-CM

## 2019-12-13 LAB
ALBUMIN SERPL-MCNC: 4.9 G/DL (ref 3.5–5.2)
ALBUMIN/GLOB SERPL: 1.6 G/DL
ALP SERPL-CCNC: 83 U/L (ref 39–117)
ALT SERPL W P-5'-P-CCNC: 15 U/L (ref 1–41)
ANION GAP SERPL CALCULATED.3IONS-SCNC: 9.7 MMOL/L (ref 5–15)
ANISOCYTOSIS BLD QL: ABNORMAL
AST SERPL-CCNC: 19 U/L (ref 1–40)
BILIRUB SERPL-MCNC: 0.8 MG/DL (ref 0.2–1.2)
BUN BLD-MCNC: 12 MG/DL (ref 8–23)
BUN/CREAT SERPL: 9 (ref 7–25)
CALCIUM SPEC-SCNC: 9.7 MG/DL (ref 8.6–10.5)
CHLORIDE SERPL-SCNC: 100 MMOL/L (ref 98–107)
CO2 SERPL-SCNC: 31.3 MMOL/L (ref 22–29)
CREAT BLD-MCNC: 1.34 MG/DL (ref 0.76–1.27)
DEPRECATED RDW RBC AUTO: 42.2 FL (ref 37–54)
EOSINOPHIL # BLD MANUAL: 0.12 10*3/MM3 (ref 0–0.4)
EOSINOPHIL NFR BLD MANUAL: 1 % (ref 0.3–6.2)
ERYTHROCYTE [DISTWIDTH] IN BLOOD BY AUTOMATED COUNT: 13 % (ref 12.3–15.4)
GFR SERPL CREATININE-BSD FRML MDRD: 54 ML/MIN/1.73
GLOBULIN UR ELPH-MCNC: 3.1 GM/DL
GLUCOSE BLD-MCNC: 100 MG/DL (ref 65–99)
HCT VFR BLD AUTO: 48 % (ref 37.5–51)
HGB BLD-MCNC: 17.3 G/DL (ref 13–17.7)
INR PPP: 1.11 (ref 0.8–1.2)
LYMPHOCYTES # BLD MANUAL: 5.33 10*3/MM3 (ref 0.7–3.1)
LYMPHOCYTES NFR BLD MANUAL: 11 % (ref 5–12)
LYMPHOCYTES NFR BLD MANUAL: 46 % (ref 19.6–45.3)
MCH RBC QN AUTO: 32.5 PG (ref 26.6–33)
MCHC RBC AUTO-ENTMCNC: 36 G/DL (ref 31.5–35.7)
MCV RBC AUTO: 90.2 FL (ref 79–97)
MICROCYTES BLD QL: ABNORMAL
MONOCYTES # BLD AUTO: 1.27 10*3/MM3 (ref 0.1–0.9)
NEUTROPHILS # BLD AUTO: 4.87 10*3/MM3 (ref 1.7–7)
NEUTROPHILS NFR BLD MANUAL: 42 % (ref 42.7–76)
PLAT MORPH BLD: NORMAL
PLATELET # BLD AUTO: 177 10*3/MM3 (ref 140–450)
PMV BLD AUTO: 12 FL (ref 6–12)
POTASSIUM BLD-SCNC: 4.9 MMOL/L (ref 3.5–5.2)
PROT SERPL-MCNC: 8 G/DL (ref 6–8.5)
PROTHROMBIN TIME: 14.1 SECONDS (ref 11.1–15.3)
RBC # BLD AUTO: 5.32 10*6/MM3 (ref 4.14–5.8)
SODIUM BLD-SCNC: 141 MMOL/L (ref 136–145)
WBC MORPH BLD: NORMAL
WBC NRBC COR # BLD: 11.59 10*3/MM3 (ref 3.4–10.8)

## 2019-12-13 PROCEDURE — 85007 BL SMEAR W/DIFF WBC COUNT: CPT

## 2019-12-13 PROCEDURE — 80053 COMPREHEN METABOLIC PANEL: CPT

## 2019-12-13 PROCEDURE — 85025 COMPLETE CBC W/AUTO DIFF WBC: CPT

## 2019-12-13 PROCEDURE — 36415 COLL VENOUS BLD VENIPUNCTURE: CPT

## 2019-12-13 PROCEDURE — 85610 PROTHROMBIN TIME: CPT

## 2020-01-01 ENCOUNTER — LAB (OUTPATIENT)
Dept: LAB | Facility: HOSPITAL | Age: 65
End: 2020-01-01

## 2020-01-01 ENCOUNTER — TELEPHONE (OUTPATIENT)
Dept: PULMONOLOGY | Facility: CLINIC | Age: 65
End: 2020-01-01

## 2020-01-01 ENCOUNTER — APPOINTMENT (OUTPATIENT)
Dept: CARDIOLOGY | Facility: HOSPITAL | Age: 65
End: 2020-01-01

## 2020-01-01 ENCOUNTER — OFFICE VISIT (OUTPATIENT)
Dept: GASTROENTEROLOGY | Facility: CLINIC | Age: 65
End: 2020-01-01

## 2020-01-01 ENCOUNTER — HOSPITAL ENCOUNTER (EMERGENCY)
Facility: HOSPITAL | Age: 65
Discharge: ANOTHER HOSPITAL | End: 2020-12-12
Attending: EMERGENCY MEDICINE | Admitting: EMERGENCY MEDICINE

## 2020-01-01 ENCOUNTER — READMISSION MANAGEMENT (OUTPATIENT)
Dept: CALL CENTER | Facility: HOSPITAL | Age: 65
End: 2020-01-01

## 2020-01-01 ENCOUNTER — TELEPHONE (OUTPATIENT)
Dept: SOCIAL WORK | Facility: HOSPITAL | Age: 65
End: 2020-01-01

## 2020-01-01 ENCOUNTER — TRANSCRIBE ORDERS (OUTPATIENT)
Dept: ADMINISTRATIVE | Facility: HOSPITAL | Age: 65
End: 2020-01-01

## 2020-01-01 ENCOUNTER — APPOINTMENT (OUTPATIENT)
Dept: CT IMAGING | Facility: HOSPITAL | Age: 65
End: 2020-01-01

## 2020-01-01 ENCOUNTER — PREP FOR SURGERY (OUTPATIENT)
Dept: OTHER | Facility: HOSPITAL | Age: 65
End: 2020-01-01

## 2020-01-01 ENCOUNTER — HOSPITAL ENCOUNTER (INPATIENT)
Facility: HOSPITAL | Age: 65
LOS: 6 days | Discharge: HOME | End: 2020-07-14
Attending: FAMILY MEDICINE | Admitting: FAMILY MEDICINE

## 2020-01-01 ENCOUNTER — APPOINTMENT (OUTPATIENT)
Dept: PREADMISSION TESTING | Facility: HOSPITAL | Age: 65
End: 2020-01-01

## 2020-01-01 ENCOUNTER — HOSPITAL ENCOUNTER (OUTPATIENT)
Dept: ULTRASOUND IMAGING | Facility: HOSPITAL | Age: 65
Discharge: HOME | End: 2020-06-17
Admitting: PHYSICIAN ASSISTANT

## 2020-01-01 ENCOUNTER — OUTSIDE FACILITY SERVICE (OUTPATIENT)
Dept: FAMILY MEDICINE CLINIC | Facility: CLINIC | Age: 65
End: 2020-01-01

## 2020-01-01 ENCOUNTER — HOSPITAL ENCOUNTER (INPATIENT)
Facility: HOSPITAL | Age: 65
LOS: 17 days | Discharge: HOME | End: 2020-09-11
Attending: FAMILY MEDICINE | Admitting: HOSPITALIST

## 2020-01-01 ENCOUNTER — ANESTHESIA (OUTPATIENT)
Dept: PERIOP | Facility: HOSPITAL | Age: 65
End: 2020-01-01

## 2020-01-01 ENCOUNTER — APPOINTMENT (OUTPATIENT)
Dept: ULTRASOUND IMAGING | Facility: HOSPITAL | Age: 65
End: 2020-01-01

## 2020-01-01 ENCOUNTER — ANESTHESIA (OUTPATIENT)
Dept: GASTROENTEROLOGY | Facility: HOSPITAL | Age: 65
End: 2020-01-01

## 2020-01-01 ENCOUNTER — ANESTHESIA EVENT (OUTPATIENT)
Dept: PERIOP | Facility: HOSPITAL | Age: 65
End: 2020-01-01

## 2020-01-01 ENCOUNTER — ANESTHESIA EVENT (OUTPATIENT)
Dept: GASTROENTEROLOGY | Facility: HOSPITAL | Age: 65
End: 2020-01-01

## 2020-01-01 ENCOUNTER — APPOINTMENT (OUTPATIENT)
Dept: GENERAL RADIOLOGY | Facility: HOSPITAL | Age: 65
End: 2020-01-01

## 2020-01-01 VITALS
OXYGEN SATURATION: 98 % | RESPIRATION RATE: 18 BRPM | HEART RATE: 115 BPM | SYSTOLIC BLOOD PRESSURE: 132 MMHG | HEIGHT: 66 IN | WEIGHT: 161.38 LBS | DIASTOLIC BLOOD PRESSURE: 64 MMHG | BODY MASS INDEX: 25.94 KG/M2

## 2020-01-01 VITALS
HEART RATE: 97 BPM | BODY MASS INDEX: 24.12 KG/M2 | OXYGEN SATURATION: 95 % | HEIGHT: 65 IN | RESPIRATION RATE: 18 BRPM | WEIGHT: 144.8 LBS | TEMPERATURE: 97.8 F | SYSTOLIC BLOOD PRESSURE: 108 MMHG | DIASTOLIC BLOOD PRESSURE: 65 MMHG

## 2020-01-01 VITALS
OXYGEN SATURATION: 100 % | DIASTOLIC BLOOD PRESSURE: 68 MMHG | TEMPERATURE: 98 F | SYSTOLIC BLOOD PRESSURE: 123 MMHG | HEART RATE: 94 BPM | RESPIRATION RATE: 18 BRPM

## 2020-01-01 VITALS
WEIGHT: 152 LBS | HEIGHT: 64 IN | DIASTOLIC BLOOD PRESSURE: 60 MMHG | BODY MASS INDEX: 25.95 KG/M2 | SYSTOLIC BLOOD PRESSURE: 107 MMHG | HEART RATE: 96 BPM

## 2020-01-01 VITALS
HEIGHT: 64 IN | HEART RATE: 116 BPM | WEIGHT: 161.8 LBS | SYSTOLIC BLOOD PRESSURE: 109 MMHG | DIASTOLIC BLOOD PRESSURE: 67 MMHG | BODY MASS INDEX: 27.62 KG/M2

## 2020-01-01 VITALS
HEART RATE: 103 BPM | OXYGEN SATURATION: 96 % | SYSTOLIC BLOOD PRESSURE: 122 MMHG | HEIGHT: 65 IN | RESPIRATION RATE: 15 BRPM | DIASTOLIC BLOOD PRESSURE: 65 MMHG | BODY MASS INDEX: 28.51 KG/M2 | TEMPERATURE: 98.2 F | WEIGHT: 171.1 LBS

## 2020-01-01 VITALS
DIASTOLIC BLOOD PRESSURE: 95 MMHG | HEART RATE: 116 BPM | HEIGHT: 64 IN | BODY MASS INDEX: 27.49 KG/M2 | WEIGHT: 161 LBS | SYSTOLIC BLOOD PRESSURE: 142 MMHG

## 2020-01-01 DIAGNOSIS — K80.20 GALLSTONES: ICD-10-CM

## 2020-01-01 DIAGNOSIS — K21.00 GASTROESOPHAGEAL REFLUX DISEASE WITH ESOPHAGITIS: ICD-10-CM

## 2020-01-01 DIAGNOSIS — D64.9 ACUTE ANEMIA: ICD-10-CM

## 2020-01-01 DIAGNOSIS — Z74.09 IMPAIRED FUNCTIONAL MOBILITY, BALANCE, GAIT, AND ENDURANCE: ICD-10-CM

## 2020-01-01 DIAGNOSIS — R63.4 WEIGHT LOSS: ICD-10-CM

## 2020-01-01 DIAGNOSIS — K74.60 CIRRHOSIS OF LIVER WITHOUT ASCITES, UNSPECIFIED HEPATIC CIRRHOSIS TYPE (HCC): ICD-10-CM

## 2020-01-01 DIAGNOSIS — Z01.818 PRE-OP TESTING: Primary | ICD-10-CM

## 2020-01-01 DIAGNOSIS — R59.0 MEDIASTINAL ADENOPATHY: ICD-10-CM

## 2020-01-01 DIAGNOSIS — T14.8XXA MULTIPLE SKIN TEARS: ICD-10-CM

## 2020-01-01 DIAGNOSIS — R59.0 ADENOPATHY, HILAR: ICD-10-CM

## 2020-01-01 DIAGNOSIS — R59.0 MEDIASTINAL ADENOPATHY: Primary | ICD-10-CM

## 2020-01-01 DIAGNOSIS — K74.00 HEPATIC FIBROSIS: ICD-10-CM

## 2020-01-01 DIAGNOSIS — K44.9 HIATAL HERNIA: ICD-10-CM

## 2020-01-01 DIAGNOSIS — S00.01XA ABRASION OF SCALP WITH INFECTION, INITIAL ENCOUNTER: ICD-10-CM
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
## 2020-01-01 DIAGNOSIS — R19.7 DIARRHEA, UNSPECIFIED TYPE: ICD-10-CM

## 2020-01-01 DIAGNOSIS — K72.10 END STAGE LIVER DISEASE (HCC): Primary | ICD-10-CM

## 2020-01-01 DIAGNOSIS — K76.6 PORTAL HYPERTENSION (HCC): ICD-10-CM

## 2020-01-01 DIAGNOSIS — Z74.09 DECREASED MOBILITY AND ENDURANCE: ICD-10-CM

## 2020-01-01 DIAGNOSIS — J13 PNEUMONIA OF LEFT UPPER LOBE DUE TO STREPTOCOCCUS PNEUMONIAE (HCC): ICD-10-CM

## 2020-01-01 DIAGNOSIS — K74.60 CIRRHOSIS OF LIVER WITHOUT ASCITES, UNSPECIFIED HEPATIC CIRRHOSIS TYPE (HCC): Primary | ICD-10-CM

## 2020-01-01 DIAGNOSIS — L08.9 ABRASION OF SCALP WITH INFECTION, INITIAL ENCOUNTER: ICD-10-CM

## 2020-01-01 DIAGNOSIS — J39.8 TRACHEAL MASS: Primary | ICD-10-CM

## 2020-01-01 DIAGNOSIS — J18.9 PNEUMONIA OF BOTH LUNGS DUE TO INFECTIOUS ORGANISM, UNSPECIFIED PART OF LUNG: Primary | ICD-10-CM

## 2020-01-01 DIAGNOSIS — R11.2 NON-INTRACTABLE VOMITING WITH NAUSEA, UNSPECIFIED VOMITING TYPE: ICD-10-CM

## 2020-01-01 DIAGNOSIS — R50.9 FEVER, UNSPECIFIED FEVER CAUSE: ICD-10-CM

## 2020-01-01 DIAGNOSIS — Z78.9 IMPAIRED MOBILITY AND ADLS: ICD-10-CM

## 2020-01-01 DIAGNOSIS — J90 PLEURAL EFFUSION: Primary | ICD-10-CM

## 2020-01-01 DIAGNOSIS — Z78.9 DECREASED ACTIVITIES OF DAILY LIVING (ADL): ICD-10-CM

## 2020-01-01 DIAGNOSIS — Z74.09 IMPAIRED MOBILITY AND ADLS: ICD-10-CM

## 2020-01-01 DIAGNOSIS — R79.89 ELEVATED SERUM CREATININE: ICD-10-CM

## 2020-01-01 LAB
A2 MACROGLOB SERPL-MCNC: 201 MG/DL (ref 110–276)
ACE SERPL-CCNC: 57 U/L (ref 14–82)
ADV 40+41 DNA STL QL NAA+NON-PROBE: NOT DETECTED
ALBUMIN SERPL-MCNC: 1.9 G/DL (ref 3.5–5.2)
ALBUMIN SERPL-MCNC: 2.1 G/DL (ref 2.9–4.4)
ALBUMIN SERPL-MCNC: 2.2 G/DL (ref 3.5–5.2)
ALBUMIN SERPL-MCNC: 2.3 G/DL (ref 3.5–5.2)
ALBUMIN SERPL-MCNC: 2.5 G/DL (ref 3.5–5.2)
ALBUMIN SERPL-MCNC: 2.7 G/DL (ref 3.5–5.2)
ALBUMIN SERPL-MCNC: 2.8 G/DL (ref 3.5–5.2)
ALBUMIN/GLOB SERPL: 0.4 G/DL
ALBUMIN/GLOB SERPL: 0.4 G/DL
ALBUMIN/GLOB SERPL: 0.5 G/DL
ALBUMIN/GLOB SERPL: 0.5 G/DL
ALBUMIN/GLOB SERPL: 0.6 G/DL
ALBUMIN/GLOB SERPL: 0.6 {RATIO} (ref 0.7–1.7)
ALBUMIN/GLOB SERPL: 0.7 G/DL
ALBUMIN/GLOB SERPL: 0.8 G/DL
ALBUMIN/GLOB SERPL: 0.8 G/DL
ALP SERPL-CCNC: 102 U/L (ref 39–117)
ALP SERPL-CCNC: 69 U/L (ref 39–117)
ALP SERPL-CCNC: 78 U/L (ref 39–117)
ALP SERPL-CCNC: 79 U/L (ref 39–117)
ALP SERPL-CCNC: 82 U/L (ref 39–117)
ALP SERPL-CCNC: 83 U/L (ref 39–117)
ALP SERPL-CCNC: 83 U/L (ref 39–117)
ALP SERPL-CCNC: 85 U/L (ref 39–117)
ALP SERPL-CCNC: 93 U/L (ref 39–117)
ALPHA-FETOPROTEIN: 1.85 NG/ML (ref 0–8.3)
ALPHA1 GLOB FLD ELPH-MCNC: 0.5 G/DL (ref 0–0.4)
ALPHA2 GLOB SERPL ELPH-MCNC: 1.1 G/DL (ref 0.4–1)
ALT SERPL W P-5'-P-CCNC: 12 U/L (ref 1–41)
ALT SERPL W P-5'-P-CCNC: 15 U/L (ref 1–41)
ALT SERPL W P-5'-P-CCNC: 16 U/L (ref 1–41)
ALT SERPL W P-5'-P-CCNC: 30 U/L (ref 1–41)
ALT SERPL W P-5'-P-CCNC: 32 IU/L (ref 0–55)
ALT SERPL W P-5'-P-CCNC: 33 U/L (ref 1–41)
ALT SERPL W P-5'-P-CCNC: 39 U/L (ref 1–41)
ALT SERPL W P-5'-P-CCNC: 39 U/L (ref 1–41)
ALT SERPL W P-5'-P-CCNC: 40 U/L (ref 1–41)
ALT SERPL W P-5'-P-CCNC: <5 U/L (ref 1–41)
AMMONIA BLD-SCNC: 18 UMOL/L (ref 16–60)
AMMONIA BLD-SCNC: 28 UMOL/L (ref 16–60)
AMORPH URATE CRY URNS QL MICRO: ABNORMAL /HPF
ANION GAP SERPL CALCULATED.3IONS-SCNC: 10 MMOL/L (ref 5–15)
ANION GAP SERPL CALCULATED.3IONS-SCNC: 11 MMOL/L (ref 5–15)
ANION GAP SERPL CALCULATED.3IONS-SCNC: 11 MMOL/L (ref 5–15)
ANION GAP SERPL CALCULATED.3IONS-SCNC: 14 MMOL/L (ref 5–15)
ANION GAP SERPL CALCULATED.3IONS-SCNC: 6 MMOL/L (ref 5–15)
ANION GAP SERPL CALCULATED.3IONS-SCNC: 7 MMOL/L (ref 5–15)
ANION GAP SERPL CALCULATED.3IONS-SCNC: 7.5 MMOL/L (ref 5–15)
ANION GAP SERPL CALCULATED.3IONS-SCNC: 8 MMOL/L (ref 5–15)
ANION GAP SERPL CALCULATED.3IONS-SCNC: 9 MMOL/L (ref 5–15)
ANISOCYTOSIS BLD QL: ABNORMAL
APO A-I SERPL-MCNC: 124 MG/DL (ref 101–178)
AST SERPL-CCNC: 13 U/L (ref 1–40)
AST SERPL-CCNC: 20 U/L (ref 1–40)
AST SERPL-CCNC: 24 U/L (ref 1–40)
AST SERPL-CCNC: 43 U/L (ref 1–40)
AST SERPL-CCNC: 46 U/L (ref 1–40)
AST SERPL-CCNC: 53 U/L (ref 1–40)
AST SERPL-CCNC: 56 U/L (ref 1–40)
AST SERPL-CCNC: 63 U/L (ref 1–40)
AST SERPL-CCNC: 73 U/L (ref 1–40)
ASTRO TYP 1-8 RNA STL QL NAA+NON-PROBE: NOT DETECTED
B PARAPERT DNA SPEC QL NAA+PROBE: NOT DETECTED
B PERT DNA SPEC QL NAA+PROBE: NOT DETECTED
B-GLOBULIN SERPL ELPH-MCNC: 0.5 G/DL (ref 0.7–1.3)
BACTERIA BLD CULT: ABNORMAL
BACTERIA SPEC AEROBE CULT: ABNORMAL
BACTERIA SPEC AEROBE CULT: ABNORMAL
BACTERIA SPEC AEROBE CULT: NORMAL
BACTERIA SPEC AEROBE CULT: NORMAL
BACTERIA SPEC RESP CULT: NORMAL
BACTERIA UR QL AUTO: ABNORMAL /HPF
BASOPHILS # BLD AUTO: 0 10*3/MM3 (ref 0–0.2)
BASOPHILS # BLD AUTO: 0 10*3/MM3 (ref 0–0.2)
BASOPHILS # BLD AUTO: 0.01 10*3/MM3 (ref 0–0.2)
BASOPHILS # BLD AUTO: 0.02 10*3/MM3 (ref 0–0.2)
BASOPHILS # BLD AUTO: 0.03 10*3/MM3 (ref 0–0.2)
BASOPHILS # BLD AUTO: 0.03 10*3/MM3 (ref 0–0.2)
BASOPHILS # BLD AUTO: 0.05 10*3/MM3 (ref 0–0.2)
BASOPHILS # BLD MANUAL: 0.15 10*3/MM3 (ref 0–0.2)
BASOPHILS NFR BLD AUTO: 0 % (ref 0–1.5)
BASOPHILS NFR BLD AUTO: 0 % (ref 0–1.5)
BASOPHILS NFR BLD AUTO: 0.1 % (ref 0–1.5)
BASOPHILS NFR BLD AUTO: 0.2 % (ref 0–1.5)
BASOPHILS NFR BLD AUTO: 0.3 % (ref 0–1.5)
BASOPHILS NFR BLD AUTO: 0.3 % (ref 0–1.5)
BASOPHILS NFR BLD AUTO: 0.5 % (ref 0–1.5)
BASOPHILS NFR BLD AUTO: 1 % (ref 0–1.5)
BH CV ECHO MEAS - ACS: 2.1 CM
BH CV ECHO MEAS - AO ISTHMUS: 3 CM
BH CV ECHO MEAS - AO MAX PG (FULL): 3.6 MMHG
BH CV ECHO MEAS - AO MAX PG: 9.6 MMHG
BH CV ECHO MEAS - AO MEAN PG (FULL): 1 MMHG
BH CV ECHO MEAS - AO MEAN PG: 4 MMHG
BH CV ECHO MEAS - AO ROOT AREA (BSA CORRECTED): 2.2
BH CV ECHO MEAS - AO ROOT AREA: 12.6 CM^2
BH CV ECHO MEAS - AO ROOT DIAM: 4 CM
BH CV ECHO MEAS - AO V2 MAX: 155 CM/SEC
BH CV ECHO MEAS - AO V2 MEAN: 86.6 CM/SEC
BH CV ECHO MEAS - AO V2 VTI: 23.4 CM
BH CV ECHO MEAS - ASC AORTA: 3.3 CM
BH CV ECHO MEAS - AVA(I,A): 2.9 CM^2
BH CV ECHO MEAS - AVA(I,D): 2.9 CM^2
BH CV ECHO MEAS - AVA(V,A): 2.7 CM^2
BH CV ECHO MEAS - AVA(V,D): 2.7 CM^2
BH CV ECHO MEAS - BSA(HAYCOCK): 1.9 M^2
BH CV ECHO MEAS - BSA: 1.8 M^2
BH CV ECHO MEAS - BZI_BMI: 28 KILOGRAMS/M^2
BH CV ECHO MEAS - BZI_METRIC_HEIGHT: 165.1 CM
BH CV ECHO MEAS - BZI_METRIC_WEIGHT: 76.2 KG
BH CV ECHO MEAS - EDV(CUBED): 91.1 ML
BH CV ECHO MEAS - EDV(MOD-SP2): 47.2 ML
BH CV ECHO MEAS - EDV(MOD-SP4): 77.2 ML
BH CV ECHO MEAS - EDV(TEICH): 92.4 ML
BH CV ECHO MEAS - EF(CUBED): 74.9 %
BH CV ECHO MEAS - EF(MOD-SP2): 72.2 %
BH CV ECHO MEAS - EF(MOD-SP4): 68 %
BH CV ECHO MEAS - EF(TEICH): 66.9 %
BH CV ECHO MEAS - ESV(CUBED): 22.9 ML
BH CV ECHO MEAS - ESV(MOD-SP2): 13.1 ML
BH CV ECHO MEAS - ESV(MOD-SP4): 24.7 ML
BH CV ECHO MEAS - ESV(TEICH): 30.6 ML
BH CV ECHO MEAS - FS: 36.9 %
BH CV ECHO MEAS - IVS/LVPW: 1.1
BH CV ECHO MEAS - IVSD: 1.1 CM
BH CV ECHO MEAS - LA DIMENSION: 3.9 CM
BH CV ECHO MEAS - LA/AO: 0.98
BH CV ECHO MEAS - LV DIASTOLIC VOL/BSA (35-75): 42 ML/M^2
BH CV ECHO MEAS - LV MASS(C)D: 158.2 GRAMS
BH CV ECHO MEAS - LV MASS(C)DI: 86.1 GRAMS/M^2
BH CV ECHO MEAS - LV MAX PG: 6.1 MMHG
BH CV ECHO MEAS - LV MEAN PG: 3 MMHG
BH CV ECHO MEAS - LV SYSTOLIC VOL/BSA (12-30): 13.4 ML/M^2
BH CV ECHO MEAS - LV V1 MAX: 123 CM/SEC
BH CV ECHO MEAS - LV V1 MEAN: 73.5 CM/SEC
BH CV ECHO MEAS - LV V1 VTI: 19.6 CM
BH CV ECHO MEAS - LVIDD: 4.5 CM
BH CV ECHO MEAS - LVIDS: 2.8 CM
BH CV ECHO MEAS - LVLD AP2: 7.4 CM
BH CV ECHO MEAS - LVLD AP4: 7.4 CM
BH CV ECHO MEAS - LVLS AP2: 4.4 CM
BH CV ECHO MEAS - LVLS AP4: 5.9 CM
BH CV ECHO MEAS - LVOT AREA (M): 3.5 CM^2
BH CV ECHO MEAS - LVOT AREA: 3.5 CM^2
BH CV ECHO MEAS - LVOT DIAM: 2.1 CM
BH CV ECHO MEAS - LVPWD: 1 CM
BH CV ECHO MEAS - MR MAX PG: 139.7 MMHG
BH CV ECHO MEAS - MR MAX VEL: 591 CM/SEC
BH CV ECHO MEAS - MV A MAX VEL: 72 CM/SEC
BH CV ECHO MEAS - MV DEC SLOPE: 487 CM/SEC^2
BH CV ECHO MEAS - MV E MAX VEL: 94.7 CM/SEC
BH CV ECHO MEAS - MV E/A: 1.3
BH CV ECHO MEAS - MV MAX PG: 4.2 MMHG
BH CV ECHO MEAS - MV MEAN PG: 2 MMHG
BH CV ECHO MEAS - MV P1/2T MAX VEL: 103 CM/SEC
BH CV ECHO MEAS - MV P1/2T: 61.9 MSEC
BH CV ECHO MEAS - MV V2 MAX: 103 CM/SEC
BH CV ECHO MEAS - MV V2 MEAN: 61.9 CM/SEC
BH CV ECHO MEAS - MV V2 VTI: 19.3 CM
BH CV ECHO MEAS - MVA P1/2T LCG: 2.1 CM^2
BH CV ECHO MEAS - MVA(P1/2T): 3.6 CM^2
BH CV ECHO MEAS - MVA(VTI): 3.5 CM^2
BH CV ECHO MEAS - PA MAX PG: 6 MMHG
BH CV ECHO MEAS - PA V2 MAX: 122 CM/SEC
BH CV ECHO MEAS - RAP SYSTOLE: 10 MMHG
BH CV ECHO MEAS - RVDD: 2.9 CM
BH CV ECHO MEAS - RVSP: 45 MMHG
BH CV ECHO MEAS - SI(AO): 160.1 ML/M^2
BH CV ECHO MEAS - SI(CUBED): 37.1 ML/M^2
BH CV ECHO MEAS - SI(LVOT): 37 ML/M^2
BH CV ECHO MEAS - SI(MOD-SP2): 18.6 ML/M^2
BH CV ECHO MEAS - SI(MOD-SP4): 28.6 ML/M^2
BH CV ECHO MEAS - SI(TEICH): 33.7 ML/M^2
BH CV ECHO MEAS - SV(AO): 294.1 ML
BH CV ECHO MEAS - SV(CUBED): 68.2 ML
BH CV ECHO MEAS - SV(LVOT): 67.9 ML
BH CV ECHO MEAS - SV(MOD-SP2): 34.1 ML
BH CV ECHO MEAS - SV(MOD-SP4): 52.5 ML
BH CV ECHO MEAS - SV(TEICH): 61.8 ML
BH CV ECHO MEAS - TR MAX VEL: 267 CM/SEC
BILIRUB CONJ SERPL-MCNC: <0.2 MG/DL (ref 0–0.3)
BILIRUB INDIRECT SERPL-MCNC: NORMAL MG/DL
BILIRUB SERPL-MCNC: 0.2 MG/DL (ref 0–1.2)
BILIRUB SERPL-MCNC: 0.3 MG/DL (ref 0–1.2)
BILIRUB SERPL-MCNC: 0.4 MG/DL (ref 0–1.2)
BILIRUB SERPL-MCNC: 0.4 MG/DL (ref 0–1.2)
BILIRUB SERPL-MCNC: 0.9 MG/DL (ref 0–1.2)
BILIRUB UR QL STRIP: ABNORMAL
BUN SERPL-MCNC: 10 MG/DL (ref 8–23)
BUN SERPL-MCNC: 11 MG/DL (ref 8–23)
BUN SERPL-MCNC: 12 MG/DL (ref 8–23)
BUN SERPL-MCNC: 12 MG/DL (ref 8–23)
BUN SERPL-MCNC: 13 MG/DL (ref 8–23)
BUN SERPL-MCNC: 15 MG/DL (ref 8–23)
BUN SERPL-MCNC: 17 MG/DL (ref 8–23)
BUN SERPL-MCNC: 18 MG/DL (ref 8–23)
BUN SERPL-MCNC: 19 MG/DL (ref 8–23)
BUN SERPL-MCNC: 21 MG/DL (ref 8–23)
BUN SERPL-MCNC: 21 MG/DL (ref 8–23)
BUN SERPL-MCNC: 22 MG/DL (ref 8–23)
BUN SERPL-MCNC: 22 MG/DL (ref 8–23)
BUN SERPL-MCNC: 23 MG/DL (ref 8–23)
BUN SERPL-MCNC: 25 MG/DL (ref 8–23)
BUN SERPL-MCNC: 26 MG/DL (ref 8–23)
BUN SERPL-MCNC: 26 MG/DL (ref 8–23)
BUN SERPL-MCNC: 28 MG/DL (ref 8–23)
BUN SERPL-MCNC: 29 MG/DL (ref 8–23)
BUN SERPL-MCNC: 30 MG/DL (ref 8–23)
BUN SERPL-MCNC: 33 MG/DL (ref 8–23)
BUN SERPL-MCNC: 36 MG/DL (ref 8–23)
BUN/CREAT SERPL: 10 (ref 7–25)
BUN/CREAT SERPL: 11.6 (ref 7–25)
BUN/CREAT SERPL: 11.9 (ref 7–25)
BUN/CREAT SERPL: 12.8 (ref 7–25)
BUN/CREAT SERPL: 14.5 (ref 7–25)
BUN/CREAT SERPL: 16 (ref 7–25)
BUN/CREAT SERPL: 16.3 (ref 7–25)
BUN/CREAT SERPL: 16.7 (ref 7–25)
BUN/CREAT SERPL: 20.3 (ref 7–25)
BUN/CREAT SERPL: 20.7 (ref 7–25)
BUN/CREAT SERPL: 21.7 (ref 7–25)
BUN/CREAT SERPL: 26.2 (ref 7–25)
BUN/CREAT SERPL: 27.1 (ref 7–25)
BUN/CREAT SERPL: 27.3 (ref 7–25)
BUN/CREAT SERPL: 27.7 (ref 7–25)
BUN/CREAT SERPL: 28 (ref 7–25)
BUN/CREAT SERPL: 29.1 (ref 7–25)
BUN/CREAT SERPL: 29.5 (ref 7–25)
BUN/CREAT SERPL: 33.3 (ref 7–25)
BUN/CREAT SERPL: 34.2 (ref 7–25)
BUN/CREAT SERPL: 34.5 (ref 7–25)
BUN/CREAT SERPL: 35.1 (ref 7–25)
BUN/CREAT SERPL: 37.5 (ref 7–25)
BUN/CREAT SERPL: 9.3 (ref 7–25)
C CAYETANENSIS DNA STL QL NAA+NON-PROBE: NOT DETECTED
C DIFF TOX GENS STL QL NAA+PROBE: NEGATIVE
C PNEUM DNA NPH QL NAA+NON-PROBE: NOT DETECTED
CALCIUM SPEC-SCNC: 7 MG/DL (ref 8.6–10.5)
CALCIUM SPEC-SCNC: 7.2 MG/DL (ref 8.6–10.5)
CALCIUM SPEC-SCNC: 7.3 MG/DL (ref 8.6–10.5)
CALCIUM SPEC-SCNC: 7.3 MG/DL (ref 8.6–10.5)
CALCIUM SPEC-SCNC: 7.4 MG/DL (ref 8.6–10.5)
CALCIUM SPEC-SCNC: 7.8 MG/DL (ref 8.6–10.5)
CALCIUM SPEC-SCNC: 7.9 MG/DL (ref 8.6–10.5)
CALCIUM SPEC-SCNC: 8 MG/DL (ref 8.6–10.5)
CALCIUM SPEC-SCNC: 8 MG/DL (ref 8.6–10.5)
CALCIUM SPEC-SCNC: 8.1 MG/DL (ref 8.6–10.5)
CALCIUM SPEC-SCNC: 8.2 MG/DL (ref 8.6–10.5)
CALCIUM SPEC-SCNC: 8.3 MG/DL (ref 8.6–10.5)
CALCIUM SPEC-SCNC: 8.5 MG/DL (ref 8.6–10.5)
CALCIUM SPEC-SCNC: 9.2 MG/DL (ref 8.6–10.5)
CAMPY SP DNA.DIARRHEA STL QL NAA+PROBE: NOT DETECTED
CHLORIDE SERPL-SCNC: 100 MMOL/L (ref 98–107)
CHLORIDE SERPL-SCNC: 100 MMOL/L (ref 98–107)
CHLORIDE SERPL-SCNC: 101 MMOL/L (ref 98–107)
CHLORIDE SERPL-SCNC: 102 MMOL/L (ref 98–107)
CHLORIDE SERPL-SCNC: 103 MMOL/L (ref 98–107)
CHLORIDE SERPL-SCNC: 104 MMOL/L (ref 98–107)
CHLORIDE SERPL-SCNC: 104 MMOL/L (ref 98–107)
CHLORIDE SERPL-SCNC: 105 MMOL/L (ref 98–107)
CHLORIDE SERPL-SCNC: 106 MMOL/L (ref 98–107)
CHLORIDE SERPL-SCNC: 108 MMOL/L (ref 98–107)
CHLORIDE SERPL-SCNC: 95 MMOL/L (ref 98–107)
CHLORIDE SERPL-SCNC: 95 MMOL/L (ref 98–107)
CHLORIDE SERPL-SCNC: 96 MMOL/L (ref 98–107)
CHLORIDE SERPL-SCNC: 97 MMOL/L (ref 98–107)
CHLORIDE SERPL-SCNC: 98 MMOL/L (ref 98–107)
CHLORIDE SERPL-SCNC: 99 MMOL/L (ref 98–107)
CHLORIDE SERPL-SCNC: 99 MMOL/L (ref 98–107)
CHOLEST SERPL-MCNC: 56 MG/DL (ref 0–200)
CK SERPL-CCNC: 45 U/L (ref 20–200)
CLARITY UR: ABNORMAL
CO2 SERPL-SCNC: 21 MMOL/L (ref 22–29)
CO2 SERPL-SCNC: 24 MMOL/L (ref 22–29)
CO2 SERPL-SCNC: 25 MMOL/L (ref 22–29)
CO2 SERPL-SCNC: 25 MMOL/L (ref 22–29)
CO2 SERPL-SCNC: 26 MMOL/L (ref 22–29)
CO2 SERPL-SCNC: 27 MMOL/L (ref 22–29)
CO2 SERPL-SCNC: 28 MMOL/L (ref 22–29)
CO2 SERPL-SCNC: 28.5 MMOL/L (ref 22–29)
CO2 SERPL-SCNC: 29 MMOL/L (ref 22–29)
CO2 SERPL-SCNC: 31 MMOL/L (ref 22–29)
CO2 SERPL-SCNC: 31 MMOL/L (ref 22–29)
CO2 SERPL-SCNC: 33 MMOL/L (ref 22–29)
CO2 SERPL-SCNC: 34 MMOL/L (ref 22–29)
CO2 SERPL-SCNC: 34 MMOL/L (ref 22–29)
COLOR UR: ABNORMAL
CREAT SERPL-MCNC: 0.74 MG/DL (ref 0.76–1.27)
CREAT SERPL-MCNC: 0.74 MG/DL (ref 0.76–1.27)
CREAT SERPL-MCNC: 0.75 MG/DL (ref 0.76–1.27)
CREAT SERPL-MCNC: 0.75 MG/DL (ref 0.76–1.27)
CREAT SERPL-MCNC: 0.76 MG/DL (ref 0.76–1.27)
CREAT SERPL-MCNC: 0.76 MG/DL (ref 0.76–1.27)
CREAT SERPL-MCNC: 0.77 MG/DL (ref 0.76–1.27)
CREAT SERPL-MCNC: 0.78 MG/DL (ref 0.76–1.27)
CREAT SERPL-MCNC: 0.8 MG/DL (ref 0.76–1.27)
CREAT SERPL-MCNC: 0.82 MG/DL (ref 0.76–1.27)
CREAT SERPL-MCNC: 0.83 MG/DL (ref 0.76–1.27)
CREAT SERPL-MCNC: 0.83 MG/DL (ref 0.76–1.27)
CREAT SERPL-MCNC: 0.84 MG/DL (ref 0.76–1.27)
CREAT SERPL-MCNC: 0.84 MG/DL (ref 0.76–1.27)
CREAT SERPL-MCNC: 0.86 MG/DL (ref 0.76–1.27)
CREAT SERPL-MCNC: 0.87 MG/DL (ref 0.76–1.27)
CREAT SERPL-MCNC: 0.95 MG/DL (ref 0.76–1.27)
CREAT SERPL-MCNC: 0.96 MG/DL (ref 0.76–1.27)
CREAT SERPL-MCNC: 0.99 MG/DL (ref 0.76–1.27)
CREAT SERPL-MCNC: 1 MG/DL (ref 0.76–1.27)
CREAT SERPL-MCNC: 1.07 MG/DL (ref 0.76–1.27)
CREAT SERPL-MCNC: 1.29 MG/DL (ref 0.76–1.27)
CREAT SERPL-MCNC: 1.32 MG/DL (ref 0.76–1.27)
CREAT SERPL-MCNC: 1.64 MG/DL (ref 0.76–1.27)
CRP SERPL-MCNC: 15.36 MG/DL (ref 0–0.5)
CRYPTOSP STL CULT: NOT DETECTED
CYTO UR: NORMAL
CYTOLOGIST CVX/VAG CYTO: NORMAL
D-LACTATE SERPL-SCNC: 1.8 MMOL/L (ref 0.5–2)
D-LACTATE SERPL-SCNC: 3.1 MMOL/L (ref 0.5–2)
DEPRECATED RDW RBC AUTO: 47 FL (ref 37–54)
DEPRECATED RDW RBC AUTO: 47.6 FL (ref 37–54)
DEPRECATED RDW RBC AUTO: 47.8 FL (ref 37–54)
DEPRECATED RDW RBC AUTO: 47.9 FL (ref 37–54)
DEPRECATED RDW RBC AUTO: 48.4 FL (ref 37–54)
DEPRECATED RDW RBC AUTO: 49 FL (ref 37–54)
DEPRECATED RDW RBC AUTO: 49 FL (ref 37–54)
DEPRECATED RDW RBC AUTO: 49.4 FL (ref 37–54)
DEPRECATED RDW RBC AUTO: 49.7 FL (ref 37–54)
DEPRECATED RDW RBC AUTO: 49.7 FL (ref 37–54)
DEPRECATED RDW RBC AUTO: 50.7 FL (ref 37–54)
DEPRECATED RDW RBC AUTO: 51.5 FL (ref 37–54)
DEPRECATED RDW RBC AUTO: 52.1 FL (ref 37–54)
DEPRECATED RDW RBC AUTO: 52.2 FL (ref 37–54)
DEPRECATED RDW RBC AUTO: 52.8 FL (ref 37–54)
DEPRECATED RDW RBC AUTO: 52.9 FL (ref 37–54)
DEPRECATED RDW RBC AUTO: 54.4 FL (ref 37–54)
DEPRECATED RDW RBC AUTO: 54.5 FL (ref 37–54)
DEPRECATED RDW RBC AUTO: 55.5 FL (ref 37–54)
DEPRECATED RDW RBC AUTO: 55.7 FL (ref 37–54)
DEPRECATED RDW RBC AUTO: 55.7 FL (ref 37–54)
DEPRECATED RDW RBC AUTO: 55.8 FL (ref 37–54)
DEPRECATED RDW RBC AUTO: 56.6 FL (ref 37–54)
E COLI DNA SPEC QL NAA+PROBE: NOT DETECTED
E HISTOLYT AG STL-ACNC: NOT DETECTED
EAEC PAA PLAS AGGR+AATA ST NAA+NON-PRB: NOT DETECTED
EC STX1 + STX2 GENES STL NAA+PROBE: NOT DETECTED
EOSINOPHIL # BLD AUTO: 0 10*3/MM3 (ref 0–0.4)
EOSINOPHIL # BLD AUTO: 0.01 10*3/MM3 (ref 0–0.4)
EOSINOPHIL # BLD AUTO: 0.02 10*3/MM3 (ref 0–0.4)
EOSINOPHIL # BLD AUTO: 0.03 10*3/MM3 (ref 0–0.4)
EOSINOPHIL # BLD AUTO: 0.03 10*3/MM3 (ref 0–0.4)
EOSINOPHIL # BLD AUTO: 0.04 10*3/MM3 (ref 0–0.4)
EOSINOPHIL # BLD AUTO: 0.06 10*3/MM3 (ref 0–0.4)
EOSINOPHIL # BLD AUTO: 0.09 10*3/MM3 (ref 0–0.4)
EOSINOPHIL # BLD AUTO: 0.15 10*3/MM3 (ref 0–0.4)
EOSINOPHIL # BLD MANUAL: 0.15 10*3/MM3 (ref 0–0.4)
EOSINOPHIL NFR BLD AUTO: 0 % (ref 0.3–6.2)
EOSINOPHIL NFR BLD AUTO: 0.1 % (ref 0.3–6.2)
EOSINOPHIL NFR BLD AUTO: 0.3 % (ref 0.3–6.2)
EOSINOPHIL NFR BLD AUTO: 0.4 % (ref 0.3–6.2)
EOSINOPHIL NFR BLD AUTO: 0.5 % (ref 0.3–6.2)
EOSINOPHIL NFR BLD AUTO: 0.7 % (ref 0.3–6.2)
EOSINOPHIL NFR BLD AUTO: 0.8 % (ref 0.3–6.2)
EOSINOPHIL NFR BLD AUTO: 1.7 % (ref 0.3–6.2)
EOSINOPHIL NFR BLD AUTO: 2.6 % (ref 0.3–6.2)
EOSINOPHIL NFR BLD MANUAL: 1 % (ref 0.3–6.2)
EPEC EAE GENE STL QL NAA+NON-PROBE: NOT DETECTED
ERYTHROCYTE [DISTWIDTH] IN BLOOD BY AUTOMATED COUNT: 14.6 % (ref 12.3–15.4)
ERYTHROCYTE [DISTWIDTH] IN BLOOD BY AUTOMATED COUNT: 14.7 % (ref 12.3–15.4)
ERYTHROCYTE [DISTWIDTH] IN BLOOD BY AUTOMATED COUNT: 14.7 % (ref 12.3–15.4)
ERYTHROCYTE [DISTWIDTH] IN BLOOD BY AUTOMATED COUNT: 14.9 % (ref 12.3–15.4)
ERYTHROCYTE [DISTWIDTH] IN BLOOD BY AUTOMATED COUNT: 14.9 % (ref 12.3–15.4)
ERYTHROCYTE [DISTWIDTH] IN BLOOD BY AUTOMATED COUNT: 15 % (ref 12.3–15.4)
ERYTHROCYTE [DISTWIDTH] IN BLOOD BY AUTOMATED COUNT: 15.1 % (ref 12.3–15.4)
ERYTHROCYTE [DISTWIDTH] IN BLOOD BY AUTOMATED COUNT: 15.2 % (ref 12.3–15.4)
ERYTHROCYTE [DISTWIDTH] IN BLOOD BY AUTOMATED COUNT: 15.4 % (ref 12.3–15.4)
ERYTHROCYTE [DISTWIDTH] IN BLOOD BY AUTOMATED COUNT: 15.4 % (ref 12.3–15.4)
ERYTHROCYTE [DISTWIDTH] IN BLOOD BY AUTOMATED COUNT: 15.6 % (ref 12.3–15.4)
ERYTHROCYTE [DISTWIDTH] IN BLOOD BY AUTOMATED COUNT: 15.9 % (ref 12.3–15.4)
ERYTHROCYTE [DISTWIDTH] IN BLOOD BY AUTOMATED COUNT: 15.9 % (ref 12.3–15.4)
ERYTHROCYTE [DISTWIDTH] IN BLOOD BY AUTOMATED COUNT: 16.2 % (ref 12.3–15.4)
ERYTHROCYTE [DISTWIDTH] IN BLOOD BY AUTOMATED COUNT: 16.6 % (ref 12.3–15.4)
ERYTHROCYTE [DISTWIDTH] IN BLOOD BY AUTOMATED COUNT: 16.7 % (ref 12.3–15.4)
ERYTHROCYTE [DISTWIDTH] IN BLOOD BY AUTOMATED COUNT: 16.8 % (ref 12.3–15.4)
ERYTHROCYTE [DISTWIDTH] IN BLOOD BY AUTOMATED COUNT: 16.8 % (ref 12.3–15.4)
ERYTHROCYTE [DISTWIDTH] IN BLOOD BY AUTOMATED COUNT: 16.9 % (ref 12.3–15.4)
ERYTHROCYTE [DISTWIDTH] IN BLOOD BY AUTOMATED COUNT: 17 % (ref 12.3–15.4)
ERYTHROCYTE [DISTWIDTH] IN BLOOD BY AUTOMATED COUNT: 17.1 % (ref 12.3–15.4)
ERYTHROCYTE [DISTWIDTH] IN BLOOD BY AUTOMATED COUNT: 17.2 % (ref 12.3–15.4)
ERYTHROCYTE [DISTWIDTH] IN BLOOD BY AUTOMATED COUNT: 18.6 % (ref 12.3–15.4)
ETEC LTA+ST1A+ST1B TOX ST NAA+NON-PROBE: NOT DETECTED
FERRITIN SERPL-MCNC: 150 NG/ML (ref 30–400)
FERRITIN SERPL-MCNC: 160 NG/ML (ref 30–400)
FERRITIN SERPL-MCNC: 424.5 NG/ML (ref 30–400)
FIBROSIS SCORING:: ABNORMAL
FIBROSIS STAGE SERPL QL: ABNORMAL
FLUAV H1 2009 PAND RNA NPH QL NAA+PROBE: NOT DETECTED
FLUAV H1 HA GENE NPH QL NAA+PROBE: NOT DETECTED
FLUAV H3 RNA NPH QL NAA+PROBE: NOT DETECTED
FLUAV SUBTYP SPEC NAA+PROBE: NOT DETECTED
FLUBV RNA ISLT QL NAA+PROBE: NOT DETECTED
FOLATE SERPL-MCNC: 2.38 NG/ML (ref 4.78–24.2)
FUNGUS ISLT: NORMAL
FUNGUS ISLT: NORMAL
FUNGUS WND CULT: ABNORMAL
FUNGUS WND CULT: NORMAL
FUNGUS WND CULT: NORMAL
G LAMBLIA DNA SPEC QL NAA+PROBE: NOT DETECTED
GAMMA GLOB SERPL ELPH-MCNC: 1.4 G/DL (ref 0.4–1.8)
GFR SERPL CREATININE-BSD FRML MDRD: 101 ML/MIN/1.73
GFR SERPL CREATININE-BSD FRML MDRD: 102 ML/MIN/1.73
GFR SERPL CREATININE-BSD FRML MDRD: 104 ML/MIN/1.73
GFR SERPL CREATININE-BSD FRML MDRD: 104 ML/MIN/1.73
GFR SERPL CREATININE-BSD FRML MDRD: 105 ML/MIN/1.73
GFR SERPL CREATININE-BSD FRML MDRD: 105 ML/MIN/1.73
GFR SERPL CREATININE-BSD FRML MDRD: 107 ML/MIN/1.73
GFR SERPL CREATININE-BSD FRML MDRD: 107 ML/MIN/1.73
GFR SERPL CREATININE-BSD FRML MDRD: 43 ML/MIN/1.73
GFR SERPL CREATININE-BSD FRML MDRD: 55 ML/MIN/1.73
GFR SERPL CREATININE-BSD FRML MDRD: 56 ML/MIN/1.73
GFR SERPL CREATININE-BSD FRML MDRD: 70 ML/MIN/1.73
GFR SERPL CREATININE-BSD FRML MDRD: 75 ML/MIN/1.73
GFR SERPL CREATININE-BSD FRML MDRD: 76 ML/MIN/1.73
GFR SERPL CREATININE-BSD FRML MDRD: 79 ML/MIN/1.73
GFR SERPL CREATININE-BSD FRML MDRD: 80 ML/MIN/1.73
GFR SERPL CREATININE-BSD FRML MDRD: 89 ML/MIN/1.73
GFR SERPL CREATININE-BSD FRML MDRD: 90 ML/MIN/1.73
GFR SERPL CREATININE-BSD FRML MDRD: 92 ML/MIN/1.73
GFR SERPL CREATININE-BSD FRML MDRD: 92 ML/MIN/1.73
GFR SERPL CREATININE-BSD FRML MDRD: 94 ML/MIN/1.73
GFR SERPL CREATININE-BSD FRML MDRD: 94 ML/MIN/1.73
GFR SERPL CREATININE-BSD FRML MDRD: 95 ML/MIN/1.73
GFR SERPL CREATININE-BSD FRML MDRD: 98 ML/MIN/1.73
GGT SERPL-CCNC: 27 IU/L (ref 0–65)
GIANT PLATELETS: ABNORMAL
GLOBULIN SER CALC-MCNC: 3.6 G/DL (ref 2.2–3.9)
GLOBULIN UR ELPH-MCNC: 3.4 GM/DL
GLOBULIN UR ELPH-MCNC: 3.6 GM/DL
GLOBULIN UR ELPH-MCNC: 3.8 GM/DL
GLOBULIN UR ELPH-MCNC: 4 GM/DL
GLOBULIN UR ELPH-MCNC: 4.1 GM/DL
GLOBULIN UR ELPH-MCNC: 4.2 GM/DL
GLOBULIN UR ELPH-MCNC: 4.7 GM/DL
GLOBULIN UR ELPH-MCNC: 5.9 GM/DL
GLUCOSE BLDC GLUCOMTR-MCNC: 113 MG/DL (ref 70–130)
GLUCOSE BLDC GLUCOMTR-MCNC: 128 MG/DL (ref 70–130)
GLUCOSE SERPL-MCNC: 100 MG/DL (ref 65–99)
GLUCOSE SERPL-MCNC: 102 MG/DL (ref 65–99)
GLUCOSE SERPL-MCNC: 104 MG/DL (ref 65–99)
GLUCOSE SERPL-MCNC: 104 MG/DL (ref 65–99)
GLUCOSE SERPL-MCNC: 111 MG/DL (ref 65–99)
GLUCOSE SERPL-MCNC: 113 MG/DL (ref 65–99)
GLUCOSE SERPL-MCNC: 115 MG/DL (ref 65–99)
GLUCOSE SERPL-MCNC: 116 MG/DL (ref 65–99)
GLUCOSE SERPL-MCNC: 118 MG/DL (ref 65–99)
GLUCOSE SERPL-MCNC: 119 MG/DL (ref 65–99)
GLUCOSE SERPL-MCNC: 120 MG/DL (ref 65–99)
GLUCOSE SERPL-MCNC: 122 MG/DL (ref 65–99)
GLUCOSE SERPL-MCNC: 124 MG/DL (ref 65–99)
GLUCOSE SERPL-MCNC: 129 MG/DL (ref 65–99)
GLUCOSE SERPL-MCNC: 137 MG/DL (ref 65–99)
GLUCOSE SERPL-MCNC: 81 MG/DL (ref 65–99)
GLUCOSE SERPL-MCNC: 86 MG/DL (ref 65–99)
GLUCOSE SERPL-MCNC: 89 MG/DL (ref 65–99)
GLUCOSE SERPL-MCNC: 93 MG/DL (ref 65–99)
GLUCOSE SERPL-MCNC: 95 MG/DL (ref 65–99)
GLUCOSE SERPL-MCNC: 95 MG/DL (ref 65–99)
GLUCOSE SERPL-MCNC: 96 MG/DL (ref 65–99)
GLUCOSE SERPL-MCNC: 98 MG/DL (ref 65–99)
GLUCOSE SERPL-MCNC: 99 MG/DL (ref 65–99)
GLUCOSE UR STRIP-MCNC: NEGATIVE MG/DL
GRAM STN SPEC: ABNORMAL
GRAM STN SPEC: NORMAL
HADV DNA SPEC NAA+PROBE: NOT DETECTED
HAPTOGLOB SERPL-MCNC: 95 MG/DL (ref 32–363)
HBA1C MFR BLD: 4.7 % (ref 4.8–5.6)
HCOV 229E RNA SPEC QL NAA+PROBE: NOT DETECTED
HCOV HKU1 RNA SPEC QL NAA+PROBE: NOT DETECTED
HCOV NL63 RNA SPEC QL NAA+PROBE: NOT DETECTED
HCOV OC43 RNA SPEC QL NAA+PROBE: NOT DETECTED
HCT VFR BLD AUTO: 13.7 % (ref 37.5–51)
HCT VFR BLD AUTO: 21.9 % (ref 37.5–51)
HCT VFR BLD AUTO: 24.4 % (ref 37.5–51)
HCT VFR BLD AUTO: 24.6 % (ref 37.5–51)
HCT VFR BLD AUTO: 24.6 % (ref 37.5–51)
HCT VFR BLD AUTO: 24.9 % (ref 37.5–51)
HCT VFR BLD AUTO: 25.4 % (ref 37.5–51)
HCT VFR BLD AUTO: 25.5 % (ref 37.5–51)
HCT VFR BLD AUTO: 25.6 % (ref 37.5–51)
HCT VFR BLD AUTO: 25.7 % (ref 37.5–51)
HCT VFR BLD AUTO: 26.2 % (ref 37.5–51)
HCT VFR BLD AUTO: 26.5 % (ref 37.5–51)
HCT VFR BLD AUTO: 26.5 % (ref 37.5–51)
HCT VFR BLD AUTO: 26.7 % (ref 37.5–51)
HCT VFR BLD AUTO: 27.4 % (ref 37.5–51)
HCT VFR BLD AUTO: 28 % (ref 37.5–51)
HCT VFR BLD AUTO: 28.2 % (ref 37.5–51)
HCT VFR BLD AUTO: 28.4 % (ref 37.5–51)
HCT VFR BLD AUTO: 28.9 % (ref 37.5–51)
HCT VFR BLD AUTO: 28.9 % (ref 37.5–51)
HCT VFR BLD AUTO: 29.1 % (ref 37.5–51)
HCT VFR BLD AUTO: 29.2 % (ref 37.5–51)
HCT VFR BLD AUTO: 29.9 % (ref 37.5–51)
HCT VFR BLD AUTO: 32 % (ref 37.5–51)
HCT VFR BLD AUTO: 34.6 % (ref 37.5–51)
HCT VFR BLD AUTO: 35.1 % (ref 37.5–51)
HCV AB SER QL: ABNORMAL
HDLC SERPL-MCNC: 17 MG/DL (ref 40–60)
HGB BLD-MCNC: 10.3 G/DL (ref 13–17.7)
HGB BLD-MCNC: 11.3 G/DL (ref 13–17.7)
HGB BLD-MCNC: 11.4 G/DL (ref 13–17.7)
HGB BLD-MCNC: 3.9 G/DL (ref 13–17.7)
HGB BLD-MCNC: 7 G/DL (ref 13–17.7)
HGB BLD-MCNC: 7.7 G/DL (ref 13–17.7)
HGB BLD-MCNC: 7.8 G/DL (ref 13–17.7)
HGB BLD-MCNC: 8 G/DL (ref 13–17.7)
HGB BLD-MCNC: 8.1 G/DL (ref 13–17.7)
HGB BLD-MCNC: 8.1 G/DL (ref 13–17.7)
HGB BLD-MCNC: 8.2 G/DL (ref 13–17.7)
HGB BLD-MCNC: 8.3 G/DL (ref 13–17.7)
HGB BLD-MCNC: 8.4 G/DL (ref 13–17.7)
HGB BLD-MCNC: 8.5 G/DL (ref 13–17.7)
HGB BLD-MCNC: 8.5 G/DL (ref 13–17.7)
HGB BLD-MCNC: 8.8 G/DL (ref 13–17.7)
HGB BLD-MCNC: 8.8 G/DL (ref 13–17.7)
HGB BLD-MCNC: 8.9 G/DL (ref 13–17.7)
HGB BLD-MCNC: 9 G/DL (ref 13–17.7)
HGB BLD-MCNC: 9.1 G/DL (ref 13–17.7)
HGB BLD-MCNC: 9.1 G/DL (ref 13–17.7)
HGB BLD-MCNC: 9.2 G/DL (ref 13–17.7)
HGB BLD-MCNC: 9.9 G/DL (ref 13–17.7)
HGB RETIC QN AUTO: 21.9 PG (ref 29.8–36.1)
HGB UR QL STRIP.AUTO: ABNORMAL
HMPV RNA NPH QL NAA+NON-PROBE: NOT DETECTED
HOLD SPECIMEN: NORMAL
HPIV1 RNA SPEC QL NAA+PROBE: NOT DETECTED
HPIV2 RNA SPEC QL NAA+PROBE: NOT DETECTED
HPIV3 RNA NPH QL NAA+PROBE: NOT DETECTED
HPIV4 P GENE NPH QL NAA+PROBE: NOT DETECTED
HYALINE CASTS UR QL AUTO: ABNORMAL /LPF
HYPOCHROMIA BLD QL: ABNORMAL
HYPOCHROMIA BLD QL: NORMAL
HYPOCHROMIA BLD QL: NORMAL
IGA SERPL-MCNC: 43 MG/DL (ref 61–437)
IGA SERPL-MCNC: 44 MG/DL (ref 61–437)
IGG SERPL-MCNC: 1353 MG/DL (ref 603–1613)
IGG SERPL-MCNC: 1572 MG/DL (ref 603–1613)
IGM SERPL-MCNC: 82 MG/DL (ref 20–172)
IGM SERPL-MCNC: 97 MG/DL (ref 20–172)
IMM GRANULOCYTES # BLD AUTO: 0.02 10*3/MM3 (ref 0–0.05)
IMM GRANULOCYTES # BLD AUTO: 0.05 10*3/MM3 (ref 0–0.05)
IMM GRANULOCYTES # BLD AUTO: 0.09 10*3/MM3 (ref 0–0.05)
IMM GRANULOCYTES # BLD AUTO: 0.1 10*3/MM3 (ref 0–0.05)
IMM GRANULOCYTES # BLD AUTO: 0.11 10*3/MM3 (ref 0–0.05)
IMM GRANULOCYTES # BLD AUTO: 0.12 10*3/MM3 (ref 0–0.05)
IMM GRANULOCYTES # BLD AUTO: 0.14 10*3/MM3 (ref 0–0.05)
IMM GRANULOCYTES # BLD AUTO: 0.14 10*3/MM3 (ref 0–0.05)
IMM GRANULOCYTES # BLD AUTO: 0.15 10*3/MM3 (ref 0–0.05)
IMM GRANULOCYTES # BLD AUTO: 0.15 10*3/MM3 (ref 0–0.05)
IMM GRANULOCYTES # BLD AUTO: 0.18 10*3/MM3 (ref 0–0.05)
IMM GRANULOCYTES # BLD AUTO: 0.19 10*3/MM3 (ref 0–0.05)
IMM GRANULOCYTES # BLD AUTO: 0.21 10*3/MM3 (ref 0–0.05)
IMM GRANULOCYTES # BLD AUTO: 0.21 10*3/MM3 (ref 0–0.05)
IMM GRANULOCYTES # BLD AUTO: 0.23 10*3/MM3 (ref 0–0.05)
IMM GRANULOCYTES # BLD AUTO: 0.24 10*3/MM3 (ref 0–0.05)
IMM GRANULOCYTES # BLD AUTO: 0.33 10*3/MM3 (ref 0–0.05)
IMM GRANULOCYTES # BLD AUTO: 0.36 10*3/MM3 (ref 0–0.05)
IMM GRANULOCYTES NFR BLD AUTO: 0.4 % (ref 0–0.5)
IMM GRANULOCYTES NFR BLD AUTO: 0.4 % (ref 0–0.5)
IMM GRANULOCYTES NFR BLD AUTO: 0.5 % (ref 0–0.5)
IMM GRANULOCYTES NFR BLD AUTO: 0.9 % (ref 0–0.5)
IMM GRANULOCYTES NFR BLD AUTO: 0.9 % (ref 0–0.5)
IMM GRANULOCYTES NFR BLD AUTO: 1.2 % (ref 0–0.5)
IMM GRANULOCYTES NFR BLD AUTO: 1.3 % (ref 0–0.5)
IMM GRANULOCYTES NFR BLD AUTO: 1.5 % (ref 0–0.5)
IMM GRANULOCYTES NFR BLD AUTO: 1.6 % (ref 0–0.5)
IMM GRANULOCYTES NFR BLD AUTO: 1.8 % (ref 0–0.5)
IMM GRANULOCYTES NFR BLD AUTO: 1.9 % (ref 0–0.5)
IMM GRANULOCYTES NFR BLD AUTO: 2 % (ref 0–0.5)
IMM GRANULOCYTES NFR BLD AUTO: 2 % (ref 0–0.5)
IMM GRANULOCYTES NFR BLD AUTO: 2.1 % (ref 0–0.5)
IMM GRANULOCYTES NFR BLD AUTO: 2.1 % (ref 0–0.5)
IMM GRANULOCYTES NFR BLD AUTO: 2.2 % (ref 0–0.5)
IMM GRANULOCYTES NFR BLD AUTO: 2.3 % (ref 0–0.5)
IMM GRANULOCYTES NFR BLD AUTO: 2.7 % (ref 0–0.5)
IMM GRANULOCYTES NFR BLD AUTO: 3 % (ref 0–0.5)
IMM GRANULOCYTES NFR BLD AUTO: 3.5 % (ref 0–0.5)
IMM RETICS NFR: 14.6 % (ref 3–15.8)
INR PPP: 0.99 (ref 0.8–1.2)
INR PPP: 1.06 (ref 0.8–1.2)
INR PPP: 1.16 (ref 0.8–1.2)
INR PPP: 1.2 (ref 0.8–1.2)
INR PPP: 1.23 (ref 0.8–1.2)
INR PPP: 1.27 (ref 0.91–1.09)
IRON 24H UR-MRATE: 30 MCG/DL (ref 59–158)
IRON 24H UR-MRATE: 60 MCG/DL (ref 59–158)
IRON SATN MFR SERPL: 13 % (ref 20–50)
IRON SATN MFR SERPL: 13 % (ref 20–50)
KAPPA LC SERPL-MCNC: 155.7 MG/L (ref 3.3–19.4)
KAPPA LC/LAMBDA SER: 4.27 {RATIO} (ref 0.26–1.65)
KETONES UR QL STRIP: NEGATIVE
KOH PREP NAIL: ABNORMAL
KOH PREP NAIL: NORMAL
KOH PREP NAIL: NORMAL
L PNEUMO1 AG UR QL IA: NEGATIVE
L PNEUMO1 AG UR QL IA: NEGATIVE
LAB AP CASE REPORT: NORMAL
LABORATORY COMMENT REPORT: ABNORMAL
LACTATE HOLD SPECIMEN: NORMAL
LAMBDA LC FREE SERPL-MCNC: 36.5 MG/L (ref 5.7–26.3)
LDH SERPL-CCNC: 218 U/L (ref 135–225)
LDLC SERPL CALC-MCNC: 23 MG/DL (ref 0–100)
LDLC/HDLC SERPL: 1.36 {RATIO}
LEUKOCYTE ESTERASE UR QL STRIP.AUTO: NEGATIVE
LIMITATIONS:: ABNORMAL
LIPASE SERPL-CCNC: 15 U/L (ref 13–60)
LIVER FIBR SCORE SERPL CALC.FIBROSURE: 0.35 (ref 0–0.21)
LYMPHOCYTES # BLD AUTO: 1.42 10*3/MM3 (ref 0.7–3.1)
LYMPHOCYTES # BLD AUTO: 1.43 10*3/MM3 (ref 0.7–3.1)
LYMPHOCYTES # BLD AUTO: 1.51 10*3/MM3 (ref 0.7–3.1)
LYMPHOCYTES # BLD AUTO: 1.56 10*3/MM3 (ref 0.7–3.1)
LYMPHOCYTES # BLD AUTO: 1.63 10*3/MM3 (ref 0.7–3.1)
LYMPHOCYTES # BLD AUTO: 2.09 10*3/MM3 (ref 0.7–3.1)
LYMPHOCYTES # BLD AUTO: 2.16 10*3/MM3 (ref 0.7–3.1)
LYMPHOCYTES # BLD AUTO: 2.35 10*3/MM3 (ref 0.7–3.1)
LYMPHOCYTES # BLD AUTO: 2.53 10*3/MM3 (ref 0.7–3.1)
LYMPHOCYTES # BLD AUTO: 3.03 10*3/MM3 (ref 0.7–3.1)
LYMPHOCYTES # BLD AUTO: 3.16 10*3/MM3 (ref 0.7–3.1)
LYMPHOCYTES # BLD AUTO: 3.65 10*3/MM3 (ref 0.7–3.1)
LYMPHOCYTES # BLD AUTO: 3.8 10*3/MM3 (ref 0.7–3.1)
LYMPHOCYTES # BLD AUTO: 3.86 10*3/MM3 (ref 0.7–3.1)
LYMPHOCYTES # BLD AUTO: 4.01 10*3/MM3 (ref 0.7–3.1)
LYMPHOCYTES # BLD AUTO: 4.15 10*3/MM3 (ref 0.7–3.1)
LYMPHOCYTES # BLD AUTO: 4.25 10*3/MM3 (ref 0.7–3.1)
LYMPHOCYTES # BLD AUTO: 5 10*3/MM3 (ref 0.7–3.1)
LYMPHOCYTES # BLD AUTO: 5.69 10*3/MM3 (ref 0.7–3.1)
LYMPHOCYTES # BLD AUTO: 6.65 10*3/MM3 (ref 0.7–3.1)
LYMPHOCYTES # BLD MANUAL: 3.08 10*3/MM3 (ref 0.7–3.1)
LYMPHOCYTES # BLD MANUAL: 3.23 10*3/MM3 (ref 0.7–3.1)
LYMPHOCYTES # BLD MANUAL: 3.27 10*3/MM3 (ref 0.7–3.1)
LYMPHOCYTES # BLD MANUAL: 4.48 10*3/MM3 (ref 0.7–3.1)
LYMPHOCYTES # BLD MANUAL: 5.52 10*3/MM3 (ref 0.7–3.1)
LYMPHOCYTES NFR BLD AUTO: 21.6 % (ref 19.6–45.3)
LYMPHOCYTES NFR BLD AUTO: 25 % (ref 19.6–45.3)
LYMPHOCYTES NFR BLD AUTO: 26.8 % (ref 19.6–45.3)
LYMPHOCYTES NFR BLD AUTO: 27 % (ref 19.6–45.3)
LYMPHOCYTES NFR BLD AUTO: 27.7 % (ref 19.6–45.3)
LYMPHOCYTES NFR BLD AUTO: 27.8 % (ref 19.6–45.3)
LYMPHOCYTES NFR BLD AUTO: 29.8 % (ref 19.6–45.3)
LYMPHOCYTES NFR BLD AUTO: 31 % (ref 19.6–45.3)
LYMPHOCYTES NFR BLD AUTO: 33.5 % (ref 19.6–45.3)
LYMPHOCYTES NFR BLD AUTO: 35 % (ref 19.6–45.3)
LYMPHOCYTES NFR BLD AUTO: 35.1 % (ref 19.6–45.3)
LYMPHOCYTES NFR BLD AUTO: 35.3 % (ref 19.6–45.3)
LYMPHOCYTES NFR BLD AUTO: 36.9 % (ref 19.6–45.3)
LYMPHOCYTES NFR BLD AUTO: 40.5 % (ref 19.6–45.3)
LYMPHOCYTES NFR BLD AUTO: 44.4 % (ref 19.6–45.3)
LYMPHOCYTES NFR BLD AUTO: 50.2 % (ref 19.6–45.3)
LYMPHOCYTES NFR BLD AUTO: 51.1 % (ref 19.6–45.3)
LYMPHOCYTES NFR BLD AUTO: 51.4 % (ref 19.6–45.3)
LYMPHOCYTES NFR BLD AUTO: 54.8 % (ref 19.6–45.3)
LYMPHOCYTES NFR BLD AUTO: 60.6 % (ref 19.6–45.3)
LYMPHOCYTES NFR BLD MANUAL: 2.4 % (ref 5–12)
LYMPHOCYTES NFR BLD MANUAL: 3 % (ref 5–12)
LYMPHOCYTES NFR BLD MANUAL: 3 % (ref 5–12)
LYMPHOCYTES NFR BLD MANUAL: 3.1 % (ref 5–12)
LYMPHOCYTES NFR BLD MANUAL: 30.6 % (ref 19.6–45.3)
LYMPHOCYTES NFR BLD MANUAL: 31 % (ref 19.6–45.3)
LYMPHOCYTES NFR BLD MANUAL: 34 % (ref 19.6–45.3)
LYMPHOCYTES NFR BLD MANUAL: 38.1 % (ref 19.6–45.3)
LYMPHOCYTES NFR BLD MANUAL: 43.1 % (ref 19.6–45.3)
LYMPHOCYTES NFR BLD MANUAL: 9.2 % (ref 5–12)
Lab: ABNORMAL
M PNEUMO IGG SER IA-ACNC: NOT DETECTED
M PNEUMONIAE IGM ABS: <770 U/ML (ref 0–769)
M-SPIKE: ABNORMAL G/DL
MAGNESIUM SERPL-MCNC: 1.6 MG/DL (ref 1.6–2.4)
MAXIMAL PREDICTED HEART RATE: 157 BPM
MCH RBC QN AUTO: 24.8 PG (ref 26.6–33)
MCH RBC QN AUTO: 26 PG (ref 26.6–33)
MCH RBC QN AUTO: 27.7 PG (ref 26.6–33)
MCH RBC QN AUTO: 27.9 PG (ref 26.6–33)
MCH RBC QN AUTO: 27.9 PG (ref 26.6–33)
MCH RBC QN AUTO: 28 PG (ref 26.6–33)
MCH RBC QN AUTO: 28.3 PG (ref 26.6–33)
MCH RBC QN AUTO: 28.4 PG (ref 26.6–33)
MCH RBC QN AUTO: 28.5 PG (ref 26.6–33)
MCH RBC QN AUTO: 28.6 PG (ref 26.6–33)
MCH RBC QN AUTO: 28.6 PG (ref 26.6–33)
MCH RBC QN AUTO: 28.7 PG (ref 26.6–33)
MCH RBC QN AUTO: 28.8 PG (ref 26.6–33)
MCH RBC QN AUTO: 28.8 PG (ref 26.6–33)
MCH RBC QN AUTO: 28.9 PG (ref 26.6–33)
MCH RBC QN AUTO: 29 PG (ref 26.6–33)
MCH RBC QN AUTO: 29 PG (ref 26.6–33)
MCH RBC QN AUTO: 29.2 PG (ref 26.6–33)
MCH RBC QN AUTO: 29.2 PG (ref 26.6–33)
MCH RBC QN AUTO: 29.3 PG (ref 26.6–33)
MCH RBC QN AUTO: 29.4 PG (ref 26.6–33)
MCH RBC QN AUTO: 29.5 PG (ref 26.6–33)
MCH RBC QN AUTO: 29.5 PG (ref 26.6–33)
MCH RBC QN AUTO: 29.6 PG (ref 26.6–33)
MCH RBC QN AUTO: 30 PG (ref 26.6–33)
MCHC RBC AUTO-ENTMCNC: 28.5 G/DL (ref 31.5–35.7)
MCHC RBC AUTO-ENTMCNC: 30.4 G/DL (ref 31.5–35.7)
MCHC RBC AUTO-ENTMCNC: 30.7 G/DL (ref 31.5–35.7)
MCHC RBC AUTO-ENTMCNC: 30.9 G/DL (ref 31.5–35.7)
MCHC RBC AUTO-ENTMCNC: 30.9 G/DL (ref 31.5–35.7)
MCHC RBC AUTO-ENTMCNC: 31.1 G/DL (ref 31.5–35.7)
MCHC RBC AUTO-ENTMCNC: 31.2 G/DL (ref 31.5–35.7)
MCHC RBC AUTO-ENTMCNC: 31.2 G/DL (ref 31.5–35.7)
MCHC RBC AUTO-ENTMCNC: 31.3 G/DL (ref 31.5–35.7)
MCHC RBC AUTO-ENTMCNC: 31.5 G/DL (ref 31.5–35.7)
MCHC RBC AUTO-ENTMCNC: 31.8 G/DL (ref 31.5–35.7)
MCHC RBC AUTO-ENTMCNC: 32 G/DL (ref 31.5–35.7)
MCHC RBC AUTO-ENTMCNC: 32 G/DL (ref 31.5–35.7)
MCHC RBC AUTO-ENTMCNC: 32.1 G/DL (ref 31.5–35.7)
MCHC RBC AUTO-ENTMCNC: 32.1 G/DL (ref 31.5–35.7)
MCHC RBC AUTO-ENTMCNC: 32.2 G/DL (ref 31.5–35.7)
MCHC RBC AUTO-ENTMCNC: 32.5 G/DL (ref 31.5–35.7)
MCHC RBC AUTO-ENTMCNC: 32.9 G/DL (ref 31.5–35.7)
MCHC RBC AUTO-ENTMCNC: 32.9 G/DL (ref 31.5–35.7)
MCV RBC AUTO: 81.4 FL (ref 79–97)
MCV RBC AUTO: 87.3 FL (ref 79–97)
MCV RBC AUTO: 88.6 FL (ref 79–97)
MCV RBC AUTO: 88.8 FL (ref 79–97)
MCV RBC AUTO: 89.1 FL (ref 79–97)
MCV RBC AUTO: 89.2 FL (ref 79–97)
MCV RBC AUTO: 89.2 FL (ref 79–97)
MCV RBC AUTO: 89.3 FL (ref 79–97)
MCV RBC AUTO: 89.9 FL (ref 79–97)
MCV RBC AUTO: 90 FL (ref 79–97)
MCV RBC AUTO: 90.1 FL (ref 79–97)
MCV RBC AUTO: 90.4 FL (ref 79–97)
MCV RBC AUTO: 90.7 FL (ref 79–97)
MCV RBC AUTO: 91 FL (ref 79–97)
MCV RBC AUTO: 91.1 FL (ref 79–97)
MCV RBC AUTO: 91.5 FL (ref 79–97)
MCV RBC AUTO: 91.7 FL (ref 79–97)
MCV RBC AUTO: 91.8 FL (ref 79–97)
MCV RBC AUTO: 91.9 FL (ref 79–97)
MCV RBC AUTO: 92.8 FL (ref 79–97)
MCV RBC AUTO: 93.8 FL (ref 79–97)
MCV RBC AUTO: 93.8 FL (ref 79–97)
MCV RBC AUTO: 94.3 FL (ref 79–97)
MCV RBC AUTO: 94.3 FL (ref 79–97)
MCV RBC AUTO: 94.9 FL (ref 79–97)
METAMYELOCYTES NFR BLD MANUAL: 0.8 % (ref 0–0)
METAMYELOCYTES NFR BLD MANUAL: 1 % (ref 0–0)
MONOCYTES # BLD AUTO: 0.19 10*3/MM3 (ref 0.1–0.9)
MONOCYTES # BLD AUTO: 0.25 10*3/MM3 (ref 0.1–0.9)
MONOCYTES # BLD AUTO: 0.26 10*3/MM3 (ref 0.1–0.9)
MONOCYTES # BLD AUTO: 0.27 10*3/MM3 (ref 0.1–0.9)
MONOCYTES # BLD AUTO: 0.29 10*3/MM3 (ref 0.1–0.9)
MONOCYTES # BLD AUTO: 0.29 10*3/MM3 (ref 0.1–0.9)
MONOCYTES # BLD AUTO: 0.3 10*3/MM3 (ref 0.1–0.9)
MONOCYTES # BLD AUTO: 0.31 10*3/MM3 (ref 0.1–0.9)
MONOCYTES # BLD AUTO: 0.32 10*3/MM3 (ref 0.1–0.9)
MONOCYTES # BLD AUTO: 0.34 10*3/MM3 (ref 0.1–0.9)
MONOCYTES # BLD AUTO: 0.35 10*3/MM3 (ref 0.1–0.9)
MONOCYTES # BLD AUTO: 0.36 10*3/MM3 (ref 0.1–0.9)
MONOCYTES # BLD AUTO: 0.41 10*3/MM3 (ref 0.1–0.9)
MONOCYTES # BLD AUTO: 0.45 10*3/MM3 (ref 0.1–0.9)
MONOCYTES # BLD AUTO: 0.55 10*3/MM3 (ref 0.1–0.9)
MONOCYTES # BLD AUTO: 0.55 10*3/MM3 (ref 0.1–0.9)
MONOCYTES # BLD AUTO: 0.62 10*3/MM3 (ref 0.1–0.9)
MONOCYTES # BLD AUTO: 0.62 10*3/MM3 (ref 0.1–0.9)
MONOCYTES # BLD AUTO: 0.63 10*3/MM3 (ref 0.1–0.9)
MONOCYTES # BLD AUTO: 0.64 10*3/MM3 (ref 0.1–0.9)
MONOCYTES # BLD AUTO: 0.7 10*3/MM3 (ref 0.1–0.9)
MONOCYTES # BLD AUTO: 0.71 10*3/MM3 (ref 0.1–0.9)
MONOCYTES # BLD AUTO: 0.76 10*3/MM3 (ref 0.1–0.9)
MONOCYTES # BLD AUTO: 0.98 10*3/MM3 (ref 0.1–0.9)
MONOCYTES # BLD AUTO: 1.14 10*3/MM3 (ref 0.1–0.9)
MONOCYTES NFR BLD AUTO: 1.9 % (ref 5–12)
MONOCYTES NFR BLD AUTO: 10.1 % (ref 5–12)
MONOCYTES NFR BLD AUTO: 11 % (ref 5–12)
MONOCYTES NFR BLD AUTO: 11.9 % (ref 5–12)
MONOCYTES NFR BLD AUTO: 13.2 % (ref 5–12)
MONOCYTES NFR BLD AUTO: 14.8 % (ref 5–12)
MONOCYTES NFR BLD AUTO: 2.6 % (ref 5–12)
MONOCYTES NFR BLD AUTO: 2.7 % (ref 5–12)
MONOCYTES NFR BLD AUTO: 2.7 % (ref 5–12)
MONOCYTES NFR BLD AUTO: 20 % (ref 5–12)
MONOCYTES NFR BLD AUTO: 3.1 % (ref 5–12)
MONOCYTES NFR BLD AUTO: 3.2 % (ref 5–12)
MONOCYTES NFR BLD AUTO: 3.3 % (ref 5–12)
MONOCYTES NFR BLD AUTO: 3.5 % (ref 5–12)
MONOCYTES NFR BLD AUTO: 4.5 % (ref 5–12)
MONOCYTES NFR BLD AUTO: 4.6 % (ref 5–12)
MONOCYTES NFR BLD AUTO: 5.4 % (ref 5–12)
MONOCYTES NFR BLD AUTO: 7.3 % (ref 5–12)
MONOCYTES NFR BLD AUTO: 7.3 % (ref 5–12)
MONOCYTES NFR BLD AUTO: 8.8 % (ref 5–12)
MRSA DNA SPEC QL NAA+PROBE: NEGATIVE
MYCOBACTERIUM SPEC CULT: ABNORMAL
MYCOBACTERIUM SPEC CULT: ABNORMAL
MYCOBACTERIUM SPEC CULT: NORMAL
MYELOCYTES NFR BLD MANUAL: 1 % (ref 0–0)
MYELOCYTES NFR BLD MANUAL: 1.6 % (ref 0–0)
NECROINFLAMM ACTIVITY SCORING:: ABNORMAL
NECROINFLAMMATORY ACT GRADE SERPL QL: ABNORMAL
NECROINFLAMMATORY ACT SCORE SERPL: 0.17 (ref 0–0.17)
NEUTROPHILS # BLD AUTO: 5.41 10*3/MM3 (ref 1.7–7)
NEUTROPHILS # BLD AUTO: 5.62 10*3/MM3 (ref 1.7–7)
NEUTROPHILS # BLD AUTO: 6.44 10*3/MM3 (ref 1.7–7)
NEUTROPHILS # BLD AUTO: 6.77 10*3/MM3 (ref 1.7–7)
NEUTROPHILS # BLD AUTO: 8.08 10*3/MM3 (ref 1.7–7)
NEUTROPHILS NFR BLD AUTO: 2.11 10*3/MM3 (ref 1.7–7)
NEUTROPHILS NFR BLD AUTO: 2.19 10*3/MM3 (ref 1.7–7)
NEUTROPHILS NFR BLD AUTO: 2.65 10*3/MM3 (ref 1.7–7)
NEUTROPHILS NFR BLD AUTO: 2.85 10*3/MM3 (ref 1.7–7)
NEUTROPHILS NFR BLD AUTO: 3.09 10*3/MM3 (ref 1.7–7)
NEUTROPHILS NFR BLD AUTO: 3.38 10*3/MM3 (ref 1.7–7)
NEUTROPHILS NFR BLD AUTO: 3.51 10*3/MM3 (ref 1.7–7)
NEUTROPHILS NFR BLD AUTO: 3.78 10*3/MM3 (ref 1.7–7)
NEUTROPHILS NFR BLD AUTO: 34.5 % (ref 42.7–76)
NEUTROPHILS NFR BLD AUTO: 38.4 % (ref 42.7–76)
NEUTROPHILS NFR BLD AUTO: 4.6 10*3/MM3 (ref 1.7–7)
NEUTROPHILS NFR BLD AUTO: 4.67 10*3/MM3 (ref 1.7–7)
NEUTROPHILS NFR BLD AUTO: 4.89 10*3/MM3 (ref 1.7–7)
NEUTROPHILS NFR BLD AUTO: 4.98 10*3/MM3 (ref 1.7–7)
NEUTROPHILS NFR BLD AUTO: 41.4 % (ref 42.7–76)
NEUTROPHILS NFR BLD AUTO: 42.5 % (ref 42.7–76)
NEUTROPHILS NFR BLD AUTO: 43.8 % (ref 42.7–76)
NEUTROPHILS NFR BLD AUTO: 46.2 % (ref 42.7–76)
NEUTROPHILS NFR BLD AUTO: 48.8 % (ref 42.7–76)
NEUTROPHILS NFR BLD AUTO: 5.03 10*3/MM3 (ref 1.7–7)
NEUTROPHILS NFR BLD AUTO: 5.16 10*3/MM3 (ref 1.7–7)
NEUTROPHILS NFR BLD AUTO: 5.17 10*3/MM3 (ref 1.7–7)
NEUTROPHILS NFR BLD AUTO: 5.37 10*3/MM3 (ref 1.7–7)
NEUTROPHILS NFR BLD AUTO: 50.1 % (ref 42.7–76)
NEUTROPHILS NFR BLD AUTO: 54.3 % (ref 42.7–76)
NEUTROPHILS NFR BLD AUTO: 56.7 % (ref 42.7–76)
NEUTROPHILS NFR BLD AUTO: 58.4 % (ref 42.7–76)
NEUTROPHILS NFR BLD AUTO: 59.3 % (ref 42.7–76)
NEUTROPHILS NFR BLD AUTO: 59.5 % (ref 42.7–76)
NEUTROPHILS NFR BLD AUTO: 59.5 % (ref 42.7–76)
NEUTROPHILS NFR BLD AUTO: 59.9 % (ref 42.7–76)
NEUTROPHILS NFR BLD AUTO: 6.08 10*3/MM3 (ref 1.7–7)
NEUTROPHILS NFR BLD AUTO: 6.17 10*3/MM3 (ref 1.7–7)
NEUTROPHILS NFR BLD AUTO: 6.28 10*3/MM3 (ref 1.7–7)
NEUTROPHILS NFR BLD AUTO: 60.2 % (ref 42.7–76)
NEUTROPHILS NFR BLD AUTO: 61 % (ref 42.7–76)
NEUTROPHILS NFR BLD AUTO: 62.3 % (ref 42.7–76)
NEUTROPHILS NFR BLD AUTO: 62.6 % (ref 42.7–76)
NEUTROPHILS NFR BLD AUTO: 66 % (ref 42.7–76)
NEUTROPHILS NFR BLD AUTO: 7.01 10*3/MM3 (ref 1.7–7)
NEUTROPHILS NFR BLD MANUAL: 49.6 % (ref 42.7–76)
NEUTROPHILS NFR BLD MANUAL: 55.7 % (ref 42.7–76)
NEUTROPHILS NFR BLD MANUAL: 57 % (ref 42.7–76)
NEUTROPHILS NFR BLD MANUAL: 60.2 % (ref 42.7–76)
NEUTROPHILS NFR BLD MANUAL: 62 % (ref 42.7–76)
NEUTS BAND NFR BLD MANUAL: 2.4 % (ref 0–5)
NEUTS BAND NFR BLD MANUAL: 8 % (ref 0–5)
NIGHT BLUE STAIN TISS: ABNORMAL
NIGHT BLUE STAIN TISS: NORMAL
NITRITE UR QL STRIP: NEGATIVE
NOROVIRUS GI+II RNA STL QL NAA+NON-PROBE: NOT DETECTED
NRBC BLD AUTO-RTO: 0 /100 WBC (ref 0–0.2)
NRBC BLD AUTO-RTO: 0.2 /100 WBC (ref 0–0.2)
NRBC BLD AUTO-RTO: 0.3 /100 WBC (ref 0–0.2)
P SHIGELLOIDES DNA STL QL NAA+PROBE: NOT DETECTED
PATH INTERP BLD-IMP: NORMAL
PATH REPORT.FINAL DX SPEC: NORMAL
PATH REPORT.GROSS SPEC: NORMAL
PF4 HEPARIN CMPLX AB SER-ACNC: 0.11 OD (ref 0–0.4)
PH UR STRIP.AUTO: 5.5 [PH] (ref 5–9)
PLAT MORPH BLD: NORMAL
PLATELET # BLD AUTO: 109 10*3/MM3 (ref 140–450)
PLATELET # BLD AUTO: 134 10*3/MM3 (ref 140–450)
PLATELET # BLD AUTO: 141 10*3/MM3 (ref 140–450)
PLATELET # BLD AUTO: 151 10*3/MM3 (ref 140–450)
PLATELET # BLD AUTO: 159 10*3/MM3 (ref 140–450)
PLATELET # BLD AUTO: 163 10*3/MM3 (ref 140–450)
PLATELET # BLD AUTO: 168 10*3/MM3 (ref 140–450)
PLATELET # BLD AUTO: 173 10*3/MM3 (ref 140–450)
PLATELET # BLD AUTO: 173 10*3/MM3 (ref 140–450)
PLATELET # BLD AUTO: 187 10*3/MM3 (ref 140–450)
PLATELET # BLD AUTO: 190 10*3/MM3 (ref 140–450)
PLATELET # BLD AUTO: 195 10*3/MM3 (ref 140–450)
PLATELET # BLD AUTO: 204 10*3/MM3 (ref 140–450)
PLATELET # BLD AUTO: 207 10*3/MM3 (ref 140–450)
PLATELET # BLD AUTO: 217 10*3/MM3 (ref 140–450)
PLATELET # BLD AUTO: 235 10*3/MM3 (ref 140–450)
PLATELET # BLD AUTO: 257 10*3/MM3 (ref 140–450)
PLATELET # BLD AUTO: 257 10*3/MM3 (ref 140–450)
PLATELET # BLD AUTO: 265 10*3/MM3 (ref 140–450)
PLATELET # BLD AUTO: 402 10*3/MM3 (ref 140–450)
PLATELET # BLD AUTO: 75 10*3/MM3 (ref 140–450)
PLATELET # BLD AUTO: 80 10*3/MM3 (ref 140–450)
PLATELET # BLD AUTO: 84 10*3/MM3 (ref 140–450)
PLATELET # BLD AUTO: 88 10*3/MM3 (ref 140–450)
PLATELET # BLD AUTO: 97 10*3/MM3 (ref 140–450)
PMV BLD AUTO: 10 FL (ref 6–12)
PMV BLD AUTO: 10.3 FL (ref 6–12)
PMV BLD AUTO: 10.4 FL (ref 6–12)
PMV BLD AUTO: 10.4 FL (ref 6–12)
PMV BLD AUTO: 10.5 FL (ref 6–12)
PMV BLD AUTO: 10.6 FL (ref 6–12)
PMV BLD AUTO: 10.6 FL (ref 6–12)
PMV BLD AUTO: 10.8 FL (ref 6–12)
PMV BLD AUTO: 10.9 FL (ref 6–12)
PMV BLD AUTO: 11 FL (ref 6–12)
PMV BLD AUTO: 11.1 FL (ref 6–12)
PMV BLD AUTO: 11.4 FL (ref 6–12)
PMV BLD AUTO: 11.4 FL (ref 6–12)
PMV BLD AUTO: 11.5 FL (ref 6–12)
PMV BLD AUTO: 11.5 FL (ref 6–12)
PMV BLD AUTO: 11.7 FL (ref 6–12)
PMV BLD AUTO: 9 FL (ref 6–12)
PMV BLD AUTO: 9.3 FL (ref 6–12)
PMV BLD AUTO: 9.8 FL (ref 6–12)
PMV BLD AUTO: 9.9 FL (ref 6–12)
POIKILOCYTOSIS BLD QL SMEAR: ABNORMAL
POLYCHROMASIA BLD QL SMEAR: ABNORMAL
POTASSIUM SERPL-SCNC: 3.5 MMOL/L (ref 3.5–5.2)
POTASSIUM SERPL-SCNC: 3.7 MMOL/L (ref 3.5–5.2)
POTASSIUM SERPL-SCNC: 3.7 MMOL/L (ref 3.5–5.2)
POTASSIUM SERPL-SCNC: 3.8 MMOL/L (ref 3.5–5.2)
POTASSIUM SERPL-SCNC: 3.9 MMOL/L (ref 3.5–5.2)
POTASSIUM SERPL-SCNC: 4.1 MMOL/L (ref 3.5–5.2)
POTASSIUM SERPL-SCNC: 4.2 MMOL/L (ref 3.5–5.2)
POTASSIUM SERPL-SCNC: 4.3 MMOL/L (ref 3.5–5.2)
POTASSIUM SERPL-SCNC: 4.3 MMOL/L (ref 3.5–5.2)
POTASSIUM SERPL-SCNC: 4.4 MMOL/L (ref 3.5–5.2)
POTASSIUM SERPL-SCNC: 4.5 MMOL/L (ref 3.5–5.2)
POTASSIUM SERPL-SCNC: 4.6 MMOL/L (ref 3.5–5.2)
POTASSIUM SERPL-SCNC: 4.7 MMOL/L (ref 3.5–5.2)
POTASSIUM SERPL-SCNC: 4.8 MMOL/L (ref 3.5–5.2)
POTASSIUM SERPL-SCNC: 4.9 MMOL/L (ref 3.5–5.2)
POTASSIUM SERPL-SCNC: 4.9 MMOL/L (ref 3.5–5.2)
PROCALCITONIN SERPL-MCNC: 1.76 NG/ML (ref 0–0.25)
PROT PATTERN SERPL ELPH-IMP: ABNORMAL
PROT PATTERN SERPL IFE-IMP: ABNORMAL
PROT SERPL-MCNC: 5.7 G/DL (ref 6–8.5)
PROT SERPL-MCNC: 6.1 G/DL (ref 6–8.5)
PROT SERPL-MCNC: 6.3 G/DL (ref 6–8.5)
PROT SERPL-MCNC: 6.3 G/DL (ref 6–8.5)
PROT SERPL-MCNC: 6.4 G/DL (ref 6–8.5)
PROT SERPL-MCNC: 6.5 G/DL (ref 6–8.5)
PROT SERPL-MCNC: 6.5 G/DL (ref 6–8.5)
PROT SERPL-MCNC: 6.6 G/DL (ref 6–8.5)
PROT SERPL-MCNC: 8.4 G/DL (ref 6–8.5)
PROT UR QL STRIP: ABNORMAL
PROTHROMBIN TIME: 13.5 SECONDS (ref 11.1–15.3)
PROTHROMBIN TIME: 14.3 SECONDS (ref 11.1–15.3)
PROTHROMBIN TIME: 15.3 SECONDS (ref 11.1–15.3)
PROTHROMBIN TIME: 15.5 SECONDS (ref 11.9–14.6)
PROTHROMBIN TIME: 15.8 SECONDS (ref 11.1–15.3)
PROTHROMBIN TIME: 16.1 SECONDS (ref 11.1–15.3)
QUANTIFERON CRITERIA: NORMAL
QUANTIFERON INCUBATION: NORMAL
QUANTIFERON MITOGEN VALUE: 7.23 IU/ML
QUANTIFERON NIL VALUE: 0.03 IU/ML
QUANTIFERON TB1 AG VALUE: 0.06 IU/ML
QUANTIFERON TB2 AG VALUE: 0.02 IU/ML
QUANTIFERON-TB GOLD PLUS: NEGATIVE
RBC # BLD AUTO: 1.57 10*6/MM3 (ref 4.14–5.8)
RBC # BLD AUTO: 2.6 10*6/MM3 (ref 4.14–5.8)
RBC # BLD AUTO: 2.61 10*6/MM3 (ref 4.14–5.8)
RBC # BLD AUTO: 2.69 10*6/MM3 (ref 4.14–5.8)
RBC # BLD AUTO: 2.74 10*6/MM3 (ref 4.14–5.8)
RBC # BLD AUTO: 2.76 10*6/MM3 (ref 4.14–5.8)
RBC # BLD AUTO: 2.79 10*6/MM3 (ref 4.14–5.8)
RBC # BLD AUTO: 2.8 10*6/MM3 (ref 4.14–5.8)
RBC # BLD AUTO: 2.81 10*6/MM3 (ref 4.14–5.8)
RBC # BLD AUTO: 2.82 10*6/MM3 (ref 4.14–5.8)
RBC # BLD AUTO: 2.91 10*6/MM3 (ref 4.14–5.8)
RBC # BLD AUTO: 2.97 10*6/MM3 (ref 4.14–5.8)
RBC # BLD AUTO: 3.01 10*6/MM3 (ref 4.14–5.8)
RBC # BLD AUTO: 3.09 10*6/MM3 (ref 4.14–5.8)
RBC # BLD AUTO: 3.11 10*6/MM3 (ref 4.14–5.8)
RBC # BLD AUTO: 3.15 10*6/MM3 (ref 4.14–5.8)
RBC # BLD AUTO: 3.16 10*6/MM3 (ref 4.14–5.8)
RBC # BLD AUTO: 3.18 10*6/MM3 (ref 4.14–5.8)
RBC # BLD AUTO: 3.21 10*6/MM3 (ref 4.14–5.8)
RBC # BLD AUTO: 3.29 10*6/MM3 (ref 4.14–5.8)
RBC # BLD AUTO: 3.54 10*6/MM3 (ref 4.14–5.8)
RBC # BLD AUTO: 3.85 10*6/MM3 (ref 4.14–5.8)
RBC # BLD AUTO: 3.93 10*6/MM3 (ref 4.14–5.8)
RBC # UR: ABNORMAL /HPF
REF LAB TEST METHOD: ABNORMAL
REF LAB TEST METHOD: NORMAL
RETICS # AUTO: 0.06 10*6/MM3 (ref 0.02–0.13)
RETICS/RBC NFR AUTO: 1.57 % (ref 0.7–1.9)
RHINOVIRUS RNA SPEC NAA+PROBE: NOT DETECTED
RSV RNA NPH QL NAA+NON-PROBE: NOT DETECTED
RV RNA STL NAA+PROBE: NOT DETECTED
S PNEUM AG SPEC QL LA: NEGATIVE
S PNEUM AG SPEC QL LA: NEGATIVE
SALMONELLA DNA SPEC QL NAA+PROBE: NOT DETECTED
SAPO I+II+IV+V RNA STL QL NAA+NON-PROBE: NOT DETECTED
SARS-COV-2 N GENE RESP QL NAA+PROBE: NOT DETECTED
SARS-COV-2 RDRP RESP QL NAA+PROBE: NOT DETECTED
SARS-COV-2 RDRP RESP QL NAA+PROBE: NOT DETECTED
SHIGELLA SP+EIEC IPAH STL QL NAA+PROBE: NOT DETECTED
SMALL PLATELETS BLD QL SMEAR: ADEQUATE
SMALL PLATELETS BLD QL SMEAR: ADEQUATE
SMALL PLATELETS BLD QL SMEAR: NORMAL
SMUDGE CELLS BLD QL SMEAR: ABNORMAL
SODIUM SERPL-SCNC: 133 MMOL/L (ref 136–145)
SODIUM SERPL-SCNC: 135 MMOL/L (ref 136–145)
SODIUM SERPL-SCNC: 135 MMOL/L (ref 136–145)
SODIUM SERPL-SCNC: 136 MMOL/L (ref 136–145)
SODIUM SERPL-SCNC: 137 MMOL/L (ref 136–145)
SODIUM SERPL-SCNC: 138 MMOL/L (ref 136–145)
SODIUM SERPL-SCNC: 139 MMOL/L (ref 136–145)
SODIUM SERPL-SCNC: 139 MMOL/L (ref 136–145)
SODIUM SERPL-SCNC: 140 MMOL/L (ref 136–145)
SODIUM SERPL-SCNC: 140 MMOL/L (ref 136–145)
SP GR UR STRIP: 1.02 (ref 1–1.03)
SQUAMOUS #/AREA URNS HPF: ABNORMAL /HPF
STRESS TARGET HR: 133 BPM
T4 FREE SERPL-MCNC: 1.12 NG/DL (ref 0.93–1.7)
TARGETS BLD QL SMEAR: ABNORMAL
TIBC SERPL-MCNC: 229 MCG/DL (ref 298–536)
TIBC SERPL-MCNC: 459 MCG/DL (ref 298–536)
TOTAL IGE SMQN RAST: 1035 IU/ML (ref 6–495)
TRANSFERRIN SERPL-MCNC: 154 MG/DL (ref 200–360)
TRANSFERRIN SERPL-MCNC: 308 MG/DL (ref 200–360)
TRIGL SERPL-MCNC: 79 MG/DL (ref 0–150)
TSH SERPL DL<=0.05 MIU/L-ACNC: 5.81 UIU/ML (ref 0.27–4.2)
UROBILINOGEN UR QL STRIP: ABNORMAL
V CHOLERAE DNA SPEC QL NAA+PROBE: NOT DETECTED
VARIANT LYMPHS NFR BLD MANUAL: 1 % (ref 0–5)
VIBRIO DNA SPEC NAA+PROBE: NOT DETECTED
VIT B12 BLD-MCNC: 1294 PG/ML (ref 211–946)
VIT B12 BLD-MCNC: 621 PG/ML (ref 211–946)
VLDLC SERPL-MCNC: 15.8 MG/DL
WBC # BLD AUTO: 10.21 10*3/MM3 (ref 3.4–10.8)
WBC # BLD AUTO: 10.4 10*3/MM3 (ref 3.4–10.8)
WBC # BLD AUTO: 10.41 10*3/MM3 (ref 3.4–10.8)
WBC # BLD AUTO: 10.7 10*3/MM3 (ref 3.4–10.8)
WBC # BLD AUTO: 10.88 10*3/MM3 (ref 3.4–10.8)
WBC # BLD AUTO: 10.98 10*3/MM3 (ref 3.4–10.8)
WBC # BLD AUTO: 11.14 10*3/MM3 (ref 3.4–10.8)
WBC # BLD AUTO: 11.76 10*3/MM3 (ref 3.4–10.8)
WBC # BLD AUTO: 14.5 10*3/MM3 (ref 3.4–10.8)
WBC # BLD AUTO: 4.32 10*3/MM3 (ref 3.4–10.8)
WBC # BLD AUTO: 5.29 10*3/MM3 (ref 3.4–10.8)
WBC # BLD AUTO: 5.29 10*3/MM3 (ref 3.4–10.8)
WBC # BLD AUTO: 5.64 10*3/MM3 (ref 3.4–10.8)
WBC # BLD AUTO: 5.69 10*3/MM3 (ref 3.4–10.8)
WBC # BLD AUTO: 6.93 10*3/MM3 (ref 3.4–10.8)
WBC # BLD AUTO: 7.51 10*3/MM3 (ref 3.4–10.8)
WBC # BLD AUTO: 7.54 10*3/MM3 (ref 3.4–10.8)
WBC # BLD AUTO: 8.05 10*3/MM3 (ref 3.4–10.8)
WBC # BLD AUTO: 8.27 10*3/MM3 (ref 3.4–10.8)
WBC # BLD AUTO: 8.48 10*3/MM3 (ref 3.4–10.8)
WBC # BLD AUTO: 8.58 10*3/MM3 (ref 3.4–10.8)
WBC # BLD AUTO: 9.05 10*3/MM3 (ref 3.4–10.8)
WBC # BLD AUTO: 9.07 10*3/MM3 (ref 3.4–10.8)
WBC # BLD AUTO: 9.52 10*3/MM3 (ref 3.4–10.8)
WBC # BLD AUTO: 9.96 10*3/MM3 (ref 3.4–10.8)
WBC MORPH BLD: NORMAL
WBC UR QL AUTO: ABNORMAL /HPF
WHOLE BLOOD HOLD SPECIMEN: NORMAL
WHOLE BLOOD HOLD SPECIMEN: NORMAL
YERSINIA STL CULT: NOT DETECTED

## 2020-01-01 PROCEDURE — 84165 PROTEIN E-PHORESIS SERUM: CPT | Performed by: INTERNAL MEDICINE

## 2020-01-01 PROCEDURE — 25010000002 LEVOFLOXACIN PER 250 MG: Performed by: INTERNAL MEDICINE

## 2020-01-01 PROCEDURE — 87186 SC STD MICRODIL/AGAR DIL: CPT | Performed by: FAMILY MEDICINE

## 2020-01-01 PROCEDURE — 87493 C DIFF AMPLIFIED PROBE: CPT | Performed by: INTERNAL MEDICINE

## 2020-01-01 PROCEDURE — 87635 SARS-COV-2 COVID-19 AMP PRB: CPT | Performed by: FAMILY MEDICINE

## 2020-01-01 PROCEDURE — 94799 UNLISTED PULMONARY SVC/PX: CPT

## 2020-01-01 PROCEDURE — 83540 ASSAY OF IRON: CPT | Performed by: PHYSICIAN ASSISTANT

## 2020-01-01 PROCEDURE — 80048 BASIC METABOLIC PNL TOTAL CA: CPT | Performed by: INTERNAL MEDICINE

## 2020-01-01 PROCEDURE — 83615 LACTATE (LD) (LDH) ENZYME: CPT | Performed by: INTERNAL MEDICINE

## 2020-01-01 PROCEDURE — 99233 SBSQ HOSP IP/OBS HIGH 50: CPT | Performed by: INTERNAL MEDICINE

## 2020-01-01 PROCEDURE — 87107 FUNGI IDENTIFICATION MOLD: CPT | Performed by: INTERNAL MEDICINE

## 2020-01-01 PROCEDURE — 0DB48ZX EXCISION OF ESOPHAGOGASTRIC JUNCTION, VIA NATURAL OR ARTIFICIAL OPENING ENDOSCOPIC, DIAGNOSTIC: ICD-10-PCS | Performed by: INTERNAL MEDICINE

## 2020-01-01 PROCEDURE — 80053 COMPREHEN METABOLIC PANEL: CPT | Performed by: INTERNAL MEDICINE

## 2020-01-01 PROCEDURE — 88305 TISSUE EXAM BY PATHOLOGIST: CPT

## 2020-01-01 PROCEDURE — 85007 BL SMEAR W/DIFF WBC COUNT: CPT | Performed by: INTERNAL MEDICINE

## 2020-01-01 PROCEDURE — 83605 ASSAY OF LACTIC ACID: CPT | Performed by: FAMILY MEDICINE

## 2020-01-01 PROCEDURE — 82164 ANGIOTENSIN I ENZYME TEST: CPT | Performed by: PHYSICIAN ASSISTANT

## 2020-01-01 PROCEDURE — 85025 COMPLETE CBC W/AUTO DIFF WBC: CPT | Performed by: FAMILY MEDICINE

## 2020-01-01 PROCEDURE — 25010000002 METHYLPREDNISOLONE PER 40 MG: Performed by: INTERNAL MEDICINE

## 2020-01-01 PROCEDURE — 84155 ASSAY OF PROTEIN SERUM: CPT | Performed by: INTERNAL MEDICINE

## 2020-01-01 PROCEDURE — 87116 MYCOBACTERIA CULTURE: CPT | Performed by: INTERNAL MEDICINE

## 2020-01-01 PROCEDURE — 25010000002 LEVOFLOXACIN PER 250 MG: Performed by: FAMILY MEDICINE

## 2020-01-01 PROCEDURE — 43239 EGD BIOPSY SINGLE/MULTIPLE: CPT | Performed by: INTERNAL MEDICINE

## 2020-01-01 PROCEDURE — 87102 FUNGUS ISOLATION CULTURE: CPT | Performed by: INTERNAL MEDICINE

## 2020-01-01 PROCEDURE — 0DBE8ZX EXCISION OF LARGE INTESTINE, VIA NATURAL OR ARTIFICIAL OPENING ENDOSCOPIC, DIAGNOSTIC: ICD-10-PCS | Performed by: INTERNAL MEDICINE

## 2020-01-01 PROCEDURE — 97535 SELF CARE MNGMENT TRAINING: CPT

## 2020-01-01 PROCEDURE — 82550 ASSAY OF CK (CPK): CPT | Performed by: FAMILY MEDICINE

## 2020-01-01 PROCEDURE — 99232 SBSQ HOSP IP/OBS MODERATE 35: CPT | Performed by: INTERNAL MEDICINE

## 2020-01-01 PROCEDURE — 94640 AIRWAY INHALATION TREATMENT: CPT

## 2020-01-01 PROCEDURE — 85610 PROTHROMBIN TIME: CPT | Performed by: PHYSICIAN ASSISTANT

## 2020-01-01 PROCEDURE — 87899 AGENT NOS ASSAY W/OPTIC: CPT | Performed by: INTERNAL MEDICINE

## 2020-01-01 PROCEDURE — 25010000002 CEFTRIAXONE PER 250 MG: Performed by: FAMILY MEDICINE

## 2020-01-01 PROCEDURE — 99285 EMERGENCY DEPT VISIT HI MDM: CPT

## 2020-01-01 PROCEDURE — 25010000002 METHYLPREDNISOLONE PER 40 MG: Performed by: STUDENT IN AN ORGANIZED HEALTH CARE EDUCATION/TRAINING PROGRAM

## 2020-01-01 PROCEDURE — 83883 ASSAY NEPHELOMETRY NOT SPEC: CPT | Performed by: INTERNAL MEDICINE

## 2020-01-01 PROCEDURE — 25010000002 NA FERRIC GLUC CPLX PER 12.5 MG: Performed by: INTERNAL MEDICINE

## 2020-01-01 PROCEDURE — 80048 BASIC METABOLIC PNL TOTAL CA: CPT | Performed by: STUDENT IN AN ORGANIZED HEALTH CARE EDUCATION/TRAINING PROGRAM

## 2020-01-01 PROCEDURE — 97116 GAIT TRAINING THERAPY: CPT

## 2020-01-01 PROCEDURE — 25010000002 ONDANSETRON PER 1 MG: Performed by: INTERNAL MEDICINE

## 2020-01-01 PROCEDURE — 25010000002 DEXAMETHASONE PER 1 MG: Performed by: ANESTHESIOLOGY

## 2020-01-01 PROCEDURE — 85610 PROTHROMBIN TIME: CPT | Performed by: INTERNAL MEDICINE

## 2020-01-01 PROCEDURE — 85060 BLOOD SMEAR INTERPRETATION: CPT | Performed by: FAMILY MEDICINE

## 2020-01-01 PROCEDURE — 36415 COLL VENOUS BLD VENIPUNCTURE: CPT

## 2020-01-01 PROCEDURE — 85025 COMPLETE CBC W/AUTO DIFF WBC: CPT | Performed by: STUDENT IN AN ORGANIZED HEALTH CARE EDUCATION/TRAINING PROGRAM

## 2020-01-01 PROCEDURE — 93306 TTE W/DOPPLER COMPLETE: CPT

## 2020-01-01 PROCEDURE — 94664 DEMO&/EVAL PT USE INHALER: CPT

## 2020-01-01 PROCEDURE — 87040 BLOOD CULTURE FOR BACTERIA: CPT | Performed by: FAMILY MEDICINE

## 2020-01-01 PROCEDURE — 80048 BASIC METABOLIC PNL TOTAL CA: CPT | Performed by: NURSE PRACTITIONER

## 2020-01-01 PROCEDURE — 85610 PROTHROMBIN TIME: CPT | Performed by: EMERGENCY MEDICINE

## 2020-01-01 PROCEDURE — 85025 COMPLETE CBC W/AUTO DIFF WBC: CPT | Performed by: INTERNAL MEDICINE

## 2020-01-01 PROCEDURE — 25010000002 LEVOFLOXACIN PER 250 MG: Performed by: STUDENT IN AN ORGANIZED HEALTH CARE EDUCATION/TRAINING PROGRAM

## 2020-01-01 PROCEDURE — 99223 1ST HOSP IP/OBS HIGH 75: CPT | Performed by: INTERNAL MEDICINE

## 2020-01-01 PROCEDURE — 63710000001 PREDNISONE PER 1 MG: Performed by: FAMILY MEDICINE

## 2020-01-01 PROCEDURE — 97530 THERAPEUTIC ACTIVITIES: CPT

## 2020-01-01 PROCEDURE — 82607 VITAMIN B-12: CPT | Performed by: INTERNAL MEDICINE

## 2020-01-01 PROCEDURE — 73523 X-RAY EXAM HIPS BI 5/> VIEWS: CPT

## 2020-01-01 PROCEDURE — 83036 HEMOGLOBIN GLYCOSYLATED A1C: CPT | Performed by: FAMILY MEDICINE

## 2020-01-01 PROCEDURE — 85025 COMPLETE CBC W/AUTO DIFF WBC: CPT | Performed by: PHYSICIAN ASSISTANT

## 2020-01-01 PROCEDURE — 88305 TISSUE EXAM BY PATHOLOGIST: CPT | Performed by: INTERNAL MEDICINE

## 2020-01-01 PROCEDURE — 36415 COLL VENOUS BLD VENIPUNCTURE: CPT | Performed by: INTERNAL MEDICINE

## 2020-01-01 PROCEDURE — 76705 ECHO EXAM OF ABDOMEN: CPT

## 2020-01-01 PROCEDURE — 82140 ASSAY OF AMMONIA: CPT | Performed by: PHYSICIAN ASSISTANT

## 2020-01-01 PROCEDURE — 25010000002 PROPOFOL 10 MG/ML EMULSION: Performed by: NURSE ANESTHETIST, CERTIFIED REGISTERED

## 2020-01-01 PROCEDURE — 87205 SMEAR GRAM STAIN: CPT | Performed by: NURSE PRACTITIONER

## 2020-01-01 PROCEDURE — 25010000002 HEPARIN (PORCINE) PER 1000 UNITS: Performed by: STUDENT IN AN ORGANIZED HEALTH CARE EDUCATION/TRAINING PROGRAM

## 2020-01-01 PROCEDURE — 31652 BRONCH EBUS SAMPLNG 1/2 NODE: CPT | Performed by: INTERNAL MEDICINE

## 2020-01-01 PROCEDURE — 86900 BLOOD TYPING SEROLOGIC ABO: CPT

## 2020-01-01 PROCEDURE — 87635 SARS-COV-2 COVID-19 AMP PRB: CPT | Performed by: STUDENT IN AN ORGANIZED HEALTH CARE EDUCATION/TRAINING PROGRAM

## 2020-01-01 PROCEDURE — 87220 TISSUE EXAM FOR FUNGI: CPT | Performed by: INTERNAL MEDICINE

## 2020-01-01 PROCEDURE — 88112 CYTOPATH CELL ENHANCE TECH: CPT | Performed by: INTERNAL MEDICINE

## 2020-01-01 PROCEDURE — 94760 N-INVAS EAR/PLS OXIMETRY 1: CPT

## 2020-01-01 PROCEDURE — 88184 FLOWCYTOMETRY/ TC 1 MARKER: CPT

## 2020-01-01 PROCEDURE — 84450 TRANSFERASE (AST) (SGOT): CPT | Performed by: NURSE PRACTITIONER

## 2020-01-01 PROCEDURE — 83735 ASSAY OF MAGNESIUM: CPT | Performed by: FAMILY MEDICINE

## 2020-01-01 PROCEDURE — 80048 BASIC METABOLIC PNL TOTAL CA: CPT | Performed by: FAMILY MEDICINE

## 2020-01-01 PROCEDURE — 99284 EMERGENCY DEPT VISIT MOD MDM: CPT

## 2020-01-01 PROCEDURE — 82140 ASSAY OF AMMONIA: CPT | Performed by: EMERGENCY MEDICINE

## 2020-01-01 PROCEDURE — 86738 MYCOPLASMA ANTIBODY: CPT | Performed by: INTERNAL MEDICINE

## 2020-01-01 PROCEDURE — 87150 DNA/RNA AMPLIFIED PROBE: CPT | Performed by: FAMILY MEDICINE

## 2020-01-01 PROCEDURE — 85007 BL SMEAR W/DIFF WBC COUNT: CPT | Performed by: FAMILY MEDICINE

## 2020-01-01 PROCEDURE — 80053 COMPREHEN METABOLIC PANEL: CPT | Performed by: EMERGENCY MEDICINE

## 2020-01-01 PROCEDURE — 71045 X-RAY EXAM CHEST 1 VIEW: CPT

## 2020-01-01 PROCEDURE — 87205 SMEAR GRAM STAIN: CPT | Performed by: INTERNAL MEDICINE

## 2020-01-01 PROCEDURE — 99221 1ST HOSP IP/OBS SF/LOW 40: CPT | Performed by: FAMILY MEDICINE

## 2020-01-01 PROCEDURE — 25010000002 HEPARIN (PORCINE) PER 1000 UNITS: Performed by: INTERNAL MEDICINE

## 2020-01-01 PROCEDURE — 85007 BL SMEAR W/DIFF WBC COUNT: CPT | Performed by: EMERGENCY MEDICINE

## 2020-01-01 PROCEDURE — 25010000002 ONDANSETRON PER 1 MG: Performed by: STUDENT IN AN ORGANIZED HEALTH CARE EDUCATION/TRAINING PROGRAM

## 2020-01-01 PROCEDURE — 72128 CT CHEST SPINE W/O DYE: CPT

## 2020-01-01 PROCEDURE — 25010000002 VANCOMYCIN 5 G RECONSTITUTED SOLUTION: Performed by: INTERNAL MEDICINE

## 2020-01-01 PROCEDURE — 84145 PROCALCITONIN (PCT): CPT | Performed by: INTERNAL MEDICINE

## 2020-01-01 PROCEDURE — 85014 HEMATOCRIT: CPT | Performed by: PHYSICIAN ASSISTANT

## 2020-01-01 PROCEDURE — 99233 SBSQ HOSP IP/OBS HIGH 50: CPT | Performed by: FAMILY MEDICINE

## 2020-01-01 PROCEDURE — 82728 ASSAY OF FERRITIN: CPT | Performed by: PHYSICIAN ASSISTANT

## 2020-01-01 PROCEDURE — 97161 PT EVAL LOW COMPLEX 20 MIN: CPT | Performed by: PHYSICAL THERAPIST

## 2020-01-01 PROCEDURE — 0097U HC BIOFIRE FILMARRAY GI PANEL: CPT | Performed by: INTERNAL MEDICINE

## 2020-01-01 PROCEDURE — 99214 OFFICE O/P EST MOD 30 MIN: CPT | Performed by: PHYSICIAN ASSISTANT

## 2020-01-01 PROCEDURE — 99231 SBSQ HOSP IP/OBS SF/LOW 25: CPT | Performed by: INTERNAL MEDICINE

## 2020-01-01 PROCEDURE — 82248 BILIRUBIN DIRECT: CPT | Performed by: NURSE PRACTITIONER

## 2020-01-01 PROCEDURE — 81001 URINALYSIS AUTO W/SCOPE: CPT | Performed by: INTERNAL MEDICINE

## 2020-01-01 PROCEDURE — 80053 COMPREHEN METABOLIC PANEL: CPT | Performed by: FAMILY MEDICINE

## 2020-01-01 PROCEDURE — 97110 THERAPEUTIC EXERCISES: CPT

## 2020-01-01 PROCEDURE — 0DB98ZX EXCISION OF DUODENUM, VIA NATURAL OR ARTIFICIAL OPENING ENDOSCOPIC, DIAGNOSTIC: ICD-10-PCS | Performed by: INTERNAL MEDICINE

## 2020-01-01 PROCEDURE — 83605 ASSAY OF LACTIC ACID: CPT | Performed by: INTERNAL MEDICINE

## 2020-01-01 PROCEDURE — 88173 CYTOPATH EVAL FNA REPORT: CPT

## 2020-01-01 PROCEDURE — C1726 CATH, BAL DIL, NON-VASCULAR: HCPCS | Performed by: INTERNAL MEDICINE

## 2020-01-01 PROCEDURE — 82784 ASSAY IGA/IGD/IGG/IGM EACH: CPT | Performed by: INTERNAL MEDICINE

## 2020-01-01 PROCEDURE — 86480 TB TEST CELL IMMUN MEASURE: CPT

## 2020-01-01 PROCEDURE — 99406 BEHAV CHNG SMOKING 3-10 MIN: CPT

## 2020-01-01 PROCEDURE — 71250 CT THORAX DX C-: CPT

## 2020-01-01 PROCEDURE — 84466 ASSAY OF TRANSFERRIN: CPT | Performed by: PHYSICIAN ASSISTANT

## 2020-01-01 PROCEDURE — 25010000002 ENOXAPARIN PER 10 MG: Performed by: FAMILY MEDICINE

## 2020-01-01 PROCEDURE — 72125 CT NECK SPINE W/O DYE: CPT

## 2020-01-01 PROCEDURE — 36430 TRANSFUSION BLD/BLD COMPNT: CPT

## 2020-01-01 PROCEDURE — 82746 ASSAY OF FOLIC ACID SERUM: CPT | Performed by: INTERNAL MEDICINE

## 2020-01-01 PROCEDURE — 84075 ASSAY ALKALINE PHOSPHATASE: CPT | Performed by: NURSE PRACTITIONER

## 2020-01-01 PROCEDURE — 85007 BL SMEAR W/DIFF WBC COUNT: CPT | Performed by: PHYSICIAN ASSISTANT

## 2020-01-01 PROCEDURE — 87635 SARS-COV-2 COVID-19 AMP PRB: CPT | Performed by: INTERNAL MEDICINE

## 2020-01-01 PROCEDURE — 83690 ASSAY OF LIPASE: CPT | Performed by: FAMILY MEDICINE

## 2020-01-01 PROCEDURE — 85018 HEMOGLOBIN: CPT | Performed by: PHYSICIAN ASSISTANT

## 2020-01-01 PROCEDURE — 85027 COMPLETE CBC AUTOMATED: CPT | Performed by: INTERNAL MEDICINE

## 2020-01-01 PROCEDURE — 87070 CULTURE OTHR SPECIMN AEROBIC: CPT | Performed by: INTERNAL MEDICINE

## 2020-01-01 PROCEDURE — 85025 COMPLETE CBC W/AUTO DIFF WBC: CPT

## 2020-01-01 PROCEDURE — 80053 COMPREHEN METABOLIC PANEL: CPT | Performed by: PHYSICIAN ASSISTANT

## 2020-01-01 PROCEDURE — 25010000002 SUCCINYLCHOLINE PER 20 MG: Performed by: NURSE ANESTHETIST, CERTIFIED REGISTERED

## 2020-01-01 PROCEDURE — 99222 1ST HOSP IP/OBS MODERATE 55: CPT | Performed by: INTERNAL MEDICINE

## 2020-01-01 PROCEDURE — 99232 SBSQ HOSP IP/OBS MODERATE 35: CPT | Performed by: FAMILY MEDICINE

## 2020-01-01 PROCEDURE — 25010000002 ONDANSETRON PER 1 MG: Performed by: NURSE ANESTHETIST, CERTIFIED REGISTERED

## 2020-01-01 PROCEDURE — C9803 HOPD COVID-19 SPEC COLLECT: HCPCS

## 2020-01-01 PROCEDURE — 73564 X-RAY EXAM KNEE 4 OR MORE: CPT

## 2020-01-01 PROCEDURE — 82962 GLUCOSE BLOOD TEST: CPT

## 2020-01-01 PROCEDURE — 82728 ASSAY OF FERRITIN: CPT | Performed by: INTERNAL MEDICINE

## 2020-01-01 PROCEDURE — 31624 DX BRONCHOSCOPE/LAVAGE: CPT | Performed by: INTERNAL MEDICINE

## 2020-01-01 PROCEDURE — 88185 FLOWCYTOMETRY/TC ADD-ON: CPT | Performed by: INTERNAL MEDICINE

## 2020-01-01 PROCEDURE — 85046 RETICYTE/HGB CONCENTRATE: CPT | Performed by: INTERNAL MEDICINE

## 2020-01-01 PROCEDURE — 87206 SMEAR FLUORESCENT/ACID STAI: CPT | Performed by: INTERNAL MEDICINE

## 2020-01-01 PROCEDURE — 25010000002 FUROSEMIDE PER 20 MG: Performed by: INTERNAL MEDICINE

## 2020-01-01 PROCEDURE — 82105 ALPHA-FETOPROTEIN SERUM: CPT | Performed by: PHYSICIAN ASSISTANT

## 2020-01-01 PROCEDURE — 86334 IMMUNOFIX E-PHORESIS SERUM: CPT | Performed by: INTERNAL MEDICINE

## 2020-01-01 PROCEDURE — 93010 ELECTROCARDIOGRAM REPORT: CPT | Performed by: INTERNAL MEDICINE

## 2020-01-01 PROCEDURE — 31622 DX BRONCHOSCOPE/WASH: CPT | Performed by: INTERNAL MEDICINE

## 2020-01-01 PROCEDURE — 93005 ELECTROCARDIOGRAM TRACING: CPT

## 2020-01-01 PROCEDURE — 84443 ASSAY THYROID STIM HORMONE: CPT | Performed by: FAMILY MEDICINE

## 2020-01-01 PROCEDURE — 25010000002 ENOXAPARIN PER 10 MG: Performed by: INTERNAL MEDICINE

## 2020-01-01 PROCEDURE — 45380 COLONOSCOPY AND BIOPSY: CPT | Performed by: INTERNAL MEDICINE

## 2020-01-01 PROCEDURE — 88112 CYTOPATH CELL ENHANCE TECH: CPT

## 2020-01-01 PROCEDURE — 97162 PT EVAL MOD COMPLEX 30 MIN: CPT

## 2020-01-01 PROCEDURE — 86140 C-REACTIVE PROTEIN: CPT | Performed by: INTERNAL MEDICINE

## 2020-01-01 PROCEDURE — 87641 MR-STAPH DNA AMP PROBE: CPT | Performed by: HOSPITALIST

## 2020-01-01 PROCEDURE — 87070 CULTURE OTHR SPECIMN AEROBIC: CPT | Performed by: NURSE PRACTITIONER

## 2020-01-01 PROCEDURE — 07D78ZX EXTRACTION OF THORAX LYMPHATIC, VIA NATURAL OR ARTIFICIAL OPENING ENDOSCOPIC, DIAGNOSTIC: ICD-10-PCS | Performed by: INTERNAL MEDICINE

## 2020-01-01 PROCEDURE — 93306 TTE W/DOPPLER COMPLETE: CPT | Performed by: INTERNAL MEDICINE

## 2020-01-01 PROCEDURE — 88185 FLOWCYTOMETRY/TC ADD-ON: CPT | Performed by: STUDENT IN AN ORGANIZED HEALTH CARE EDUCATION/TRAINING PROGRAM

## 2020-01-01 PROCEDURE — 36415 COLL VENOUS BLD VENIPUNCTURE: CPT | Performed by: PHYSICIAN ASSISTANT

## 2020-01-01 PROCEDURE — 85025 COMPLETE CBC W/AUTO DIFF WBC: CPT | Performed by: NURSE PRACTITIONER

## 2020-01-01 PROCEDURE — 0BC68ZZ EXTIRPATION OF MATTER FROM RIGHT LOWER LOBE BRONCHUS, VIA NATURAL OR ARTIFICIAL OPENING ENDOSCOPIC: ICD-10-PCS | Performed by: INTERNAL MEDICINE

## 2020-01-01 PROCEDURE — 0100U HC BIOFIRE FILMARRAY RESP PANEL 2: CPT | Performed by: FAMILY MEDICINE

## 2020-01-01 PROCEDURE — 97165 OT EVAL LOW COMPLEX 30 MIN: CPT | Performed by: OCCUPATIONAL THERAPIST

## 2020-01-01 PROCEDURE — 25010000002 IOPAMIDOL 61 % SOLUTION: Performed by: STUDENT IN AN ORGANIZED HEALTH CARE EDUCATION/TRAINING PROGRAM

## 2020-01-01 PROCEDURE — P9016 RBC LEUKOCYTES REDUCED: HCPCS

## 2020-01-01 PROCEDURE — 0B9H8ZX DRAINAGE OF LUNG LINGULA, VIA NATURAL OR ARTIFICIAL OPENING ENDOSCOPIC, DIAGNOSTIC: ICD-10-PCS | Performed by: INTERNAL MEDICINE

## 2020-01-01 PROCEDURE — 85025 COMPLETE CBC W/AUTO DIFF WBC: CPT | Performed by: EMERGENCY MEDICINE

## 2020-01-01 PROCEDURE — 70450 CT HEAD/BRAIN W/O DYE: CPT

## 2020-01-01 PROCEDURE — 73080 X-RAY EXAM OF ELBOW: CPT

## 2020-01-01 PROCEDURE — 74178 CT ABD&PLV WO CNTR FLWD CNTR: CPT

## 2020-01-01 PROCEDURE — 80061 LIPID PANEL: CPT | Performed by: FAMILY MEDICINE

## 2020-01-01 PROCEDURE — 87635 SARS-COV-2 COVID-19 AMP PRB: CPT | Performed by: NURSE PRACTITIONER

## 2020-01-01 PROCEDURE — 84460 ALANINE AMINO (ALT) (SGPT): CPT | Performed by: NURSE PRACTITIONER

## 2020-01-01 PROCEDURE — 82247 BILIRUBIN TOTAL: CPT | Performed by: NURSE PRACTITIONER

## 2020-01-01 PROCEDURE — 97166 OT EVAL MOD COMPLEX 45 MIN: CPT

## 2020-01-01 PROCEDURE — 88172 CYTP DX EVAL FNA 1ST EA SITE: CPT

## 2020-01-01 PROCEDURE — 72131 CT LUMBAR SPINE W/O DYE: CPT

## 2020-01-01 PROCEDURE — 84439 ASSAY OF FREE THYROXINE: CPT | Performed by: FAMILY MEDICINE

## 2020-01-01 PROCEDURE — 85007 BL SMEAR W/DIFF WBC COUNT: CPT

## 2020-01-01 PROCEDURE — 82785 ASSAY OF IGE: CPT | Performed by: INTERNAL MEDICINE

## 2020-01-01 PROCEDURE — 0DB68ZX EXCISION OF STOMACH, VIA NATURAL OR ARTIFICIAL OPENING ENDOSCOPIC, DIAGNOSTIC: ICD-10-PCS | Performed by: INTERNAL MEDICINE

## 2020-01-01 PROCEDURE — 86022 PLATELET ANTIBODIES: CPT | Performed by: NURSE PRACTITIONER

## 2020-01-01 PROCEDURE — 87040 BLOOD CULTURE FOR BACTERIA: CPT | Performed by: INTERNAL MEDICINE

## 2020-01-01 RX ORDER — FENTANYL CITRATE 50 UG/ML
25 INJECTION, SOLUTION INTRAMUSCULAR; INTRAVENOUS
Status: DISCONTINUED | OUTPATIENT
Start: 2020-01-01 | End: 2020-01-01 | Stop reason: HOSPADM

## 2020-01-01 RX ORDER — LANOLIN ALCOHOL/MO/W.PET/CERES
325 CREAM (GRAM) TOPICAL
Qty: 30 TABLET | Refills: 2 | Status: SHIPPED | OUTPATIENT
Start: 2020-01-01 | End: 2021-07-27

## 2020-01-01 RX ORDER — PROPOFOL 10 MG/ML
VIAL (ML) INTRAVENOUS AS NEEDED
Status: DISCONTINUED | OUTPATIENT
Start: 2020-01-01 | End: 2020-01-01 | Stop reason: SURG

## 2020-01-01 RX ORDER — HYDROCODONE BITARTRATE AND ACETAMINOPHEN 5; 325 MG/1; MG/1
1 TABLET ORAL EVERY 6 HOURS PRN
Status: DISCONTINUED | OUTPATIENT
Start: 2020-01-01 | End: 2020-01-01 | Stop reason: HOSPADM

## 2020-01-01 RX ORDER — 0.9 % SODIUM CHLORIDE 0.9 %
VIAL (ML) INJECTION AS NEEDED
Status: DISCONTINUED | OUTPATIENT
Start: 2020-01-01 | End: 2020-01-01 | Stop reason: HOSPADM

## 2020-01-01 RX ORDER — MIDAZOLAM HYDROCHLORIDE 1 MG/ML
1 INJECTION INTRAMUSCULAR; INTRAVENOUS
Status: DISCONTINUED | OUTPATIENT
Start: 2020-01-01 | End: 2020-01-01 | Stop reason: HOSPADM

## 2020-01-01 RX ORDER — SUCCINYLCHOLINE CHLORIDE 20 MG/ML
INJECTION INTRAMUSCULAR; INTRAVENOUS AS NEEDED
Status: DISCONTINUED | OUTPATIENT
Start: 2020-01-01 | End: 2020-01-01 | Stop reason: SURG

## 2020-01-01 RX ORDER — SODIUM CHLORIDE 0.9 % (FLUSH) 0.9 %
10 SYRINGE (ML) INJECTION AS NEEDED
Status: DISCONTINUED | OUTPATIENT
Start: 2020-01-01 | End: 2020-01-01

## 2020-01-01 RX ORDER — PROCHLORPERAZINE MALEATE 10 MG
10 TABLET ORAL EVERY 6 HOURS PRN
Status: DISCONTINUED | OUTPATIENT
Start: 2020-01-01 | End: 2020-01-01 | Stop reason: HOSPADM

## 2020-01-01 RX ORDER — METHYLPREDNISOLONE SODIUM SUCCINATE 40 MG/ML
40 INJECTION, POWDER, LYOPHILIZED, FOR SOLUTION INTRAMUSCULAR; INTRAVENOUS DAILY
Status: DISCONTINUED | OUTPATIENT
Start: 2020-01-01 | End: 2020-01-01

## 2020-01-01 RX ORDER — ASCORBIC ACID 500 MG
1000 TABLET ORAL
Status: DISCONTINUED | OUTPATIENT
Start: 2020-01-01 | End: 2020-01-01 | Stop reason: HOSPADM

## 2020-01-01 RX ORDER — QUETIAPINE FUMARATE 100 MG/1
TABLET, FILM COATED ORAL
COMMUNITY
Start: 2020-01-01

## 2020-01-01 RX ORDER — LEVOFLOXACIN 5 MG/ML
500 INJECTION, SOLUTION INTRAVENOUS EVERY 24 HOURS
Status: COMPLETED | OUTPATIENT
Start: 2020-01-01 | End: 2020-01-01

## 2020-01-01 RX ORDER — SODIUM CHLORIDE 0.9 % (FLUSH) 0.9 %
3 SYRINGE (ML) INJECTION EVERY 12 HOURS SCHEDULED
Status: CANCELLED | OUTPATIENT
Start: 2020-01-01

## 2020-01-01 RX ORDER — BUDESONIDE AND FORMOTEROL FUMARATE DIHYDRATE 160; 4.5 UG/1; UG/1
2 AEROSOL RESPIRATORY (INHALATION)
Status: DISCONTINUED | OUTPATIENT
Start: 2020-01-01 | End: 2020-01-01 | Stop reason: HOSPADM

## 2020-01-01 RX ORDER — ARFORMOTEROL TARTRATE 15 UG/2ML
15 SOLUTION RESPIRATORY (INHALATION)
Status: DISCONTINUED | OUTPATIENT
Start: 2020-01-01 | End: 2020-01-01 | Stop reason: HOSPADM

## 2020-01-01 RX ORDER — BUDESONIDE 0.5 MG/2ML
0.5 INHALANT ORAL
Status: DISCONTINUED | OUTPATIENT
Start: 2020-01-01 | End: 2020-01-01 | Stop reason: HOSPADM

## 2020-01-01 RX ORDER — FOLIC ACID 1 MG/1
1 TABLET ORAL DAILY
Qty: 30 TABLET | Refills: 0 | Status: SHIPPED | OUTPATIENT
Start: 2020-01-01

## 2020-01-01 RX ORDER — FLUMAZENIL 0.1 MG/ML
0.2 INJECTION INTRAVENOUS AS NEEDED
Status: DISCONTINUED | OUTPATIENT
Start: 2020-01-01 | End: 2020-01-01 | Stop reason: HOSPADM

## 2020-01-01 RX ORDER — SODIUM FERRIC GLUCONATE COMPLEX IN SUCROSE 12.5 MG/ML
125 INJECTION INTRAVENOUS ONCE
Status: DISCONTINUED | OUTPATIENT
Start: 2020-01-01 | End: 2020-01-01

## 2020-01-01 RX ORDER — FLUTICASONE PROPIONATE 50 MCG
SPRAY, SUSPENSION (ML) NASAL
COMMUNITY
Start: 2020-01-01

## 2020-01-01 RX ORDER — LOSARTAN POTASSIUM 50 MG/1
50 TABLET ORAL DAILY
Status: DISCONTINUED | OUTPATIENT
Start: 2020-01-01 | End: 2020-01-01 | Stop reason: HOSPADM

## 2020-01-01 RX ORDER — ALBUTEROL SULFATE 2.5 MG/3ML
2.5 SOLUTION RESPIRATORY (INHALATION) EVERY 6 HOURS PRN
Status: DISCONTINUED | OUTPATIENT
Start: 2020-01-01 | End: 2020-01-01 | Stop reason: HOSPADM

## 2020-01-01 RX ORDER — LORAZEPAM 2 MG/ML
0.25 INJECTION INTRAMUSCULAR EVERY 4 HOURS PRN
Status: ACTIVE | OUTPATIENT
Start: 2020-01-01 | End: 2020-01-01

## 2020-01-01 RX ORDER — SODIUM CHLORIDE 9 MG/ML
150 INJECTION, SOLUTION INTRAVENOUS CONTINUOUS
Status: DISCONTINUED | OUTPATIENT
Start: 2020-01-01 | End: 2020-01-01

## 2020-01-01 RX ORDER — SODIUM CHLORIDE 0.9 % (FLUSH) 0.9 %
3 SYRINGE (ML) INJECTION EVERY 12 HOURS SCHEDULED
Status: DISCONTINUED | OUTPATIENT
Start: 2020-01-01 | End: 2020-01-01 | Stop reason: HOSPADM

## 2020-01-01 RX ORDER — LIDOCAINE HYDROCHLORIDE 20 MG/ML
INJECTION, SOLUTION INTRAVENOUS AS NEEDED
Status: DISCONTINUED | OUTPATIENT
Start: 2020-01-01 | End: 2020-01-01 | Stop reason: SURG

## 2020-01-01 RX ORDER — OXYCODONE AND ACETAMINOPHEN 7.5; 325 MG/1; MG/1
2 TABLET ORAL EVERY 4 HOURS PRN
Status: DISCONTINUED | OUTPATIENT
Start: 2020-01-01 | End: 2020-01-01 | Stop reason: HOSPADM

## 2020-01-01 RX ORDER — PREDNISONE 20 MG/1
20 TABLET ORAL EVERY 12 HOURS SCHEDULED
Status: DISCONTINUED | OUTPATIENT
Start: 2020-01-01 | End: 2020-01-01 | Stop reason: HOSPADM

## 2020-01-01 RX ORDER — IPRATROPIUM BROMIDE AND ALBUTEROL SULFATE 2.5; .5 MG/3ML; MG/3ML
3 SOLUTION RESPIRATORY (INHALATION)
Status: DISCONTINUED | OUTPATIENT
Start: 2020-01-01 | End: 2020-01-01 | Stop reason: HOSPADM

## 2020-01-01 RX ORDER — METHYLPREDNISOLONE SODIUM SUCCINATE 40 MG/ML
40 INJECTION, POWDER, LYOPHILIZED, FOR SOLUTION INTRAMUSCULAR; INTRAVENOUS EVERY 12 HOURS
Status: DISCONTINUED | OUTPATIENT
Start: 2020-01-01 | End: 2020-01-01

## 2020-01-01 RX ORDER — PREDNISONE 10 MG/1
TABLET ORAL
Qty: 30 TABLET | Refills: 0 | Status: SHIPPED | OUTPATIENT
Start: 2020-01-01 | End: 2020-01-01 | Stop reason: HOSPADM

## 2020-01-01 RX ORDER — FAMOTIDINE 10 MG/ML
20 INJECTION, SOLUTION INTRAVENOUS
Status: COMPLETED | OUTPATIENT
Start: 2020-01-01 | End: 2020-01-01

## 2020-01-01 RX ORDER — LIDOCAINE HYDROCHLORIDE 10 MG/ML
0.1 INJECTION, SOLUTION EPIDURAL; INFILTRATION; INTRACAUDAL; PERINEURAL ONCE AS NEEDED
Status: DISCONTINUED | OUTPATIENT
Start: 2020-01-01 | End: 2020-01-01

## 2020-01-01 RX ORDER — OXYCODONE AND ACETAMINOPHEN 10; 325 MG/1; MG/1
1 TABLET ORAL ONCE AS NEEDED
Status: DISCONTINUED | OUTPATIENT
Start: 2020-01-01 | End: 2020-01-01 | Stop reason: HOSPADM

## 2020-01-01 RX ORDER — NALOXONE HCL 0.4 MG/ML
0.4 VIAL (ML) INJECTION AS NEEDED
Status: DISCONTINUED | OUTPATIENT
Start: 2020-01-01 | End: 2020-01-01 | Stop reason: HOSPADM

## 2020-01-01 RX ORDER — FOLIC ACID 1 MG/1
1 TABLET ORAL DAILY
Status: DISCONTINUED | OUTPATIENT
Start: 2020-01-01 | End: 2020-01-01 | Stop reason: HOSPADM

## 2020-01-01 RX ORDER — BUDESONIDE AND FORMOTEROL FUMARATE DIHYDRATE 160; 4.5 UG/1; UG/1
2 AEROSOL RESPIRATORY (INHALATION)
Qty: 6 G | Refills: 12 | Status: SHIPPED | OUTPATIENT
Start: 2020-01-01

## 2020-01-01 RX ORDER — ROCURONIUM BROMIDE 10 MG/ML
INJECTION, SOLUTION INTRAVENOUS AS NEEDED
Status: DISCONTINUED | OUTPATIENT
Start: 2020-01-01 | End: 2020-01-01 | Stop reason: SURG

## 2020-01-01 RX ORDER — HYDROCODONE BITARTRATE AND ACETAMINOPHEN 5; 325 MG/1; MG/1
1 TABLET ORAL ONCE
Status: COMPLETED | OUTPATIENT
Start: 2020-01-01 | End: 2020-01-01

## 2020-01-01 RX ORDER — ALBUTEROL SULFATE 90 UG/1
2 AEROSOL, METERED RESPIRATORY (INHALATION) EVERY 4 HOURS PRN
Status: DISCONTINUED | OUTPATIENT
Start: 2020-01-01 | End: 2020-01-01 | Stop reason: HOSPADM

## 2020-01-01 RX ORDER — DEXAMETHASONE SODIUM PHOSPHATE 4 MG/ML
4 INJECTION, SOLUTION INTRA-ARTICULAR; INTRALESIONAL; INTRAMUSCULAR; INTRAVENOUS; SOFT TISSUE ONCE AS NEEDED
Status: COMPLETED | OUTPATIENT
Start: 2020-01-01 | End: 2020-01-01

## 2020-01-01 RX ORDER — LEVOFLOXACIN 5 MG/ML
750 INJECTION, SOLUTION INTRAVENOUS ONCE
Status: COMPLETED | OUTPATIENT
Start: 2020-01-01 | End: 2020-01-01

## 2020-01-01 RX ORDER — ACETAMINOPHEN 325 MG/1
650 TABLET ORAL ONCE AS NEEDED
Status: DISCONTINUED | OUTPATIENT
Start: 2020-01-01 | End: 2020-01-01

## 2020-01-01 RX ORDER — MECLIZINE HYDROCHLORIDE 25 MG/1
25 TABLET ORAL 2 TIMES DAILY PRN
Status: DISCONTINUED | OUTPATIENT
Start: 2020-01-01 | End: 2020-01-01 | Stop reason: HOSPADM

## 2020-01-01 RX ORDER — ONDANSETRON 2 MG/ML
INJECTION INTRAMUSCULAR; INTRAVENOUS AS NEEDED
Status: DISCONTINUED | OUTPATIENT
Start: 2020-01-01 | End: 2020-01-01 | Stop reason: SURG

## 2020-01-01 RX ORDER — ONDANSETRON 2 MG/ML
4 INJECTION INTRAMUSCULAR; INTRAVENOUS EVERY 6 HOURS PRN
Status: DISCONTINUED | OUTPATIENT
Start: 2020-01-01 | End: 2020-01-01 | Stop reason: HOSPADM

## 2020-01-01 RX ORDER — HEPARIN SODIUM 5000 [USP'U]/ML
5000 INJECTION, SOLUTION INTRAVENOUS; SUBCUTANEOUS EVERY 8 HOURS SCHEDULED
Status: DISCONTINUED | OUTPATIENT
Start: 2020-01-01 | End: 2020-01-01

## 2020-01-01 RX ORDER — METHYLPREDNISOLONE SODIUM SUCCINATE 40 MG/ML
40 INJECTION, POWDER, LYOPHILIZED, FOR SOLUTION INTRAMUSCULAR; INTRAVENOUS EVERY 6 HOURS SCHEDULED
Status: DISCONTINUED | OUTPATIENT
Start: 2020-01-01 | End: 2020-01-01

## 2020-01-01 RX ORDER — SODIUM CHLORIDE 0.9 % (FLUSH) 0.9 %
10 SYRINGE (ML) INJECTION AS NEEDED
Status: DISCONTINUED | OUTPATIENT
Start: 2020-01-01 | End: 2020-01-01 | Stop reason: HOSPADM

## 2020-01-01 RX ORDER — LIDOCAINE HYDROCHLORIDE 10 MG/ML
0.5 INJECTION, SOLUTION EPIDURAL; INFILTRATION; INTRACAUDAL; PERINEURAL ONCE AS NEEDED
Status: DISCONTINUED | OUTPATIENT
Start: 2020-01-01 | End: 2020-01-01 | Stop reason: HOSPADM

## 2020-01-01 RX ORDER — POLYETHYLENE GLYCOL 3350 17 G/17G
255 POWDER, FOR SOLUTION ORAL ONCE
Status: DISCONTINUED | OUTPATIENT
Start: 2020-01-01 | End: 2020-01-01

## 2020-01-01 RX ORDER — CETIRIZINE HYDROCHLORIDE 10 MG/1
10 TABLET ORAL DAILY
Status: DISCONTINUED | OUTPATIENT
Start: 2020-01-01 | End: 2020-01-01 | Stop reason: HOSPADM

## 2020-01-01 RX ORDER — SODIUM CHLORIDE, SODIUM LACTATE, POTASSIUM CHLORIDE, CALCIUM CHLORIDE 600; 310; 30; 20 MG/100ML; MG/100ML; MG/100ML; MG/100ML
100 INJECTION, SOLUTION INTRAVENOUS CONTINUOUS
Status: DISCONTINUED | OUTPATIENT
Start: 2020-01-01 | End: 2020-01-01 | Stop reason: HOSPADM

## 2020-01-01 RX ORDER — SODIUM CHLORIDE 0.9 % (FLUSH) 0.9 %
3 SYRINGE (ML) INJECTION EVERY 12 HOURS SCHEDULED
Status: DISCONTINUED | OUTPATIENT
Start: 2020-01-01 | End: 2020-01-01

## 2020-01-01 RX ORDER — SODIUM CHLORIDE 0.9 % (FLUSH) 0.9 %
3-10 SYRINGE (ML) INJECTION AS NEEDED
Status: DISCONTINUED | OUTPATIENT
Start: 2020-01-01 | End: 2020-01-01 | Stop reason: HOSPADM

## 2020-01-01 RX ORDER — SODIUM CHLORIDE 0.9 % (FLUSH) 0.9 %
10 SYRINGE (ML) INJECTION EVERY 12 HOURS SCHEDULED
Status: DISCONTINUED | OUTPATIENT
Start: 2020-01-01 | End: 2020-01-01 | Stop reason: HOSPADM

## 2020-01-01 RX ORDER — NICOTINE 21 MG/24HR
1 PATCH, TRANSDERMAL 24 HOURS TRANSDERMAL
Status: DISCONTINUED | OUTPATIENT
Start: 2020-01-01 | End: 2020-01-01 | Stop reason: HOSPADM

## 2020-01-01 RX ORDER — ACETAMINOPHEN 500 MG
1000 TABLET ORAL ONCE
Status: COMPLETED | OUTPATIENT
Start: 2020-01-01 | End: 2020-01-01

## 2020-01-01 RX ORDER — PANTOPRAZOLE SODIUM 40 MG/1
40 TABLET, DELAYED RELEASE ORAL
Status: DISCONTINUED | OUTPATIENT
Start: 2020-01-01 | End: 2020-01-01 | Stop reason: HOSPADM

## 2020-01-01 RX ORDER — QUETIAPINE FUMARATE 100 MG/1
100 TABLET, FILM COATED ORAL NIGHTLY
Status: DISCONTINUED | OUTPATIENT
Start: 2020-01-01 | End: 2020-01-01 | Stop reason: HOSPADM

## 2020-01-01 RX ORDER — FERROUS SULFATE TAB EC 324 MG (65 MG FE EQUIVALENT) 324 (65 FE) MG
324 TABLET DELAYED RESPONSE ORAL
Status: DISCONTINUED | OUTPATIENT
Start: 2020-01-01 | End: 2020-01-01

## 2020-01-01 RX ORDER — BUSPIRONE HYDROCHLORIDE 15 MG/1
15 TABLET ORAL EVERY 12 HOURS SCHEDULED
Status: DISCONTINUED | OUTPATIENT
Start: 2020-01-01 | End: 2020-01-01 | Stop reason: HOSPADM

## 2020-01-01 RX ORDER — FUROSEMIDE 10 MG/ML
40 INJECTION INTRAMUSCULAR; INTRAVENOUS ONCE
Status: COMPLETED | OUTPATIENT
Start: 2020-01-01 | End: 2020-01-01

## 2020-01-01 RX ORDER — SODIUM CHLORIDE 9 MG/ML
50 INJECTION, SOLUTION INTRAVENOUS CONTINUOUS
Status: DISCONTINUED | OUTPATIENT
Start: 2020-01-01 | End: 2020-01-01

## 2020-01-01 RX ORDER — IBUPROFEN 600 MG/1
600 TABLET ORAL ONCE AS NEEDED
Status: DISCONTINUED | OUTPATIENT
Start: 2020-01-01 | End: 2020-01-01 | Stop reason: HOSPADM

## 2020-01-01 RX ORDER — VASOPRESSIN 20 U/ML
INJECTION PARENTERAL AS NEEDED
Status: DISCONTINUED | OUTPATIENT
Start: 2020-01-01 | End: 2020-01-01 | Stop reason: SURG

## 2020-01-01 RX ORDER — SODIUM CHLORIDE, SODIUM GLUCONATE, SODIUM ACETATE, POTASSIUM CHLORIDE, AND MAGNESIUM CHLORIDE 526; 502; 368; 37; 30 MG/100ML; MG/100ML; MG/100ML; MG/100ML; MG/100ML
1000 INJECTION, SOLUTION INTRAVENOUS CONTINUOUS
Status: DISCONTINUED | OUTPATIENT
Start: 2020-01-01 | End: 2020-01-01

## 2020-01-01 RX ORDER — DEXTROSE AND SODIUM CHLORIDE 5; .45 G/100ML; G/100ML
30 INJECTION, SOLUTION INTRAVENOUS CONTINUOUS PRN
Status: CANCELLED | OUTPATIENT
Start: 2020-01-01

## 2020-01-01 RX ORDER — LABETALOL HYDROCHLORIDE 5 MG/ML
5 INJECTION, SOLUTION INTRAVENOUS
Status: DISCONTINUED | OUTPATIENT
Start: 2020-01-01 | End: 2020-01-01

## 2020-01-01 RX ORDER — ONDANSETRON 2 MG/ML
4 INJECTION INTRAMUSCULAR; INTRAVENOUS EVERY 6 HOURS PRN
Status: DISCONTINUED | OUTPATIENT
Start: 2020-01-01 | End: 2020-01-01 | Stop reason: SDUPTHER

## 2020-01-01 RX ORDER — METHYLPREDNISOLONE SODIUM SUCCINATE 40 MG/ML
20 INJECTION, POWDER, LYOPHILIZED, FOR SOLUTION INTRAMUSCULAR; INTRAVENOUS DAILY
Status: DISCONTINUED | OUTPATIENT
Start: 2020-01-01 | End: 2020-01-01

## 2020-01-01 RX ORDER — LABETALOL HYDROCHLORIDE 5 MG/ML
5 INJECTION, SOLUTION INTRAVENOUS
Status: DISCONTINUED | OUTPATIENT
Start: 2020-01-01 | End: 2020-01-01 | Stop reason: HOSPADM

## 2020-01-01 RX ORDER — ONDANSETRON 2 MG/ML
4 INJECTION INTRAMUSCULAR; INTRAVENOUS ONCE AS NEEDED
Status: DISCONTINUED | OUTPATIENT
Start: 2020-01-01 | End: 2020-01-01

## 2020-01-01 RX ORDER — SODIUM CHLORIDE 9 MG/ML
125 INJECTION, SOLUTION INTRAVENOUS CONTINUOUS
Status: DISCONTINUED | OUTPATIENT
Start: 2020-01-01 | End: 2020-01-01

## 2020-01-01 RX ORDER — SODIUM CHLORIDE 0.9 % (FLUSH) 0.9 %
10 SYRINGE (ML) INJECTION AS NEEDED
Status: CANCELLED | OUTPATIENT
Start: 2020-01-01

## 2020-01-01 RX ORDER — LEVOFLOXACIN 5 MG/ML
750 INJECTION, SOLUTION INTRAVENOUS EVERY 24 HOURS
Status: DISPENSED | OUTPATIENT
Start: 2020-01-01 | End: 2020-01-01

## 2020-01-01 RX ORDER — ACETAMINOPHEN 325 MG/1
650 TABLET ORAL EVERY 4 HOURS PRN
Status: DISCONTINUED | OUTPATIENT
Start: 2020-01-01 | End: 2020-01-01 | Stop reason: HOSPADM

## 2020-01-01 RX ORDER — L. ACIDOPHILUS/L.BULGARICUS 1MM CELL
1 TABLET ORAL DAILY
Status: DISCONTINUED | OUTPATIENT
Start: 2020-01-01 | End: 2020-01-01 | Stop reason: HOSPADM

## 2020-01-01 RX ORDER — LOPERAMIDE HYDROCHLORIDE 2 MG/1
2 CAPSULE ORAL 4 TIMES DAILY PRN
Status: DISCONTINUED | OUTPATIENT
Start: 2020-01-01 | End: 2020-01-01 | Stop reason: HOSPADM

## 2020-01-01 RX ORDER — ACETAMINOPHEN 650 MG/1
650 SUPPOSITORY RECTAL ONCE AS NEEDED
Status: DISCONTINUED | OUTPATIENT
Start: 2020-01-01 | End: 2020-01-01

## 2020-01-01 RX ORDER — ONDANSETRON 2 MG/ML
4 INJECTION INTRAMUSCULAR; INTRAVENOUS ONCE AS NEEDED
Status: DISCONTINUED | OUTPATIENT
Start: 2020-01-01 | End: 2020-01-01 | Stop reason: HOSPADM

## 2020-01-01 RX ORDER — DEXTROSE AND SODIUM CHLORIDE 5; .45 G/100ML; G/100ML
30 INJECTION, SOLUTION INTRAVENOUS CONTINUOUS PRN
Status: DISCONTINUED | OUTPATIENT
Start: 2020-01-01 | End: 2020-01-01 | Stop reason: HOSPADM

## 2020-01-01 RX ORDER — POLYETHYLENE GLYCOL 3350 17 G/17G
255 POWDER, FOR SOLUTION ORAL ONCE
Status: COMPLETED | OUTPATIENT
Start: 2020-01-01 | End: 2020-01-01

## 2020-01-01 RX ORDER — LOPERAMIDE HYDROCHLORIDE 2 MG/1
2 CAPSULE ORAL 4 TIMES DAILY PRN
COMMUNITY

## 2020-01-01 RX ORDER — ZOLPIDEM TARTRATE 5 MG/1
5 TABLET ORAL NIGHTLY PRN
Status: DISPENSED | OUTPATIENT
Start: 2020-01-01 | End: 2020-01-01

## 2020-01-01 RX ORDER — HEPARIN SODIUM 5000 [USP'U]/ML
5000 INJECTION, SOLUTION INTRAVENOUS; SUBCUTANEOUS EVERY 8 HOURS SCHEDULED
Status: DISPENSED | OUTPATIENT
Start: 2020-01-01 | End: 2020-01-01

## 2020-01-01 RX ORDER — L. ACIDOPHILUS/L.BULGARICUS 1MM CELL
1 TABLET ORAL DAILY
Qty: 30 TABLET | Refills: 0 | Status: SHIPPED | OUTPATIENT
Start: 2020-01-01

## 2020-01-01 RX ORDER — EPHEDRINE SULFATE 50 MG/ML
5 INJECTION, SOLUTION INTRAVENOUS ONCE AS NEEDED
Status: DISCONTINUED | OUTPATIENT
Start: 2020-01-01 | End: 2020-01-01

## 2020-01-01 RX ADMIN — IPRATROPIUM BROMIDE AND ALBUTEROL SULFATE 3 ML: 2.5; .5 SOLUTION RESPIRATORY (INHALATION) at 11:03

## 2020-01-01 RX ADMIN — PROPOFOL 100 MG: 10 INJECTION, EMULSION INTRAVENOUS at 13:51

## 2020-01-01 RX ADMIN — IPRATROPIUM BROMIDE AND ALBUTEROL SULFATE 3 ML: 2.5; .5 SOLUTION RESPIRATORY (INHALATION) at 15:25

## 2020-01-01 RX ADMIN — Medication 1 TABLET: at 15:19

## 2020-01-01 RX ADMIN — METHYLPREDNISOLONE SODIUM SUCCINATE 40 MG: 40 INJECTION, POWDER, FOR SOLUTION INTRAMUSCULAR; INTRAVENOUS at 14:12

## 2020-01-01 RX ADMIN — BUDESONIDE AND FORMOTEROL FUMARATE DIHYDRATE 2 PUFF: 160; 4.5 AEROSOL RESPIRATORY (INHALATION) at 07:36

## 2020-01-01 RX ADMIN — BUSPIRONE HYDROCHLORIDE 15 MG: 15 TABLET ORAL at 15:18

## 2020-01-01 RX ADMIN — SUCCINYLCHOLINE CHLORIDE 120 MG: 20 INJECTION, SOLUTION INTRAMUSCULAR; INTRAVENOUS at 13:17

## 2020-01-01 RX ADMIN — IPRATROPIUM BROMIDE AND ALBUTEROL SULFATE 3 ML: 2.5; .5 SOLUTION RESPIRATORY (INHALATION) at 16:10

## 2020-01-01 RX ADMIN — LEVOFLOXACIN 750 MG: 5 INJECTION, SOLUTION INTRAVENOUS at 14:53

## 2020-01-01 RX ADMIN — ONDANSETRON 4 MG: 2 SOLUTION INTRAMUSCULAR; INTRAVENOUS at 10:14

## 2020-01-01 RX ADMIN — IPRATROPIUM BROMIDE AND ALBUTEROL SULFATE 3 ML: 2.5; .5 SOLUTION RESPIRATORY (INHALATION) at 20:54

## 2020-01-01 RX ADMIN — HEPARIN SODIUM 5000 UNITS: 5000 INJECTION INTRAVENOUS; SUBCUTANEOUS at 14:14

## 2020-01-01 RX ADMIN — BUDESONIDE 0.5 MG: 0.5 INHALANT RESPIRATORY (INHALATION) at 22:59

## 2020-01-01 RX ADMIN — BUSPIRONE HYDROCHLORIDE 15 MG: 10 TABLET ORAL at 09:23

## 2020-01-01 RX ADMIN — ARFORMOTEROL TARTRATE 15 MCG: 15 SOLUTION RESPIRATORY (INHALATION) at 07:11

## 2020-01-01 RX ADMIN — Medication 1000 MG: at 09:06

## 2020-01-01 RX ADMIN — FERROUS SULFATE TAB EC 324 MG (65 MG FE EQUIVALENT) 324 MG: 324 (65 FE) TABLET DELAYED RESPONSE at 08:01

## 2020-01-01 RX ADMIN — IPRATROPIUM BROMIDE AND ALBUTEROL SULFATE 3 ML: 2.5; .5 SOLUTION RESPIRATORY (INHALATION) at 19:38

## 2020-01-01 RX ADMIN — HYDROCORTISONE: 1 CREAM TOPICAL at 12:15

## 2020-01-01 RX ADMIN — BUSPIRONE HYDROCHLORIDE 15 MG: 15 TABLET ORAL at 08:05

## 2020-01-01 RX ADMIN — SODIUM CHLORIDE, PRESERVATIVE FREE 10 ML: 5 INJECTION INTRAVENOUS at 20:31

## 2020-01-01 RX ADMIN — BUDESONIDE 0.5 MG: 0.5 INHALANT RESPIRATORY (INHALATION) at 07:22

## 2020-01-01 RX ADMIN — HYDROCORTISONE: 1 CREAM TOPICAL at 09:21

## 2020-01-01 RX ADMIN — SODIUM CHLORIDE, PRESERVATIVE FREE 10 ML: 5 INJECTION INTRAVENOUS at 21:04

## 2020-01-01 RX ADMIN — SODIUM CHLORIDE, PRESERVATIVE FREE 10 ML: 5 INJECTION INTRAVENOUS at 21:35

## 2020-01-01 RX ADMIN — HYDROCORTISONE: 1 CREAM TOPICAL at 08:07

## 2020-01-01 RX ADMIN — Medication 1000 MG: at 13:14

## 2020-01-01 RX ADMIN — FOLIC ACID 1 MG: 1 TABLET ORAL at 15:19

## 2020-01-01 RX ADMIN — CETIRIZINE HYDROCHLORIDE 10 MG: 10 TABLET, FILM COATED ORAL at 08:00

## 2020-01-01 RX ADMIN — IPRATROPIUM BROMIDE AND ALBUTEROL SULFATE 3 ML: 2.5; .5 SOLUTION RESPIRATORY (INHALATION) at 07:26

## 2020-01-01 RX ADMIN — BUSPIRONE HYDROCHLORIDE 15 MG: 10 TABLET ORAL at 21:35

## 2020-01-01 RX ADMIN — ACETAMINOPHEN 1000 MG: 500 TABLET, FILM COATED ORAL at 13:13

## 2020-01-01 RX ADMIN — BUSPIRONE HYDROCHLORIDE 15 MG: 15 TABLET ORAL at 08:01

## 2020-01-01 RX ADMIN — BUDESONIDE AND FORMOTEROL FUMARATE DIHYDRATE 2 PUFF: 160; 4.5 AEROSOL RESPIRATORY (INHALATION) at 07:55

## 2020-01-01 RX ADMIN — SODIUM CHLORIDE, PRESERVATIVE FREE 10 ML: 5 INJECTION INTRAVENOUS at 20:49

## 2020-01-01 RX ADMIN — HEPARIN SODIUM 5000 UNITS: 5000 INJECTION INTRAVENOUS; SUBCUTANEOUS at 05:06

## 2020-01-01 RX ADMIN — PANTOPRAZOLE SODIUM 40 MG: 40 TABLET, DELAYED RELEASE ORAL at 06:19

## 2020-01-01 RX ADMIN — BUDESONIDE AND FORMOTEROL FUMARATE DIHYDRATE 2 PUFF: 160; 4.5 AEROSOL RESPIRATORY (INHALATION) at 19:20

## 2020-01-01 RX ADMIN — METHYLPREDNISOLONE SODIUM SUCCINATE 40 MG: 40 INJECTION, POWDER, FOR SOLUTION INTRAMUSCULAR; INTRAVENOUS at 17:29

## 2020-01-01 RX ADMIN — BUSPIRONE HYDROCHLORIDE 15 MG: 15 TABLET ORAL at 08:04

## 2020-01-01 RX ADMIN — METHYLPREDNISOLONE SODIUM SUCCINATE 40 MG: 40 INJECTION, POWDER, FOR SOLUTION INTRAMUSCULAR; INTRAVENOUS at 17:19

## 2020-01-01 RX ADMIN — BUSPIRONE HYDROCHLORIDE 15 MG: 15 TABLET ORAL at 19:42

## 2020-01-01 RX ADMIN — SODIUM CHLORIDE 150 ML/HR: 900 INJECTION, SOLUTION INTRAVENOUS at 01:02

## 2020-01-01 RX ADMIN — IPRATROPIUM BROMIDE AND ALBUTEROL SULFATE 3 ML: 2.5; .5 SOLUTION RESPIRATORY (INHALATION) at 11:30

## 2020-01-01 RX ADMIN — FOLIC ACID 1 MG: 1 TABLET ORAL at 08:05

## 2020-01-01 RX ADMIN — HEPARIN SODIUM 5000 UNITS: 5000 INJECTION INTRAVENOUS; SUBCUTANEOUS at 21:08

## 2020-01-01 RX ADMIN — SODIUM CHLORIDE 125 MG: 9 INJECTION, SOLUTION INTRAVENOUS at 11:24

## 2020-01-01 RX ADMIN — SODIUM CHLORIDE 150 ML/HR: 900 INJECTION, SOLUTION INTRAVENOUS at 08:49

## 2020-01-01 RX ADMIN — Medication 1000 MG: at 08:14

## 2020-01-01 RX ADMIN — IPRATROPIUM BROMIDE AND ALBUTEROL SULFATE 3 ML: 2.5; .5 SOLUTION RESPIRATORY (INHALATION) at 19:11

## 2020-01-01 RX ADMIN — IPRATROPIUM BROMIDE AND ALBUTEROL SULFATE 3 ML: 2.5; .5 SOLUTION RESPIRATORY (INHALATION) at 10:29

## 2020-01-01 RX ADMIN — METHYLPREDNISOLONE SODIUM SUCCINATE 40 MG: 40 INJECTION, POWDER, FOR SOLUTION INTRAMUSCULAR; INTRAVENOUS at 17:09

## 2020-01-01 RX ADMIN — IPRATROPIUM BROMIDE AND ALBUTEROL SULFATE 3 ML: 2.5; .5 SOLUTION RESPIRATORY (INHALATION) at 15:13

## 2020-01-01 RX ADMIN — LOSARTAN POTASSIUM 50 MG: 50 TABLET, FILM COATED ORAL at 09:23

## 2020-01-01 RX ADMIN — IPRATROPIUM BROMIDE AND ALBUTEROL SULFATE 3 ML: 2.5; .5 SOLUTION RESPIRATORY (INHALATION) at 11:11

## 2020-01-01 RX ADMIN — HYDROCODONE BITARTRATE AND ACETAMINOPHEN 1 TABLET: 5; 325 TABLET ORAL at 21:35

## 2020-01-01 RX ADMIN — ENOXAPARIN SODIUM 30 MG: 30 INJECTION SUBCUTANEOUS at 21:35

## 2020-01-01 RX ADMIN — FOLIC ACID 1 MG: 1 TABLET ORAL at 09:20

## 2020-01-01 RX ADMIN — ACETAMINOPHEN 650 MG: 325 TABLET, FILM COATED ORAL at 14:45

## 2020-01-01 RX ADMIN — IPRATROPIUM BROMIDE AND ALBUTEROL SULFATE 3 ML: 2.5; .5 SOLUTION RESPIRATORY (INHALATION) at 20:35

## 2020-01-01 RX ADMIN — BUDESONIDE AND FORMOTEROL FUMARATE DIHYDRATE 2 PUFF: 160; 4.5 AEROSOL RESPIRATORY (INHALATION) at 07:56

## 2020-01-01 RX ADMIN — Medication 1 TABLET: at 09:15

## 2020-01-01 RX ADMIN — ACETAMINOPHEN 650 MG: 325 TABLET, FILM COATED ORAL at 15:55

## 2020-01-01 RX ADMIN — HYDROCORTISONE: 1 CREAM TOPICAL at 08:21

## 2020-01-01 RX ADMIN — PREDNISONE 20 MG: 20 TABLET ORAL at 09:41

## 2020-01-01 RX ADMIN — Medication 1 TABLET: at 09:32

## 2020-01-01 RX ADMIN — SODIUM CHLORIDE, PRESERVATIVE FREE 10 ML: 5 INJECTION INTRAVENOUS at 20:15

## 2020-01-01 RX ADMIN — HYDROCORTISONE: 1 CREAM TOPICAL at 20:27

## 2020-01-01 RX ADMIN — PANTOPRAZOLE SODIUM 40 MG: 40 TABLET, DELAYED RELEASE ORAL at 05:13

## 2020-01-01 RX ADMIN — CETIRIZINE HYDROCHLORIDE 10 MG: 10 TABLET, FILM COATED ORAL at 07:49

## 2020-01-01 RX ADMIN — PROPOFOL 50 MG: 10 INJECTION, EMULSION INTRAVENOUS at 13:21

## 2020-01-01 RX ADMIN — IPRATROPIUM BROMIDE AND ALBUTEROL SULFATE 3 ML: 2.5; .5 SOLUTION RESPIRATORY (INHALATION) at 20:18

## 2020-01-01 RX ADMIN — LEVOFLOXACIN 750 MG: 5 INJECTION, SOLUTION INTRAVENOUS at 15:00

## 2020-01-01 RX ADMIN — IOPAMIDOL 90 ML: 612 INJECTION, SOLUTION INTRAVENOUS at 15:15

## 2020-01-01 RX ADMIN — Medication 1000 MG: at 17:08

## 2020-01-01 RX ADMIN — METHYLPREDNISOLONE SODIUM SUCCINATE 40 MG: 40 INJECTION, POWDER, FOR SOLUTION INTRAMUSCULAR; INTRAVENOUS at 17:05

## 2020-01-01 RX ADMIN — CETIRIZINE HYDROCHLORIDE 10 MG: 10 TABLET, FILM COATED ORAL at 08:14

## 2020-01-01 RX ADMIN — ALBUTEROL SULFATE 2 PUFF: 90 AEROSOL, METERED RESPIRATORY (INHALATION) at 07:36

## 2020-01-01 RX ADMIN — METHYLPREDNISOLONE SODIUM SUCCINATE 40 MG: 40 INJECTION, POWDER, FOR SOLUTION INTRAMUSCULAR; INTRAVENOUS at 00:11

## 2020-01-01 RX ADMIN — Medication 1000 MG: at 08:05

## 2020-01-01 RX ADMIN — IPRATROPIUM BROMIDE AND ALBUTEROL SULFATE 3 ML: 2.5; .5 SOLUTION RESPIRATORY (INHALATION) at 10:47

## 2020-01-01 RX ADMIN — BUSPIRONE HYDROCHLORIDE 15 MG: 15 TABLET ORAL at 20:01

## 2020-01-01 RX ADMIN — Medication 1000 MG: at 17:31

## 2020-01-01 RX ADMIN — BUDESONIDE AND FORMOTEROL FUMARATE DIHYDRATE 2 PUFF: 160; 4.5 AEROSOL RESPIRATORY (INHALATION) at 07:45

## 2020-01-01 RX ADMIN — Medication 1000 MG: at 11:24

## 2020-01-01 RX ADMIN — HYDROCORTISONE: 1 CREAM TOPICAL at 21:22

## 2020-01-01 RX ADMIN — HEPARIN SODIUM 5000 UNITS: 5000 INJECTION INTRAVENOUS; SUBCUTANEOUS at 22:26

## 2020-01-01 RX ADMIN — CETIRIZINE HYDROCHLORIDE 10 MG: 10 TABLET, FILM COATED ORAL at 15:19

## 2020-01-01 RX ADMIN — BUSPIRONE HYDROCHLORIDE 15 MG: 10 TABLET ORAL at 21:34

## 2020-01-01 RX ADMIN — LIDOCAINE HYDROCHLORIDE 50 MG: 20 INJECTION, SOLUTION INTRAVENOUS at 15:18

## 2020-01-01 RX ADMIN — ONDANSETRON 4 MG: 2 SOLUTION INTRAMUSCULAR; INTRAVENOUS at 08:56

## 2020-01-01 RX ADMIN — IPRATROPIUM BROMIDE AND ALBUTEROL SULFATE 3 ML: 2.5; .5 SOLUTION RESPIRATORY (INHALATION) at 15:02

## 2020-01-01 RX ADMIN — ACETAMINOPHEN 650 MG: 325 TABLET, FILM COATED ORAL at 00:17

## 2020-01-01 RX ADMIN — IPRATROPIUM BROMIDE AND ALBUTEROL SULFATE 3 ML: 2.5; .5 SOLUTION RESPIRATORY (INHALATION) at 19:14

## 2020-01-01 RX ADMIN — Medication 1000 MG: at 15:19

## 2020-01-01 RX ADMIN — IPRATROPIUM BROMIDE AND ALBUTEROL SULFATE 3 ML: 2.5; .5 SOLUTION RESPIRATORY (INHALATION) at 11:02

## 2020-01-01 RX ADMIN — IPRATROPIUM BROMIDE AND ALBUTEROL SULFATE 3 ML: 2.5; .5 SOLUTION RESPIRATORY (INHALATION) at 15:59

## 2020-01-01 RX ADMIN — SODIUM CHLORIDE, PRESERVATIVE FREE 10 ML: 5 INJECTION INTRAVENOUS at 09:32

## 2020-01-01 RX ADMIN — HEPARIN SODIUM 5000 UNITS: 5000 INJECTION INTRAVENOUS; SUBCUTANEOUS at 13:22

## 2020-01-01 RX ADMIN — SODIUM CHLORIDE 150 ML/HR: 900 INJECTION, SOLUTION INTRAVENOUS at 23:41

## 2020-01-01 RX ADMIN — BUSPIRONE HYDROCHLORIDE 15 MG: 15 TABLET ORAL at 20:27

## 2020-01-01 RX ADMIN — QUETIAPINE FUMARATE 100 MG: 100 TABLET ORAL at 01:43

## 2020-01-01 RX ADMIN — Medication 1000 MG: at 13:57

## 2020-01-01 RX ADMIN — HEPARIN SODIUM 5000 UNITS: 5000 INJECTION, SOLUTION INTRAVENOUS; SUBCUTANEOUS at 16:01

## 2020-01-01 RX ADMIN — BUSPIRONE HYDROCHLORIDE 15 MG: 15 TABLET ORAL at 20:14

## 2020-01-01 RX ADMIN — CEFTRIAXONE SODIUM 1 G: 1 INJECTION, POWDER, FOR SOLUTION INTRAMUSCULAR; INTRAVENOUS at 00:39

## 2020-01-01 RX ADMIN — FERROUS SULFATE TAB EC 324 MG (65 MG FE EQUIVALENT) 324 MG: 324 (65 FE) TABLET DELAYED RESPONSE at 08:48

## 2020-01-01 RX ADMIN — LEVOFLOXACIN 750 MG: 5 INJECTION, SOLUTION INTRAVENOUS at 14:45

## 2020-01-01 RX ADMIN — IPRATROPIUM BROMIDE AND ALBUTEROL SULFATE 3 ML: 2.5; .5 SOLUTION RESPIRATORY (INHALATION) at 07:12

## 2020-01-01 RX ADMIN — BUSPIRONE HYDROCHLORIDE 15 MG: 10 TABLET ORAL at 08:49

## 2020-01-01 RX ADMIN — ALBUTEROL SULFATE 2 PUFF: 90 AEROSOL, METERED RESPIRATORY (INHALATION) at 19:40

## 2020-01-01 RX ADMIN — METHYLPREDNISOLONE SODIUM SUCCINATE 40 MG: 40 INJECTION, POWDER, FOR SOLUTION INTRAMUSCULAR; INTRAVENOUS at 23:14

## 2020-01-01 RX ADMIN — BUSPIRONE HYDROCHLORIDE 15 MG: 10 TABLET ORAL at 20:58

## 2020-01-01 RX ADMIN — ARFORMOTEROL TARTRATE 15 MCG: 15 SOLUTION RESPIRATORY (INHALATION) at 06:57

## 2020-01-01 RX ADMIN — IPRATROPIUM BROMIDE AND ALBUTEROL SULFATE 3 ML: 2.5; .5 SOLUTION RESPIRATORY (INHALATION) at 14:38

## 2020-01-01 RX ADMIN — BUDESONIDE AND FORMOTEROL FUMARATE DIHYDRATE 2 PUFF: 160; 4.5 AEROSOL RESPIRATORY (INHALATION) at 20:18

## 2020-01-01 RX ADMIN — IPRATROPIUM BROMIDE AND ALBUTEROL SULFATE 3 ML: 2.5; .5 SOLUTION RESPIRATORY (INHALATION) at 14:50

## 2020-01-01 RX ADMIN — BUSPIRONE HYDROCHLORIDE 15 MG: 10 TABLET ORAL at 21:04

## 2020-01-01 RX ADMIN — ARFORMOTEROL TARTRATE 15 MCG: 15 SOLUTION RESPIRATORY (INHALATION) at 20:38

## 2020-01-01 RX ADMIN — QUETIAPINE FUMARATE 100 MG: 100 TABLET ORAL at 21:35

## 2020-01-01 RX ADMIN — FOLIC ACID 1 MG: 1 TABLET ORAL at 09:31

## 2020-01-01 RX ADMIN — Medication 1000 MG: at 17:11

## 2020-01-01 RX ADMIN — SODIUM CHLORIDE 2217 ML: 900 INJECTION, SOLUTION INTRAVENOUS at 13:23

## 2020-01-01 RX ADMIN — SODIUM CHLORIDE 150 ML/HR: 900 INJECTION, SOLUTION INTRAVENOUS at 12:41

## 2020-01-01 RX ADMIN — IPRATROPIUM BROMIDE AND ALBUTEROL SULFATE 3 ML: 2.5; .5 SOLUTION RESPIRATORY (INHALATION) at 08:02

## 2020-01-01 RX ADMIN — BUSPIRONE HYDROCHLORIDE 15 MG: 15 TABLET ORAL at 08:34

## 2020-01-01 RX ADMIN — IPRATROPIUM BROMIDE AND ALBUTEROL SULFATE 3 ML: 2.5; .5 SOLUTION RESPIRATORY (INHALATION) at 15:11

## 2020-01-01 RX ADMIN — Medication 1 TABLET: at 08:05

## 2020-01-01 RX ADMIN — SODIUM CHLORIDE, PRESERVATIVE FREE 10 ML: 5 INJECTION INTRAVENOUS at 22:27

## 2020-01-01 RX ADMIN — METHYLPREDNISOLONE SODIUM SUCCINATE 40 MG: 40 INJECTION, POWDER, FOR SOLUTION INTRAMUSCULAR; INTRAVENOUS at 20:06

## 2020-01-01 RX ADMIN — Medication 1000 MG: at 09:31

## 2020-01-01 RX ADMIN — LEVOFLOXACIN 500 MG: 5 INJECTION, SOLUTION INTRAVENOUS at 00:39

## 2020-01-01 RX ADMIN — ALBUTEROL SULFATE 2 PUFF: 90 AEROSOL, METERED RESPIRATORY (INHALATION) at 12:01

## 2020-01-01 RX ADMIN — Medication 1000 MG: at 12:45

## 2020-01-01 RX ADMIN — ONDANSETRON 4 MG: 2 SOLUTION INTRAMUSCULAR; INTRAVENOUS at 17:08

## 2020-01-01 RX ADMIN — IPRATROPIUM BROMIDE AND ALBUTEROL SULFATE 3 ML: 2.5; .5 SOLUTION RESPIRATORY (INHALATION) at 06:47

## 2020-01-01 RX ADMIN — IPRATROPIUM BROMIDE AND ALBUTEROL SULFATE 3 ML: 2.5; .5 SOLUTION RESPIRATORY (INHALATION) at 06:39

## 2020-01-01 RX ADMIN — SODIUM CHLORIDE 150 ML/HR: 900 INJECTION, SOLUTION INTRAVENOUS at 14:00

## 2020-01-01 RX ADMIN — SODIUM CHLORIDE, PRESERVATIVE FREE 10 ML: 5 INJECTION INTRAVENOUS at 08:07

## 2020-01-01 RX ADMIN — BUDESONIDE AND FORMOTEROL FUMARATE DIHYDRATE 2 PUFF: 160; 4.5 AEROSOL RESPIRATORY (INHALATION) at 08:02

## 2020-01-01 RX ADMIN — SODIUM CHLORIDE, PRESERVATIVE FREE 10 ML: 5 INJECTION INTRAVENOUS at 20:04

## 2020-01-01 RX ADMIN — SODIUM CHLORIDE, PRESERVATIVE FREE 10 ML: 5 INJECTION INTRAVENOUS at 20:40

## 2020-01-01 RX ADMIN — SODIUM CHLORIDE, PRESERVATIVE FREE 10 ML: 5 INJECTION INTRAVENOUS at 07:50

## 2020-01-01 RX ADMIN — Medication 1000 MG: at 17:26

## 2020-01-01 RX ADMIN — METHYLPREDNISOLONE SODIUM SUCCINATE 40 MG: 40 INJECTION, POWDER, FOR SOLUTION INTRAMUSCULAR; INTRAVENOUS at 06:18

## 2020-01-01 RX ADMIN — CETIRIZINE HYDROCHLORIDE 10 MG: 10 TABLET, FILM COATED ORAL at 08:03

## 2020-01-01 RX ADMIN — BUDESONIDE 0.5 MG: 0.5 INHALANT RESPIRATORY (INHALATION) at 07:50

## 2020-01-01 RX ADMIN — HEPARIN SODIUM 5000 UNITS: 5000 INJECTION INTRAVENOUS; SUBCUTANEOUS at 13:51

## 2020-01-01 RX ADMIN — LEVOFLOXACIN 500 MG: 5 INJECTION, SOLUTION INTRAVENOUS at 00:12

## 2020-01-01 RX ADMIN — LEVOFLOXACIN 750 MG: 5 INJECTION, SOLUTION INTRAVENOUS at 13:45

## 2020-01-01 RX ADMIN — BUSPIRONE HYDROCHLORIDE 15 MG: 15 TABLET ORAL at 20:07

## 2020-01-01 RX ADMIN — PROPOFOL 20 MG: 10 INJECTION, EMULSION INTRAVENOUS at 13:32

## 2020-01-01 RX ADMIN — HEPARIN SODIUM 5000 UNITS: 5000 INJECTION INTRAVENOUS; SUBCUTANEOUS at 05:23

## 2020-01-01 RX ADMIN — HYDROCORTISONE: 1 CREAM TOPICAL at 09:15

## 2020-01-01 RX ADMIN — BUSPIRONE HYDROCHLORIDE 15 MG: 15 TABLET ORAL at 22:26

## 2020-01-01 RX ADMIN — FOLIC ACID 1 MG: 1 TABLET ORAL at 09:32

## 2020-01-01 RX ADMIN — HEPARIN SODIUM 5000 UNITS: 5000 INJECTION INTRAVENOUS; SUBCUTANEOUS at 21:21

## 2020-01-01 RX ADMIN — BUDESONIDE AND FORMOTEROL FUMARATE DIHYDRATE 2 PUFF: 160; 4.5 AEROSOL RESPIRATORY (INHALATION) at 08:23

## 2020-01-01 RX ADMIN — ONDANSETRON 4 MG: 2 SOLUTION INTRAMUSCULAR; INTRAVENOUS at 14:02

## 2020-01-01 RX ADMIN — CETIRIZINE HYDROCHLORIDE 10 MG: 10 TABLET, FILM COATED ORAL at 08:48

## 2020-01-01 RX ADMIN — SODIUM CHLORIDE, PRESERVATIVE FREE 10 ML: 5 INJECTION INTRAVENOUS at 20:07

## 2020-01-01 RX ADMIN — FAMOTIDINE 20 MG: 10 INJECTION, SOLUTION INTRAVENOUS at 13:13

## 2020-01-01 RX ADMIN — IPRATROPIUM BROMIDE AND ALBUTEROL SULFATE 3 ML: 2.5; .5 SOLUTION RESPIRATORY (INHALATION) at 07:39

## 2020-01-01 RX ADMIN — Medication 1 TABLET: at 08:34

## 2020-01-01 RX ADMIN — SODIUM CHLORIDE, PRESERVATIVE FREE 10 ML: 5 INJECTION INTRAVENOUS at 20:27

## 2020-01-01 RX ADMIN — HYDROCORTISONE: 1 CREAM TOPICAL at 20:23

## 2020-01-01 RX ADMIN — FERROUS SULFATE TAB EC 324 MG (65 MG FE EQUIVALENT) 324 MG: 324 (65 FE) TABLET DELAYED RESPONSE at 08:53

## 2020-01-01 RX ADMIN — ARFORMOTEROL TARTRATE 15 MCG: 15 SOLUTION RESPIRATORY (INHALATION) at 20:43

## 2020-01-01 RX ADMIN — HEPARIN SODIUM 5000 UNITS: 5000 INJECTION INTRAVENOUS; SUBCUTANEOUS at 05:17

## 2020-01-01 RX ADMIN — SODIUM CHLORIDE, PRESERVATIVE FREE 10 ML: 5 INJECTION INTRAVENOUS at 08:02

## 2020-01-01 RX ADMIN — Medication 1000 MG: at 13:19

## 2020-01-01 RX ADMIN — METHYLPREDNISOLONE SODIUM SUCCINATE 40 MG: 40 INJECTION, POWDER, FOR SOLUTION INTRAMUSCULAR; INTRAVENOUS at 17:12

## 2020-01-01 RX ADMIN — IPRATROPIUM BROMIDE AND ALBUTEROL SULFATE 3 ML: 2.5; .5 SOLUTION RESPIRATORY (INHALATION) at 11:07

## 2020-01-01 RX ADMIN — IPRATROPIUM BROMIDE AND ALBUTEROL SULFATE 3 ML: 2.5; .5 SOLUTION RESPIRATORY (INHALATION) at 11:45

## 2020-01-01 RX ADMIN — LOSARTAN POTASSIUM 50 MG: 50 TABLET, FILM COATED ORAL at 17:12

## 2020-01-01 RX ADMIN — HEPARIN SODIUM 5000 UNITS: 5000 INJECTION INTRAVENOUS; SUBCUTANEOUS at 21:05

## 2020-01-01 RX ADMIN — SODIUM CHLORIDE, PRESERVATIVE FREE 10 ML: 5 INJECTION INTRAVENOUS at 09:06

## 2020-01-01 RX ADMIN — BUDESONIDE 0.5 MG: 0.5 INHALANT RESPIRATORY (INHALATION) at 20:36

## 2020-01-01 RX ADMIN — IPRATROPIUM BROMIDE AND ALBUTEROL SULFATE 3 ML: 2.5; .5 SOLUTION RESPIRATORY (INHALATION) at 07:30

## 2020-01-01 RX ADMIN — LOSARTAN POTASSIUM 50 MG: 50 TABLET, FILM COATED ORAL at 09:20

## 2020-01-01 RX ADMIN — HYDROCODONE BITARTRATE AND ACETAMINOPHEN 1 TABLET: 5; 325 TABLET ORAL at 16:21

## 2020-01-01 RX ADMIN — FOLIC ACID 1 MG: 1 TABLET ORAL at 07:50

## 2020-01-01 RX ADMIN — ROCURONIUM BROMIDE 10 MG: 10 INJECTION INTRAVENOUS at 13:52

## 2020-01-01 RX ADMIN — ARFORMOTEROL TARTRATE 15 MCG: 15 SOLUTION RESPIRATORY (INHALATION) at 11:08

## 2020-01-01 RX ADMIN — SODIUM CHLORIDE 125 MG: 9 INJECTION, SOLUTION INTRAVENOUS at 16:01

## 2020-01-01 RX ADMIN — METHYLPREDNISOLONE SODIUM SUCCINATE 40 MG: 40 INJECTION, POWDER, FOR SOLUTION INTRAMUSCULAR; INTRAVENOUS at 17:52

## 2020-01-01 RX ADMIN — BUDESONIDE AND FORMOTEROL FUMARATE DIHYDRATE 2 PUFF: 160; 4.5 AEROSOL RESPIRATORY (INHALATION) at 07:48

## 2020-01-01 RX ADMIN — LOSARTAN POTASSIUM 50 MG: 50 TABLET, FILM COATED ORAL at 10:00

## 2020-01-01 RX ADMIN — BUSPIRONE HYDROCHLORIDE 15 MG: 15 TABLET ORAL at 08:14

## 2020-01-01 RX ADMIN — BUDESONIDE 0.5 MG: 0.5 INHALANT RESPIRATORY (INHALATION) at 22:37

## 2020-01-01 RX ADMIN — BUSPIRONE HYDROCHLORIDE 15 MG: 10 TABLET ORAL at 09:20

## 2020-01-01 RX ADMIN — PANTOPRAZOLE SODIUM 40 MG: 40 TABLET, DELAYED RELEASE ORAL at 06:11

## 2020-01-01 RX ADMIN — Medication 1000 MG: at 17:33

## 2020-01-01 RX ADMIN — LEVOFLOXACIN 750 MG: 5 INJECTION, SOLUTION INTRAVENOUS at 16:30

## 2020-01-01 RX ADMIN — Medication 1 TABLET: at 08:14

## 2020-01-01 RX ADMIN — HYDROCORTISONE: 1 CREAM TOPICAL at 20:24

## 2020-01-01 RX ADMIN — IPRATROPIUM BROMIDE AND ALBUTEROL SULFATE 3 ML: 2.5; .5 SOLUTION RESPIRATORY (INHALATION) at 10:51

## 2020-01-01 RX ADMIN — HEPARIN SODIUM 5000 UNITS: 5000 INJECTION, SOLUTION INTRAVENOUS; SUBCUTANEOUS at 14:13

## 2020-01-01 RX ADMIN — HEPARIN SODIUM 5000 UNITS: 5000 INJECTION INTRAVENOUS; SUBCUTANEOUS at 21:26

## 2020-01-01 RX ADMIN — Medication 1000 MG: at 09:32

## 2020-01-01 RX ADMIN — IPRATROPIUM BROMIDE AND ALBUTEROL SULFATE 3 ML: 2.5; .5 SOLUTION RESPIRATORY (INHALATION) at 07:55

## 2020-01-01 RX ADMIN — METHYLPREDNISOLONE SODIUM SUCCINATE 40 MG: 40 INJECTION, POWDER, FOR SOLUTION INTRAMUSCULAR; INTRAVENOUS at 05:34

## 2020-01-01 RX ADMIN — ONDANSETRON 4 MG: 2 SOLUTION INTRAMUSCULAR; INTRAVENOUS at 10:20

## 2020-01-01 RX ADMIN — HEPARIN SODIUM 5000 UNITS: 5000 INJECTION INTRAVENOUS; SUBCUTANEOUS at 20:14

## 2020-01-01 RX ADMIN — FOLIC ACID 1 MG: 1 TABLET ORAL at 08:34

## 2020-01-01 RX ADMIN — BUSPIRONE HYDROCHLORIDE 15 MG: 15 TABLET ORAL at 09:32

## 2020-01-01 RX ADMIN — BUDESONIDE AND FORMOTEROL FUMARATE DIHYDRATE 2 PUFF: 160; 4.5 AEROSOL RESPIRATORY (INHALATION) at 19:15

## 2020-01-01 RX ADMIN — Medication 1000 MG: at 18:46

## 2020-01-01 RX ADMIN — HEPARIN SODIUM 5000 UNITS: 5000 INJECTION INTRAVENOUS; SUBCUTANEOUS at 20:09

## 2020-01-01 RX ADMIN — SODIUM CHLORIDE, PRESERVATIVE FREE 10 ML: 5 INJECTION INTRAVENOUS at 08:03

## 2020-01-01 RX ADMIN — BUDESONIDE AND FORMOTEROL FUMARATE DIHYDRATE 2 PUFF: 160; 4.5 AEROSOL RESPIRATORY (INHALATION) at 19:36

## 2020-01-01 RX ADMIN — POLYETHYLENE GLYCOL 3350 255 G: 17 POWDER, FOR SOLUTION ORAL at 18:04

## 2020-01-01 RX ADMIN — HEPARIN SODIUM 5000 UNITS: 5000 INJECTION INTRAVENOUS; SUBCUTANEOUS at 14:53

## 2020-01-01 RX ADMIN — IPRATROPIUM BROMIDE AND ALBUTEROL SULFATE 3 ML: 2.5; .5 SOLUTION RESPIRATORY (INHALATION) at 19:21

## 2020-01-01 RX ADMIN — IPRATROPIUM BROMIDE AND ALBUTEROL SULFATE 3 ML: 2.5; .5 SOLUTION RESPIRATORY (INHALATION) at 14:09

## 2020-01-01 RX ADMIN — HYDROCORTISONE: 1 CREAM TOPICAL at 20:45

## 2020-01-01 RX ADMIN — BUSPIRONE HYDROCHLORIDE 15 MG: 10 TABLET ORAL at 09:41

## 2020-01-01 RX ADMIN — BUDESONIDE AND FORMOTEROL FUMARATE DIHYDRATE 2 PUFF: 160; 4.5 AEROSOL RESPIRATORY (INHALATION) at 07:14

## 2020-01-01 RX ADMIN — ARFORMOTEROL TARTRATE 15 MCG: 15 SOLUTION RESPIRATORY (INHALATION) at 06:41

## 2020-01-01 RX ADMIN — HYDROCORTISONE: 1 CREAM TOPICAL at 19:42

## 2020-01-01 RX ADMIN — FUROSEMIDE 40 MG: 10 INJECTION, SOLUTION INTRAMUSCULAR; INTRAVENOUS at 16:25

## 2020-01-01 RX ADMIN — LEVOFLOXACIN 750 MG: 5 INJECTION, SOLUTION INTRAVENOUS at 14:13

## 2020-01-01 RX ADMIN — SODIUM CHLORIDE, PRESERVATIVE FREE 10 ML: 5 INJECTION INTRAVENOUS at 01:43

## 2020-01-01 RX ADMIN — SODIUM CHLORIDE 125 MG: 9 INJECTION, SOLUTION INTRAVENOUS at 13:52

## 2020-01-01 RX ADMIN — SODIUM CHLORIDE, PRESERVATIVE FREE 10 ML: 5 INJECTION INTRAVENOUS at 21:34

## 2020-01-01 RX ADMIN — ONDANSETRON 4 MG: 2 SOLUTION INTRAMUSCULAR; INTRAVENOUS at 09:37

## 2020-01-01 RX ADMIN — SODIUM CHLORIDE, PRESERVATIVE FREE 10 ML: 5 INJECTION INTRAVENOUS at 14:17

## 2020-01-01 RX ADMIN — Medication 1000 MG: at 11:15

## 2020-01-01 RX ADMIN — BUDESONIDE AND FORMOTEROL FUMARATE DIHYDRATE 2 PUFF: 160; 4.5 AEROSOL RESPIRATORY (INHALATION) at 06:53

## 2020-01-01 RX ADMIN — SODIUM CHLORIDE, PRESERVATIVE FREE 3 ML: 5 INJECTION INTRAVENOUS at 21:36

## 2020-01-01 RX ADMIN — IPRATROPIUM BROMIDE AND ALBUTEROL SULFATE 3 ML: 2.5; .5 SOLUTION RESPIRATORY (INHALATION) at 14:58

## 2020-01-01 RX ADMIN — SODIUM CHLORIDE 50 ML/HR: 9 INJECTION, SOLUTION INTRAVENOUS at 02:36

## 2020-01-01 RX ADMIN — BUDESONIDE AND FORMOTEROL FUMARATE DIHYDRATE 2 PUFF: 160; 4.5 AEROSOL RESPIRATORY (INHALATION) at 08:14

## 2020-01-01 RX ADMIN — CETIRIZINE HYDROCHLORIDE 10 MG: 10 TABLET, FILM COATED ORAL at 09:31

## 2020-01-01 RX ADMIN — IPRATROPIUM BROMIDE AND ALBUTEROL SULFATE 3 ML: 2.5; .5 SOLUTION RESPIRATORY (INHALATION) at 10:36

## 2020-01-01 RX ADMIN — ONDANSETRON 4 MG: 2 SOLUTION INTRAMUSCULAR; INTRAVENOUS at 16:28

## 2020-01-01 RX ADMIN — SODIUM CHLORIDE, PRESERVATIVE FREE 10 ML: 5 INJECTION INTRAVENOUS at 11:01

## 2020-01-01 RX ADMIN — METHYLPREDNISOLONE SODIUM SUCCINATE 40 MG: 40 INJECTION, POWDER, FOR SOLUTION INTRAMUSCULAR; INTRAVENOUS at 08:03

## 2020-01-01 RX ADMIN — Medication 1000 MG: at 11:11

## 2020-01-01 RX ADMIN — SODIUM CHLORIDE, PRESERVATIVE FREE 10 ML: 5 INJECTION INTRAVENOUS at 08:49

## 2020-01-01 RX ADMIN — SODIUM CHLORIDE, PRESERVATIVE FREE 10 ML: 5 INJECTION INTRAVENOUS at 21:06

## 2020-01-01 RX ADMIN — LIDOCAINE HYDROCHLORIDE 100 MG: 20 INJECTION, SOLUTION INTRAVENOUS at 13:51

## 2020-01-01 RX ADMIN — QUETIAPINE FUMARATE 100 MG: 100 TABLET ORAL at 21:34

## 2020-01-01 RX ADMIN — QUETIAPINE FUMARATE 100 MG: 100 TABLET ORAL at 20:49

## 2020-01-01 RX ADMIN — HYDROCORTISONE: 1 CREAM TOPICAL at 08:35

## 2020-01-01 RX ADMIN — BUSPIRONE HYDROCHLORIDE 15 MG: 15 TABLET ORAL at 20:06

## 2020-01-01 RX ADMIN — LEVOFLOXACIN 750 MG: 5 INJECTION, SOLUTION INTRAVENOUS at 17:03

## 2020-01-01 RX ADMIN — Medication 1000 MG: at 11:35

## 2020-01-01 RX ADMIN — SODIUM CHLORIDE, PRESERVATIVE FREE 3 ML: 5 INJECTION INTRAVENOUS at 09:41

## 2020-01-01 RX ADMIN — Medication 1000 MG: at 11:29

## 2020-01-01 RX ADMIN — ONDANSETRON 4 MG: 2 SOLUTION INTRAMUSCULAR; INTRAVENOUS at 20:07

## 2020-01-01 RX ADMIN — SODIUM CHLORIDE, PRESERVATIVE FREE 10 ML: 5 INJECTION INTRAVENOUS at 09:21

## 2020-01-01 RX ADMIN — BUSPIRONE HYDROCHLORIDE 15 MG: 15 TABLET ORAL at 07:49

## 2020-01-01 RX ADMIN — BUDESONIDE AND FORMOTEROL FUMARATE DIHYDRATE 2 PUFF: 160; 4.5 AEROSOL RESPIRATORY (INHALATION) at 07:31

## 2020-01-01 RX ADMIN — IPRATROPIUM BROMIDE AND ALBUTEROL SULFATE 3 ML: 2.5; .5 SOLUTION RESPIRATORY (INHALATION) at 22:36

## 2020-01-01 RX ADMIN — SODIUM CHLORIDE, PRESERVATIVE FREE 10 ML: 5 INJECTION INTRAVENOUS at 19:42

## 2020-01-01 RX ADMIN — ARFORMOTEROL TARTRATE 15 MCG: 15 SOLUTION RESPIRATORY (INHALATION) at 21:37

## 2020-01-01 RX ADMIN — HYDROCORTISONE: 1 CREAM TOPICAL at 20:31

## 2020-01-01 RX ADMIN — SODIUM CHLORIDE 50 ML/HR: 9 INJECTION, SOLUTION INTRAVENOUS at 02:28

## 2020-01-01 RX ADMIN — IPRATROPIUM BROMIDE AND ALBUTEROL SULFATE 3 ML: 2.5; .5 SOLUTION RESPIRATORY (INHALATION) at 22:59

## 2020-01-01 RX ADMIN — HYDROCORTISONE: 1 CREAM TOPICAL at 09:07

## 2020-01-01 RX ADMIN — LIDOCAINE HYDROCHLORIDE 20 MG: 20 INJECTION, SOLUTION INTRAVENOUS at 13:21

## 2020-01-01 RX ADMIN — HYDROCORTISONE: 1 CREAM TOPICAL at 23:23

## 2020-01-01 RX ADMIN — PROPOFOL 20 MG: 10 INJECTION, EMULSION INTRAVENOUS at 13:25

## 2020-01-01 RX ADMIN — LOSARTAN POTASSIUM 50 MG: 50 TABLET, FILM COATED ORAL at 09:15

## 2020-01-01 RX ADMIN — SODIUM CHLORIDE 125 MG: 9 INJECTION, SOLUTION INTRAVENOUS at 20:14

## 2020-01-01 RX ADMIN — BUDESONIDE 0.5 MG: 0.5 INHALANT RESPIRATORY (INHALATION) at 11:08

## 2020-01-01 RX ADMIN — IPRATROPIUM BROMIDE AND ALBUTEROL SULFATE 3 ML: 2.5; .5 SOLUTION RESPIRATORY (INHALATION) at 07:22

## 2020-01-01 RX ADMIN — Medication 1 TABLET: at 19:16

## 2020-01-01 RX ADMIN — HYDROCORTISONE: 1 CREAM TOPICAL at 08:57

## 2020-01-01 RX ADMIN — SODIUM CHLORIDE, PRESERVATIVE FREE 10 ML: 5 INJECTION INTRAVENOUS at 10:00

## 2020-01-01 RX ADMIN — BUSPIRONE HYDROCHLORIDE 15 MG: 15 TABLET ORAL at 09:31

## 2020-01-01 RX ADMIN — HYDROCORTISONE: 1 CREAM TOPICAL at 09:32

## 2020-01-01 RX ADMIN — METHYLPREDNISOLONE SODIUM SUCCINATE 40 MG: 40 INJECTION, POWDER, FOR SOLUTION INTRAMUSCULAR; INTRAVENOUS at 05:23

## 2020-01-01 RX ADMIN — PREDNISONE 20 MG: 20 TABLET ORAL at 21:35

## 2020-01-01 RX ADMIN — HYDROCODONE BITARTRATE AND ACETAMINOPHEN 1 TABLET: 5; 325 TABLET ORAL at 21:00

## 2020-01-01 RX ADMIN — BUSPIRONE HYDROCHLORIDE 15 MG: 15 TABLET ORAL at 22:07

## 2020-01-01 RX ADMIN — IPRATROPIUM BROMIDE AND ALBUTEROL SULFATE 3 ML: 2.5; .5 SOLUTION RESPIRATORY (INHALATION) at 11:20

## 2020-01-01 RX ADMIN — CETIRIZINE HYDROCHLORIDE 10 MG: 10 TABLET, FILM COATED ORAL at 08:53

## 2020-01-01 RX ADMIN — IPRATROPIUM BROMIDE AND ALBUTEROL SULFATE 3 ML: 2.5; .5 SOLUTION RESPIRATORY (INHALATION) at 11:08

## 2020-01-01 RX ADMIN — LOSARTAN POTASSIUM 50 MG: 50 TABLET, FILM COATED ORAL at 09:18

## 2020-01-01 RX ADMIN — IPRATROPIUM BROMIDE AND ALBUTEROL SULFATE 3 ML: 2.5; .5 SOLUTION RESPIRATORY (INHALATION) at 07:42

## 2020-01-01 RX ADMIN — HYDROCODONE BITARTRATE AND ACETAMINOPHEN 1 TABLET: 5; 325 TABLET ORAL at 23:34

## 2020-01-01 RX ADMIN — BUDESONIDE AND FORMOTEROL FUMARATE DIHYDRATE 2 PUFF: 160; 4.5 AEROSOL RESPIRATORY (INHALATION) at 21:41

## 2020-01-01 RX ADMIN — SODIUM CHLORIDE 150 ML/HR: 900 INJECTION, SOLUTION INTRAVENOUS at 05:27

## 2020-01-01 RX ADMIN — METHYLPREDNISOLONE SODIUM SUCCINATE 40 MG: 40 INJECTION, POWDER, FOR SOLUTION INTRAMUSCULAR; INTRAVENOUS at 11:47

## 2020-01-01 RX ADMIN — METHYLPREDNISOLONE SODIUM SUCCINATE 40 MG: 40 INJECTION, POWDER, FOR SOLUTION INTRAMUSCULAR; INTRAVENOUS at 05:12

## 2020-01-01 RX ADMIN — BUDESONIDE AND FORMOTEROL FUMARATE DIHYDRATE 2 PUFF: 160; 4.5 AEROSOL RESPIRATORY (INHALATION) at 20:35

## 2020-01-01 RX ADMIN — CETIRIZINE HYDROCHLORIDE 10 MG: 10 TABLET, FILM COATED ORAL at 08:05

## 2020-01-01 RX ADMIN — LEVOFLOXACIN 500 MG: 5 INJECTION, SOLUTION INTRAVENOUS at 01:17

## 2020-01-01 RX ADMIN — LOSARTAN POTASSIUM 50 MG: 50 TABLET, FILM COATED ORAL at 07:49

## 2020-01-01 RX ADMIN — Medication 1 TABLET: at 08:03

## 2020-01-01 RX ADMIN — FOLIC ACID 1 MG: 1 TABLET ORAL at 09:06

## 2020-01-01 RX ADMIN — ENOXAPARIN SODIUM 30 MG: 30 INJECTION SUBCUTANEOUS at 20:49

## 2020-01-01 RX ADMIN — Medication 1000 MG: at 08:03

## 2020-01-01 RX ADMIN — PROPOFOL 30 MG: 10 INJECTION, EMULSION INTRAVENOUS at 15:24

## 2020-01-01 RX ADMIN — VANCOMYCIN HYDROCHLORIDE 1500 MG: 5 INJECTION, POWDER, LYOPHILIZED, FOR SOLUTION INTRAVENOUS at 20:04

## 2020-01-01 RX ADMIN — METHYLPREDNISOLONE SODIUM SUCCINATE 40 MG: 40 INJECTION, POWDER, FOR SOLUTION INTRAMUSCULAR; INTRAVENOUS at 12:53

## 2020-01-01 RX ADMIN — Medication 1000 MG: at 16:37

## 2020-01-01 RX ADMIN — SUCCINYLCHOLINE CHLORIDE 100 MG: 20 INJECTION, SOLUTION INTRAMUSCULAR; INTRAVENOUS at 13:52

## 2020-01-01 RX ADMIN — Medication 1000 MG: at 17:12

## 2020-01-01 RX ADMIN — ONDANSETRON 4 MG: 2 INJECTION INTRAMUSCULAR; INTRAVENOUS at 13:31

## 2020-01-01 RX ADMIN — SODIUM CHLORIDE, PRESERVATIVE FREE 10 ML: 5 INJECTION INTRAVENOUS at 20:58

## 2020-01-01 RX ADMIN — BUDESONIDE AND FORMOTEROL FUMARATE DIHYDRATE 2 PUFF: 160; 4.5 AEROSOL RESPIRATORY (INHALATION) at 19:42

## 2020-01-01 RX ADMIN — LEVOFLOXACIN 500 MG: 5 INJECTION, SOLUTION INTRAVENOUS at 01:52

## 2020-01-01 RX ADMIN — BUDESONIDE 0.5 MG: 0.5 INHALANT RESPIRATORY (INHALATION) at 06:53

## 2020-01-01 RX ADMIN — CETIRIZINE HYDROCHLORIDE 10 MG: 10 TABLET, FILM COATED ORAL at 09:15

## 2020-01-01 RX ADMIN — ZOLPIDEM TARTRATE 5 MG: 5 TABLET ORAL at 23:53

## 2020-01-01 RX ADMIN — BUDESONIDE 0.5 MG: 0.5 INHALANT RESPIRATORY (INHALATION) at 07:07

## 2020-01-01 RX ADMIN — LEVOFLOXACIN 750 MG: 5 INJECTION, SOLUTION INTRAVENOUS at 13:24

## 2020-01-01 RX ADMIN — CETIRIZINE HYDROCHLORIDE 10 MG: 10 TABLET, FILM COATED ORAL at 08:34

## 2020-01-01 RX ADMIN — HYDROCORTISONE: 1 CREAM TOPICAL at 08:54

## 2020-01-01 RX ADMIN — HYDROCORTISONE: 1 CREAM TOPICAL at 08:16

## 2020-01-01 RX ADMIN — SODIUM CHLORIDE, PRESERVATIVE FREE 3 ML: 5 INJECTION INTRAVENOUS at 21:21

## 2020-01-01 RX ADMIN — VASOPRESSIN 2 UNITS: 20 INJECTION INTRAVENOUS at 14:04

## 2020-01-01 RX ADMIN — SODIUM CHLORIDE, PRESERVATIVE FREE 10 ML: 5 INJECTION INTRAVENOUS at 08:06

## 2020-01-01 RX ADMIN — Medication 1000 MG: at 17:25

## 2020-01-01 RX ADMIN — Medication 1000 MG: at 12:08

## 2020-01-01 RX ADMIN — IPRATROPIUM BROMIDE AND ALBUTEROL SULFATE 3 ML: 2.5; .5 SOLUTION RESPIRATORY (INHALATION) at 06:51

## 2020-01-01 RX ADMIN — METHYLPREDNISOLONE SODIUM SUCCINATE 40 MG: 40 INJECTION, POWDER, FOR SOLUTION INTRAMUSCULAR; INTRAVENOUS at 00:39

## 2020-01-01 RX ADMIN — CETIRIZINE HYDROCHLORIDE 10 MG: 10 TABLET, FILM COATED ORAL at 09:22

## 2020-01-01 RX ADMIN — LOSARTAN POTASSIUM 50 MG: 50 TABLET, FILM COATED ORAL at 08:49

## 2020-01-01 RX ADMIN — METHYLPREDNISOLONE SODIUM SUCCINATE 40 MG: 40 INJECTION, POWDER, FOR SOLUTION INTRAMUSCULAR; INTRAVENOUS at 05:08

## 2020-01-01 RX ADMIN — SODIUM CHLORIDE, PRESERVATIVE FREE 10 ML: 5 INJECTION INTRAVENOUS at 00:12

## 2020-01-01 RX ADMIN — Medication 1000 MG: at 08:01

## 2020-01-01 RX ADMIN — ACETAMINOPHEN 1000 MG: 500 TABLET ORAL at 13:22

## 2020-01-01 RX ADMIN — BUDESONIDE 0.5 MG: 0.5 INHALANT RESPIRATORY (INHALATION) at 20:37

## 2020-01-01 RX ADMIN — Medication 1 TABLET: at 09:31

## 2020-01-01 RX ADMIN — METHYLPREDNISOLONE SODIUM SUCCINATE 40 MG: 40 INJECTION, POWDER, FOR SOLUTION INTRAMUSCULAR; INTRAVENOUS at 05:59

## 2020-01-01 RX ADMIN — BUDESONIDE AND FORMOTEROL FUMARATE DIHYDRATE 2 PUFF: 160; 4.5 AEROSOL RESPIRATORY (INHALATION) at 19:38

## 2020-01-01 RX ADMIN — BUDESONIDE AND FORMOTEROL FUMARATE DIHYDRATE 2 PUFF: 160; 4.5 AEROSOL RESPIRATORY (INHALATION) at 08:16

## 2020-01-01 RX ADMIN — ONDANSETRON 4 MG: 2 SOLUTION INTRAMUSCULAR; INTRAVENOUS at 15:42

## 2020-01-01 RX ADMIN — SODIUM CHLORIDE, PRESERVATIVE FREE 10 ML: 5 INJECTION INTRAVENOUS at 20:09

## 2020-01-01 RX ADMIN — HYDROCORTISONE: 1 CREAM TOPICAL at 09:33

## 2020-01-01 RX ADMIN — IPRATROPIUM BROMIDE AND ALBUTEROL SULFATE 3 ML: 2.5; .5 SOLUTION RESPIRATORY (INHALATION) at 20:30

## 2020-01-01 RX ADMIN — HEPARIN SODIUM 5000 UNITS: 5000 INJECTION INTRAVENOUS; SUBCUTANEOUS at 05:47

## 2020-01-01 RX ADMIN — IPRATROPIUM BROMIDE AND ALBUTEROL SULFATE 3 ML: 2.5; .5 SOLUTION RESPIRATORY (INHALATION) at 08:06

## 2020-01-01 RX ADMIN — BUDESONIDE 0.5 MG: 0.5 INHALANT RESPIRATORY (INHALATION) at 06:48

## 2020-01-01 RX ADMIN — BUSPIRONE HYDROCHLORIDE 15 MG: 15 TABLET ORAL at 20:40

## 2020-01-01 RX ADMIN — IPRATROPIUM BROMIDE AND ALBUTEROL SULFATE 3 ML: 2.5; .5 SOLUTION RESPIRATORY (INHALATION) at 19:36

## 2020-01-01 RX ADMIN — LOSARTAN POTASSIUM 50 MG: 50 TABLET, FILM COATED ORAL at 09:32

## 2020-01-01 RX ADMIN — HEPARIN SODIUM 5000 UNITS: 5000 INJECTION INTRAVENOUS; SUBCUTANEOUS at 21:49

## 2020-01-01 RX ADMIN — BUSPIRONE HYDROCHLORIDE 15 MG: 15 TABLET ORAL at 20:31

## 2020-01-01 RX ADMIN — LEVOFLOXACIN 500 MG: 5 INJECTION, SOLUTION INTRAVENOUS at 00:27

## 2020-01-01 RX ADMIN — SODIUM CHLORIDE, PRESERVATIVE FREE 10 ML: 5 INJECTION INTRAVENOUS at 22:07

## 2020-01-01 RX ADMIN — ZOLPIDEM TARTRATE 5 MG: 5 TABLET ORAL at 20:45

## 2020-01-01 RX ADMIN — FOLIC ACID 1 MG: 1 TABLET ORAL at 16:28

## 2020-01-01 RX ADMIN — IPRATROPIUM BROMIDE AND ALBUTEROL SULFATE 3 ML: 2.5; .5 SOLUTION RESPIRATORY (INHALATION) at 20:38

## 2020-01-01 RX ADMIN — SODIUM CHLORIDE, PRESERVATIVE FREE 10 ML: 5 INJECTION INTRAVENOUS at 09:15

## 2020-01-01 RX ADMIN — ENOXAPARIN SODIUM 30 MG: 30 INJECTION SUBCUTANEOUS at 20:58

## 2020-01-01 RX ADMIN — IPRATROPIUM BROMIDE AND ALBUTEROL SULFATE 3 ML: 2.5; .5 SOLUTION RESPIRATORY (INHALATION) at 10:19

## 2020-01-01 RX ADMIN — FOLIC ACID 1 MG: 1 TABLET ORAL at 08:03

## 2020-01-01 RX ADMIN — BUSPIRONE HYDROCHLORIDE 15 MG: 15 TABLET ORAL at 20:25

## 2020-01-01 RX ADMIN — HYDROCORTISONE: 1 CREAM TOPICAL at 20:40

## 2020-01-01 RX ADMIN — BUDESONIDE AND FORMOTEROL FUMARATE DIHYDRATE 2 PUFF: 160; 4.5 AEROSOL RESPIRATORY (INHALATION) at 20:55

## 2020-01-01 RX ADMIN — ARFORMOTEROL TARTRATE 15 MCG: 15 SOLUTION RESPIRATORY (INHALATION) at 07:54

## 2020-01-01 RX ADMIN — Medication 1000 MG: at 17:24

## 2020-01-01 RX ADMIN — BUSPIRONE HYDROCHLORIDE 15 MG: 15 TABLET ORAL at 20:08

## 2020-01-01 RX ADMIN — ROCURONIUM BROMIDE 10 MG: 10 INJECTION INTRAVENOUS at 13:17

## 2020-01-01 RX ADMIN — HYDROCORTISONE: 1 CREAM TOPICAL at 22:27

## 2020-01-01 RX ADMIN — HYDROCORTISONE: 1 CREAM TOPICAL at 20:14

## 2020-01-01 RX ADMIN — BUDESONIDE AND FORMOTEROL FUMARATE DIHYDRATE 2 PUFF: 160; 4.5 AEROSOL RESPIRATORY (INHALATION) at 19:34

## 2020-01-01 RX ADMIN — BUSPIRONE HYDROCHLORIDE 15 MG: 10 TABLET ORAL at 20:49

## 2020-01-01 RX ADMIN — HYDROCORTISONE: 1 CREAM TOPICAL at 22:07

## 2020-01-01 RX ADMIN — BUSPIRONE HYDROCHLORIDE 15 MG: 15 TABLET ORAL at 08:48

## 2020-01-01 RX ADMIN — HYDROCORTISONE: 1 CREAM TOPICAL at 20:00

## 2020-01-01 RX ADMIN — HYDROCORTISONE: 1 CREAM TOPICAL at 20:15

## 2020-01-01 RX ADMIN — SODIUM CHLORIDE, POTASSIUM CHLORIDE, SODIUM LACTATE AND CALCIUM CHLORIDE: 600; 310; 30; 20 INJECTION, SOLUTION INTRAVENOUS at 13:41

## 2020-01-01 RX ADMIN — CEFTRIAXONE SODIUM 1 G: 1 INJECTION, POWDER, FOR SOLUTION INTRAMUSCULAR; INTRAVENOUS at 00:27

## 2020-01-01 RX ADMIN — ACETAMINOPHEN 650 MG: 325 TABLET, FILM COATED ORAL at 11:29

## 2020-01-01 RX ADMIN — SODIUM CHLORIDE, PRESERVATIVE FREE 10 ML: 5 INJECTION INTRAVENOUS at 09:19

## 2020-01-01 RX ADMIN — LEVOFLOXACIN 750 MG: 5 INJECTION, SOLUTION INTRAVENOUS at 17:04

## 2020-01-01 RX ADMIN — SODIUM CHLORIDE, PRESERVATIVE FREE 10 ML: 5 INJECTION INTRAVENOUS at 08:57

## 2020-01-01 RX ADMIN — BUSPIRONE HYDROCHLORIDE 15 MG: 15 TABLET ORAL at 21:21

## 2020-01-01 RX ADMIN — Medication 1 TABLET: at 09:08

## 2020-01-01 RX ADMIN — Medication 1000 MG: at 17:23

## 2020-01-01 RX ADMIN — IPRATROPIUM BROMIDE AND ALBUTEROL SULFATE 3 ML: 2.5; .5 SOLUTION RESPIRATORY (INHALATION) at 07:40

## 2020-01-01 RX ADMIN — LOSARTAN POTASSIUM 50 MG: 50 TABLET, FILM COATED ORAL at 15:18

## 2020-01-01 RX ADMIN — BUDESONIDE AND FORMOTEROL FUMARATE DIHYDRATE 2 PUFF: 160; 4.5 AEROSOL RESPIRATORY (INHALATION) at 19:13

## 2020-01-01 RX ADMIN — FOLIC ACID 1 MG: 1 TABLET ORAL at 08:14

## 2020-01-01 RX ADMIN — POLYETHYLENE GLYCOL 3350 255 G: 17 POWDER, FOR SOLUTION ORAL at 17:58

## 2020-01-01 RX ADMIN — HYDROCORTISONE: 1 CREAM TOPICAL at 20:07

## 2020-01-01 RX ADMIN — BUSPIRONE HYDROCHLORIDE 15 MG: 10 TABLET ORAL at 01:43

## 2020-01-01 RX ADMIN — HYDROCORTISONE: 1 CREAM TOPICAL at 08:30

## 2020-01-01 RX ADMIN — CETIRIZINE HYDROCHLORIDE 10 MG: 10 TABLET, FILM COATED ORAL at 09:06

## 2020-01-01 RX ADMIN — BUDESONIDE AND FORMOTEROL FUMARATE DIHYDRATE 2 PUFF: 160; 4.5 AEROSOL RESPIRATORY (INHALATION) at 06:51

## 2020-01-01 RX ADMIN — SODIUM CHLORIDE, PRESERVATIVE FREE 10 ML: 5 INJECTION INTRAVENOUS at 21:22

## 2020-01-01 RX ADMIN — BUDESONIDE AND FORMOTEROL FUMARATE DIHYDRATE 2 PUFF: 160; 4.5 AEROSOL RESPIRATORY (INHALATION) at 19:40

## 2020-01-01 RX ADMIN — PROPOFOL 20 MG: 10 INJECTION, EMULSION INTRAVENOUS at 13:29

## 2020-01-01 RX ADMIN — BUSPIRONE HYDROCHLORIDE 15 MG: 15 TABLET ORAL at 09:06

## 2020-01-01 RX ADMIN — HEPARIN SODIUM 5000 UNITS: 5000 INJECTION INTRAVENOUS; SUBCUTANEOUS at 13:19

## 2020-01-01 RX ADMIN — BUSPIRONE HYDROCHLORIDE 15 MG: 10 TABLET ORAL at 10:59

## 2020-01-01 RX ADMIN — FOLIC ACID 1 MG: 1 TABLET ORAL at 09:15

## 2020-01-01 RX ADMIN — PROPOFOL 30 MG: 10 INJECTION, EMULSION INTRAVENOUS at 15:21

## 2020-01-01 RX ADMIN — CEFTRIAXONE SODIUM 1 G: 1 INJECTION, POWDER, FOR SOLUTION INTRAMUSCULAR; INTRAVENOUS at 00:12

## 2020-01-01 RX ADMIN — BUSPIRONE HYDROCHLORIDE 15 MG: 15 TABLET ORAL at 09:15

## 2020-01-01 RX ADMIN — METHYLPREDNISOLONE SODIUM SUCCINATE 40 MG: 40 INJECTION, POWDER, FOR SOLUTION INTRAMUSCULAR; INTRAVENOUS at 05:47

## 2020-01-01 RX ADMIN — ARFORMOTEROL TARTRATE 15 MCG: 15 SOLUTION RESPIRATORY (INHALATION) at 07:22

## 2020-01-01 RX ADMIN — HEPARIN SODIUM 5000 UNITS: 5000 INJECTION INTRAVENOUS; SUBCUTANEOUS at 05:27

## 2020-01-01 RX ADMIN — HYDROCORTISONE: 1 CREAM TOPICAL at 14:18

## 2020-01-01 RX ADMIN — HEPARIN SODIUM 5000 UNITS: 5000 INJECTION INTRAVENOUS; SUBCUTANEOUS at 05:12

## 2020-01-01 RX ADMIN — IPRATROPIUM BROMIDE AND ALBUTEROL SULFATE 3 ML: 2.5; .5 SOLUTION RESPIRATORY (INHALATION) at 11:04

## 2020-01-01 RX ADMIN — IPRATROPIUM BROMIDE AND ALBUTEROL SULFATE 3 ML: 2.5; .5 SOLUTION RESPIRATORY (INHALATION) at 07:02

## 2020-01-01 RX ADMIN — IPRATROPIUM BROMIDE AND ALBUTEROL SULFATE 3 ML: 2.5; .5 SOLUTION RESPIRATORY (INHALATION) at 21:36

## 2020-01-01 RX ADMIN — SODIUM CHLORIDE, PRESERVATIVE FREE 10 ML: 5 INJECTION INTRAVENOUS at 08:34

## 2020-01-01 RX ADMIN — Medication 1000 MG: at 09:21

## 2020-01-01 RX ADMIN — SODIUM CHLORIDE 125 ML/HR: 900 INJECTION, SOLUTION INTRAVENOUS at 13:24

## 2020-01-01 RX ADMIN — BUSPIRONE HYDROCHLORIDE 15 MG: 15 TABLET ORAL at 09:22

## 2020-01-01 RX ADMIN — Medication 1000 MG: at 18:17

## 2020-01-01 RX ADMIN — LEVOFLOXACIN 750 MG: 5 INJECTION, SOLUTION INTRAVENOUS at 14:14

## 2020-01-01 RX ADMIN — ONDANSETRON 4 MG: 2 SOLUTION INTRAMUSCULAR; INTRAVENOUS at 08:07

## 2020-01-01 RX ADMIN — Medication 1 TABLET: at 08:01

## 2020-01-01 RX ADMIN — Medication 1000 MG: at 08:34

## 2020-01-01 RX ADMIN — BUSPIRONE HYDROCHLORIDE 15 MG: 10 TABLET ORAL at 10:00

## 2020-01-01 RX ADMIN — LOSARTAN POTASSIUM 50 MG: 50 TABLET, FILM COATED ORAL at 08:05

## 2020-01-01 RX ADMIN — Medication 1000 MG: at 17:04

## 2020-01-01 RX ADMIN — ARFORMOTEROL TARTRATE 15 MCG: 15 SOLUTION RESPIRATORY (INHALATION) at 22:36

## 2020-01-01 RX ADMIN — IPRATROPIUM BROMIDE AND ALBUTEROL SULFATE 3 ML: 2.5; .5 SOLUTION RESPIRATORY (INHALATION) at 15:17

## 2020-01-01 RX ADMIN — IPRATROPIUM BROMIDE AND ALBUTEROL SULFATE 3 ML: 2.5; .5 SOLUTION RESPIRATORY (INHALATION) at 10:30

## 2020-01-01 RX ADMIN — IPRATROPIUM BROMIDE AND ALBUTEROL SULFATE 3 ML: 2.5; .5 SOLUTION RESPIRATORY (INHALATION) at 10:57

## 2020-01-01 RX ADMIN — ACETAMINOPHEN 650 MG: 325 TABLET, FILM COATED ORAL at 20:07

## 2020-01-01 RX ADMIN — IPRATROPIUM BROMIDE AND ALBUTEROL SULFATE 3 ML: 2.5; .5 SOLUTION RESPIRATORY (INHALATION) at 10:41

## 2020-01-01 RX ADMIN — Medication 1 TABLET: at 09:22

## 2020-01-01 RX ADMIN — ONDANSETRON 4 MG: 2 SOLUTION INTRAMUSCULAR; INTRAVENOUS at 00:24

## 2020-01-01 RX ADMIN — CEFTRIAXONE SODIUM 1 G: 1 INJECTION, POWDER, FOR SOLUTION INTRAMUSCULAR; INTRAVENOUS at 01:43

## 2020-01-01 RX ADMIN — LEVOFLOXACIN 750 MG: 5 INJECTION, SOLUTION INTRAVENOUS at 13:58

## 2020-01-01 RX ADMIN — PROPOFOL 150 MG: 10 INJECTION, EMULSION INTRAVENOUS at 13:17

## 2020-01-01 RX ADMIN — LOSARTAN POTASSIUM 50 MG: 50 TABLET, FILM COATED ORAL at 08:34

## 2020-01-01 RX ADMIN — LOSARTAN POTASSIUM 50 MG: 50 TABLET, FILM COATED ORAL at 08:14

## 2020-01-01 RX ADMIN — IPRATROPIUM BROMIDE AND ALBUTEROL SULFATE 3 ML: 2.5; .5 SOLUTION RESPIRATORY (INHALATION) at 11:38

## 2020-01-01 RX ADMIN — Medication 1 TABLET: at 07:49

## 2020-01-01 RX ADMIN — METHYLPREDNISOLONE SODIUM SUCCINATE 40 MG: 40 INJECTION, POWDER, FOR SOLUTION INTRAMUSCULAR; INTRAVENOUS at 05:27

## 2020-01-01 RX ADMIN — CETIRIZINE HYDROCHLORIDE 10 MG: 10 TABLET, FILM COATED ORAL at 09:32

## 2020-01-01 RX ADMIN — METHYLPREDNISOLONE SODIUM SUCCINATE 20 MG: 40 INJECTION, POWDER, FOR SOLUTION INTRAMUSCULAR; INTRAVENOUS at 08:21

## 2020-01-01 RX ADMIN — BUDESONIDE 0.5 MG: 0.5 INHALANT RESPIRATORY (INHALATION) at 21:36

## 2020-01-01 RX ADMIN — Medication 1000 MG: at 07:50

## 2020-01-01 RX ADMIN — HYDROCODONE BITARTRATE AND ACETAMINOPHEN 1 TABLET: 5; 325 TABLET ORAL at 06:23

## 2020-01-01 RX ADMIN — ZOLPIDEM TARTRATE 5 MG: 5 TABLET ORAL at 22:26

## 2020-01-01 RX ADMIN — Medication 1000 MG: at 17:42

## 2020-01-01 RX ADMIN — HEPARIN SODIUM 5000 UNITS: 5000 INJECTION INTRAVENOUS; SUBCUTANEOUS at 13:45

## 2020-01-01 RX ADMIN — PANTOPRAZOLE SODIUM 40 MG: 40 TABLET, DELAYED RELEASE ORAL at 05:34

## 2020-01-01 RX ADMIN — BUDESONIDE AND FORMOTEROL FUMARATE DIHYDRATE 2 PUFF: 160; 4.5 AEROSOL RESPIRATORY (INHALATION) at 21:04

## 2020-01-01 RX ADMIN — IPRATROPIUM BROMIDE AND ALBUTEROL SULFATE 3 ML: 2.5; .5 SOLUTION RESPIRATORY (INHALATION) at 19:33

## 2020-01-01 RX ADMIN — HEPARIN SODIUM 5000 UNITS: 5000 INJECTION INTRAVENOUS; SUBCUTANEOUS at 21:20

## 2020-01-01 RX ADMIN — SODIUM CHLORIDE, PRESERVATIVE FREE 10 ML: 5 INJECTION INTRAVENOUS at 20:24

## 2020-01-01 RX ADMIN — BUSPIRONE HYDROCHLORIDE 15 MG: 15 TABLET ORAL at 20:24

## 2020-01-01 RX ADMIN — BUDESONIDE AND FORMOTEROL FUMARATE DIHYDRATE 2 PUFF: 160; 4.5 AEROSOL RESPIRATORY (INHALATION) at 19:29

## 2020-01-01 RX ADMIN — CEFTRIAXONE SODIUM 1 G: 1 INJECTION, POWDER, FOR SOLUTION INTRAMUSCULAR; INTRAVENOUS at 02:31

## 2020-01-01 RX ADMIN — HYDROCORTISONE: 1 CREAM TOPICAL at 08:03

## 2020-01-01 RX ADMIN — HEPARIN SODIUM 5000 UNITS: 5000 INJECTION INTRAVENOUS; SUBCUTANEOUS at 13:24

## 2020-01-01 RX ADMIN — BUSPIRONE HYDROCHLORIDE 15 MG: 15 TABLET ORAL at 20:45

## 2020-01-01 RX ADMIN — SODIUM CHLORIDE 1000 ML: 900 INJECTION, SOLUTION INTRAVENOUS at 13:23

## 2020-01-01 RX ADMIN — SODIUM CHLORIDE 50 ML/HR: 9 INJECTION, SOLUTION INTRAVENOUS at 18:51

## 2020-01-01 RX ADMIN — BUDESONIDE AND FORMOTEROL FUMARATE DIHYDRATE 2 PUFF: 160; 4.5 AEROSOL RESPIRATORY (INHALATION) at 19:58

## 2020-01-01 RX ADMIN — HEPARIN SODIUM 5000 UNITS: 5000 INJECTION, SOLUTION INTRAVENOUS; SUBCUTANEOUS at 21:34

## 2020-01-01 RX ADMIN — Medication 1000 MG: at 09:15

## 2020-01-01 RX ADMIN — LEVOFLOXACIN 750 MG: 5 INJECTION, SOLUTION INTRAVENOUS at 16:35

## 2020-01-01 RX ADMIN — BUDESONIDE AND FORMOTEROL FUMARATE DIHYDRATE 2 PUFF: 160; 4.5 AEROSOL RESPIRATORY (INHALATION) at 06:39

## 2020-01-01 RX ADMIN — PANTOPRAZOLE SODIUM 40 MG: 40 TABLET, DELAYED RELEASE ORAL at 06:18

## 2020-01-01 RX ADMIN — Medication 1000 MG: at 12:41

## 2020-01-01 RX ADMIN — QUETIAPINE FUMARATE 100 MG: 100 TABLET ORAL at 21:04

## 2020-01-01 RX ADMIN — BUSPIRONE HYDROCHLORIDE 15 MG: 15 TABLET ORAL at 08:53

## 2020-01-01 RX ADMIN — SODIUM CHLORIDE, PRESERVATIVE FREE 10 ML: 5 INJECTION INTRAVENOUS at 08:22

## 2020-01-01 RX ADMIN — HYDROCODONE BITARTRATE AND ACETAMINOPHEN 1 TABLET: 5; 325 TABLET ORAL at 18:56

## 2020-01-01 RX ADMIN — DEXAMETHASONE SODIUM PHOSPHATE 4 MG: 4 INJECTION, SOLUTION INTRAMUSCULAR; INTRAVENOUS at 13:13

## 2020-01-01 RX ADMIN — IPRATROPIUM BROMIDE AND ALBUTEROL SULFATE 3 ML: 2.5; .5 SOLUTION RESPIRATORY (INHALATION) at 19:58

## 2020-01-01 RX ADMIN — HEPARIN SODIUM 5000 UNITS: 5000 INJECTION INTRAVENOUS; SUBCUTANEOUS at 23:09

## 2020-01-01 RX ADMIN — LEVOFLOXACIN 750 MG: 5 INJECTION, SOLUTION INTRAVENOUS at 13:20

## 2020-01-01 RX ADMIN — ENOXAPARIN SODIUM 30 MG: 30 INJECTION SUBCUTANEOUS at 21:04

## 2020-01-01 RX ADMIN — PROPOFOL 100 MG: 10 INJECTION, EMULSION INTRAVENOUS at 15:18

## 2020-01-01 RX ADMIN — METHYLPREDNISOLONE SODIUM SUCCINATE 40 MG: 40 INJECTION, POWDER, FOR SOLUTION INTRAMUSCULAR; INTRAVENOUS at 18:25

## 2020-01-01 RX ADMIN — QUETIAPINE FUMARATE 100 MG: 100 TABLET ORAL at 20:58

## 2020-01-01 RX ADMIN — IPRATROPIUM BROMIDE AND ALBUTEROL SULFATE 3 ML: 2.5; .5 SOLUTION RESPIRATORY (INHALATION) at 14:49

## 2020-01-01 RX ADMIN — HYDROCODONE BITARTRATE AND ACETAMINOPHEN 1 TABLET: 5; 325 TABLET ORAL at 14:16

## 2020-01-01 RX ADMIN — SODIUM CHLORIDE 50 ML/HR: 9 INJECTION, SOLUTION INTRAVENOUS at 13:44

## 2020-01-01 RX ADMIN — HEPARIN SODIUM 5000 UNITS: 5000 INJECTION, SOLUTION INTRAVENOUS; SUBCUTANEOUS at 06:20

## 2020-01-01 RX ADMIN — SODIUM CHLORIDE, SODIUM GLUCONATE, SODIUM ACETATE, POTASSIUM CHLORIDE, AND MAGNESIUM CHLORIDE 1000 ML: 526; 502; 368; 37; 30 INJECTION, SOLUTION INTRAVENOUS at 12:41

## 2020-01-01 RX ADMIN — IPRATROPIUM BROMIDE AND ALBUTEROL SULFATE 3 ML: 2.5; .5 SOLUTION RESPIRATORY (INHALATION) at 15:01

## 2020-01-01 RX ADMIN — HYDROCORTISONE: 1 CREAM TOPICAL at 07:50

## 2020-01-01 RX ADMIN — IPRATROPIUM BROMIDE AND ALBUTEROL SULFATE 3 ML: 2.5; .5 SOLUTION RESPIRATORY (INHALATION) at 08:23

## 2020-01-01 RX ADMIN — Medication 1000 MG: at 17:09

## 2020-01-01 RX ADMIN — LIDOCAINE HYDROCHLORIDE 20 MG: 20 INJECTION, SOLUTION INTRAVENOUS at 13:23

## 2020-01-01 RX ADMIN — IPRATROPIUM BROMIDE AND ALBUTEROL SULFATE 3 ML: 2.5; .5 SOLUTION RESPIRATORY (INHALATION) at 15:10

## 2020-01-01 RX ADMIN — LOSARTAN POTASSIUM 50 MG: 50 TABLET, FILM COATED ORAL at 09:41

## 2020-01-08 ENCOUNTER — APPOINTMENT (OUTPATIENT)
Dept: LAB | Facility: HOSPITAL | Age: 65
End: 2020-01-08

## 2020-01-08 ENCOUNTER — OFFICE VISIT (OUTPATIENT)
Dept: GASTROENTEROLOGY | Facility: CLINIC | Age: 65
End: 2020-01-08

## 2020-01-08 ENCOUNTER — HOSPITAL ENCOUNTER (OUTPATIENT)
Dept: GENERAL RADIOLOGY | Facility: HOSPITAL | Age: 65
Discharge: HOME | End: 2020-01-08
Admitting: PHYSICIAN ASSISTANT

## 2020-01-08 VITALS
HEIGHT: 64 IN | SYSTOLIC BLOOD PRESSURE: 114 MMHG | DIASTOLIC BLOOD PRESSURE: 68 MMHG | WEIGHT: 179 LBS | HEART RATE: 76 BPM | BODY MASS INDEX: 30.56 KG/M2

## 2020-01-08 DIAGNOSIS — K80.20 GALLSTONES: ICD-10-CM

## 2020-01-08 DIAGNOSIS — R05.9 COUGH: ICD-10-CM

## 2020-01-08 DIAGNOSIS — K21.00 GASTROESOPHAGEAL REFLUX DISEASE WITH ESOPHAGITIS: ICD-10-CM

## 2020-01-08 DIAGNOSIS — R79.89 ELEVATED SERUM CREATININE: ICD-10-CM

## 2020-01-08 DIAGNOSIS — K74.60 CIRRHOSIS OF LIVER WITHOUT ASCITES, UNSPECIFIED HEPATIC CIRRHOSIS TYPE (HCC): Primary | ICD-10-CM

## 2020-01-08 LAB
ANION GAP SERPL CALCULATED.3IONS-SCNC: 10 MMOL/L (ref 5–15)
BUN BLD-MCNC: 16 MG/DL (ref 8–23)
BUN/CREAT SERPL: 12.8 (ref 7–25)
CALCIUM SPEC-SCNC: 9.7 MG/DL (ref 8.6–10.5)
CHLORIDE SERPL-SCNC: 101 MMOL/L (ref 98–107)
CO2 SERPL-SCNC: 32 MMOL/L (ref 22–29)
CREAT BLD-MCNC: 1.25 MG/DL (ref 0.76–1.27)
GFR SERPL CREATININE-BSD FRML MDRD: 59 ML/MIN/1.73
GLUCOSE BLD-MCNC: 96 MG/DL (ref 65–99)
POTASSIUM BLD-SCNC: 4.2 MMOL/L (ref 3.5–5.2)
SODIUM BLD-SCNC: 143 MMOL/L (ref 136–145)

## 2020-01-08 PROCEDURE — 71046 X-RAY EXAM CHEST 2 VIEWS: CPT

## 2020-01-08 PROCEDURE — 99214 OFFICE O/P EST MOD 30 MIN: CPT | Performed by: PHYSICIAN ASSISTANT

## 2020-01-08 PROCEDURE — 36415 COLL VENOUS BLD VENIPUNCTURE: CPT | Performed by: PHYSICIAN ASSISTANT

## 2020-01-08 PROCEDURE — 80048 BASIC METABOLIC PNL TOTAL CA: CPT | Performed by: PHYSICIAN ASSISTANT

## 2020-01-08 NOTE — PATIENT INSTRUCTIONS

## 2020-01-09 ENCOUNTER — TELEPHONE (OUTPATIENT)
Dept: GASTROENTEROLOGY | Facility: CLINIC | Age: 65
End: 2020-01-09

## 2020-01-09 NOTE — TELEPHONE ENCOUNTER
An attempt to contact the patient to make him aware of his results was unsuccessful, there was no answer and no voicemail option.

## 2020-01-09 NOTE — TELEPHONE ENCOUNTER
----- Message from Deny Heller PA-C sent at 1/8/2020  4:57 PM CST -----  Please call the patient regarding his abnormal result. CXR normal. Kidney function better.

## 2020-07-09 PROBLEM — K74.60 CIRRHOSIS OF LIVER (HCC): Status: ACTIVE | Noted: 2020-01-01

## 2020-07-09 PROBLEM — D69.6 ACQUIRED THROMBOCYTOPENIA (HCC): Status: ACTIVE | Noted: 2020-01-01

## 2020-07-09 PROBLEM — B18.2 CHRONIC HEPATITIS C (HCC): Status: ACTIVE | Noted: 2020-01-01

## 2020-07-09 PROBLEM — J18.9 PNEUMONIA: Status: ACTIVE | Noted: 2020-01-01

## 2020-07-09 PROBLEM — I10 HYPERTENSION: Status: ACTIVE | Noted: 2020-01-01

## 2020-07-09 PROBLEM — J39.8 TRACHEAL MASS: Status: ACTIVE | Noted: 2020-01-01

## 2020-07-09 NOTE — PROGRESS NOTES
"Discharge Planning Assessment  [facility]     Patient Name: Wong Patrick  MRN: [id number]  Today's Date: 7/9/2020    Admit Date: 7/8/2020    Discharge Needs Assessment     Row Name 07/09/20 0958       Living Environment    Lives With  alone    Current Living Arrangements  home/apartment/condo    Primary Care Provided by  self    Provides Primary Care For  no one    Quality of Family Relationships  unable to assess       Resource/Environmental Concerns    Resource/Environmental Concerns  environmental    Environment Concerns  other (see comments) Patient was treated for bedbugs but states his apartment has roaches.       Transition Planning    Patient/Family Anticipates Transition to  home    Transportation Anticipated  family or friend will provide       Discharge Needs Assessment    Readmission Within the Last 30 Days  no previous admission in last 30 days    Equipment Currently Used at Home  none    Anticipated Changes Related to Illness  none    Current Discharge Risk  chronically ill;lives alone    Discharge Coordination/Progress  SW spoke with patient regarding living conditions.  When  inquired about bed bugs, patient stated, \"it's not bed bugs, it's roaches.\"   then informed patient per nursing note, he was treated for bed bugs at transferring hospital.  Patient stated he was not aware of this.  SW offered to contact health department regarding roaches and bed bugs at apartment complex and patient declined.  SW also offered to contact patient's landlord regarding living conditions.  Patient stated he did not have his landlord's contact information available.  SW advised patient to attempt to obtain this information if he would like us to address these concerns with his landlord.  Patient functions independently at home, does not utilize DME or HH.  Patient has a PCP and rX coverage.  SW will follow for discharge plan/needs.  Concerned about patient's living environment and " patient's ability to advocate for himself due to intellectual functioning.          Discharge Plan    No documentation.       Destination      Coordination has not been started for this encounter.      Durable Medical Equipment      Coordination has not been started for this encounter.      Dialysis/Infusion      Coordination has not been started for this encounter.      Home Medical Care      Coordination has not been started for this encounter.      Therapy      Coordination has not been started for this encounter.      Community Resources      Coordination has not been started for this encounter.          Demographic Summary    No documentation.       Functional Status    No documentation.       Psychosocial    No documentation.       Abuse/Neglect    No documentation.       Legal    No documentation.       Substance Abuse    No documentation.       Patient Forms    No documentation.           ROBIN Petersen

## 2020-07-09 NOTE — PLAN OF CARE
Problem: Patient Care Overview  Goal: Plan of Care Review  Outcome: Ongoing (interventions implemented as appropriate)  Note:   Pt with reported decreased appetite, 50% of lunch documented. Pt has no teeth however, RN reports he has been doing well with PO diet being Regular solids. No significant wt loss noted, 11# over the last 6 months. Ordered Boost daily with dinner, will continue to follow.

## 2020-07-09 NOTE — H&P
[facility] Medicine Services  HISTORY AND PHYSICAL    Date of Admission: 7/8/2020  Primary Care Physician: Provider, No Known    Subjective     Chief Complaint: cough and shortness of breath    History of Present Illness  65 year old male with PMH of cirrhosis, HTN, hepatitis C that presents to OSH with cough and shortness of breath and is admitted due to pneumonia. He had worsening rales and dyspnea, CT cehst was obtained with showed Right lung pneumonia with CT findings concerning for para tracheal mass and lymphadenopathy. We are called to accept transfer and direct the patient for a pulmonology consult.     The patient is dyspneic at rest and has wet cough during the exam. Rales cover the whole right chest posteriorly. He states is feeling bad and has had trouble swallowing for a while.     Review of Systems   Otherwise complete ROS reviewed and negative except as mentioned in the HPI.    Past Medical History:   Past Medical History:   Diagnosis Date   • Abnormal weight loss    • Acquired thrombocytopenia (CMS/HCC)    • Adenomatous polyp of colon    • Anxiety    • Biliary calculus     denies N/V, abd pain   • Chronic hepatitis C (CMS/HCC)    • Cirrhosis of liver (CMS/HCC)    • Colon polyp    • Elevated levels of transaminase & lactic acid dehydrogenase    • Generalized abdominal pain     known gallstones   • GERD (gastroesophageal reflux disease)    • Hypertension    • IBS (irritable bowel syndrome)    • Impacted cerumen    • Kidney disease     US suggest renal dz, elevated alpha 2 macroglobulins   • Nausea and vomiting    • Patient noncompliance, general    • Rectal hemorrhage    • Villous adenoma of colon    • Wheezing      Past Surgical History:  Past Surgical History:   Procedure Laterality Date   • COLONOSCOPY  06/03/2013   • COLONOSCOPY N/A 8/22/2018    Procedure: COLONOSCOPY;  Surgeon: Charles Pham MD;  Location: [facility] ENDOSCOPY;  Service: Gastroenterology   •  ENDOSCOPY N/A 7/9/2019    Procedure: ESOPHAGOGASTRODUODENOSCOPY;  Surgeon: Bi Ibarra MD;  Location: [facility] ENDOSCOPY;  Service: General   • ENDOSCOPY AND COLONOSCOPY     • ESOPHAGOSCOPY / EGD      with tube   • UPPER GASTROINTESTINAL ENDOSCOPY  06/03/2013   • UPPER GASTROINTESTINAL ENDOSCOPY  07/09/2019     Social History:  reports that he has been smoking cigarettes. He has been smoking about 1.00 pack per day. He has never used smokeless tobacco. He reports that he does not drink alcohol or use drugs.    Family History: family history includes Diabetes in an other family member; Heart disease in an other family member.       Allergies:  Allergies   Allergen Reactions   • Codeine Unknown - High Severity     Pt stated it caused swelling in arms and legs; caused skin irritation; excessive sleepiness   • Penicillins Hives     Medications:  Prior to Admission medications    Medication Sig Start Date End Date Taking? Authorizing Provider   albuterol (PROVENTIL HFA;VENTOLIN HFA) 108 (90 BASE) MCG/ACT inhaler Inhale 2 puffs 2 (two) times a day as needed for wheezing.   Yes Rafael Wang MD   busPIRone (BUSPAR) 15 MG tablet Take 15 mg by mouth 2 (two) times a day.   Yes Rafael Wang MD   fluticasone (FLONASE) 50 MCG/ACT nasal spray 2 sprays into each nostril Daily.   Yes Rafael Wang MD   loratadine (CLARITIN) 10 MG tablet TAKE 1 TABLET BY MOUTH EVERY DAY 5/11/17  Yes Rafael Wang MD   losartan (COZAAR) 50 MG tablet Take 50 mg by mouth daily.   Yes Rafael Wang MD   omeprazole (priLOSEC) 20 MG capsule Take 1 capsule by mouth Daily. 6/20/19  Yes Deny Heller PA-C   QUEtiapine (SEROquel) 100 MG tablet Take 100 mg by mouth every night.   Yes Rafael Wang MD   ursodiol (ACTIGALL) 300 MG capsule Take 1 capsule by mouth 3 (Three) Times a Day With Meals. 7/9/19 7/8/20 Yes Bi Ibarra MD   meclizine (ANTIVERT) 25 MG tablet Take 25 mg by mouth 2  (Two) Times a Day As Needed.    Provider, MD Rafael   omeprazole (priLOSEC) 20 MG capsule TAKE 1 CAPSULE BY MOUTH DAILY. 7/30/19   Deny Heller PA-C     Objective     Vital Signs: /67 (BP Location: Left arm, Patient Position: Lying)   Pulse 79   Temp 97.6 °F (36.4 °C) (Oral)   Resp 16   Wt 76.3 kg (168 lb 3.2 oz)   SpO2 94%   BMI 28.87 kg/m²   Physical Exam   Constitutional: He is oriented to person, place, and time. He appears well-developed. No distress.   HENT:   Head: Normocephalic and atraumatic.   Right Ear: External ear normal.   Left Ear: External ear normal.   Eyes: Pupils are equal, round, and reactive to light. EOM are normal. Right eye exhibits no discharge. Left eye exhibits no discharge.   Neck: Normal range of motion. Neck supple. No JVD present. No thyromegaly present.   Cardiovascular: Normal rate, regular rhythm and normal heart sounds.   No murmur heard.  Pulmonary/Chest: No stridor. No respiratory distress. He has wheezes. He has rales.   Abdominal: Soft. Bowel sounds are normal. He exhibits no distension and no mass. There is no tenderness. There is no guarding.   Musculoskeletal: Normal range of motion. He exhibits no edema.   Neurological: He is alert and oriented to person, place, and time. He displays normal reflexes. No cranial nerve deficit. Coordination normal.   Skin: Skin is warm. Capillary refill takes less than 2 seconds. No erythema.   Psychiatric: He has a normal mood and affect.     Results Reviewed:  Lab Results (last 24 hours)     ** No results found for the last 24 hours. **        Imaging Results (Last 24 Hours)     ** No results found for the last 24 hours. **        I have personally reviewed and interpreted the radiology studies and ECG obtained at time of admission.     Assessment / Plan     Assessment:   Active Hospital Problems    Diagnosis   • **Right lower lobe pneumonia   • Tracheal mass   • Acquired thrombocytopenia (CMS/HCC)   • Chronic  hepatitis C (CMS/HCC)   • Hypertension   • Cirrhosis of liver (CMS/HCC)     Plan:   Admit to medical floor. Vitals routine.   IVF saline lock  Cardiac diet. Up add valerie    Antibiotics > Rocephin/Levaquin  Follow cultures.  Oxygen as needed.  Pulmonology consult in AM.    Monitor for ETOH withdrawal signs and treat as needed.        Code Status: Full     I discussed the patient's findings and my recommendations with the patient    Estimated length of stay over 2 midnights    Patient seen and examined by me on see below.    Maged Casillas MD   07/09/20   00:11

## 2020-07-09 NOTE — PLAN OF CARE
Problem: Patient Care Overview  Goal: Plan of Care Review  Outcome: Ongoing (interventions implemented as appropriate)  Flowsheets (Taken 7/9/2020 050)  Progress: no change  Outcome Summary: pt was transfered from [facility]; admitted with pneumonia and concerns with regard to CT scan; pt was found to have bedbugs on admit to [facility] and was successfully treated there; pt belongings are double bagged; pt does have new clothes for when he returns home;  consult placed r/t pt living conditions; pt reported to [facility] that his landlord is aware of the bug problem and will not do anything; pt also has belongings that were sent to security for safe keeping; pt c/o some discomfort in abdominal area and stated he gets frequent heart burn also; otherwise pt had no c/o pain; VSS; pt has been resting well; safety discussed and maintained; will continue to monitor

## 2020-07-09 NOTE — CONSULTS
PULMONARY & CRITICAL CARE CONSULT - [facility]    20, 09:23  Patient Care Team:  Provider, No Known as PCP - Deny Campos PA-C as Physician Assistant (Gastroenterology)  Name: Wong Partick  : 1955  MRN: [id number]  Contact Serial Number [id number]    Chief complaint: Shortness of breath    HPI:  We have been consulted by Bryan Braxton MD to see this 65 y.o. male born on 1955.  Patient complains of dyspnea developed at home for a few days ago.  It was Mild at first which was getting worse.  Aggravating factors: walking and exertion.   Alleviating factors: medication(s) (feeling little better after getting bronchodilator treatment here) Associated symptoms: Cough with clear expectoration. Sputum is white and Without any hemoptysis.  Patient currently is on oxygen at 2 L/min per nasal cannula.. Respiratory history: no history of pneumonia or bronchitis recently.  He is an active smoker.  He used to drink alcohol but quit drinking a few years ago.  He claimed he takes only BuSpar at home and was not on any regular inhalers or home oxygen.  Past Medical History:   has a past medical history of Abnormal weight loss, Acquired thrombocytopenia (CMS/HCC), Adenomatous polyp of colon, Anxiety, Biliary calculus, Chronic hepatitis C (CMS/HCC), Cirrhosis of liver (CMS/HCC), Colon polyp, Elevated levels of transaminase & lactic acid dehydrogenase, Generalized abdominal pain, GERD (gastroesophageal reflux disease), Hypertension, IBS (irritable bowel syndrome), Impacted cerumen, Kidney disease, Nausea and vomiting, Patient noncompliance, general, Rectal hemorrhage, Villous adenoma of colon, and Wheezing.   has a past surgical history that includes endoscopy and colonoscopy; Esophagoscopy / EGD; Colonoscopy (2013); Colonoscopy (N/A, 2018); Esophagogastroduodenoscopy (N/A, 2019); Upper gastrointestinal endoscopy (2013); and Upper gastrointestinal endoscopy  (07/09/2019).  Allergies   Allergen Reactions   • Codeine Unknown - High Severity     Pt stated it caused swelling in arms and legs; caused skin irritation; excessive sleepiness   • Penicillins Hives     Medications:    busPIRone 15 mg Oral BID   cefTRIAXone 1 g Intravenous Q24H   levoFLOXacin 500 mg Intravenous Q24H   losartan 50 mg Oral Daily   pantoprazole 40 mg Oral Q AM   QUEtiapine 100 mg Oral Nightly   sodium chloride 10 mL Intravenous Q12H        Family History:  Family History   Problem Relation Age of Onset   • Heart disease Other    • Diabetes Other      Social History:   reports that he has been smoking cigarettes. He has been smoking about 1.00 pack per day. He has never used smokeless tobacco. He reports that he does not drink alcohol or use drugs.  Review of Systems:  Review of Systems   Physical Exam:  Temp:  [97.6 °F (36.4 °C)-98.2 °F (36.8 °C)] 98.2 °F (36.8 °C)  Heart Rate:  [79-92] 84  Resp:  [16-18] 18  BP: ()/(60-67) 115/60    Intake/Output Summary (Last 24 hours) at 7/9/2020 0923  Last data filed at 7/9/2020 0900  Gross per 24 hour   Intake 120 ml   Output 675 ml   Net -555 ml         07/08/20  2300   Weight: 76.3 kg (168 lb 3.2 oz)     SpO2 Percentage    07/08/20 2300 07/09/20 0400 07/09/20 0746   SpO2: 94% 96% 96%     Physical Exam   Constitutional: He is oriented to person, place, and time. He appears well-developed and well-nourished.   HENT:   Head: Normocephalic and atraumatic.   Eyes: Pupils are equal, round, and reactive to light. EOM are normal.   Neck: Normal range of motion. Neck supple.   Cardiovascular: Normal rate, regular rhythm and normal heart sounds.   Pulmonary/Chest: Effort normal. No respiratory distress. He has wheezes. He has rales.   Abdominal: Soft. Bowel sounds are normal. He exhibits no distension and no mass. There is no tenderness.   Musculoskeletal: Normal range of motion.   Neurological: He is alert and oriented to person, place, and time. No cranial nerve  deficit or sensory deficit.   Skin: Skin is warm and dry.   Psychiatric: He has a normal mood and affect. His behavior is normal.   He has little slurring of speech appears little slow to respond.     Results from last 7 days   Lab Units 07/09/20  0508   WBC 10*3/mm3 10.98*   HEMOGLOBIN g/dL 8.0*   PLATELETS 10*3/mm3 204     Results from last 7 days   Lab Units 07/09/20  0508   SODIUM mmol/L 140   POTASSIUM mmol/L 4.4   CO2 mmol/L 25.0   BUN mg/dL 30*   CREATININE mg/dL 0.87   GLUCOSE mg/dL 81         No results found for: PROBNP  No results found for: BLOODCX, URINECX, WOUNDCX, MRSACX, RESPCX, STOOLCX  Recent radiology:   Imaging Results (Last 72 Hours)     ** No results found for the last 72 hours. **        My radiograph interpretation/independent review of imaging: Patient had CT scan of the chest done in outside facility and the results are available which showed a right lower lobe pneumonia and a paratracheal mass  Other test results (not lab or imaging):   Reviewed recent lab work  Independent review of ekg: Not done    Problem List as identified by Epic (may contain historical, inactive problems)  Patient Active Problem List   Diagnosis   • History of colon polyps   • Nausea and vomiting   • Right lower lobe pneumonia   • Tracheal mass   • Acquired thrombocytopenia (CMS/HCC)   • Chronic hepatitis C (CMS/HCC)   • Hypertension   • Cirrhosis of liver (CMS/HCC)     Pulmonary Assessment:    New problem (to me), with additional workup planned:   Right lower lobe pneumonia  Community-acquired bacterial pneumonia  Chronic obstructive pulmonary disease  History of tobacco abuse  Acute on chronic hypoxic respiratory failure requiring supplemental oxygen  Tracheal mass a bronchoscopy has been planned for Monday    New problem (to me), no additional workup planned:   As noted above  He has been tested negative for COVID-19 twice as per records    Other problems either stable, failing to improve or worsening:  Other  problems were stable      Recommend/plan:     1.  Right lower lobe pneumonia: Follow-up x-ray, continue ceftriaxone and levofloxacin.Check streptococcal pneumonia antigen, Legionella antigen in urine, mycoplasma screening.  Adjust antibiotics depending on culture results  2.  Tracheal mass: Plan for bronchoscopy with endobronchial ultrasound by Dr. Sewell on Monday.  3.  Bronchodilator treatment, Brovana, Pulmicort, DuoNeb.  Incentive spirometry and CoughAssist device to be used to improve pulmonary compliance and clearance.  4.  Plan for pulmonary function test when stable, long-term combination bronchodilators to be started at time of discharge.  5.  Outpatient pulmonary clinic follow-up.  Smoking cessation counseling.  Start nicotine patch for tobacco abuse.  6.  He had history of alcohol abuse and he claimed he quit drinking sometime ago.  Monitor for possible withdrawal symptoms.  7.  Continue DVT, ulcer prophylaxis.  Pain anxiety control, nutritional support, physical therapy and occasional therapy.  8.  CODE STATUS: Full.  Pulmonary team will continue following make further recommendations.      Thank you for this consult.  We will follow along.  Electronically signed by Noah Arriaga MD, 07/09/20, 9:24 AM.    Noah Arriaga MD  Pulmonologist/Intensivist  7/9/2020 09:41

## 2020-07-09 NOTE — PROGRESS NOTES
[facility] Medicine Services  INPATIENT PROGRESS NOTE    Length of Stay: 2  Date of Admission: 7/8/2020  Primary Care Physician: Provider, No Known    Subjective   Chief Complaint: Tracheal mass/COPD exacerbation/pneumonia    HPI   Patient is that he is breathing better.  Patient presented requiring 2 L of oxygen.  Patient denies any chest pain.  Patient no acute distress.  Increased hypoxia when upon ambulating.    Review of Systems   Constitutional: Positive for activity change, appetite change and fatigue. Negative for chills and fever.   HENT: Negative for hearing loss, nosebleeds, tinnitus and trouble swallowing.    Eyes: Negative for visual disturbance.   Respiratory: Positive for shortness of breath and wheezing. Negative for cough and chest tightness.    Cardiovascular: Negative for chest pain, palpitations and leg swelling.   Gastrointestinal: Negative for abdominal distention, abdominal pain, blood in stool, constipation, diarrhea, nausea and vomiting.   Endocrine: Negative for cold intolerance, heat intolerance, polydipsia, polyphagia and polyuria.   Genitourinary: Negative for decreased urine volume, difficulty urinating, dysuria, flank pain, frequency and hematuria.   Musculoskeletal: Positive for arthralgias, gait problem and myalgias. Negative for joint swelling.   Skin: Negative for rash.   Allergic/Immunologic: Negative for immunocompromised state.   Neurological: Positive for weakness. Negative for dizziness, syncope, light-headedness and headaches.   Hematological: Negative for adenopathy. Does not bruise/bleed easily.   Psychiatric/Behavioral: Negative for sleep disturbance. The patient is not nervous/anxious.           All pertinent negatives and positives are as above. All other systems have been reviewed and are negative unless otherwise stated.     Objective    Temp:  [97.2 °F (36.2 °C)-98.6 °F (37 °C)] 98.2 °F (36.8 °C)  Heart Rate:  [] 100  Resp:   [16-22] 18  BP: (100-125)/(60-69) 100/60    Intake/Output Summary (Last 24 hours) at 7/10/2020 1549  Last data filed at 7/10/2020 1300  Gross per 24 hour   Intake 480 ml   Output 550 ml   Net -70 ml     Physical Exam   Constitutional: He is oriented to person, place, and time. He appears well-developed.   Patient is intellectually disabled.   HENT:   Head: Normocephalic.   Eyes: Pupils are equal, round, and reactive to light. Conjunctivae are normal.   Neck: Neck supple. No JVD present.   Cardiovascular: Normal rate, regular rhythm, normal heart sounds and intact distal pulses. Exam reveals no gallop and no friction rub.   No murmur heard.  Pulmonary/Chest: No respiratory distress. He has wheezes. He has no rales. He exhibits no tenderness.   Decrease in breath sounds.  Currently on 2 L of oxygen.  Slight wheezing.   Abdominal: Soft. Bowel sounds are normal. He exhibits no distension. There is no tenderness. There is no rebound and no guarding.   Musculoskeletal: Normal range of motion. He exhibits edema. He exhibits no tenderness or deformity.   Trace edema bilateral lower extremities   Neurological: He is alert and oriented to person, place, and time. He displays normal reflexes. No cranial nerve deficit. He exhibits abnormal muscle tone. Coordination abnormal.   Skin: Skin is warm and dry. Capillary refill takes 2 to 3 seconds. No rash noted.   Psychiatric: He has a normal mood and affect. His behavior is normal.   Nursing note and vitals reviewed.      Results Review:  Lab Results (last 24 hours)     Procedure Component Value Units Date/Time    Mycoplasma Pneumoniae Antibody, IgM - Blood, Arm, Left [[id number]] Collected:  07/09/20 1021    Specimen:  Blood from Arm, Left Updated:  07/10/20 1409     M pneumoniae IgM Abs <770 U/mL      Comment:                              Negative            <770  Clinically significant amount of M. pneumoniae antibody  not detected.                               Low Positive    770 - 950  M. pneumoniae specific IgM presumptively detected.  It  is recommended that another sample be collected 1-2  weeks later to assure reactivity.                               Positive            >950  Highly significant amount of M. pneumoniae specific  IgM antibody detected.       Narrative:       Performed at:  [address] [place] [id number]  [id number]  : Xiang Keating PhD, Phone:  [phone]    Slide Review, Hematology [[id number]] Collected:  07/09/20 0508    Specimen:  Blood Updated:  07/10/20 0922     Performed by: Jae Mckay MD     Pathologist Interpretation Absolute lymphocytosis.    Manual Differential [[id number]]  (Abnormal) Collected:  07/10/20 0416    Specimen:  Blood Updated:  07/10/20 0806     Neutrophil % 49.6 %      Lymphocyte % 43.1 %      Monocyte % 2.4 %      Bands %  2.4 %      Metamyelocyte % 0.8 %      Myelocyte % 1.6 %      Neutrophils Absolute 5.41 10*3/mm3      Lymphocytes Absolute 4.48 10*3/mm3      Monocytes Absolute 0.25 10*3/mm3      Polychromasia Slight/1+     Smudge Cells Large/3+     Giant Platelets Slight/1+    CBC & Differential [[id number]] Collected:  07/10/20 0416    Specimen:  Blood Updated:  07/10/20 0806    Narrative:       The following orders were created for panel order CBC & Differential.  Procedure                               Abnormality         Status                     ---------                               -----------         ------                     CBC Auto Differential[[id number]]        Abnormal            Final result                 Please view results for these tests on the individual orders.    CBC Auto Differential [[id number]]  (Abnormal) Collected:  07/10/20 0416    Specimen:  Blood Updated:  07/10/20 0806     WBC 10.40 10*3/mm3      RBC 2.74 10*6/mm3      Hemoglobin 8.0 g/dL      Hematocrit 25.7 %      MCV 93.8 fL      MCH 29.2 pg      MCHC 31.1 g/dL      RDW 15.4 %      RDW-SD 51.5 fl      MPV 10.4 fL       Platelets 195 10*3/mm3     CBC & Differential [[id number]] Collected:  07/09/20 0508    Specimen:  Blood Updated:  07/10/20 0719    Narrative:       The following orders were created for panel order CBC & Differential.  Procedure                               Abnormality         Status                     ---------                               -----------         ------                     CBC Auto Differential[[id number]]        Abnormal            Edited Result - FINAL        Please view results for these tests on the individual orders.    CBC Auto Differential [[id number]]  (Abnormal) Collected:  07/09/20 0508    Specimen:  Blood Updated:  07/10/20 0719     WBC 10.98 10*3/mm3      RBC 2.76 10*6/mm3      Hemoglobin 8.0 g/dL      Hematocrit 25.6 %      MCV 92.8 fL      MCH 29.0 pg      MCHC 31.3 g/dL      RDW 15.4 %      RDW-SD 52.2 fl      MPV 10.3 fL      Platelets 204 10*3/mm3      Neutrophil % 34.5 %      Lymphocyte % 60.6 %      Monocyte % 2.6 %      Eosinophil % 0.5 %      Basophil % 0.2 %      Immature Grans % 1.6 %      Neutrophils, Absolute 3.78 10*3/mm3      Lymphocytes, Absolute 6.65 10*3/mm3      Monocytes, Absolute 0.29 10*3/mm3      Eosinophils, Absolute 0.06 10*3/mm3      Basophils, Absolute 0.02 10*3/mm3      Immature Grans, Absolute 0.18 10*3/mm3      nRBC 0.0 /100 WBC     Hemoglobin A1c [[id number]]  (Abnormal) Collected:  07/10/20 0416    Specimen:  Blood Updated:  07/10/20 0702     Hemoglobin A1C 4.70 %     Narrative:       Hemoglobin A1C Ranges:    Increased Risk for Diabetes  5.7% to 6.4%  Diabetes                     >= 6.5%  Diabetic Goal                < 7.0%    Lipid Panel [[id number]]  (Abnormal) Collected:  07/10/20 0416    Specimen:  Blood Updated:  07/10/20 0554     Total Cholesterol 56 mg/dL      Triglycerides 79 mg/dL      HDL Cholesterol 17 mg/dL      LDL Cholesterol  23 mg/dL      VLDL Cholesterol 15.8 mg/dL      LDL/HDL Ratio 1.36    Narrative:       Cholesterol Reference  Ranges  (U.S. Department of Health and Human Services ATP III Classifications)    Desirable          <200 mg/dL  Borderline High    200-239 mg/dL  High Risk          >240 mg/dL      Triglyceride Reference Ranges  (U.S. Department of Health and Human Services ATP III Classifications)    Normal           <150 mg/dL  Borderline High  150-199 mg/dL  High             200-499 mg/dL  Very High        >500 mg/dL    HDL Reference Ranges  (U.S. Department of Health and Human Services ATP III Classifcations)    Low     <40 mg/dl (major risk factor for CHD)  High    >60 mg/dl ('negative' risk factor for CHD)        LDL Reference Ranges  (U.S. Department of Health and Human Services ATP III Classifcations)    Optimal          <100 mg/dL  Near Optimal     100-129 mg/dL  Borderline High  130-159 mg/dL  High             160-189 mg/dL  Very High        >189 mg/dL    Comprehensive Metabolic Panel [[id number]]  (Abnormal) Collected:  07/10/20 0416    Specimen:  Blood Updated:  07/10/20 0554     Glucose 93 mg/dL      BUN 23 mg/dL      Creatinine 0.83 mg/dL      Sodium 140 mmol/L      Potassium 4.1 mmol/L      Chloride 106 mmol/L      CO2 24.0 mmol/L      Calcium 7.8 mg/dL      Total Protein 6.3 g/dL      Albumin 2.20 g/dL      ALT (SGPT) 39 U/L      AST (SGOT) 73 U/L      Alkaline Phosphatase 83 U/L      Total Bilirubin 0.3 mg/dL      eGFR Non African Amer 94 mL/min/1.73      Globulin 4.1 gm/dL      A/G Ratio 0.5 g/dL      BUN/Creatinine Ratio 27.7     Anion Gap 10.0 mmol/L     Narrative:       GFR Normal >60  Chronic Kidney Disease <60  Kidney Failure <15      TSH [[id number]]  (Abnormal) Collected:  07/10/20 0416    Specimen:  Blood Updated:  07/10/20 0554     TSH 5.810 uIU/mL     Respiratory Panel, PCR - Swab, Nasopharynx [[id number]]  (Normal) Collected:  07/09/20 1530    Specimen:  Swab from Nasopharynx Updated:  07/09/20 1658     ADENOVIRUS, PCR Not Detected     Coronavirus 229E Not Detected     Coronavirus HKU1 Not Detected      Coronavirus NL63 Not Detected     Coronavirus OC43 Not Detected     Human Metapneumovirus Not Detected     Human Rhinovirus/Enterovirus Not Detected     Influenza B PCR Not Detected     Parainfluenza Virus 1 Not Detected     Parainfluenza Virus 2 Not Detected     Parainfluenza Virus 3 Not Detected     Parainfluenza Virus 4 Not Detected     Bordetella pertussis pcr Not Detected     Influenza A H1 2009 PCR Not Detected     Chlamydophila pneumoniae PCR Not Detected     Mycoplasma pneumo by PCR Not Detected     Influenza A PCR Not Detected     Influenza A H3 Not Detected     Influenza A H1 Not Detected     RSV, PCR Not Detected     Bordetella parapertussis PCR Not Detected    Narrative:       The coronavirus on the RVP is NOT COVID-19 and is NOT indicative of infection with COVID-19.     COVID-19, [facility] IN-HOUSE,NP Swab (NO TRANSPORT MEDIA) 2 HR TAT - Swab, Nasopharynx [[id number]]  (Normal) Collected:  07/09/20 1529    Specimen:  Swab from Nasopharynx Updated:  07/09/20 1633     COVID19 Not Detected    Narrative:       Fact sheet for providers: https://www.fda.gov/media/903617/download     Fact sheet for patients: https://www.fda.gov/media/985008/download    Legionella Antigen, Urine - Urine, Urine, Clean Catch [[id number]]  (Normal) Collected:  07/09/20 1529    Specimen:  Urine, Clean Catch Updated:  07/09/20 1616     LEGIONELLA ANTIGEN, URINE Negative    S. Pneumo Ag Urine or CSF - Urine, Urine, Clean Catch [[id number]]  (Normal) Collected:  07/09/20 1529    Specimen:  Urine, Clean Catch Updated:  07/09/20 1615     Strep Pneumo Ag Negative           Cultures:  No results found for: BLOODCX, URINECX, WOUNDCX, MRSACX, RESPCX, STOOLCX    Radiology Data:    Imaging Results (Last 24 Hours)     Procedure Component Value Units Date/Time    XR Chest 1 View [[id number]] Collected:  07/10/20 0752     Updated:  07/10/20 0757    Narrative:       HISTORY: Pneumonia     CXR: Frontal view the chest obtained.     COMPARISON: None      FINDINGS: There are patchy opacities seen throughout the right lung with  a questionable small right pleural effusion. There are left perihilar  and basilar densities. Heart appears normal in size. There is a  prominence of the superior mediastinum/right paratracheal region and  borderline prominence of the hilar stations which could indicate  underlying lymphadenopathy. Apical pleural thickening. Calcified  granuloma of the right costophrenic sulcus and the left lower lung.  Degenerative change of the regional skeleton.       Impression:       1. Patchy right greater than left pulmonary opacities worrisome for  multifocal pneumonia. Probable small right pleural effusion.  Questionable mediastinal and hilar adenopathy.  This report was finalized on 07/10/2020 07:54 by Dr. Opal Horne MD.          Allergies   Allergen Reactions   • Codeine Unknown - High Severity     Pt stated it caused swelling in arms and legs; caused skin irritation; excessive sleepiness   • Penicillins Hives       Scheduled meds:     arformoterol 15 mcg Nebulization BID - RT   budesonide 0.5 mg Nebulization BID - RT   busPIRone 15 mg Oral BID   cefTRIAXone 1 g Intravenous Q24H   heparin (porcine) 5,000 Units Subcutaneous Q8H   ipratropium-albuterol 3 mL Nebulization 4x Daily - RT   levoFLOXacin 500 mg Intravenous Q24H   losartan 50 mg Oral Daily   methylPREDNISolone sodium succinate 40 mg Intravenous Q6H   nicotine 1 patch Transdermal Q24H   pantoprazole 40 mg Oral Q AM   QUEtiapine 100 mg Oral Nightly   sodium chloride 10 mL Intravenous Q12H       PRN meds:  •  acetaminophen  •  albuterol  •  HYDROcodone-acetaminophen  •  meclizine  •  ondansetron  •  sodium chloride    Assessment/Plan       Right lower lobe pneumonia    Tracheal mass    Acquired thrombocytopenia (CMS/HCC)    Chronic hepatitis C (CMS/HCC)    Hypertension    Cirrhosis of liver (CMS/HCC)      Plan:    Shortness of breath/pneumonia right lower lobe.  Continue Rocephin and  Levaquin.  Oxygen PRN.  Continue Brovana nebs.  Continue Pulmicort nebs.  Duo nebs.   Incentive spirometer.  Acapella.  Oxygen PRN.  Patient is currently on 2 L of oxygen.  Solu-Medrol.  Chest x-ray - patchy right greater than left pulmonary opacities worrisome for multifocal pneumonia, small right pleural effusion, questionable mediastinal and hilar adenopathy.    Tracheal mass/lymphadenopathy.  Reason for transfer is no pulmonary.  Pulmonary consult.  Plan for bronchoscope and endobronchial ultrasound by Dr. Sewell on Monday.  CT scan in [place]- concerning tracheal mass.    Cirrhosis/hep C.    Hypertension.  Continue Cozaar.    Anxiety/insomnia.  Continue BuSpar.  Continue Seroquel at night.  Antivert as needed.    Chronic pain.  Norco as needed.    Reflux.  Continue Protonix.  Zofran PRN.    Chronic smoker.  Nicotine patch.  Discussed patient quitting.    Anemia.  Hemoglobin stable.  No sign of acute bleed.      History of bedbugs.  This is been treated at [facility].    Lovenox prophylaxis.    Nutrition.  Cardiac diet.     Deconditioning.  PT and OT consult.    Blood culture-positive for strep pneumo at [facility].  Legionella antigen-negative. Strep pneumo-negative. Respiratory PCR-negative.    Discharge Plann[facility] to 5 days.    Bryan Braxton MD   07/10/20   15:49

## 2020-07-09 NOTE — PAYOR COMM NOTE
"Admitted 7/8/2020    [facility]  DON,  [phone]  OR   FAX   [phone]    Mabel Patrick (65 y.o. Male)     Date of Birth Social Security Number Address Home Phone MRN    1955  [address] [facility] [address] [phone] [id number]    Yarsani Marital Status          Anglican Single       Admission Date Admission Type Admitting Provider Attending Provider Department, Room/Bed    7/8/20 Urgent Bryan Braxton MD Truong, Khai C, MD [facility] 4C, 462/1    Discharge Date Discharge Disposition Discharge Destination                       Attending Provider:  Bryan Braxton MD    Allergies:  Codeine, Penicillins    Isolation:  None   Infection:  COVID (rule out) (07/09/20)   Code Status:  CPR    Ht:  165.1 cm (65\")   Wt:  76.3 kg (168 lb 3.2 oz)    Admission Cmt:  None   Principal Problem:  Right lower lobe pneumonia [J18.9]                 Active Insurance as of 7/8/2020     Primary Coverage     Payor Plan Insurance Group Employer/Plan Group    [facility] [employer] MEDICARE REPLACEMENT      Payor Plan Address Payor Plan Phone Number Payor Plan Fax Number Effective Dates    [address] [address] [phone]  7/8/2020 - None Entered    [facility] [id number]       Subscriber Name Subscriber Birth Date Member ID       Mabel Patrick 1955 [id number]           Secondary Coverage     Payor Plan Insurance Group Employer/Plan Group    [place]      Payor Plan Address Payor Plan Phone Number Payor Plan Fax Number Effective Dates    [address] [address]   1/1/2014 - None Entered    [place] [phone]       Subscriber Name Subscriber Birth Date Member ID       MABEL PATRICK 1955 [id number]                 Emergency Contacts      (Rel.) Home Phone Work Phone Mobile Phone    Yinka Shelton (Friend) [phone] -- [phone]    DarnellVolodymyr (Relative) [phone] -- [phone]    "                                   Signed               Added by:  [x]Charisma Liang, RN     Problem: Patient Care Overview  Goal: Plan of Care Review  Outcome: Ongoing (interventions implemented as appropriate)  Flowsheets (Taken 7/9/2020 0501)  Progress: no change  Outcome Summary: pt was transfered from [facility]; admitted with pneumonia and concerns with regard to CT scan; pt was found to have bedbugs on admit to [facility] and was successfully treated there; pt belongings are double bagged; pt does have new clothes for when he returns home;  consult placed r/t pt living conditions; pt reported to [facility] that his landlord is aware of the bug problem and will not do anything; pt also has belongings that were sent to security for safe keeping; pt c/o some discomfort in abdominal area and stated he gets frequent heart burn also; otherwise pt had no c/o pain; VSS; pt has been resting well; safety discussed and maintained; will continue to monitor                        History & Physical      Maged Casillas MD at 07/09/20 0011              [facility] Medicine Services  HISTORY AND PHYSICAL    Date of Admission: 7/8/2020  Primary Care Physician: Provider, No Known    Subjective     Chief Complaint: cough and shortness of breath    History of Present Illness  65 year old male with PMH of cirrhosis, HTN, hepatitis C that presents to OSH with cough and shortness of breath and is admitted due to pneumonia. He had worsening rales and dyspnea, CT cehst was obtained with showed Right lung pneumonia with CT findings concerning for para tracheal mass and lymphadenopathy. We are called to accept transfer and direct the patient for a pulmonology consult.     The patient is dyspneic at rest and has wet cough during the exam. Rales cover the whole right chest posteriorly. He states is feeling bad and has had trouble swallowing for a while.     Review of Systems    Otherwise complete ROS reviewed and negative except as mentioned in the HPI.    Past Medical History:   Past Medical History:   Diagnosis Date   • Abnormal weight loss    • Acquired thrombocytopenia (CMS/HCC)    • Adenomatous polyp of colon    • Anxiety    • Biliary calculus     denies N/V, abd pain   • Chronic hepatitis C (CMS/HCC)    • Cirrhosis of liver (CMS/HCC)    • Colon polyp    • Elevated levels of transaminase & lactic acid dehydrogenase    • Generalized abdominal pain     known gallstones   • GERD (gastroesophageal reflux disease)    • Hypertension    • IBS (irritable bowel syndrome)    • Impacted cerumen    • Kidney disease     US suggest renal dz, elevated alpha 2 macroglobulins   • Nausea and vomiting    • Patient noncompliance, general    • Rectal hemorrhage    • Villous adenoma of colon    • Wheezing      Past Surgical History:  Past Surgical History:   Procedure Laterality Date   • COLONOSCOPY  06/03/2013   • COLONOSCOPY N/A 8/22/2018    Procedure: COLONOSCOPY;  Surgeon: Charles Pham MD;  Location: [facility] ENDOSCOPY;  Service: Gastroenterology   • ENDOSCOPY N/A 7/9/2019    Procedure: ESOPHAGOGASTRODUODENOSCOPY;  Surgeon: Bi Ibarra MD;  Location: [facility] ENDOSCOPY;  Service: General   • ENDOSCOPY AND COLONOSCOPY     • ESOPHAGOSCOPY / EGD      with tube   • UPPER GASTROINTESTINAL ENDOSCOPY  06/03/2013   • UPPER GASTROINTESTINAL ENDOSCOPY  07/09/2019     Social History:  reports that he has been smoking cigarettes. He has been smoking about 1.00 pack per day. He has never used smokeless tobacco. He reports that he does not drink alcohol or use drugs.    Family History: family history includes Diabetes in an other family member; Heart disease in an other family member.       Allergies:  Allergies   Allergen Reactions   • Codeine Unknown - High Severity     Pt stated it caused swelling in arms and legs; caused skin irritation; excessive sleepiness   • Penicillins Hives      Medications:  Prior to Admission medications    Medication Sig Start Date End Date Taking? Authorizing Provider   albuterol (PROVENTIL HFA;VENTOLIN HFA) 108 (90 BASE) MCG/ACT inhaler Inhale 2 puffs 2 (two) times a day as needed for wheezing.   Yes Rafael Wang MD   busPIRone (BUSPAR) 15 MG tablet Take 15 mg by mouth 2 (two) times a day.   Yes Rafael Wang MD   fluticasone (FLONASE) 50 MCG/ACT nasal spray 2 sprays into each nostril Daily.   Yes Rafael Wang MD   loratadine (CLARITIN) 10 MG tablet TAKE 1 TABLET BY MOUTH EVERY DAY 5/11/17  Yes Rafael Wang MD   losartan (COZAAR) 50 MG tablet Take 50 mg by mouth daily.   Yes Rafael Wang MD   omeprazole (priLOSEC) 20 MG capsule Take 1 capsule by mouth Daily. 6/20/19  Yes Deny Heller PA-C   QUEtiapine (SEROquel) 100 MG tablet Take 100 mg by mouth every night.   Yes Rafael Wang MD   ursodiol (ACTIGALL) 300 MG capsule Take 1 capsule by mouth 3 (Three) Times a Day With Meals. 7/9/19 7/8/20 Yes Bi Ibarra MD   meclizine (ANTIVERT) 25 MG tablet Take 25 mg by mouth 2 (Two) Times a Day As Needed.    Rafael Wang MD   omeprazole (priLOSEC) 20 MG capsule TAKE 1 CAPSULE BY MOUTH DAILY. 7/30/19   Deny Heller PA-C     Objective     Vital Signs: /67 (BP Location: Left arm, Patient Position: Lying)   Pulse 79   Temp 97.6 °F (36.4 °C) (Oral)   Resp 16   Wt 76.3 kg (168 lb 3.2 oz)   SpO2 94%   BMI 28.87 kg/m²    Physical Exam   Constitutional: He is oriented to person, place, and time. He appears well-developed. No distress.   HENT:   Head: Normocephalic and atraumatic.   Right Ear: External ear normal.   Left Ear: External ear normal.   Eyes: Pupils are equal, round, and reactive to light. EOM are normal. Right eye exhibits no discharge. Left eye exhibits no discharge.   Neck: Normal range of motion. Neck supple. No JVD present. No thyromegaly present.   Cardiovascular: Normal  rate, regular rhythm and normal heart sounds.   No murmur heard.  Pulmonary/Chest: No stridor. No respiratory distress. He has wheezes. He has rales.   Abdominal: Soft. Bowel sounds are normal. He exhibits no distension and no mass. There is no tenderness. There is no guarding.   Musculoskeletal: Normal range of motion. He exhibits no edema.   Neurological: He is alert and oriented to person, place, and time. He displays normal reflexes. No cranial nerve deficit. Coordination normal.   Skin: Skin is warm. Capillary refill takes less than 2 seconds. No erythema.   Psychiatric: He has a normal mood and affect.     Results Reviewed:  Lab Results (last 24 hours)      No results found for the last 24 hours.         Imaging Results (Last 24 Hours)      No results found for the last 24 hours.         I have personally reviewed and interpreted the radiology studies and ECG obtained at time of admission.     Assessment / Plan     Assessment:   Active Hospital Problems    Diagnosis   • **Right lower lobe pneumonia   • Tracheal mass   • Acquired thrombocytopenia (CMS/HCC)   • Chronic hepatitis C (CMS/HCC)   • Hypertension   • Cirrhosis of liver (CMS/HCC)     Plan:   Admit to medical floor. Vitals routine.   IVF saline lock  Cardiac diet. Up add valerie    Antibiotics > Rocephin/Levaquin  Follow cultures.  Oxygen as needed.  Pulmonology consult in AM.    Monitor for ETOH withdrawal signs and treat as needed.        Code Status: Full     I discussed the patient's findings and my recommendations with the patient    Estimated length of stay over 2 midnights    Patient seen and examined by me on see below.    Maged Casillas MD   07/09/20   00:11            Electronically signed by Maged Casillas MD at 07/09/20 0718       Vital Signs (last 3 days)     Date/Time   Temp   Temp src   Pulse   Resp   BP   Patient Position   SpO2    07/09/20 1157   97.4 (36.3)   Oral   91   18   100/62   Lying   94    07/09/20 1124   --    --   97   20   --   --   95    07/09/20 1119   --   --   89   19   --   --   98    07/09/20 1108   --   --   87   17   --   --   97    07/09/20 0746   98.2 (36.8)   Oral   84   18   115/60   Lying   96    07/09/20 0400   97.8 (36.6)   Oral   92   18   97/66   Lying   96    07/08/20 2300   97.6 (36.4)   Oral   79   16   118/67   Lying   94              Oxygen Therapy (last 2 days)     Date/Time   SpO2   Device (Oxygen Therapy)   Flow (L/min)   Oxygen Concentration (%)   ETCO2 (mmHg)    07/09/20 1157   94   nasal cannula   2   --   --    07/09/20 1124   95   nasal cannula   2   --   --    07/09/20 1119   98   nasal cannula   2   --   --    07/09/20 1108   97   nasal cannula   2   --   --    07/09/20 0810   --   nasal cannula   2   --   --    07/09/20 0746   96   nasal cannula   2   --   --    07/09/20 0400   96   nasal cannula   --   --   --    07/08/20 2300   94   nasal cannula   --   --   --              Facility-Administered Medications as of 7/9/2020   Medication Dose Route Frequency Provider Last Rate Last Dose   • acetaminophen (TYLENOL) tablet 650 mg  650 mg Oral Q4H PRN Maged Casillas MD       • albuterol (PROVENTIL) nebulizer solution 0.083% 2.5 mg/3mL  2.5 mg Nebulization Q6H PRN Noah Arriaga MD       • arformoterol (BROVANA) nebulizer solution 15 mcg  15 mcg Nebulization BID - RT Noah Arriaga MD   15 mcg at 07/09/20 1108   • budesonide (PULMICORT) nebulizer solution 0.5 mg  0.5 mg Nebulization BID - RT Noah Arriaga MD   0.5 mg at 07/09/20 1108   • busPIRone (BUSPAR) tablet 15 mg  15 mg Oral BID Maged Casillas MD   15 mg at 07/09/20 1059   • cefTRIAXone (ROCEPHIN) 1 g/100 mL 0.9% NS (MBP)  1 g Intravenous Q24H Maged Casillas MD   Stopped at 07/09/20 0220   • heparin (porcine) 5000 UNIT/ML injection 5,000 Units  5,000 Units Subcutaneous Q8H SensoNah wolf MD       • ipratropium-albuterol (DUO-NEB) nebulizer solution 3 mL  3 mL Nebulization 4x Daily - RT  Noah Arriaga MD   3 mL at 07/09/20 1108   • levoFLOXacin (LEVAQUIN) 500 mg/100 mL D5W (premix) (LEVAQUIN) 500 mg  500 mg Intravenous Q24H Maged Casillas MD   Stopped at 07/09/20 0320   • losartan (COZAAR) tablet 50 mg  50 mg Oral Daily Maged Casillas MD       • meclizine (ANTIVERT) tablet 25 mg  25 mg Oral BID PRN Maged Casillas MD       • nicotine (NICODERM CQ) 21 MG/24HR patch 1 patch  1 patch Transdermal Q24H Bryan Braxton MD       • ondansetron (ZOFRAN) injection 4 mg  4 mg Intravenous Q6H PRN Maged Casillas MD       • pantoprazole (PROTONIX) EC tablet 40 mg  40 mg Oral Q AM Maged Casillas MD   40 mg at 07/09/20 0611   • QUEtiapine (SEROquel) tablet 100 mg  100 mg Oral Nightly Maged Casillas MD   100 mg at 07/09/20 0143   • sodium chloride 0.9 % flush 10 mL  10 mL Intravenous Q12H Maged Casillas MD   10 mL at 07/09/20 1101   • sodium chloride 0.9 % flush 10 mL  10 mL Intravenous PRN Maged Casillas MD           Orders (last 48 hrs)      Start     Ordered    07/12/20 0001  NPO Diet  Diet Effective Midnight      07/09/20 1350    07/10/20 0600  XR Chest 1 View  1 Time Imaging      07/09/20 0949    07/10/20 0600  CBC & Differential  Morning Draw,   Status:  Canceled      07/09/20 0949    07/10/20 0600  Basic Metabolic Panel  Morning Draw,   Status:  Canceled      07/09/20 0949    07/10/20 0600  Comprehensive Metabolic Panel  Morning Draw,   Status:  Canceled      07/09/20 1339    07/10/20 0600  Hemoglobin A1c  Morning Draw      07/09/20 1339    07/10/20 0600  TSH  Morning Draw      07/09/20 1339    07/10/20 0600  Lipid Panel  Morning Draw      07/09/20 1339    07/10/20 0600  CBC & Differential  Daily      07/09/20 1339    07/10/20 0600  Comprehensive Metabolic Panel  Daily      07/09/20 1339    07/10/20 0001  NPO Diet NPO Except: Sips with meds  Diet Effective Midnight,   Status:  Canceled      07/09/20 0848    07/09/20 1430   nicotine (NICODERM CQ) 21 MG/24HR patch 1 patch  Every 24 Hours Scheduled      07/09/20 1343    07/09/20 1400  heparin (porcine) 5000 UNIT/ML injection 5,000 Units  Every 8 Hours Scheduled      07/09/20 0945    07/09/20 1342  COVID-19, ABBOTT IN-HOUSE,NP Swab (NO TRANSPORT MEDIA) 2 HR TAT - Swab, Nasopharynx  Once      07/09/20 1341    07/09/20 1341  Respiratory Panel, PCR - Swab, Nasopharynx  Once      07/09/20 1340    07/09/20 1230  Oscillating Positive Expiratory Pressure (OPEP)  4 Times Daily - RT     Comments:  To assist in cough    07/09/20 0945    07/09/20 1115  ipratropium-albuterol (DUO-NEB) nebulizer solution 3 mL  4 Times Daily - RT      07/09/20 0945    07/09/20 1045  arformoterol (BROVANA) nebulizer solution 15 mcg  2 Times Daily - RT      07/09/20 0945    07/09/20 1045  budesonide (PULMICORT) nebulizer solution 0.5 mg  2 Times Daily - RT      07/09/20 0945    07/09/20 1000  Incentive Spirometry  Every 4 Hours While Awake      07/09/20 0945    07/09/20 0949  Mycoplasma Pneumoniae Antibody, IgM - Blood, Arm, Left  Once      07/09/20 0948    07/09/20 0949  S. Pneumo Ag Urine or CSF - Urine, Urine, Clean Catch  Once      07/09/20 0948    07/09/20 0949  Respiratory Culture - Sputum, Cough  Once      07/09/20 0948    07/09/20 0948  Legionella Antigen, Urine - Urine, Urine, Clean Catch  Once      07/09/20 0948    07/09/20 0943  albuterol (PROVENTIL) nebulizer solution 0.083% 2.5 mg/3mL  Every 6 Hours PRN      07/09/20 0945    07/09/20 0900  losartan (COZAAR) tablet 50 mg  Daily      07/09/20 0023    07/09/20 0848  Case Request  Once      07/09/20 0848    07/09/20 0702  Inpatient Pulmonology Consult  [place]      Specialty:  Pulmonary Disease  Provider:  Karthik Sewell MD    07/09/20 0023    07/09/20 0608  Manual Differential  Once,   Status:  Canceled      07/09/20 0607    07/09/20 0600  Basic Metabolic Panel  Daily,   Status:  Canceled      07/09/20 0023    07/09/20 0600  CBC & Differential  Daily,    Status:  Canceled      07/09/20 0023    07/09/20 0600  CBC Auto Differential  PROCEDURE ONCE      07/09/20 0023    07/09/20 0600  pantoprazole (PROTONIX) EC tablet 40 mg  Every Early Morning      07/09/20 0023    07/09/20 0400  Vital Signs  Every 4 Hours      07/09/20 0023    07/09/20 0130  levoFLOXacin (LEVAQUIN) 500 mg/100 mL D5W (premix) (LEVAQUIN) 500 mg  Every 24 Hours      07/09/20 0023    07/09/20 0115  sodium chloride 0.9 % flush 10 mL  Every 12 Hours Scheduled      07/09/20 0023    07/09/20 0115  cefTRIAXone (ROCEPHIN) 1 g/100 mL 0.9% NS (MBP)  Every 24 Hours      07/09/20 0023    07/09/20 0115  busPIRone (BUSPAR) tablet 15 mg  2 times daily      07/09/20 0023    07/09/20 0115  QUEtiapine (SEROquel) tablet 100 mg  Nightly      07/09/20 0023    07/09/20 0023  meclizine (ANTIVERT) tablet 25 mg  2 Times Daily PRN      07/09/20 0023    07/09/20 0022  Place Sequential Compression Device  Once      07/09/20 0023    07/09/20 0022  Maintain Sequential Compression Device  Continuous      07/09/20 0023    07/09/20 0022  Cardiac Monitoring  Continuous      07/09/20 0023    07/09/20 0022  Activity - Ad Meghan  Until Discontinued      07/09/20 0023    07/09/20 0022  Diet Regular; Cardiac  Diet Effective Now      07/09/20 0023    07/09/20 0021  ondansetron (ZOFRAN) injection 4 mg  Every 6 Hours PRN      07/09/20 0023    07/09/20 0021  acetaminophen (TYLENOL) tablet 650 mg  Every 4 Hours PRN      07/09/20 0023    07/09/20 0021  Code Status and Medical Interventions:  Continuous      07/09/20 0023    07/09/20 0021  Intake & Output  Every Shift      07/09/20 0023    07/09/20 0021  Weigh Patient  Once      07/09/20 0023    07/09/20 0021  Oxygen Therapy- Nasal Cannula; Titrate for SPO2: 90% - 95%  Continuous      07/09/20 0023    07/09/20 0021  Insert Peripheral IV  Once      07/09/20 0023    07/09/20 0021  Saline Lock & Maintain IV Access  Continuous      07/09/20 0023    07/09/20 0021  Inpatient Admission  Once      07/09/20  0023    07/09/20 0020  sodium chloride 0.9 % flush 10 mL  As Needed      07/09/20 0023    07/09/20 0001  Inpatient Case Management  Consult  Once     Provider:  (Not yet assigned)    07/09/20 0002    07/09/20 0001  Inpatient Nutrition Consult  Once     Comments:  Pt states decreased intake r/t poor appetite   Provider:  (Not yet assigned)    07/09/20 0002    07/08/20 2324  Inpatient Consult to Advance Care Planning  Once     Provider:  (Not yet assigned)    07/08/20 2323    Signed and Held  Follow Anesthesia Guidlines / Standing Orders  Once      Signed and Held    Signed and Held  Obtain Informed Consent  Once      Signed and Held    Signed and Held  Insert Peripheral IV  Once      Signed and Held    Signed and Held  Saline Lock & Maintain IV Access  Continuous      Signed and Held    Signed and Held  sodium chloride 0.9 % flush 3 mL  Every 12 Hours Scheduled      Signed and Held    Signed and Held  sodium chloride 0.9 % flush 10 mL  As Needed      Signed and Held                                                           Added by:  [x]Bryan Braxton MD         [facility] Medicine Services  INPATIENT PROGRESS NOTE     Length of Stay: 1  Date of Admission: 7/8/2020  Primary Care Physician: Provider, No Known     Subjective   Chief Complaint:    HPI        Review of Systems      All pertinent negatives and positives are as above. All other systems have been reviewed and are negative unless otherwise stated.      Objective    Temp:  [97.4 °F (36.3 °C)-98.2 °F (36.8 °C)] 97.4 °F (36.3 °C)  Heart Rate:  [79-97] 91  Resp:  [16-20] 18  BP: ()/(60-67) 100/62     Intake/Output Summary (Last 24 hours) at 7/9/2020 1323  Last data filed at 7/9/2020 0900      Gross per 24 hour   Intake 120 ml   Output 675 ml   Net -555 ml      Physical Exam    Results Review:           Lab Results (last 24 hours)      Procedure Component Value Units Date/Time     Mycoplasma Pneumoniae  Antibody, IgM - Blood, Arm, Left [[id number]] Collected:  07/09/20 1021     Specimen:  Blood from Arm, Left Updated:  07/09/20 1028     CBC & Differential [[id number]] Collected:  07/09/20 0508     Specimen:  Blood Updated:  07/09/20 0644     Narrative:        The following orders were created for panel order CBC & Differential.  Procedure                               Abnormality         Status                     ---------                               -----------         ------                     CBC Auto Differential[[id number]]        Abnormal            Final result                  Please view results for these tests on the individual orders.     CBC Auto Differential [[id number]]  (Abnormal) Collected:  07/09/20 0508     Specimen:  Blood Updated:  07/09/20 0644       WBC 10.98 10*3/mm3         RBC 2.76 10*6/mm3         Hemoglobin 8.0 g/dL         Hematocrit 25.6 %         MCV 92.8 fL         MCH 29.0 pg         MCHC 31.3 g/dL         RDW 15.4 %         RDW-SD 52.2 fl         MPV 10.3 fL         Platelets 204 10*3/mm3         Neutrophil % 34.5 %         Lymphocyte % 60.6 %         Monocyte % 2.6 %         Eosinophil % 0.5 %         Basophil % 0.2 %         Immature Grans % 1.6 %         Neutrophils, Absolute 3.78 10*3/mm3         Lymphocytes, Absolute 6.65 10*3/mm3         Monocytes, Absolute 0.29 10*3/mm3         Eosinophils, Absolute 0.06 10*3/mm3         Basophils, Absolute 0.02 10*3/mm3         Immature Grans, Absolute 0.18 10*3/mm3         nRBC 0.0 /100 WBC       Basic Metabolic Panel [[id number]]  (Abnormal) Collected:  07/09/20 0508     Specimen:  Blood Updated:  07/09/20 0634       Glucose 81 mg/dL         BUN 30 mg/dL         Creatinine 0.87 mg/dL         Sodium 140 mmol/L         Potassium 4.4 mmol/L         Chloride 108 mmol/L         CO2 25.0 mmol/L         Calcium 7.2 mg/dL         eGFR Non African Amer 89 mL/min/1.73         BUN/Creatinine Ratio 34.5       Anion Gap 7.0 mmol/L       Narrative:         GFR Normal >60  Chronic Kidney Disease <60  Kidney Failure <15                Cultures:  No results found for: BLOODCX, URINECX, WOUNDCX, MRSACX, RESPCX, STOOLCX     Radiology Data:        Imaging Results (Last 24 Hours)      ** No results found for the last 24 hours. **                   Allergies   Allergen Reactions   • Codeine Unknown - High Severity       Pt stated it caused swelling in arms and legs; caused skin irritation; excessive sleepiness   • Penicillins Hives         Scheduled meds:      arformoterol 15 mcg Nebulization BID - RT   budesonide 0.5 mg Nebulization BID - RT   busPIRone 15 mg Oral BID   cefTRIAXone 1 g Intravenous Q24H   heparin (porcine) 5,000 Units Subcutaneous Q8H   ipratropium-albuterol 3 mL Nebulization 4x Daily - RT   levoFLOXacin 500 mg Intravenous Q24H   losartan 50 mg Oral Daily   pantoprazole 40 mg Oral Q AM   QUEtiapine 100 mg Oral Nightly   sodium chloride 10 mL Intravenous Q12H         PRN meds:  •  acetaminophen  •  albuterol  •  meclizine  •  ondansetron  •  sodium chloride     Assessment/Plan        Right lower lobe pneumonia    Tracheal mass    Acquired thrombocytopenia (CMS/HCC)    Chronic hepatitis C (CMS/HCC)    Hypertension    Cirrhosis of liver (CMS/HCC)        Plan:     Shortness of breath/pneumonia right lower lobe.  Continue Rocephin and Levaquin.  Oxygen PRN.  Continue Brovana nebs.  Continue Pulmicort nebs.  Duo nebs.  Albuterol nebs PRN.  Incentive spirometer.  Acapella.  Oxygen PRN.  Patient is currently on 2 L of oxygen.  Chest x-ray is pending.     Tracheal mass/lymphadenopathy.  Reason for transfer is no pulmonary.  Pulmonary consult.  Plan for bronchoscope and endobronchial ultrasound by Dr. Sewell on Monday.  CT scan in [place]- concerning tracheal mass.     Cirrhosis/hep C.     Thrombocytopenia-probably secondary to cirrhosis of liver.     Hypertension.  Continue Cozaar.     Anxiety/insomnia.  Continue BuSpar.  Continue Seroquel at night.  Antivert as  needed.     Reflux.  Continue Protonix.  Zofran PRN.     History of alcohol abuse.  Claims he quit smoking long time ago.     Chronic smoker.  Nicotine patch.  Discussed patient quitting.     Anemia.  No sign of acute bleed.  Hemoglobin is 8.0.     Heparin prophylaxis.     Nutrition.  Cardiac diet     Blood culture-positive for strep pneumo at [facility].  Legionella antigen pending.  Respiratory culture pending.  Strep pneumo pending.  Respiratory PCR.        Discharge Planning:       Bryan Braxton MD   20   13:23                                                        Consult Notes (last 24 hours) (Notes from 20 1445 through 20 1445)      Noah Arriaga MD at 20 0923      Consult Orders    1. Inpatient Pulmonology Consult [[id number]] ordered by Maged Casillas MD at 20 0023                      PULMONARY & CRITICAL CARE CONSULT - [facility]    20, 09:23  Patient Care Team:  Provider, No Known as PCP - Deny Campos PA-C as Physician Assistant (Gastroenterology)  Name: Wong Patrick  : 1955  MRN: [id number]  Contact Serial Number [id number]    Chief complaint: Shortness of breath    HPI:  We have been consulted by Bryan Braxton MD to see this 65 y.o. male born on 1955.  Patient complains of dyspnea developed at home for a few days ago.  It was Mild at first which was getting worse.  Aggravating factors: walking and exertion.   Alleviating factors: medication(s) (feeling little better after getting bronchodilator treatment here) Associated symptoms: Cough with clear expectoration. Sputum is white and Without any hemoptysis.  Patient currently is on oxygen at 2 L/min per nasal cannula.. Respiratory history: no history of pneumonia or bronchitis recently.  He is an active smoker.  He used to drink alcohol but quit drinking a few years ago.  He claimed he takes only BuSpar at home and was not on any regular inhalers or  home oxygen.  Past Medical History:   has a past medical history of Abnormal weight loss, Acquired thrombocytopenia (CMS/HCC), Adenomatous polyp of colon, Anxiety, Biliary calculus, Chronic hepatitis C (CMS/HCC), Cirrhosis of liver (CMS/HCC), Colon polyp, Elevated levels of transaminase & lactic acid dehydrogenase, Generalized abdominal pain, GERD (gastroesophageal reflux disease), Hypertension, IBS (irritable bowel syndrome), Impacted cerumen, Kidney disease, Nausea and vomiting, Patient noncompliance, general, Rectal hemorrhage, Villous adenoma of colon, and Wheezing.   has a past surgical history that includes endoscopy and colonoscopy; Esophagoscopy / EGD; Colonoscopy (06/03/2013); Colonoscopy (N/A, 8/22/2018); Esophagogastroduodenoscopy (N/A, 7/9/2019); Upper gastrointestinal endoscopy (06/03/2013); and Upper gastrointestinal endoscopy (07/09/2019).  Allergies   Allergen Reactions   • Codeine Unknown - High Severity     Pt stated it caused swelling in arms and legs; caused skin irritation; excessive sleepiness   • Penicillins Hives     Medications:    busPIRone 15 mg Oral BID   cefTRIAXone 1 g Intravenous Q24H   levoFLOXacin 500 mg Intravenous Q24H   losartan 50 mg Oral Daily   pantoprazole 40 mg Oral Q AM   QUEtiapine 100 mg Oral Nightly   sodium chloride 10 mL Intravenous Q12H        Family History:  Family History   Problem Relation Age of Onset   • Heart disease Other    • Diabetes Other      Social History:   reports that he has been smoking cigarettes. He has been smoking about 1.00 pack per day. He has never used smokeless tobacco. He reports that he does not drink alcohol or use drugs.  Review of Systems:  Review of Systems   Physical Exam:  Temp:  [97.6 °F (36.4 °C)-98.2 °F (36.8 °C)] 98.2 °F (36.8 °C)  Heart Rate:  [79-92] 84  Resp:  [16-18] 18  BP: ()/(60-67) 115/60    Intake/Output Summary (Last 24 hours) at 7/9/2020 0923  Last data filed at 7/9/2020 0900  Gross per 24 hour   Intake 120 ml    Output 675 ml   Net -555 ml         07/08/20 2300   Weight: 76.3 kg (168 lb 3.2 oz)     SpO2 Percentage    07/08/20 2300 07/09/20 0400 07/09/20 0746   SpO2: 94% 96% 96%     Physical Exam   Constitutional: He is oriented to person, place, and time. He appears well-developed and well-nourished.   HENT:   Head: Normocephalic and atraumatic.   Eyes: Pupils are equal, round, and reactive to light. EOM are normal.   Neck: Normal range of motion. Neck supple.   Cardiovascular: Normal rate, regular rhythm and normal heart sounds.   Pulmonary/Chest: Effort normal. No respiratory distress. He has wheezes. He has rales.   Abdominal: Soft. Bowel sounds are normal. He exhibits no distension and no mass. There is no tenderness.   Musculoskeletal: Normal range of motion.   Neurological: He is alert and oriented to person, place, and time. No cranial nerve deficit or sensory deficit.   Skin: Skin is warm and dry.   Psychiatric: He has a normal mood and affect. His behavior is normal.   He has little slurring of speech appears little slow to respond.     Results from last 7 days   Lab Units 07/09/20  0508   WBC 10*3/mm3 10.98*   HEMOGLOBIN g/dL 8.0*   PLATELETS 10*3/mm3 204     Results from last 7 days   Lab Units 07/09/20  0508   SODIUM mmol/L 140   POTASSIUM mmol/L 4.4   CO2 mmol/L 25.0   BUN mg/dL 30*   CREATININE mg/dL 0.87   GLUCOSE mg/dL 81         No results found for: PROBNP  No results found for: BLOODCX, URINECX, WOUNDCX, MRSACX, RESPCX, STOOLCX  Recent radiology:   Imaging Results (Last 72 Hours)     ** No results found for the last 72 hours. **        My radiograph interpretation/independent review of imaging: Patient had CT scan of the chest done in outside facility and the results are available which showed a right lower lobe pneumonia and a paratracheal mass  Other test results (not lab or imaging):   Reviewed recent lab work  Independent review of ekg: Not done    Problem List as identified by Epic (may contain  historical, inactive problems)  Patient Active Problem List   Diagnosis   • History of colon polyps   • Nausea and vomiting   • Right lower lobe pneumonia   • Tracheal mass   • Acquired thrombocytopenia (CMS/HCC)   • Chronic hepatitis C (CMS/HCC)   • Hypertension   • Cirrhosis of liver (CMS/HCC)     Pulmonary Assessment:    New problem (to me), with additional workup planned:   Right lower lobe pneumonia  Community-acquired bacterial pneumonia  Chronic obstructive pulmonary disease  History of tobacco abuse  Acute on chronic hypoxic respiratory failure requiring supplemental oxygen  Tracheal mass a bronchoscopy has been planned for Monday    New problem (to me), no additional workup planned:   As noted above  He has been tested negative for COVID-19 twice as per records    Other problems either stable, failing to improve or worsening:  Other problems were stable      Recommend/plan:     1.  Right lower lobe pneumonia: Follow-up x-ray, continue ceftriaxone and levofloxacin.Check streptococcal pneumonia antigen, Legionella antigen in urine, mycoplasma screening.  Adjust antibiotics depending on culture results  2.  Tracheal mass: Plan for bronchoscopy with endobronchial ultrasound by Dr. Sewell on Monday.  3.  Bronchodilator treatment, Brovana, Pulmicort, DuoNeb.  Incentive spirometry and CoughAssist device to be used to improve pulmonary compliance and clearance.  4.  Plan for pulmonary function test when stable, long-term combination bronchodilators to be started at time of discharge.  5.  Outpatient pulmonary clinic follow-up.  Smoking cessation counseling.  Start nicotine patch for tobacco abuse.  6.  He had history of alcohol abuse and he claimed he quit drinking sometime ago.  Monitor for possible withdrawal symptoms.  7.  Continue DVT, ulcer prophylaxis.  Pain anxiety control, nutritional support, physical therapy and occasional therapy.  8.  CODE STATUS: Full.  Pulmonary team will continue following make  further recommendations.      Thank you for this consult.  We will follow along.  Electronically signed by Noah Arriaga MD, 07/09/20, 9:24 AM.    Noah Arriaga MD  Pulmonologist/Intensivist  7/9/2020 09:41        Electronically signed by Noah Arriaga MD at 07/09/20 0946

## 2020-07-09 NOTE — PAYOR COMM NOTE
"ADMITTED ON 7/8/2020    [facility]  DON, [phone]  OR  FAX  [phone]    Mabel Patrick (65 y.o. Male)     Date of Birth Social Security Number Address Home Phone MRN    1955  [address] [facility] [address] [phone] [id number]    Jew Marital Status          Jew Single       Admission Date Admission Type Admitting Provider Attending Provider Department, Room/Bed    7/8/20 Urgent Bryan Braxton MD Truong, Khai C, MD [facility] 4C, 462/1    Discharge Date Discharge Disposition Discharge Destination                       Attending Provider:  Bryan Braxton MD    Allergies:  Codeine, Penicillins    Isolation:  None   Infection:  COVID (rule out) (07/09/20)   Code Status:  CPR    Ht:  165.1 cm (65\")   Wt:  76.3 kg (168 lb 3.2 oz)    Admission Cmt:  None   Principal Problem:  Right lower lobe pneumonia [J18.9]                 Active Insurance as of 7/8/2020     Primary Coverage     Payor Plan Insurance Group Employer/Plan Group    [facility] MEDICARE REPLACEMENT WELL[employer] MEDICARE REPLACEMENT      Payor Plan Address Payor Plan Phone Number Payor Plan Fax Number Effective Dates    [address] [address] [phone]  7/8/2020 - None Entered    [facility] [id number]       Subscriber Name Subscriber Birth Date Member ID       Mabel Patrick 1955 [id number]           Secondary Coverage     Payor Plan Insurance Group Employer/Plan Group    [employer]      Payor Plan Address Payor Plan Phone Number Payor Plan Fax Number Effective Dates    [address] [address]   1/1/2014 - None Entered    [place] [address]       Subscriber Name Subscriber Birth Date Member ID       MABEL PATRICK 1955 [id number]                 Emergency Contacts      (Rel.) Home Phone Work Phone Mobile Phone    Yinka Shelton (Friend) [phone] -- [phone]    DarnellVolodymyr (Relative) [phone] -- [phone]    "     Charisma Liang, RN   Registered Nurse      Plan of Care   Signed   Date of Service:  07/09/20 0513   Creation Time:  07/09/20 0513            Signed             Added by:  [x]Charisma Liang RN         Problem: Patient Care Overview  Goal: Plan of Care Review  Outcome: Ongoing (interventions implemented as appropriate)  Flowsheets (Taken 7/9/2020 0501)  Progress: no change  Outcome Summary: pt was transfered from [facility]; admitted with pneumonia and concerns with regard to CT scan; pt was found to have bedbugs on admit to [facility] and was successfully treated there; pt belongings are double bagged; pt does have new clothes for when he returns home;  consult placed r/t pt living conditions; pt reported to [facility] that his landlord is aware of the bug problem and will not do anything; pt also has belongings that were sent to security for safe keeping; pt c/o some discomfort in abdominal area and stated he gets frequent heart burn also; otherwise pt had no c/o pain; VSS; pt has been resting well; safety discussed and maintained; will continue to monitor                 Tanika Sanderson   Registered Dietitian   Nutrition   Plan of Care   Signed   Date of Service:  07/09/20 1454   Creation Time:  07/09/20 1454            Signed               Added by:  [x]Tanika Sanderson         Problem: Patient Care Overview  Goal: Plan of Care Review  Outcome: Ongoing (interventions implemented as appropriate)  Note:   Pt with reported decreased appetite, 50% of lunch documented. Pt has no teeth however, RN reports he has been doing well with PO diet being Regular solids. No significant wt loss noted, 11# over the last 6 months. Ordered Boost daily with dinner, will continue to follow.                         History & Physical      Maged Casillas MD at 07/09/20 0011              [facility]  HISTORY AND PHYSICAL    Date of  Admission: 7/8/2020  Primary Care Physician: Provider, No Known    Subjective     Chief Complaint: cough and shortness of breath    History of Present Illness  65 year old male with PMH of cirrhosis, HTN, hepatitis C that presents to OSH with cough and shortness of breath and is admitted due to pneumonia. He had worsening rales and dyspnea, CT cehst was obtained with showed Right lung pneumonia with CT findings concerning for para tracheal mass and lymphadenopathy. We are called to accept transfer and direct the patient for a pulmonology consult.     The patient is dyspneic at rest and has wet cough during the exam. Rales cover the whole right chest posteriorly. He states is feeling bad and has had trouble swallowing for a while.     Review of Systems   Otherwise complete ROS reviewed and negative except as mentioned in the HPI.    Past Medical History:   Past Medical History:   Diagnosis Date   • Abnormal weight loss    • Acquired thrombocytopenia (CMS/HCC)    • Adenomatous polyp of colon    • Anxiety    • Biliary calculus     denies N/V, abd pain   • Chronic hepatitis C (CMS/HCC)    • Cirrhosis of liver (CMS/HCC)    • Colon polyp    • Elevated levels of transaminase & lactic acid dehydrogenase    • Generalized abdominal pain     known gallstones   • GERD (gastroesophageal reflux disease)    • Hypertension    • IBS (irritable bowel syndrome)    • Impacted cerumen    • Kidney disease     US suggest renal dz, elevated alpha 2 macroglobulins   • Nausea and vomiting    • Patient noncompliance, general    • Rectal hemorrhage    • Villous adenoma of colon    • Wheezing      Past Surgical History:  Past Surgical History:   Procedure Laterality Date   • COLONOSCOPY  06/03/2013   • COLONOSCOPY N/A 8/22/2018    Procedure: COLONOSCOPY;  Surgeon: Charles Pham MD;  Location: [facility] ENDOSCOPY;  Service: Gastroenterology   • ENDOSCOPY N/A 7/9/2019    Procedure: ESOPHAGOGASTRODUODENOSCOPY;  Surgeon: Bi Ibarra,  MD;  Location: [facility] ENDOSCOPY;  Service: General   • ENDOSCOPY AND COLONOSCOPY     • ESOPHAGOSCOPY / EGD      with tube   • UPPER GASTROINTESTINAL ENDOSCOPY  06/03/2013   • UPPER GASTROINTESTINAL ENDOSCOPY  07/09/2019     Social History:  reports that he has been smoking cigarettes. He has been smoking about 1.00 pack per day. He has never used smokeless tobacco. He reports that he does not drink alcohol or use drugs.    Family History: family history includes Diabetes in an other family member; Heart disease in an other family member.       Allergies:  Allergies   Allergen Reactions   • Codeine Unknown - High Severity     Pt stated it caused swelling in arms and legs; caused skin irritation; excessive sleepiness   • Penicillins Hives     Medications:  Prior to Admission medications    Medication Sig Start Date End Date Taking? Authorizing Provider   albuterol (PROVENTIL HFA;VENTOLIN HFA) 108 (90 BASE) MCG/ACT inhaler Inhale 2 puffs 2 (two) times a day as needed for wheezing.   Yes Rafael Wang MD   busPIRone (BUSPAR) 15 MG tablet Take 15 mg by mouth 2 (two) times a day.   Yes Rafael Wang MD   fluticasone (FLONASE) 50 MCG/ACT nasal spray 2 sprays into each nostril Daily.   Yes Rafael Wang MD   loratadine (CLARITIN) 10 MG tablet TAKE 1 TABLET BY MOUTH EVERY DAY 5/11/17  Yes Rafael Wang MD   losartan (COZAAR) 50 MG tablet Take 50 mg by mouth daily.   Yes Rafael Wang MD   omeprazole (priLOSEC) 20 MG capsule Take 1 capsule by mouth Daily. 6/20/19  Yes Deny Heller PA-C   QUEtiapine (SEROquel) 100 MG tablet Take 100 mg by mouth every night.   Yes Rafael Wang MD   ursodiol (ACTIGALL) 300 MG capsule Take 1 capsule by mouth 3 (Three) Times a Day With Meals. 7/9/19 7/8/20 Yes Bi Ibarra MD   meclizine (ANTIVERT) 25 MG tablet Take 25 mg by mouth 2 (Two) Times a Day As Needed.    Rafael Wang MD   omeprazole (priLOSEC) 20 MG capsule TAKE 1  CAPSULE BY MOUTH DAILY. 7/30/19   Deny Heller PA-C     Objective     Vital Signs: /67 (BP Location: Left arm, Patient Position: Lying)   Pulse 79   Temp 97.6 °F (36.4 °C) (Oral)   Resp 16   Wt 76.3 kg (168 lb 3.2 oz)   SpO2 94%   BMI 28.87 kg/m²    Physical Exam   Constitutional: He is oriented to person, place, and time. He appears well-developed. No distress.   HENT:   Head: Normocephalic and atraumatic.   Right Ear: External ear normal.   Left Ear: External ear normal.   Eyes: Pupils are equal, round, and reactive to light. EOM are normal. Right eye exhibits no discharge. Left eye exhibits no discharge.   Neck: Normal range of motion. Neck supple. No JVD present. No thyromegaly present.   Cardiovascular: Normal rate, regular rhythm and normal heart sounds.   No murmur heard.  Pulmonary/Chest: No stridor. No respiratory distress. He has wheezes. He has rales.   Abdominal: Soft. Bowel sounds are normal. He exhibits no distension and no mass. There is no tenderness. There is no guarding.   Musculoskeletal: Normal range of motion. He exhibits no edema.   Neurological: He is alert and oriented to person, place, and time. He displays normal reflexes. No cranial nerve deficit. Coordination normal.   Skin: Skin is warm. Capillary refill takes less than 2 seconds. No erythema.   Psychiatric: He has a normal mood and affect.     Results Reviewed:  Lab Results (last 24 hours)     ** No results found for the last 24 hours. **        Imaging Results (Last 24 Hours)     ** No results found for the last 24 hours. **        I have personally reviewed and interpreted the radiology studies and ECG obtained at time of admission.     Assessment / Plan     Assessment:   Active Hospital Problems    Diagnosis   • **Right lower lobe pneumonia   • Tracheal mass   • Acquired thrombocytopenia (CMS/HCC)   • Chronic hepatitis C (CMS/HCC)   • Hypertension   • Cirrhosis of liver (CMS/HCC)     Plan:   Admit to medical floor.  Vitals routine.   IVF saline lock  Cardiac diet. Up add valerie    Antibiotics > Rocephin/Levaquin  Follow cultures.  Oxygen as needed.  Pulmonology consult in AM.    Monitor for ETOH withdrawal signs and treat as needed.        Code Status: Full     I discussed the patient's findings and my recommendations with the patient    Estimated length of stay over 2 midnights    Patient seen and examined by me on see below.    Maged Casillas MD   07/09/20   00:11            Electronically signed by Maged Casillas MD at 07/09/20 0718       Vital Signs (last 2 days)     Date/Time   Temp   Temp src   Pulse   Resp   BP   Patient Position   SpO2    07/09/20 1157   97.4 (36.3)   Oral   91   18   100/62   Lying   94    07/09/20 1124   --   --   97   20   --   --   95    07/09/20 1119   --   --   89   19   --   --   98    07/09/20 1108   --   --   87   17   --   --   97    07/09/20 0746   98.2 (36.8)   Oral   84   18   115/60   Lying   96    07/09/20 0400   97.8 (36.6)   Oral   92   18   97/66   Lying   96    07/08/20 2300   97.6 (36.4)   Oral   79   16   118/67   Lying   94            Oxygen Therapy (last 2 days)     Date/Time   SpO2   Device (Oxygen Therapy)   Flow (L/min)   Oxygen Concentration (%)   ETCO2 (mmHg)    07/09/20 1157   94   nasal cannula   2   --   --    07/09/20 1124   95   nasal cannula   2   --   --    07/09/20 1119   98   nasal cannula   2   --   --    07/09/20 1108   97   nasal cannula   2   --   --    07/09/20 0810   --   nasal cannula   2   --   --    07/09/20 0746   96   nasal cannula   2   --   --    07/09/20 0400   96   nasal cannula   --   --   --    07/08/20 2300   94   nasal cannula   --   --   --              Facility-Administered Medications as of 7/9/2020   Medication Dose Route Frequency Provider Last Rate Last Dose   • acetaminophen (TYLENOL) tablet 650 mg  650 mg Oral Q4H PRN Maged Casillas MD       • albuterol (PROVENTIL) nebulizer solution 0.083% 2.5 mg/3mL  2.5 mg  Nebulization Q6H PRN Noah Arriaga MD       • arformoterol (BROVANA) nebulizer solution 15 mcg  15 mcg Nebulization BID - RT Noah Arriaga MD   15 mcg at 07/09/20 1108   • budesonide (PULMICORT) nebulizer solution 0.5 mg  0.5 mg Nebulization BID - RT Noah Arriaga MD   0.5 mg at 07/09/20 1108   • busPIRone (BUSPAR) tablet 15 mg  15 mg Oral BID Maged Casillas MD   15 mg at 07/09/20 1059   • cefTRIAXone (ROCEPHIN) 1 g/100 mL 0.9% NS (MBP)  1 g Intravenous Q24H Maged Casillas MD   Stopped at 07/09/20 0220   • heparin (porcine) 5000 UNIT/ML injection 5,000 Units  5,000 Units Subcutaneous Q8H Noah Arriaga MD       • ipratropium-albuterol (DUO-NEB) nebulizer solution 3 mL  3 mL Nebulization 4x Daily - RT Noah Arriaga MD   3 mL at 07/09/20 1108   • levoFLOXacin (LEVAQUIN) 500 mg/100 mL D5W (premix) (LEVAQUIN) 500 mg  500 mg Intravenous Q24H Maged Casillas MD   Stopped at 07/09/20 0320   • losartan (COZAAR) tablet 50 mg  50 mg Oral Daily Maged Casillas MD       • meclizine (ANTIVERT) tablet 25 mg  25 mg Oral BID PRN Maged Casillas MD       • nicotine (NICODERM CQ) 21 MG/24HR patch 1 patch  1 patch Transdermal Q24H Bryan Braxton MD       • ondansetron (ZOFRAN) injection 4 mg  4 mg Intravenous Q6H PRN Maged Casillas MD       • pantoprazole (PROTONIX) EC tablet 40 mg  40 mg Oral Q AM Maged Casillas MD   40 mg at 07/09/20 0611   • QUEtiapine (SEROquel) tablet 100 mg  100 mg Oral Nightly Maged Casillas MD   100 mg at 07/09/20 0143   • sodium chloride 0.9 % flush 10 mL  10 mL Intravenous Q12H Maged Casillas MD   10 mL at 07/09/20 1101   • sodium chloride 0.9 % flush 10 mL  10 mL Intravenous PRN Maged Casillas MD           Orders (last 48 hrs)      Start     Ordered    07/12/20 0001  NPO Diet  Diet Effective Midnight      07/09/20 1350    07/10/20 0600  XR Chest 1 View  1 Time Imaging      07/09/20 0949     07/10/20 0600  CBC & Differential  Morning Draw,   Status:  Canceled      07/09/20 0949    07/10/20 0600  Basic Metabolic Panel  Morning Draw,   Status:  Canceled      07/09/20 0949    07/10/20 0600  Comprehensive Metabolic Panel  Morning Draw,   Status:  Canceled      07/09/20 1339    07/10/20 0600  Hemoglobin A1c  Morning Draw      07/09/20 1339    07/10/20 0600  TSH  Morning Draw      07/09/20 1339    07/10/20 0600  Lipid Panel  Morning Draw      07/09/20 1339    07/10/20 0600  CBC & Differential  Daily      07/09/20 1339    07/10/20 0600  Comprehensive Metabolic Panel  Daily      07/09/20 1339    07/10/20 0001  NPO Diet NPO Except: Sips with meds  Diet Effective Midnight,   Status:  Canceled      07/09/20 0848    07/09/20 1430  nicotine (NICODERM CQ) 21 MG/24HR patch 1 patch  Every 24 Hours Scheduled      07/09/20 1343    07/09/20 1400  heparin (porcine) 5000 UNIT/ML injection 5,000 Units  Every 8 Hours Scheduled      07/09/20 0945    07/09/20 1342  COVID-19, ABBOTT IN-HOUSE,NP Swab (NO TRANSPORT MEDIA) 2 HR TAT - Swab, Nasopharynx  Once      07/09/20 1341    07/09/20 1341  Respiratory Panel, PCR - Swab, Nasopharynx  Once      07/09/20 1340    07/09/20 1230  Oscillating Positive Expiratory Pressure (OPEP)  4 Times Daily - RT     Comments:  To assist in cough    07/09/20 0945    07/09/20 1115  ipratropium-albuterol (DUO-NEB) nebulizer solution 3 mL  4 Times Daily - RT      07/09/20 0945    07/09/20 1045  arformoterol (BROVANA) nebulizer solution 15 mcg  2 Times Daily - RT      07/09/20 0945    07/09/20 1045  budesonide (PULMICORT) nebulizer solution 0.5 mg  2 Times Daily - RT      07/09/20 0945    07/09/20 1000  Incentive Spirometry  Every 4 Hours While Awake      07/09/20 0945    07/09/20 0949  Mycoplasma Pneumoniae Antibody, IgM - Blood, Arm, Left  Once      07/09/20 0948    07/09/20 0949  S. Pneumo Ag Urine or CSF - Urine, Urine, Clean Catch  Once      07/09/20 0948    07/09/20 0949  Respiratory Culture -  Sputum, Cough  Once      07/09/20 0948    07/09/20 0948  Legionella Antigen, Urine - Urine, Urine, Clean Catch  Once      07/09/20 0948    07/09/20 0943  albuterol (PROVENTIL) nebulizer solution 0.083% 2.5 mg/3mL  Every 6 Hours PRN      07/09/20 0945    07/09/20 0900  losartan (COZAAR) tablet 50 mg  Daily      07/09/20 0023    07/09/20 0848  Case Request  Once      07/09/20 0848    07/09/20 0702  Inpatient Pulmonology Consult  IN      Specialty:  Pulmonary Disease  Provider:  Karthik Sewell MD    07/09/20 0023    07/09/20 0608  Manual Differential  Once,   Status:  Canceled      07/09/20 0607    07/09/20 0600  Basic Metabolic Panel  Daily,   Status:  Canceled      07/09/20 0023    07/09/20 0600  CBC & Differential  Daily,   Status:  Canceled      07/09/20 0023    07/09/20 0600  CBC Auto Differential  PROCEDURE ONCE      07/09/20 0023    07/09/20 0600  pantoprazole (PROTONIX) EC tablet 40 mg  Every Early Morning      07/09/20 0023    07/09/20 0400  Vital Signs  Every 4 Hours      07/09/20 0023    07/09/20 0130  levoFLOXacin (LEVAQUIN) 500 mg/100 mL D5W (premix) (LEVAQUIN) 500 mg  Every 24 Hours      07/09/20 0023    07/09/20 0115  sodium chloride 0.9 % flush 10 mL  Every 12 Hours Scheduled      07/09/20 0023    07/09/20 0115  cefTRIAXone (ROCEPHIN) 1 g/100 mL 0.9% NS (MBP)  Every 24 Hours      07/09/20 0023    07/09/20 0115  busPIRone (BUSPAR) tablet 15 mg  2 times daily      07/09/20 0023    07/09/20 0115  QUEtiapine (SEROquel) tablet 100 mg  Nightly      07/09/20 0023    07/09/20 0023  meclizine (ANTIVERT) tablet 25 mg  2 Times Daily PRN      07/09/20 0023    07/09/20 0022  Place Sequential Compression Device  Once      07/09/20 0023    07/09/20 0022  Maintain Sequential Compression Device  Continuous      07/09/20 0023    07/09/20 0022  Cardiac Monitoring  Continuous      07/09/20 0023    07/09/20 0022  Activity - Ad Meghan  Until Discontinued      07/09/20 0023    07/09/20 0022  Diet Regular; Cardiac  Diet  Effective Now      07/09/20 0023    07/09/20 0021  ondansetron (ZOFRAN) injection 4 mg  Every 6 Hours PRN      07/09/20 0023    07/09/20 0021  acetaminophen (TYLENOL) tablet 650 mg  Every 4 Hours PRN      07/09/20 0023    07/09/20 0021  Code Status and Medical Interventions:  Continuous      07/09/20 0023    07/09/20 0021  Intake & Output  Every Shift      07/09/20 0023    07/09/20 0021  Weigh Patient  Once      07/09/20 0023    07/09/20 0021  Oxygen Therapy- Nasal Cannula; Titrate for SPO2: 90% - 95%  Continuous      07/09/20 0023    07/09/20 0021  Insert Peripheral IV  Once      07/09/20 0023    07/09/20 0021  Saline Lock & Maintain IV Access  Continuous      07/09/20 0023    07/09/20 0021  Inpatient Admission  Once      07/09/20 0023    07/09/20 0020  sodium chloride 0.9 % flush 10 mL  As Needed      07/09/20 0023    07/09/20 0001  Inpatient Case Management  Consult  Once     Provider:  (Not yet assigned)    07/09/20 0002    07/09/20 0001  Inpatient Nutrition Consult  Once     Comments:  Pt states decreased intake r/t poor appetite   Provider:  (Not yet assigned)    07/09/20 0002    07/08/20 2324  Inpatient Consult to Advance Care Planning  Once     Provider:  (Not yet assigned)    07/08/20 2323    Signed and Held  Follow Anesthesia Guidlines / Standing Orders  Once      Signed and Held    Signed and Held  Obtain Informed Consent  Once      Signed and Held    Signed and Held  Insert Peripheral IV  Once      Signed and Held    Signed and Held  Saline Lock & Maintain IV Access  Continuous      Signed and Held    Signed and Held  sodium chloride 0.9 % flush 3 mL  Every 12 Hours Scheduled      Signed and Held    Signed and Held  sodium chloride 0.9 % flush 10 mL  As Needed      Signed and Held                 Consult Notes (last 48 hours) (Notes from 07/07/20 1455 through 07/09/20 1455)      Noah Arriaga MD at 07/09/20 0923      Consult Orders    1. Inpatient Pulmonology Consult [[id number]]  ordered by Maged Casillas MD at 20 0023                      PULMONARY & CRITICAL CARE CONSULT - [facility]    20, 09:23  Patient Care Team:  Provider, No Known as PCP - Deny Campos PA-C as Physician Assistant (Gastroenterology)  Name: Wong Patrick  : 1955  MRN: [id number]  Contact Serial Number [id number]    Chief complaint: Shortness of breath    HPI:  We have been consulted by Bryan Braxton MD to see this 65 y.o. male born on 1955.  Patient complains of dyspnea developed at home for a few days ago.  It was Mild at first which was getting worse.  Aggravating factors: walking and exertion.   Alleviating factors: medication(s) (feeling little better after getting bronchodilator treatment here) Associated symptoms: Cough with clear expectoration. Sputum is white and Without any hemoptysis.  Patient currently is on oxygen at 2 L/min per nasal cannula.. Respiratory history: no history of pneumonia or bronchitis recently.  He is an active smoker.  He used to drink alcohol but quit drinking a few years ago.  He claimed he takes only BuSpar at home and was not on any regular inhalers or home oxygen.  Past Medical History:   has a past medical history of Abnormal weight loss, Acquired thrombocytopenia (CMS/HCC), Adenomatous polyp of colon, Anxiety, Biliary calculus, Chronic hepatitis C (CMS/HCC), Cirrhosis of liver (CMS/HCC), Colon polyp, Elevated levels of transaminase & lactic acid dehydrogenase, Generalized abdominal pain, GERD (gastroesophageal reflux disease), Hypertension, IBS (irritable bowel syndrome), Impacted cerumen, Kidney disease, Nausea and vomiting, Patient noncompliance, general, Rectal hemorrhage, Villous adenoma of colon, and Wheezing.   has a past surgical history that includes endoscopy and colonoscopy; Esophagoscopy / EGD; Colonoscopy (2013); Colonoscopy (N/A, 2018); Esophagogastroduodenoscopy (N/A, 2019); Upper  gastrointestinal endoscopy (06/03/2013); and Upper gastrointestinal endoscopy (07/09/2019).  Allergies   Allergen Reactions   • Codeine Unknown - High Severity     Pt stated it caused swelling in arms and legs; caused skin irritation; excessive sleepiness   • Penicillins Hives     Medications:    busPIRone 15 mg Oral BID   cefTRIAXone 1 g Intravenous Q24H   levoFLOXacin 500 mg Intravenous Q24H   losartan 50 mg Oral Daily   pantoprazole 40 mg Oral Q AM   QUEtiapine 100 mg Oral Nightly   sodium chloride 10 mL Intravenous Q12H        Family History:  Family History   Problem Relation Age of Onset   • Heart disease Other    • Diabetes Other      Social History:   reports that he has been smoking cigarettes. He has been smoking about 1.00 pack per day. He has never used smokeless tobacco. He reports that he does not drink alcohol or use drugs.  Review of Systems:  Review of Systems   Physical Exam:  Temp:  [97.6 °F (36.4 °C)-98.2 °F (36.8 °C)] 98.2 °F (36.8 °C)  Heart Rate:  [79-92] 84  Resp:  [16-18] 18  BP: ()/(60-67) 115/60    Intake/Output Summary (Last 24 hours) at 7/9/2020 0923  Last data filed at 7/9/2020 0900  Gross per 24 hour   Intake 120 ml   Output 675 ml   Net -555 ml         07/08/20  2300   Weight: 76.3 kg (168 lb 3.2 oz)     SpO2 Percentage    07/08/20 2300 07/09/20 0400 07/09/20 0746   SpO2: 94% 96% 96%     Physical Exam   Constitutional: He is oriented to person, place, and time. He appears well-developed and well-nourished.   HENT:   Head: Normocephalic and atraumatic.   Eyes: Pupils are equal, round, and reactive to light. EOM are normal.   Neck: Normal range of motion. Neck supple.   Cardiovascular: Normal rate, regular rhythm and normal heart sounds.   Pulmonary/Chest: Effort normal. No respiratory distress. He has wheezes. He has rales.   Abdominal: Soft. Bowel sounds are normal. He exhibits no distension and no mass. There is no tenderness.   Musculoskeletal: Normal range of motion.    Neurological: He is alert and oriented to person, place, and time. No cranial nerve deficit or sensory deficit.   Skin: Skin is warm and dry.   Psychiatric: He has a normal mood and affect. His behavior is normal.   He has little slurring of speech appears little slow to respond.     Results from last 7 days   Lab Units 07/09/20  0508   WBC 10*3/mm3 10.98*   HEMOGLOBIN g/dL 8.0*   PLATELETS 10*3/mm3 204     Results from last 7 days   Lab Units 07/09/20  0508   SODIUM mmol/L 140   POTASSIUM mmol/L 4.4   CO2 mmol/L 25.0   BUN mg/dL 30*   CREATININE mg/dL 0.87   GLUCOSE mg/dL 81         No results found for: PROBNP  No results found for: BLOODCX, URINECX, WOUNDCX, MRSACX, RESPCX, STOOLCX  Recent radiology:   Imaging Results (Last 72 Hours)     ** No results found for the last 72 hours. **        My radiograph interpretation/independent review of imaging: Patient had CT scan of the chest done in outside facility and the results are available which showed a right lower lobe pneumonia and a paratracheal mass  Other test results (not lab or imaging):   Reviewed recent lab work  Independent review of ekg: Not done    Problem List as identified by Epic (may contain historical, inactive problems)  Patient Active Problem List   Diagnosis   • History of colon polyps   • Nausea and vomiting   • Right lower lobe pneumonia   • Tracheal mass   • Acquired thrombocytopenia (CMS/HCC)   • Chronic hepatitis C (CMS/HCC)   • Hypertension   • Cirrhosis of liver (CMS/HCC)     Pulmonary Assessment:    New problem (to me), with additional workup planned:   Right lower lobe pneumonia  Community-acquired bacterial pneumonia  Chronic obstructive pulmonary disease  History of tobacco abuse  Acute on chronic hypoxic respiratory failure requiring supplemental oxygen  Tracheal mass a bronchoscopy has been planned for Monday    New problem (to me), no additional workup planned:   As noted above  He has been tested negative for COVID-19 twice as per  records    Other problems either stable, failing to improve or worsening:  Other problems were stable      Recommend/plan:     1.  Right lower lobe pneumonia: Follow-up x-ray, continue ceftriaxone and levofloxacin.Check streptococcal pneumonia antigen, Legionella antigen in urine, mycoplasma screening.  Adjust antibiotics depending on culture results  2.  Tracheal mass: Plan for bronchoscopy with endobronchial ultrasound by Dr. Sewell on Monday.  3.  Bronchodilator treatment, Brovana, Pulmicort, DuoNeb.  Incentive spirometry and CoughAssist device to be used to improve pulmonary compliance and clearance.  4.  Plan for pulmonary function test when stable, long-term combination bronchodilators to be started at time of discharge.  5.  Outpatient pulmonary clinic follow-up.  Smoking cessation counseling.  Start nicotine patch for tobacco abuse.  6.  He had history of alcohol abuse and he claimed he quit drinking sometime ago.  Monitor for possible withdrawal symptoms.  7.  Continue DVT, ulcer prophylaxis.  Pain anxiety control, nutritional support, physical therapy and occasional therapy.  8.  CODE STATUS: Full.  Pulmonary team will continue following make further recommendations.      Thank you for this consult.  We will follow along.  Electronically signed by Noah Arriaga MD, 07/09/20, 9:24 AM.    Noah Arriaga MD  Pulmonologist/Intensivist  7/9/2020 09:41        Electronically signed by Noah Arriaga MD at 07/09/20 0946                                                    Added by:  [x]Bryan Braxton MD           [facility] Medicine Services  INPATIENT PROGRESS NOTE     Length of Stay: 1  Date of Admission: 7/8/2020  Primary Care Physician: Provider, No Known     Subjective   Chief Complaint:     HPI        Review of Systems        All pertinent negatives and positives are as above. All other systems have been reviewed and are negative unless otherwise stated.      Objective     Temp:  [97.4 °F (36.3 °C)-98.2 °F (36.8 °C)] 97.4 °F (36.3 °C)  Heart Rate:  [79-97] 91  Resp:  [16-20] 18  BP: ()/(60-67) 100/62     Intake/Output Summary (Last 24 hours) at 7/9/2020 1323  Last data filed at 7/9/2020 0900      Gross per 24 hour   Intake 120 ml   Output 675 ml   Net -555 ml      Physical Exam    Results Review:           Lab Results (last 24 hours)      Procedure Component Value Units Date/Time     Mycoplasma Pneumoniae Antibody, IgM - Blood, Arm, Left [[id number]] Collected:  07/09/20 1021     Specimen:  Blood from Arm, Left Updated:  07/09/20 1028     CBC & Differential [[id number]] Collected:  07/09/20 0508     Specimen:  Blood Updated:  07/09/20 0644     Narrative:        The following orders were created for panel order CBC & Differential.  Procedure                               Abnormality         Status                     ---------                               -----------         ------                     CBC Auto Differential[[id number]]        Abnormal            Final result                  Please view results for these tests on the individual orders.     CBC Auto Differential [[id number]]  (Abnormal) Collected:  07/09/20 0508     Specimen:  Blood Updated:  07/09/20 0644       WBC 10.98 10*3/mm3         RBC 2.76 10*6/mm3         Hemoglobin 8.0 g/dL         Hematocrit 25.6 %         MCV 92.8 fL         MCH 29.0 pg         MCHC 31.3 g/dL         RDW 15.4 %         RDW-SD 52.2 fl         MPV 10.3 fL         Platelets 204 10*3/mm3         Neutrophil % 34.5 %         Lymphocyte % 60.6 %         Monocyte % 2.6 %         Eosinophil % 0.5 %         Basophil % 0.2 %         Immature Grans % 1.6 %         Neutrophils, Absolute 3.78 10*3/mm3         Lymphocytes, Absolute 6.65 10*3/mm3         Monocytes, Absolute 0.29 10*3/mm3         Eosinophils, Absolute 0.06 10*3/mm3         Basophils, Absolute 0.02 10*3/mm3         Immature Grans, Absolute 0.18 10*3/mm3         nRBC 0.0 /100 WBC        Basic Metabolic Panel [[id number]]  (Abnormal) Collected:  07/09/20 0508     Specimen:  Blood Updated:  07/09/20 0634       Glucose 81 mg/dL         BUN 30 mg/dL         Creatinine 0.87 mg/dL         Sodium 140 mmol/L         Potassium 4.4 mmol/L         Chloride 108 mmol/L         CO2 25.0 mmol/L         Calcium 7.2 mg/dL         eGFR Non African Amer 89 mL/min/1.73         BUN/Creatinine Ratio 34.5       Anion Gap 7.0 mmol/L       Narrative:        GFR Normal >60  Chronic Kidney Disease <60  Kidney Failure <15                Cultures:  No results found for: BLOODCX, URINECX, WOUNDCX, MRSACX, RESPCX, STOOLCX     Radiology Data:        Imaging Results (Last 24 Hours)      ** No results found for the last 24 hours. **                   Allergies   Allergen Reactions   • Codeine Unknown - High Severity       Pt stated it caused swelling in arms and legs; caused skin irritation; excessive sleepiness   • Penicillins Hives         Scheduled meds:      arformoterol 15 mcg Nebulization BID - RT   budesonide 0.5 mg Nebulization BID - RT   busPIRone 15 mg Oral BID   cefTRIAXone 1 g Intravenous Q24H   heparin (porcine) 5,000 Units Subcutaneous Q8H   ipratropium-albuterol 3 mL Nebulization 4x Daily - RT   levoFLOXacin 500 mg Intravenous Q24H   losartan 50 mg Oral Daily   pantoprazole 40 mg Oral Q AM   QUEtiapine 100 mg Oral Nightly   sodium chloride 10 mL Intravenous Q12H         PRN meds:  •  acetaminophen  •  albuterol  •  meclizine  •  ondansetron  •  sodium chloride     Assessment/Plan        Right lower lobe pneumonia    Tracheal mass    Acquired thrombocytopenia (CMS/HCC)    Chronic hepatitis C (CMS/HCC)    Hypertension    Cirrhosis of liver (CMS/HCC)        Plan:     Shortness of breath/pneumonia right lower lobe.  Continue Rocephin and Levaquin.  Oxygen PRN.  Continue Brovana nebs.  Continue Pulmicort nebs.  Duo nebs.  Albuterol nebs PRN.  Incentive spirometer.  Acapella.  Oxygen PRN.  Patient is currently on 2 L of  oxygen.  Chest x-ray is pending.     Tracheal mass/lymphadenopathy.  Reason for transfer is no pulmonary.  Pulmonary consult.  Plan for bronchoscope and endobronchial ultrasound by Dr. Sewell on Monday.  CT scan in [place]- concerning tracheal mass.     Cirrhosis/hep C.     Thrombocytopenia-probably secondary to cirrhosis of liver.     Hypertension.  Continue Cozaar.     Anxiety/insomnia.  Continue BuSpar.  Continue Seroquel at night.  Antivert as needed.     Reflux.  Continue Protonix.  Zofran PRN.     History of alcohol abuse.  Claims he quit smoking long time ago.     Chronic smoker.  Nicotine patch.  Discussed patient quitting.     Anemia.  No sign of acute bleed.  Hemoglobin is 8.0.     Heparin prophylaxis.     Nutrition.  Cardiac diet     Blood culture-positive for strep pneumo at [facility].  Legionella antigen pending.  Respiratory culture pending.  Strep pneumo pending.  Respiratory PCR.        Discharge Planning:    Bryan Braxton MD   07/09/20   13:23

## 2020-07-10 NOTE — PROGRESS NOTES
"Continued Stay Note  [facility]     Patient Name: Wong Patrick  MRN: [id number]  Today's Date: 7/10/2020    Admit Date: 7/8/2020    Discharge Plan     Row Name 07/10/20 0901       Plan    Plan  Home    Patient/Family in Agreement with Plan  yes    Plan Comments  SW spoke with patient on 7/9/20 regarding living conditions.  When  inquired about bed bugs, patient stated, \"it's not bed bugs, it's roaches.\"   then informed patient per nursing note, he was treated for bed bugs at [facility] hospital.  Patient stated he was not aware of this.  SW offered to contact health department regarding roaches and bed bugs at apartment complex and patient declined.  SW also offered to contact patient's landlord regarding living conditions.  Patient stated he did not have his landlord's contact information available.  SW advised patient to attempt to obtain this information if he would like us to address these concerns with his landlord.  Patient functions independently at home, does not utilize DME or HH.  Patient has a PCP and rX coverage.  SW will follow for discharge plan/needs        Discharge Codes    No documentation.             ROBIN Petersen    "

## 2020-07-10 NOTE — PLAN OF CARE
Problem: Patient Care Overview  Goal: Plan of Care Review  Outcome: Ongoing (interventions implemented as appropriate)  Flowsheets (Taken 7/10/2020 1115)  Progress: no change  Plan of Care Reviewed With: patient  Outcome Summary: Pt denies pain. SOB with exertion. Oxygen at 2L NC. VSS.

## 2020-07-10 NOTE — NURSING NOTE
Pt snapped at nurse this morning when he was asked to rate his abdominal pain. He stated that it has hurt the same all night! Nurse reminded him that when he has been asked if he was hurting all through the night he has said no. Pt would not see anymore. Kristy SHARIF was in the room getting 0400 vital signs when pt was asked if he was hurting and he said no.

## 2020-07-10 NOTE — PAYOR COMM NOTE
"7/10 CLINICAL UPDATE  UR  [id number]    Mabel Patrick (65 y.o. Male)     Date of Birth Social Security Number Address Home Phone MRN    1955  [address]  [address] [facility] [address] [phone] [id number]    Christian Marital Status          Mandaen Single       Admission Date Admission Type Admitting Provider Attending Provider Department, Room/Bed    7/8/20 Urgent Bryan Braxton MD Truong, Khai C, MD [facility] 4C, 462/1    Discharge Date Discharge Disposition Discharge Destination                       Attending Provider:  Bryan Braxton MD    Allergies:  Codeine, Penicillins    Isolation:  None   Infection:  COVID (rule out) (07/09/20)   Code Status:  CPR    Ht:  165.1 cm (65\")   Wt:  76.3 kg (168 lb 4 oz)    Admission Cmt:  None   Principal Problem:  Right lower lobe pneumonia [J18.9]                 Active Insurance as of 7/8/2020     Primary Coverage     Payor Plan Insurance Group Employer/Plan Group    [facility] MEDICARE REPLACEMENT [employer] MEDICARE REPLACEMENT      Payor Plan Address Payor Plan Phone Number Payor Plan Fax Number Effective Dates    [address] [address] [phone]  7/8/2020 - None Entered    [facility] [id number]       Subscriber Name Subscriber Birth Date Member ID       Mabel Patrick 1955 [id number]           Secondary Coverage     Payor Plan Insurance Group Employer/Plan Group    [employer]      Payor Plan Address Payor Plan Phone Number Payor Plan Fax Number Effective Dates    [address] [address]   1/1/2014 - None Entered    [place] [address]       Subscriber Name Subscriber Birth Date Member ID       MABEL PATRICK 1955 [id number]                 Emergency Contacts      (Rel.) Home Phone Work Phone Mobile Phone    Yinka Shelton (Friend) [phone] -- [phone]    Volodymyr Patrick (Relative) [phone] -- [phone]            Vital Signs (last day)     Date/Time   Temp   " Temp src   Pulse   Resp   BP   Patient Position   SpO2    07/10/20 0354   97.3 (36.3)   Oral   101   16   125/67   Sitting   90    07/10/20 0129   97.2 (36.2)   Oral   87   16   116/69   Lying   92    07/09/20 2049   --   --   94   16   --   --   --    07/09/20 2043   --   --   94   16   --   --   96    07/09/20 2042   --   --   100   16   --   --   --    07/09/20 2036   --   --   102   16   --   --   --    07/09/20 2030   --   --   98   16   --   --   95    07/09/20 1939   98.6 (37)   Oral   94   18   124/68   Lying   96    07/09/20 1630   97.8 (36.6)   Oral   97   18   120/66   Lying   95    07/09/20 1519   --   --   98   18   --   --   95    07/09/20 1511   --   --   96   18   --   --   93    07/09/20 1157   97.4 (36.3)   Oral   91   18   100/62   Lying   94    07/09/20 1124   --   --   97   20   --   --   95    07/09/20 1119   --   --   89   19   --   --   98    07/09/20 1108   --   --   87   17   --   --   97    07/09/20 0746   98.2 (36.8)   Oral   84   18   115/60   Lying   96    07/09/20 0400   97.8 (36.6)   Oral   92   18   97/66   Lying   96              Oxygen Therapy (last day)     Date/Time   SpO2   Device (Oxygen Therapy)   Flow (L/min)   Oxygen Concentration (%)   ETCO2 (mmHg)    07/10/20 0354   90   nasal cannula   --   --   --    07/10/20 0129   92   nasal cannula   2   --   --    07/09/20 2043   96   nasal cannula   2   --   --    07/09/20 2030   95   nasal cannula   2   --   --    07/09/20 1939   96   nasal cannula   2   --   --    07/09/20 1630   95   nasal cannula   2   --   --    07/09/20 1519   95   nasal cannula   2   --   --    07/09/20 1511   93   nasal cannula   2   --   --    07/09/20 1157   94   nasal cannula   2   --   --    07/09/20 1124   95   nasal cannula   2   --   --    07/09/20 1119   98   nasal cannula   2   --   --    07/09/20 1108   97   nasal cannula   2   --   --    07/09/20 0810   --   nasal cannula   2   --   --    07/09/20 0746   96   nasal cannula   2   --   --     07/09/20 0400   96   nasal cannula   --   --   --              Intake & Output (last day)       07/09 0701 - 07/10 0700    P.O. 360    Total Intake(mL/kg) 360 (4.7)    Urine (mL/kg/hr) 1200 (0.7)    Stool 0    Total Output 1200    Net -840         Urine Unmeasured Occurrence 1 x    Stool Unmeasured Occurrence 1 x          Current Facility-Administered Medications   Medication Dose Route Frequency Provider Last Rate Last Dose   • acetaminophen (TYLENOL) tablet 650 mg  650 mg Oral Q4H PRN Maged Casillas MD       • albuterol (PROVENTIL) nebulizer solution 0.083% 2.5 mg/3mL  2.5 mg Nebulization Q6H PRN Noah Arriaga MD       • arformoterol (BROVANA) nebulizer solution 15 mcg  15 mcg Nebulization BID - RT Noah Arriaga MD   15 mcg at 07/09/20 2043   • budesonide (PULMICORT) nebulizer solution 0.5 mg  0.5 mg Nebulization BID - RT Noah Arriaga MD   0.5 mg at 07/09/20 2036   • busPIRone (BUSPAR) tablet 15 mg  15 mg Oral BID Maged Casillas MD   15 mg at 07/09/20 2134   • cefTRIAXone (ROCEPHIN) 1 g/100 mL 0.9% NS (MBP)  1 g Intravenous Q24H Maged Casillas MD 0 mL/hr at 07/09/20 0220 1 g at 07/10/20 0231   • heparin (porcine) 5000 UNIT/ML injection 5,000 Units  5,000 Units Subcutaneous Q8H Noah Arriaga MD   5,000 Units at 07/10/20 0620   • HYDROcodone-acetaminophen (NORCO) 5-325 MG per tablet 1 tablet  1 tablet Oral Q6H PRN Bryan Braxton MD   1 tablet at 07/10/20 0623   • ipratropium-albuterol (DUO-NEB) nebulizer solution 3 mL  3 mL Nebulization 4x Daily - RT Noah Arriaga MD   3 mL at 07/09/20 2030   • levoFLOXacin (LEVAQUIN) 500 mg/100 mL D5W (premix) (LEVAQUIN) 500 mg  500 mg Intravenous Q24H Maged Casillas MD 0 mL/hr at 07/09/20 0320 500 mg at 07/10/20 0117   • losartan (COZAAR) tablet 50 mg  50 mg Oral Daily Maged Casillas MD       • meclizine (ANTIVERT) tablet 25 mg  25 mg Oral BID PRN Maged Casillas MD       • nicotine (NICODERM CQ) 21  MG/24HR patch 1 patch  1 patch Transdermal Q24H Bryan Braxton MD       • ondansetron (ZOFRAN) injection 4 mg  4 mg Intravenous Q6H PRN Maged Casillas MD       • pantoprazole (PROTONIX) EC tablet 40 mg  40 mg Oral Q AM Maged Casillas MD   40 mg at 07/10/20 0619   • QUEtiapine (SEROquel) tablet 100 mg  100 mg Oral Nightly Maged Casillas MD   100 mg at 07/09/20 2134   • sodium chloride 0.9 % flush 10 mL  10 mL Intravenous Q12H Maged Casillas MD   10 mL at 07/09/20 2134   • sodium chloride 0.9 % flush 10 mL  10 mL Intravenous PRN Maged Casillas MD           Lab Results (last 24 hours)     Procedure Component Value Units Date/Time    Lipid Panel [[id number]]  (Abnormal) Collected:  07/10/20 0416    Specimen:  Blood Updated:  07/10/20 0554     Total Cholesterol 56 mg/dL      Triglycerides 79 mg/dL      HDL Cholesterol 17 mg/dL      LDL Cholesterol  23 mg/dL      VLDL Cholesterol 15.8 mg/dL      LDL/HDL Ratio 1.36    Narrative:       Cholesterol Reference Ranges  (U.S. Department of Health and Human Services ATP III Classifications)    Desirable          <200 mg/dL  Borderline High    200-239 mg/dL  High Risk          >240 mg/dL      Triglyceride Reference Ranges  (U.S. Department of Health and Human Services ATP III Classifications)    Normal           <150 mg/dL  Borderline High  150-199 mg/dL  High             200-499 mg/dL  Very High        >500 mg/dL    HDL Reference Ranges  (U.S. Department of Health and Human Services ATP III Classifcations)    Low     <40 mg/dl (major risk factor for CHD)  High    >60 mg/dl ('negative' risk factor for CHD)        LDL Reference Ranges  (U.S. Department of Health and Human Services ATP III Classifcations)    Optimal          <100 mg/dL  Near Optimal     100-129 mg/dL  Borderline High  130-159 mg/dL  High             160-189 mg/dL  Very High        >189 mg/dL    Comprehensive Metabolic Panel [[id number]]  (Abnormal) Collected:   07/10/20 0416    Specimen:  Blood Updated:  07/10/20 0554     Glucose 93 mg/dL      BUN 23 mg/dL      Creatinine 0.83 mg/dL      Sodium 140 mmol/L      Potassium 4.1 mmol/L      Chloride 106 mmol/L      CO2 24.0 mmol/L      Calcium 7.8 mg/dL      Total Protein 6.3 g/dL      Albumin 2.20 g/dL      ALT (SGPT) 39 U/L      AST (SGOT) 73 U/L      Alkaline Phosphatase 83 U/L      Total Bilirubin 0.3 mg/dL      eGFR Non African Amer 94 mL/min/1.73      Globulin 4.1 gm/dL      A/G Ratio 0.5 g/dL      BUN/Creatinine Ratio 27.7     Anion Gap 10.0 mmol/L     Narrative:       GFR Normal >60  Chronic Kidney Disease <60  Kidney Failure <15      TSH [[id number]]  (Abnormal) Collected:  07/10/20 0416    Specimen:  Blood Updated:  07/10/20 0554     TSH 5.810 uIU/mL     CBC Auto Differential [[id number]]  (Abnormal) Collected:  07/10/20 0416    Specimen:  Blood Updated:  07/10/20 0536     WBC 10.40 10*3/mm3      RBC 2.74 10*6/mm3      Hemoglobin 8.0 g/dL      Hematocrit 25.7 %      MCV 93.8 fL      MCH 29.2 pg      MCHC 31.1 g/dL      RDW 15.4 %      RDW-SD 51.5 fl      MPV 10.4 fL      Platelets 195 10*3/mm3     Manual Differential [[id number]] Collected:  07/10/20 0416    Specimen:  Blood Updated:  07/10/20 0534    Hemoglobin A1c [[id number]] Collected:  07/10/20 0416    Specimen:  Blood Updated:  07/10/20 0526    CBC & Differential [[id number]] Collected:  07/10/20 0416    Specimen:  Blood Updated:  07/10/20 0526    Narrative:       The following orders were created for panel order CBC & Differential.  Procedure                               Abnormality         Status                     ---------                               -----------         ------                     CBC Auto Differential[[id number]]        Abnormal            Preliminary result           Please view results for these tests on the individual orders.    Respiratory Panel, PCR - Swab, Nasopharynx [[id number]]  (Normal) Collected:  07/09/20 1530    Specimen:  Swab  from Nasopharynx Updated:  07/09/20 1658     ADENOVIRUS, PCR Not Detected     Coronavirus 229E Not Detected     Coronavirus HKU1 Not Detected     Coronavirus NL63 Not Detected     Coronavirus OC43 Not Detected     Human Metapneumovirus Not Detected     Human Rhinovirus/Enterovirus Not Detected     Influenza B PCR Not Detected     Parainfluenza Virus 1 Not Detected     Parainfluenza Virus 2 Not Detected     Parainfluenza Virus 3 Not Detected     Parainfluenza Virus 4 Not Detected     Bordetella pertussis pcr Not Detected     Influenza A H1 2009 PCR Not Detected     Chlamydophila pneumoniae PCR Not Detected     Mycoplasma pneumo by PCR Not Detected     Influenza A PCR Not Detected     Influenza A H3 Not Detected     Influenza A H1 Not Detected     RSV, PCR Not Detected     Bordetella parapertussis PCR Not Detected    Narrative:       The coronavirus on the RVP is NOT COVID-19 and is NOT indicative of infection with COVID-19.     COVID-19, ABBOTT IN-HOUSE,NP Swab (NO TRANSPORT MEDIA) 2 HR TAT - Swab, Nasopharynx [[id number]]  (Normal) Collected:  07/09/20 1529    Specimen:  Swab from Nasopharynx Updated:  07/09/20 1633     COVID19 Not Detected    Narrative:       Fact sheet for providers: https://www.fda.gov/media/694735/download     Fact sheet for patients: https://www.fda.gov/media/705276/download    Legionella Antigen, Urine - Urine, Urine, Clean Catch [[id number]]  (Normal) Collected:  07/09/20 1529    Specimen:  Urine, Clean Catch Updated:  07/09/20 1616     LEGIONELLA ANTIGEN, URINE Negative    S. Pneumo Ag Urine or CSF - Urine, Urine, Clean Catch [[id number]]  (Normal) Collected:  07/09/20 1529    Specimen:  Urine, Clean Catch Updated:  07/09/20 1615     Strep Pneumo Ag Negative    Mycoplasma Pneumoniae Antibody, IgM - Blood, Arm, Left [[id number]] Collected:  07/09/20 1021    Specimen:  Blood from Arm, Left Updated:  07/09/20 1028    CBC & Differential [[id number]] Collected:  07/09/20 0508    Specimen:  Blood  Updated:  07/09/20 0644    Narrative:       The following orders were created for panel order CBC & Differential.  Procedure                               Abnormality         Status                     ---------                               -----------         ------                     CBC Auto Differential[[id number]]        Abnormal            Final result                 Please view results for these tests on the individual orders.    CBC Auto Differential [[id number]]  (Abnormal) Collected:  07/09/20 0508    Specimen:  Blood Updated:  07/09/20 0644     WBC 10.98 10*3/mm3      RBC 2.76 10*6/mm3      Hemoglobin 8.0 g/dL      Hematocrit 25.6 %      MCV 92.8 fL      MCH 29.0 pg      MCHC 31.3 g/dL      RDW 15.4 %      RDW-SD 52.2 fl      MPV 10.3 fL      Platelets 204 10*3/mm3      Neutrophil % 34.5 %      Lymphocyte % 60.6 %      Monocyte % 2.6 %      Eosinophil % 0.5 %      Basophil % 0.2 %      Immature Grans % 1.6 %      Neutrophils, Absolute 3.78 10*3/mm3      Lymphocytes, Absolute 6.65 10*3/mm3      Monocytes, Absolute 0.29 10*3/mm3      Eosinophils, Absolute 0.06 10*3/mm3      Basophils, Absolute 0.02 10*3/mm3      Immature Grans, Absolute 0.18 10*3/mm3      nRBC 0.0 /100 WBC     Basic Metabolic Panel [[id number]]  (Abnormal) Collected:  07/09/20 0508    Specimen:  Blood Updated:  07/09/20 0634     Glucose 81 mg/dL      BUN 30 mg/dL      Creatinine 0.87 mg/dL      Sodium 140 mmol/L      Potassium 4.4 mmol/L      Chloride 108 mmol/L      CO2 25.0 mmol/L      Calcium 7.2 mg/dL      eGFR Non African Amer 89 mL/min/1.73      BUN/Creatinine Ratio 34.5     Anion Gap 7.0 mmol/L     Narrative:       GFR Normal >60  Chronic Kidney Disease <60  Kidney Failure <15          Imaging Results (Last 24 Hours)     Procedure Component Value Units Date/Time    XR Chest 1 View [[id number]] Resulted:  07/10/20 0404     Updated:  07/10/20 0408        Orders (last 24 hrs)      Start     Ordered    07/12/20 0001  NPO Diet  Diet  Effective Midnight      07/09/20 1350    07/10/20 0600  XR Chest 1 View  1 Time Imaging      07/09/20 0949    07/10/20 0600  CBC & Differential  Morning Draw,   Status:  Canceled      07/09/20 0949    07/10/20 0600  Basic Metabolic Panel  Morning Draw,   Status:  Canceled      07/09/20 0949    07/10/20 0600  Comprehensive Metabolic Panel  Morning Draw,   Status:  Canceled      07/09/20 1339    07/10/20 0600  Hemoglobin A1c  Morning Draw      07/09/20 1339    07/10/20 0600  TSH  Morning Draw      07/09/20 1339    07/10/20 0600  Lipid Panel  Morning Draw      07/09/20 1339    07/10/20 0600  CBC & Differential  Daily      07/09/20 1339    07/10/20 0600  Comprehensive Metabolic Panel  Daily      07/09/20 1339    07/10/20 0600  CBC Auto Differential  PROCEDURE ONCE      07/10/20 0001    07/10/20 0535  Manual Differential  Once      07/10/20 0534    07/10/20 0001  NPO Diet NPO Except: Sips with meds  Diet Effective Midnight,   Status:  Canceled      07/09/20 0848    07/09/20 1800  Dietary Nutrition Supplements Boost Plus (Ensure Enlive, Ensure Plus)  Daily With Dinner      07/09/20 1456    07/09/20 1603  HYDROcodone-acetaminophen (NORCO) 5-325 MG per tablet 1 tablet  Every 6 Hours PRN      07/09/20 1603    07/09/20 1430  nicotine (NICODERM CQ) 21 MG/24HR patch 1 patch  Every 24 Hours Scheduled      07/09/20 1343    07/09/20 1400  heparin (porcine) 5000 UNIT/ML injection 5,000 Units  Every 8 Hours Scheduled      07/09/20 0945    07/09/20 1342  COVID-19, [facility]OTT IN-HOUSE,NP Swab (NO TRANSPORT MEDIA) 2 HR TAT - Swab, Nasopharynx  Once      07/09/20 1341    07/09/20 1341  Respiratory Panel, PCR - Swab, Nasopharynx  Once      07/09/20 1340    07/09/20 1230  Oscillating Positive Expiratory Pressure (OPEP)  4 Times Daily - RT     Comments:  To assist in cough    07/09/20 0945    07/09/20 1115  ipratropium-albuterol (DUO-NEB) nebulizer solution 3 mL  4 Times Daily - RT      07/09/20 0945    07/09/20 1045  arformoterol (BROVANA)  nebulizer solution 15 mcg  2 Times Daily - RT      07/09/20 0945    07/09/20 1045  budesonide (PULMICORT) nebulizer solution 0.5 mg  2 Times Daily - RT      07/09/20 0945    07/09/20 1000  Incentive Spirometry  Every 4 Hours While Awake      07/09/20 0945    07/09/20 0949  Mycoplasma Pneumoniae Antibody, IgM - Blood, Arm, Left  Once      07/09/20 0948    07/09/20 0949  S. Pneumo Ag Urine or CSF - Urine, Urine, Clean Catch  Once      07/09/20 0948    07/09/20 0949  Respiratory Culture - Sputum, Cough  Once      07/09/20 0948    07/09/20 0948  Legionella Antigen, Urine - Urine, Urine, Clean Catch  Once      07/09/20 0948    07/09/20 0943  albuterol (PROVENTIL) nebulizer solution 0.083% 2.5 mg/3mL  Every 6 Hours PRN      07/09/20 0945    07/09/20 0900  losartan (COZAAR) tablet 50 mg  Daily      07/09/20 0023    07/09/20 0848  Case Request  Once      07/09/20 0848    07/09/20 0702  Inpatient Pulmonology Consult  [place]      Specialty:  Pulmonary Disease  Provider:  Karthik Sewell MD    07/09/20 0023    07/09/20 0600  pantoprazole (PROTONIX) EC tablet 40 mg  Every Early Morning      07/09/20 0023    07/09/20 0130  levoFLOXacin (LEVAQUIN) 500 mg/100 mL D5W (premix) (LEVAQUIN) 500 mg  Every 24 Hours      07/09/20 0023    07/09/20 0115  sodium chloride 0.9 % flush 10 mL  Every 12 Hours Scheduled      07/09/20 0023    07/09/20 0115  cefTRIAXone (ROCEPHIN) 1 g/100 mL 0.9% NS (MBP)  Every 24 Hours      07/09/20 0023    07/09/20 0115  busPIRone (BUSPAR) tablet 15 mg  2 times daily      07/09/20 0023    07/09/20 0115  QUEtiapine (SEROquel) tablet 100 mg  Nightly      07/09/20 0023    07/09/20 0023  meclizine (ANTIVERT) tablet 25 mg  2 Times Daily PRN      07/09/20 0023    07/09/20 0021  ondansetron (ZOFRAN) injection 4 mg  Every 6 Hours PRN      07/09/20 0023    07/09/20 0021  acetaminophen (TYLENOL) tablet 650 mg  Every 4 Hours PRN      07/09/20 0023    07/09/20 0020  sodium chloride 0.9 % flush 10 mL  As Needed       20 0023    Signed and Held  Follow Anesthesia Guidlines / Standing Orders  Once      Signed and Held    Signed and Held  Obtain Informed Consent  Once      Signed and Held    Signed and Held  Insert Peripheral IV  Once      Signed and Held    Signed and Held  Saline Lock & Maintain IV Access  Continuous      Signed and Held    Signed and Held  sodium chloride 0.9 % flush 3 mL  Every 12 Hours Scheduled      Signed and Held    Signed and Held  sodium chloride 0.9 % flush 10 mL  As Needed      Signed and Held                Ventilator/Non-Invasive Ventilation Settings (From admission, onward)    None        Physician Progress Notes (last 24 hours) (Notes from 20 through 07/10/20 0634)    No notes of this type exist for this encounter.            Consult Notes (last 24 hours) (Notes from 20 through 07/10/20 0634)      Noah Arriaga MD at 20      Consult Orders    1. Inpatient Pulmonology Consult [[id number]] ordered by Maged Casillas MD at 20                      PULMONARY & CRITICAL CARE CONSULT - [facility]    20, 09:23  Patient Care Team:  Provider, No Known as PCP - Deny Campos PA-C as Physician Assistant (Gastroenterology)  Name: Wong Patrick  : 1955  MRN: [id number]  Contact Serial Number [id number]    Chief complaint: Shortness of breath    HPI:  We have been consulted by Bryan Braxton MD to see this 65 y.o. male born on 1955.  Patient complains of dyspnea developed at home for a few days ago.  It was Mild at first which was getting worse.  Aggravating factors: walking and exertion.   Alleviating factors: medication(s) (feeling little better after getting bronchodilator treatment here) Associated symptoms: Cough with clear expectoration. Sputum is white and Without any hemoptysis.  Patient currently is on oxygen at 2 L/min per nasal cannula.. Respiratory history: no history of pneumonia or  bronchitis recently.  He is an active smoker.  He used to drink alcohol but quit drinking a few years ago.  He claimed he takes only BuSpar at home and was not on any regular inhalers or home oxygen.  Past Medical History:   has a past medical history of Abnormal weight loss, Acquired thrombocytopenia (CMS/HCC), Adenomatous polyp of colon, Anxiety, Biliary calculus, Chronic hepatitis C (CMS/HCC), Cirrhosis of liver (CMS/HCC), Colon polyp, Elevated levels of transaminase & lactic acid dehydrogenase, Generalized abdominal pain, GERD (gastroesophageal reflux disease), Hypertension, IBS (irritable bowel syndrome), Impacted cerumen, Kidney disease, Nausea and vomiting, Patient noncompliance, general, Rectal hemorrhage, Villous adenoma of colon, and Wheezing.   has a past surgical history that includes endoscopy and colonoscopy; Esophagoscopy / EGD; Colonoscopy (06/03/2013); Colonoscopy (N/A, 8/22/2018); Esophagogastroduodenoscopy (N/A, 7/9/2019); Upper gastrointestinal endoscopy (06/03/2013); and Upper gastrointestinal endoscopy (07/09/2019).  Allergies   Allergen Reactions   • Codeine Unknown - High Severity     Pt stated it caused swelling in arms and legs; caused skin irritation; excessive sleepiness   • Penicillins Hives     Medications:    busPIRone 15 mg Oral BID   cefTRIAXone 1 g Intravenous Q24H   levoFLOXacin 500 mg Intravenous Q24H   losartan 50 mg Oral Daily   pantoprazole 40 mg Oral Q AM   QUEtiapine 100 mg Oral Nightly   sodium chloride 10 mL Intravenous Q12H        Family History:  Family History   Problem Relation Age of Onset   • Heart disease Other    • Diabetes Other      Social History:   reports that he has been smoking cigarettes. He has been smoking about 1.00 pack per day. He has never used smokeless tobacco. He reports that he does not drink alcohol or use drugs.  Review of Systems:  Review of Systems   Physical Exam:  Temp:  [97.6 °F (36.4 °C)-98.2 °F (36.8 °C)] 98.2 °F (36.8 °C)  Heart Rate:   [79-92] 84  Resp:  [16-18] 18  BP: ()/(60-67) 115/60    Intake/Output Summary (Last 24 hours) at 7/9/2020 0923  Last data filed at 7/9/2020 0900  Gross per 24 hour   Intake 120 ml   Output 675 ml   Net -555 ml         07/08/20  2300   Weight: 76.3 kg (168 lb 3.2 oz)     SpO2 Percentage    07/08/20 2300 07/09/20 0400 07/09/20 0746   SpO2: 94% 96% 96%     Physical Exam   Constitutional: He is oriented to person, place, and time. He appears well-developed and well-nourished.   HENT:   Head: Normocephalic and atraumatic.   Eyes: Pupils are equal, round, and reactive to light. EOM are normal.   Neck: Normal range of motion. Neck supple.   Cardiovascular: Normal rate, regular rhythm and normal heart sounds.   Pulmonary/Chest: Effort normal. No respiratory distress. He has wheezes. He has rales.   Abdominal: Soft. Bowel sounds are normal. He exhibits no distension and no mass. There is no tenderness.   Musculoskeletal: Normal range of motion.   Neurological: He is alert and oriented to person, place, and time. No cranial nerve deficit or sensory deficit.   Skin: Skin is warm and dry.   Psychiatric: He has a normal mood and affect. His behavior is normal.   He has little slurring of speech appears little slow to respond.     Results from last 7 days   Lab Units 07/09/20  0508   WBC 10*3/mm3 10.98*   HEMOGLOBIN g/dL 8.0*   PLATELETS 10*3/mm3 204     Results from last 7 days   Lab Units 07/09/20  0508   SODIUM mmol/L 140   POTASSIUM mmol/L 4.4   CO2 mmol/L 25.0   BUN mg/dL 30*   CREATININE mg/dL 0.87   GLUCOSE mg/dL 81         No results found for: PROBNP  No results found for: BLOODCX, URINECX, WOUNDCX, MRSACX, RESPCX, STOOLCX  Recent radiology:   Imaging Results (Last 72 Hours)     ** No results found for the last 72 hours. **        My radiograph interpretation/independent review of imaging: Patient had CT scan of the chest done in outside facility and the results are available which showed a right lower lobe  pneumonia and a paratracheal mass  Other test results (not lab or imaging):   Reviewed recent lab work  Independent review of ekg: Not done    Problem List as identified by Epic (may contain historical, inactive problems)  Patient Active Problem List   Diagnosis   • History of colon polyps   • Nausea and vomiting   • Right lower lobe pneumonia   • Tracheal mass   • Acquired thrombocytopenia (CMS/HCC)   • Chronic hepatitis C (CMS/HCC)   • Hypertension   • Cirrhosis of liver (CMS/HCC)     Pulmonary Assessment:    New problem (to me), with additional workup planned:   Right lower lobe pneumonia  Community-acquired bacterial pneumonia  Chronic obstructive pulmonary disease  History of tobacco abuse  Acute on chronic hypoxic respiratory failure requiring supplemental oxygen  Tracheal mass a bronchoscopy has been planned for Monday    New problem (to me), no additional workup planned:   As noted above  He has been tested negative for COVID-19 twice as per records    Other problems either stable, failing to improve or worsening:  Other problems were stable      Recommend/plan:     1.  Right lower lobe pneumonia: Follow-up x-ray, continue ceftriaxone and levofloxacin.Check streptococcal pneumonia antigen, Legionella antigen in urine, mycoplasma screening.  Adjust antibiotics depending on culture results  2.  Tracheal mass: Plan for bronchoscopy with endobronchial ultrasound by Dr. Sewell on Monday.  3.  Bronchodilator treatment, Brovana, Pulmicort, DuoNeb.  Incentive spirometry and CoughAssist device to be used to improve pulmonary compliance and clearance.  4.  Plan for pulmonary function test when stable, long-term combination bronchodilators to be started at time of discharge.  5.  Outpatient pulmonary clinic follow-up.  Smoking cessation counseling.  Start nicotine patch for tobacco abuse.  6.  He had history of alcohol abuse and he claimed he quit drinking sometime ago.  Monitor for possible withdrawal symptoms.  7.   Continue DVT, ulcer prophylaxis.  Pain anxiety control, nutritional support, physical therapy and occasional therapy.  8.  CODE STATUS: Full.  Pulmonary team will continue following make further recommendations.      Thank you for this consult.  We will follow along.  Electronically signed by Noah Arriaga MD, 07/09/20, 9:24 AM.    Noah Arriaga MD  Pulmonologist/Intensivist  7/9/2020 09:41        Electronically signed by Noah Arriaga MD at 07/09/20 0946

## 2020-07-10 NOTE — PROGRESS NOTES
PULMONARY AND CRITICAL CARE PROGRESS NOTE - [facility]    Patient: Wong Patrick  1955   MR# [id number]   Acct# [id number]  07/10/20   11:47  Referring Provider: Bryan Braxton MD    Chief Complaint: Shortness of breath, acute exacerbation of chronic obstructive pulmonary disease,    Interval history: Patient still remains short of breath on exertion and has some oxygen desaturation.  No other acute events overnight but he remains on 2 L of oxygen and is planned for bronchoscopy for the tracheal mask on Monday.  Apparently according to social work note patient has unhealthy home condition with questionable history of bedbugs and he also has roaches at home health department has been notified but patient refused to get an inspection into the notes.  He is little more stable this morning and was answering questions little better than yesterday but still remains a poor historian.    Meds:    arformoterol 15 mcg Nebulization BID - RT   budesonide 0.5 mg Nebulization BID - RT   busPIRone 15 mg Oral BID   cefTRIAXone 1 g Intravenous Q24H   heparin (porcine) 5,000 Units Subcutaneous Q8H   ipratropium-albuterol 3 mL Nebulization 4x Daily - RT   levoFLOXacin 500 mg Intravenous Q24H   losartan 50 mg Oral Daily   nicotine 1 patch Transdermal Q24H   pantoprazole 40 mg Oral Q AM   QUEtiapine 100 mg Oral Nightly   sodium chloride 10 mL Intravenous Q12H        Review of Systems:   Review of Systems   All other systems reviewed and are negative.  A 14 point systems review was done.  Positive findings as described above.  Physical Exam:  SpO2 Percentage    07/10/20 1030 07/10/20 1036 07/10/20 1124   SpO2: (!) 89% 93% 96%     Temp:  [97.2 °F (36.2 °C)-98.6 °F (37 °C)] 98.2 °F (36.8 °C)  Heart Rate:  [] 89  Resp:  [16-22] 18  BP: (100-125)/(60-69) 100/60    Intake/Output Summary (Last 24 hours) at 7/10/2020 1147  Last data filed at 7/10/2020 0900  Gross per 24 hour   Intake 480 ml   Output 800 ml   Net  -320 ml     Physical Exam   Constitutional: He is oriented to person, place, and time. He appears well-developed and well-nourished.   HENT:   Head: Normocephalic and atraumatic.   Eyes: Pupils are equal, round, and reactive to light. EOM are normal.   Neck: Normal range of motion. Neck supple. No tracheal deviation present. No thyromegaly present.   Cardiovascular: Normal rate, regular rhythm and normal heart sounds. Exam reveals no gallop.   No murmur heard.  Pulmonary/Chest: Effort normal. He has no wheezes. He has no rales.   Abdominal: Soft. Bowel sounds are normal. He exhibits no mass.   Musculoskeletal: Normal range of motion. He exhibits no edema or deformity.   Neurological: He is alert and oriented to person, place, and time. He displays normal reflexes. No cranial nerve deficit.   Skin: Skin is warm and dry.   Psychiatric: He has a normal mood and affect. His behavior is normal. Thought content normal.   Vitals reviewed.    Laboratory Data:  Results from last 7 days   Lab Units 07/10/20  0416 07/09/20  0508   WBC 10*3/mm3 10.40 10.98*   HEMOGLOBIN g/dL 8.0* 8.0*   PLATELETS 10*3/mm3 195 204     Results from last 7 days   Lab Units 07/10/20  0416 07/09/20  0508   SODIUM mmol/L 140 140   POTASSIUM mmol/L 4.1 4.4   BUN mg/dL 23 30*   CREATININE mg/dL 0.83 0.87         No results found for: BLOODCX, URINECX, WOUNDCX, MRSACX, RESPCX, STOOLCX  Recent films:  Xr Chest 1 View    Result Date: 7/10/2020  1. Patchy right greater than left pulmonary opacities worrisome for multifocal pneumonia. Probable small right pleural effusion. Questionable mediastinal and hilar adenopathy. This report was finalized on 07/10/2020 07:54 by Dr. Opal Horne MD.    Films reviewed personally by me.  My interpretation: Same as above.  Pulmonary Assessment:  1. Right lower lobe pneumonia  2. Community-acquired bacterial pneumonia  3. Acute exacerbation of chronic obstructive pulmonary disease  4. Ongoing shortness of  breath  5. History of tobacco abuse and alcohol abuse  6. Acute on chronic hypoxic respiratory failure requiring supplemental oxygen,.  7. Tracheal mass waiting for bronchoscopy in 2 days time.  8. Allergic rhinitis  9. Anxiety and depression  10. Patient screen negative for COVID twice      Recommend:   · Continue current treatment plan with respiratory care.  Continue bronchodilator treatment.  · Started on Solu-Medrol for underlying chronic obstructive pulmonary's acute exacerbation causing oxygen desaturation.  · Plan for bronchoscopy for tracheal mass on Monday.  Continue incentive spirometry and flutter valve to improve pulmonary clearance and compliance.  Bronchoscopy scheduled with Dr. Sewell   · Continue physical therapy for deconditioning.  He is still having oxygen desaturations when walking.  · Social work is working on his home environment for questionable bedbug and landers issues.  · Continue DVT ulcer prophylaxis and pain and anxiety control.  · Routine labs as needed.  Follow-up chest x-ray.  · Pulmonary team will continue following and make further recommendations    Noah Arriaga MD  Pulmonologist/Intensivist  7/10/2020 11:55    Electronically signed by Noah Arriaga MD, 07/10/20, 11:47

## 2020-07-11 NOTE — PROGRESS NOTES
PULMONARY AND CRITICAL CARE PROGRESS NOTE - [facility]    Patient: Wong Patrick  1955   MR# [id number]   Acct# [id number]  07/11/20   11:48  Referring Provider: Jonathan Hurst MD    Chief Complaint: Shortness of breath, acute exacerbation of chronic obstructive pulmonary disease,    Interval history: He is feeling little better today denies any shortness of breath.  He is on 2 L of oxygen.  He is started on Solu-Medrol yesterday for increasing oxygen demand and some chest tightness and today he is better.  He is waiting for bronchoscopy for the tracheal mass on Monday.  Had no other acute events overnight and remains afebrile.  Meds:    arformoterol 15 mcg Nebulization BID - RT   budesonide 0.5 mg Nebulization BID - RT   busPIRone 15 mg Oral BID   cefTRIAXone 1 g Intravenous Q24H   enoxaparin 30 mg Subcutaneous Nightly   ipratropium-albuterol 3 mL Nebulization 4x Daily - RT   levoFLOXacin 500 mg Intravenous Q24H   losartan 50 mg Oral Daily   methylPREDNISolone sodium succinate 40 mg Intravenous Q6H   nicotine 1 patch Transdermal Q24H   pantoprazole 40 mg Oral Q AM   QUEtiapine 100 mg Oral Nightly   sodium chloride 10 mL Intravenous Q12H        Review of Systems:   Review of Systems   All other systems reviewed and are negative.  A 14 point systems review was done.  Positive findings as described above.  Physical Exam:  SpO2 Percentage    07/11/20 0829 07/11/20 1041 07/11/20 1047   SpO2: 91% 94% 93%     Temp:  [97.8 °F (36.6 °C)-98.1 °F (36.7 °C)] 97.8 °F (36.6 °C)  Heart Rate:  [] 100  Resp:  [14-19] 16  BP: (104-120)/(63-71) 116/63    Intake/Output Summary (Last 24 hours) at 7/11/2020 1148  Last data filed at 7/11/2020 0829  Gross per 24 hour   Intake 1040 ml   Output 900 ml   Net 140 ml     Physical Exam   Constitutional: He is oriented to person, place, and time. He appears well-developed and well-nourished.   HENT:   Head: Normocephalic and atraumatic.   Eyes: Pupils are  equal, round, and reactive to light. EOM are normal.   Neck: Normal range of motion. Neck supple. No tracheal deviation present. No thyromegaly present.   Cardiovascular: Normal rate, regular rhythm and normal heart sounds. Exam reveals no gallop.   No murmur heard.  Pulmonary/Chest: Effort normal. He has no wheezes. He has no rales.   Abdominal: Soft. Bowel sounds are normal. He exhibits no mass.   Musculoskeletal: Normal range of motion. He exhibits no edema or deformity.   Neurological: He is alert and oriented to person, place, and time. He displays normal reflexes. No cranial nerve deficit.   Skin: Skin is warm and dry.   Psychiatric: He has a normal mood and affect. His behavior is normal. Thought content normal.   Vitals reviewed.    Laboratory Data:  Results from last 7 days   Lab Units 07/11/20  0518 07/10/20  0416 07/09/20  0508   WBC 10*3/mm3 9.96 10.40 10.98*   HEMOGLOBIN g/dL 7.8* 8.0* 8.0*   PLATELETS 10*3/mm3 173 195 204     Results from last 7 days   Lab Units 07/11/20  0518 07/10/20  0416 07/09/20  0508   SODIUM mmol/L 136 140 140   POTASSIUM mmol/L 4.9 4.1 4.4   BUN mg/dL 22 23 30*   CREATININE mg/dL 0.84 0.83 0.87         No results found for: BLOODCX, URINECX, WOUNDCX, MRSACX, RESPCX, STOOLCX  Recent films:  Xr Chest 1 View    Result Date: 7/10/2020  1. Patchy right greater than left pulmonary opacities worrisome for multifocal pneumonia. Probable small right pleural effusion. Questionable mediastinal and hilar adenopathy. This report was finalized on 07/10/2020 07:54 by Dr. Opal Horne MD.    Films reviewed personally by me.  My interpretation: Same as above.  Pulmonary Assessment:  1. Right lower lobe pneumonia  2. Community-acquired bacterial pneumonia  3. Acute exacerbation of chronic obstructive pulmonary disease  4. Ongoing shortness of breath  5. History of tobacco abuse and alcohol abuse  6. Acute on chronic hypoxic respiratory failure requiring supplemental oxygen,.  7. Tracheal mass  waiting for bronchoscopy in 2 days time.  8. Allergic rhinitis  9. Anxiety and depression  10. Patient screen negative for COVID twice      Recommend:   · Continue current treatment plan with respiratory care.  Continue bronchodilator treatment.  · Continue Solu-Medrol for acute exacerbation of chronic obstructive pulmonary disease  · He has a bronchoscopy scheduled for tracheal mass on Monday with Dr. Sewell.    · Continue incentive spirometry and flutter valve to improve pulmonary clearance and compliance.  · Continue physical therapy for deconditioning.  Nutritional support.  · Social work is working on his home environment for questionable bedbug and landers issues.  · Continue DVT ulcer prophylaxis and pain and anxiety control.  · Routine labs as needed.  Follow-up chest x-ray will be needed after bronchoscopy.  · Pulmonary team will continue following and make further recommendations    Noah Arriaga MD  Pulmonologist/Intensivist  7/11/2020 11:48    Electronically signed by Noah Arriaga MD, 07/11/20, 11:48

## 2020-07-11 NOTE — PROGRESS NOTES
[facility] Medicine Services  INPATIENT PROGRESS NOTE    Patient Name: Wong Patrick  Date of Admission: 7/8/2020  Today's Date: 07/11/20  Length of Stay: 3  Primary Care Physician: Provider, No Known    Subjective   Chief Complaint: SOA  HPI   Doing ok.  Still feels a bit SOA.  Requiring 2L NC  Not wheezing much.  Afebrile.  Plans for bronch on Monday with Pulmonology        Review of Systems   Constitutional: Negative for fatigue and fever.   HENT: Negative for congestion and ear pain.    Eyes: Negative for redness and visual disturbance.   Respiratory: Positive for cough and shortness of breath. Negative for wheezing.    Cardiovascular: Negative for chest pain and palpitations.   Gastrointestinal: Negative for abdominal pain, diarrhea, nausea and vomiting.   Endocrine: Negative for cold intolerance and heat intolerance.   Genitourinary: Negative for dysuria and frequency.   Musculoskeletal: Negative for arthralgias and back pain.   Skin: Negative for rash and wound.   Neurological: Negative for dizziness and headaches.   Psychiatric/Behavioral: Negative for confusion. The patient is not nervous/anxious.         All pertinent negatives and positives are as above. All other systems have been reviewed and are negative unless otherwise stated.     Objective    Temp:  [97.8 °F (36.6 °C)-98.1 °F (36.7 °C)] 97.9 °F (36.6 °C)  Heart Rate:  [] 108  Resp:  [14-19] 18  BP: (104-120)/(63-71) 117/71  Physical Exam   Constitutional: He is oriented to person, place, and time. He appears well-developed and well-nourished.   HENT:   Head: Normocephalic and atraumatic.   Right Ear: External ear normal.   Left Ear: External ear normal.   Nose: Nose normal.   Mouth/Throat: Oropharynx is clear and moist.   Eyes: Pupils are equal, round, and reactive to light. Conjunctivae and EOM are normal. Right eye exhibits no discharge. Left eye exhibits no discharge. No scleral icterus.   Neck:  Normal range of motion. Neck supple. No tracheal deviation present. No thyromegaly present.   Cardiovascular: Normal rate, regular rhythm, normal heart sounds and intact distal pulses. Exam reveals no gallop and no friction rub.   No murmur heard.  Pulmonary/Chest: Effort normal and breath sounds normal. No stridor. No respiratory distress. He has no wheezes. He has no rales. He exhibits no tenderness.   Abdominal: Soft. Bowel sounds are normal. He exhibits no distension and no mass. There is no tenderness. There is no rebound and no guarding. No hernia.   Musculoskeletal: Normal range of motion. He exhibits no edema or deformity.   Lymphadenopathy:     He has no cervical adenopathy.   Neurological: He is alert and oriented to person, place, and time. He has normal reflexes. He displays normal reflexes. No cranial nerve deficit. He exhibits normal muscle tone. Coordination normal.   Skin: Skin is warm and dry. No rash noted. No erythema. No pallor.   Psychiatric: He has a normal mood and affect. His behavior is normal. Judgment and thought content normal.   Vitals reviewed.      Results Review:  I have reviewed the labs, radiology results, and diagnostic studies.    Laboratory Data:   Results from last 7 days   Lab Units 07/11/20  0518 07/10/20  0416 07/09/20  0508   WBC 10*3/mm3 9.96 10.40 10.98*   HEMOGLOBIN g/dL 7.8* 8.0* 8.0*   HEMATOCRIT % 24.4* 25.7* 25.6*   PLATELETS 10*3/mm3 173 195 204        Results from last 7 days   Lab Units 07/11/20  0518 07/10/20  0416 07/09/20  0508   SODIUM mmol/L 136 140 140   POTASSIUM mmol/L 4.9 4.1 4.4   CHLORIDE mmol/L 103 106 108*   CO2 mmol/L 26.0 24.0 25.0   BUN mg/dL 22 23 30*   CREATININE mg/dL 0.84 0.83 0.87   CALCIUM mg/dL 8.1* 7.8* 7.2*   BILIRUBIN mg/dL 0.2 0.3  --    ALK PHOS U/L 79 83  --    ALT (SGPT) U/L 30 39  --    AST (SGOT) U/L 46* 73*  --    GLUCOSE mg/dL 137* 93 81       Culture Data:   No results found for: BLOODCX, URINECX, WOUNDCX, MRSACX, RESPCX,  STOOLCX    Radiology Data:   Imaging Results (Last 24 Hours)     ** No results found for the last 24 hours. **          I have reviewed the patient's current medications.     Assessment/Plan     Active Hospital Problems    Diagnosis   • **Right lower lobe pneumonia   • Tracheal mass   • Acquired thrombocytopenia (CMS/HCC)   • Chronic hepatitis C (CMS/HCC)   • Hypertension   • Cirrhosis of liver (CMS/HCC)       1.  PNA  -IV abx  -Pulm following    2.  COPD AE  -IV abx  -Pulm following  -IV steroids  -Brovana  -Pulmicort  -duonebs    3.  Tracheal Mass  -Pulm following, bronch monday    4.  Cirrhosis  -monitor    5.  Thrombocytopenia  -resolved    6.  HTN  -Losartan                   Discharge Planning: I expect the patient to be discharged to home in ? days    Jonathan Hurst MD   07/11/20   14:21

## 2020-07-11 NOTE — PLAN OF CARE
Problem: Patient Care Overview  Goal: Plan of Care Review  7/11/2020 0433 by Mandie Diaz RN  Outcome: Ongoing (interventions implemented as appropriate)  Flowsheets (Taken 7/11/2020 0433)  Progress: no change  Plan of Care Reviewed With: patient  Note:   Patient continuing with IV ABX and solumedrol.  Blood culture positive for gram + cocci and Strep pneumona.  Pt to have bronchoscopy Monday per Dr. García.  No c/o pain voiced.  Patient appeared to sleep well overnight.   7/11/2020 0432 by Mandie Diaz RN  Outcome: Ongoing (interventions implemented as appropriate)  Goal: Individualization and Mutuality  7/11/2020 0433 by Mandie Diaz RN  Outcome: Ongoing (interventions implemented as appropriate)  7/11/2020 0432 by Mandie Diaz RN  Outcome: Ongoing (interventions implemented as appropriate)  Flowsheets (Taken 7/11/2020 0432)  Patient Specific Goals (Include Timeframe): better breathing  Patient Specific Interventions: IV abx  Patient Specific Preferences: door closed  Goal: Discharge Needs Assessment  7/11/2020 0433 by Mandie Diaz RN  Outcome: Ongoing (interventions implemented as appropriate)  7/11/2020 0432 by Mandie Diaz RN  Outcome: Ongoing (interventions implemented as appropriate)  Goal: Interprofessional Rounds/Family Conf  7/11/2020 0433 by Mandie Diaz RN  Outcome: Ongoing (interventions implemented as appropriate)  7/11/2020 0432 by Mandie Diaz RN  Outcome: Ongoing (interventions implemented as appropriate)     Problem: Pneumonia (Adult)  Goal: Signs and Symptoms of Listed Potential Problems Will be Absent, Minimized or Managed (Pneumonia)  7/11/2020 0433 by Mandie Diaz RN  Outcome: Ongoing (interventions implemented as appropriate)  7/11/2020 0432 by Mandie Diaz RN  Outcome: Ongoing (interventions implemented as appropriate)

## 2020-07-11 NOTE — H&P (VIEW-ONLY)
PULMONARY AND CRITICAL CARE PROGRESS NOTE - [facility]    Patient: Wong Patrick  1955   MR# [id number]   Acct# [id number]  07/11/20   11:48  Referring Provider: Jonathan Hurst MD    Chief Complaint: Shortness of breath, acute exacerbation of chronic obstructive pulmonary disease,    Interval history: He is feeling little better today denies any shortness of breath.  He is on 2 L of oxygen.  He is started on Solu-Medrol yesterday for increasing oxygen demand and some chest tightness and today he is better.  He is waiting for bronchoscopy for the tracheal mass on Monday.  Had no other acute events overnight and remains afebrile.  Meds:    arformoterol 15 mcg Nebulization BID - RT   budesonide 0.5 mg Nebulization BID - RT   busPIRone 15 mg Oral BID   cefTRIAXone 1 g Intravenous Q24H   enoxaparin 30 mg Subcutaneous Nightly   ipratropium-albuterol 3 mL Nebulization 4x Daily - RT   levoFLOXacin 500 mg Intravenous Q24H   losartan 50 mg Oral Daily   methylPREDNISolone sodium succinate 40 mg Intravenous Q6H   nicotine 1 patch Transdermal Q24H   pantoprazole 40 mg Oral Q AM   QUEtiapine 100 mg Oral Nightly   sodium chloride 10 mL Intravenous Q12H        Review of Systems:   Review of Systems   All other systems reviewed and are negative.  A 14 point systems review was done.  Positive findings as described above.  Physical Exam:  SpO2 Percentage    07/11/20 0829 07/11/20 1041 07/11/20 1047   SpO2: 91% 94% 93%     Temp:  [97.8 °F (36.6 °C)-98.1 °F (36.7 °C)] 97.8 °F (36.6 °C)  Heart Rate:  [] 100  Resp:  [14-19] 16  BP: (104-120)/(63-71) 116/63    Intake/Output Summary (Last 24 hours) at 7/11/2020 1148  Last data filed at 7/11/2020 0829  Gross per 24 hour   Intake 1040 ml   Output 900 ml   Net 140 ml     Physical Exam   Constitutional: He is oriented to person, place, and time. He appears well-developed and well-nourished.   HENT:   Head: Normocephalic and atraumatic.   Eyes: Pupils are  equal, round, and reactive to light. EOM are normal.   Neck: Normal range of motion. Neck supple. No tracheal deviation present. No thyromegaly present.   Cardiovascular: Normal rate, regular rhythm and normal heart sounds. Exam reveals no gallop.   No murmur heard.  Pulmonary/Chest: Effort normal. He has no wheezes. He has no rales.   Abdominal: Soft. Bowel sounds are normal. He exhibits no mass.   Musculoskeletal: Normal range of motion. He exhibits no edema or deformity.   Neurological: He is alert and oriented to person, place, and time. He displays normal reflexes. No cranial nerve deficit.   Skin: Skin is warm and dry.   Psychiatric: He has a normal mood and affect. His behavior is normal. Thought content normal.   Vitals reviewed.    Laboratory Data:  Results from last 7 days   Lab Units 07/11/20  0518 07/10/20  0416 07/09/20  0508   WBC 10*3/mm3 9.96 10.40 10.98*   HEMOGLOBIN g/dL 7.8* 8.0* 8.0*   PLATELETS 10*3/mm3 173 195 204     Results from last 7 days   Lab Units 07/11/20  0518 07/10/20  0416 07/09/20  0508   SODIUM mmol/L 136 140 140   POTASSIUM mmol/L 4.9 4.1 4.4   BUN mg/dL 22 23 30*   CREATININE mg/dL 0.84 0.83 0.87         No results found for: BLOODCX, URINECX, WOUNDCX, MRSACX, RESPCX, STOOLCX  Recent films:  Xr Chest 1 View    Result Date: 7/10/2020  1. Patchy right greater than left pulmonary opacities worrisome for multifocal pneumonia. Probable small right pleural effusion. Questionable mediastinal and hilar adenopathy. This report was finalized on 07/10/2020 07:54 by Dr. Opal Horne MD.    Films reviewed personally by me.  My interpretation: Same as above.  Pulmonary Assessment:  1. Right lower lobe pneumonia  2. Community-acquired bacterial pneumonia  3. Acute exacerbation of chronic obstructive pulmonary disease  4. Ongoing shortness of breath  5. History of tobacco abuse and alcohol abuse  6. Acute on chronic hypoxic respiratory failure requiring supplemental oxygen,.  7. Tracheal mass  waiting for bronchoscopy in 2 days time.  8. Allergic rhinitis  9. Anxiety and depression  10. Patient screen negative for COVID twice      Recommend:   · Continue current treatment plan with respiratory care.  Continue bronchodilator treatment.  · Continue Solu-Medrol for acute exacerbation of chronic obstructive pulmonary disease  · He has a bronchoscopy scheduled for tracheal mass on Monday with Dr. Sewell.    · Continue incentive spirometry and flutter valve to improve pulmonary clearance and compliance.  · Continue physical therapy for deconditioning.  Nutritional support.  · Social work is working on his home environment for questionable bedbug and landers issues.  · Continue DVT ulcer prophylaxis and pain and anxiety control.  · Routine labs as needed.  Follow-up chest x-ray will be needed after bronchoscopy.  · Pulmonary team will continue following and make further recommendations    Noah Arriaga MD  Pulmonologist/Intensivist  7/11/2020 11:48    Electronically signed by Noah Arriaga MD, 07/11/20, 11:48

## 2020-07-11 NOTE — PLAN OF CARE
Problem: Patient Care Overview  Goal: Plan of Care Review  Outcome: Ongoing (interventions implemented as appropriate)  Flowsheets (Taken 7/11/2020 1890)  Progress: improving  Plan of Care Reviewed With: patient  Outcome Summary: VSS this shift. Patient wearing 2L NC due to SOA with exertion. Pt recieving IV rocephin and IV levaquin for PNA. Patient also recieving IV solumedrol. No complaints of pain this shift. Refusing nicotine patch. Plans for bronchoscopy on Monday. S/ST  with PVCs on tele. Safety maintained, no falls. Will cont to monitor and notify MD of any changes.

## 2020-07-12 NOTE — PLAN OF CARE
Problem: Patient Care Overview  Goal: Plan of Care Review  Outcome: Ongoing (interventions implemented as appropriate)  Flowsheets (Taken 7/12/2020 7173)  Plan of Care Reviewed With: patient  Outcome Summary: VSS. Patient on 2L NC when needed. Bronchoscopy tomorrow, NPO at midnight. Recieving IV solumedrol Q6H. Patient recieving IV rocephin and IV levaquin. Safety maintained, no falls. Will cont to monitor and notify MD of any changes.

## 2020-07-12 NOTE — PLAN OF CARE
Problem: Patient Care Overview  Goal: Plan of Care Review  Outcome: Ongoing (interventions implemented as appropriate)  Flowsheets (Taken 7/12/2020 0324)  Progress: no change  Plan of Care Reviewed With: patient  Note:   Waiting for bronchoscopy to be done Monday.  2L/min NC, no c/o SOA unless exertion.  IV ABX continued.  IV solumedrol continued.  Monitored by tele.  No c/o pain voiced.    Goal: Individualization and Mutuality  Outcome: Ongoing (interventions implemented as appropriate)  Flowsheets (Taken 7/11/2020 0432)  Patient Specific Goals (Include Timeframe): better breathing  Patient Specific Interventions: IV abx  Patient Specific Preferences: door closed  Goal: Discharge Needs Assessment  Outcome: Ongoing (interventions implemented as appropriate)  Goal: Interprofessional Rounds/Family Conf  Outcome: Ongoing (interventions implemented as appropriate)     Problem: Pneumonia (Adult)  Goal: Signs and Symptoms of Listed Potential Problems Will be Absent, Minimized or Managed (Pneumonia)  Outcome: Ongoing (interventions implemented as appropriate)

## 2020-07-12 NOTE — THERAPY DISCHARGE NOTE
Acute Care - Occupational Therapy Initial Eval/Discharge  [facility]     Patient Name: Wong Patrick  : 1955  MRN: [id number]  Today's Date: 2020  Onset of Illness/Injury or Date of Surgery: 20  Date of Referral to OT: 07/10/20  Referring Physician: Dr. Hurst      Admit Date: 2020       ICD-10-CM ICD-9-CM   1. Tracheal mass J39.8 786.6   2. Decreased activities of daily living (ADL) Z78.9 V49.89     Patient Active Problem List   Diagnosis   • History of colon polyps   • Nausea and vomiting   • Right lower lobe pneumonia   • Tracheal mass   • Acquired thrombocytopenia (CMS/HCC)   • Chronic hepatitis C (CMS/HCC)   • Hypertension   • Cirrhosis of liver (CMS/HCC)     Past Medical History:   Diagnosis Date   • Abnormal weight loss    • Acquired thrombocytopenia (CMS/HCC)    • Adenomatous polyp of colon    • Anxiety    • Biliary calculus     denies N/V, abd pain   • Chronic hepatitis C (CMS/HCC)    • Cirrhosis of liver (CMS/HCC)    • Colon polyp    • Elevated levels of transaminase & lactic acid dehydrogenase    • Generalized abdominal pain     known gallstones   • GERD (gastroesophageal reflux disease)    • Hypertension    • IBS (irritable bowel syndrome)    • Impacted cerumen    • Kidney disease     US suggest renal dz, elevated alpha 2 macroglobulins   • Nausea and vomiting    • Patient noncompliance, general    • Rectal hemorrhage    • Villous adenoma of colon    • Wheezing      Past Surgical History:   Procedure Laterality Date   • COLONOSCOPY  2013   • COLONOSCOPY N/A 2018    Procedure: COLONOSCOPY;  Surgeon: Charles Pham MD;  Location: [facility] ENDOSCOPY;  Service: Gastroenterology   • ENDOSCOPY N/A 2019    Procedure: ESOPHAGOGASTRODUODENOSCOPY;  Surgeon: Bi Ibarra MD;  Location: [facility] ENDOSCOPY;  Service: General   • ENDOSCOPY AND COLONOSCOPY     • ESOPHAGOSCOPY / EGD      with tube   • UPPER GASTROINTESTINAL ENDOSCOPY  2013   • UPPER  GASTROINTESTINAL ENDOSCOPY  07/09/2019          OT ASSESSMENT FLOWSHEET (last 12 hours)      Occupational Therapy Evaluation     Row Name 07/12/20 1338                   OT Evaluation Time/Intention    Subjective Information  complains of;fatigue  -JJ        Document Type  evaluation see MAR  -JJ        Mode of Treatment  occupational therapy  -JJ        Patient Effort  good  -JJ           General Information    Patient Profile Reviewed?  yes  -JJ        Onset of Illness/Injury or Date of Surgery  07/08/20  -JJ        Referring Physician  Dr. Hurst  -JJ        Patient Observations  alert;cooperative;agree to therapy  -JJ        Patient/Family Observations  no family present in room   -JJ        General Observations of Patient  fowlers in bed, 2L O2/NC.   -JJ        Prior Level of Function  independent:;all household mobility;community mobility;transfer;bed mobility;ADL's  -JJ        Equipment Currently Used at Home  none  -JJ        Pertinent History of Current Functional Problem  Pt t/f from OSH with pnuemonia. CT Scan: para tracheal mass, lymphadenopathy, R lung pneumonia. Pt PMH of cirrhosis, HTN, hepatitis C. Dx: R LL pneumonia, tracheal mass.   -JJ        Existing Precautions/Restrictions  oxygen therapy device and L/min;fall  -JJ        Risks Reviewed  patient:;LOB;nausea/vomiting;dizziness;increased discomfort;change in vital signs;increased drainage;lines disloged  -JJ        Benefits Reviewed  patient:;improve function;increase independence;increase strength;increase balance;decrease pain;decrease risk of DVT;improve skin integrity;increase knowledge  -JJ           Relationship/Environment    Lives With  alone  -JJ           Resource/Environmental Concerns    Current Living Arrangements  home/apartment/condo  -JJ        Resource/Environmental Concerns  environmental  -JJ           Home Main Entrance    Number of Stairs, Main Entrance  four  -JJ        Stair Railings, Main Entrance  none  -JJ            Cognitive Assessment/Interventions    Additional Documentation  Cognitive Assessment/Intervention (Group)  -           Cognitive Assessment/Intervention- PT/OT    Affect/Mental Status (Cognitive)  WNL  -J        Orientation Status (Cognition)  oriented x 4  -JJ        Follows Commands (Cognition)  WNL  -        Cognitive Function (Cognitive)  WNL  -        Personal Safety Interventions  fall prevention program maintained;gait belt;muscle strengthening facilitated;nonskid shoes/slippers when out of bed;supervised activity  -           Safety Issues, Functional Mobility    Impairments Affecting Function (Mobility)  endurance/activity tolerance  -           Bed Mobility Assessment/Treatment    Bed Mobility Assessment/Treatment  supine-sit;sit-supine  -        Supine-Sit [place] (Bed Mobility)  independent  -        Sit-Supine [place] (Bed Mobility)  independent  -           Functional Mobility    Functional Mobility- Ind. Level  supervision required  -        Functional Mobility- Safety Issues  supplemental O2  -        Functional Mobility- Comment  bed to door and window x2 and back to bed   -           Transfer Assessment/Treatment    Transfer Assessment/Treatment  sit-stand transfer;stand-sit transfer  -           Sit-Stand Transfer    Sit-Stand [place] (Transfers)  supervision  -           Stand-Sit Transfer    Stand-Sit [place] (Transfers)  supervision  -           ADL Assessment/Intervention    BADL Assessment/Intervention  bathing;lower body dressing  -           Bathing Assessment/Intervention    Comment (Bathing)  Pt reports independently complete all bathing tasks this am.   -           Lower Body Dressing Assessment/Training    Lower Body Dressing [place] Level  don;socks;independent  -        Lower Body Dressing Position  edge of bed sitting  -           BADL Safety/Performance    Impairments, BADL Safety/Performance  endurance/activity tolerance   -JJ        Skilled BADL Treatment/Intervention  BADL process/adaptation training  -           General ROM    GENERAL ROM COMMENTS  BUE and BLE AROM WFL   -           MMT (Manual Muscle Testing)    General MMT Comments  BUE strength 5/5, BLE strength grossly 4/5  -           Motor Assessment/Interventions    Additional Documentation  Balance (Group)  -J           Balance    Balance  static sitting balance;static standing balance  -           Static Sitting Balance    Level of [place] (Unsupported Sitting, Static Balance)  independent  -        Sitting Position (Unsupported Sitting, Static Balance)  sitting on edge of bed  -           Static Standing Balance    Level of [place] (Supported Standing, Static Balance)  supervision  -           Sensory Assessment/Intervention    Sensory General Assessment  no sensation deficits identified  -           Positioning and Restraints    Pre-Treatment Position  in bed  -        Post Treatment Position  bed  -JJ        In Bed  fowlers;notified nsg;call light within reach;encouraged to call for assist  -           Pain Assessment    Additional Documentation  Pain Scale: Numbers Pre/Post-Treatment (Group)  -           Pain Scale: Numbers Pre/Post-Treatment    Pain Scale: Numbers, Pretreatment  0/10 - no pain  -        Pain Scale: Numbers, Post-Treatment  0/10 - no pain  -           Plan of Care Review    Plan of Care Reviewed With  patient  -        Outcome Summary  OT eval completed. Pt demo's decreased activity tolerance/endurance. Independent with bed mobility, LB dressing and all aspects of bathing. Supervision for sit <> stand t/fs and all functional mobility with 2L O2/NC. Pt reports increased fatigue with all mobility. Pt is independent with all aspects of adl care. Will defer endruance training to PT. OT to sign off. Anticipate d/c home with assist.   -PADMA           Clinical Impression (OT)    Date of Referral to OT  07/10/20  -PADMA         OT Diagnosis  decreased adls  -J        Prognosis (OT Eval)  good  -JJ        Patient/Family Goals Statement (OT Eval)  return home  -J        Criteria for Skilled Therapeutic Interventions Met (OT Eval)  no;treatment indicated  -J        Therapy Frequency (OT Eval)  evaluation only  -J        Care Plan Review (OT)  evaluation/treatment results reviewed;care plan/treatment goals reviewed;risks/benefits reviewed;current/potential barriers reviewed;patient/other agree to care plan  -J        Anticipated Discharge Disposition (OT)  home with assist  -           Living Environment    Home Accessibility  stairs to enter home walk-in shower  -          User Key  (r) = Recorded By, (t) = Taken By, (c) = Cosigned By    Initials Name Effective Dates    Opal Acosta OTR/L 07/05/20 -           Occupational Therapy Education                 Title: PT OT SLP Therapies (In Progress)     Topic: Occupational Therapy (In Progress)     Point: ADL training (Done)     Description:   Instruct learner(s) on proper safety adaptation and remediation techniques during self care or transfers.   Instruct in proper use of assistive devices.              Learning Progress Summary           Patient Acceptance, E, VU by PADMA at 7/12/2020 1514                   Point: Home exercise program (Not Started)     Description:   Instruct learner(s) on appropriate technique for monitoring, assisting and/or progressing therapeutic exercises/activities.              Learner Progress:   Not documented in this visit.          Point: Precautions (Done)     Description:   Instruct learner(s) on prescribed precautions during self-care and functional transfers.              Learning Progress Summary           Patient Acceptance, E, VU by PADMA at 7/12/2020 1514                   Point: Body mechanics (Done)     Description:   Instruct learner(s) on proper positioning and spine alignment during self-care, functional mobility activities and/or  exercises.              Learning Progress Summary           Patient Acceptance, E, VU by PADMA at 7/12/2020 1514                               User Key     Initials Effective Dates Name Provider Type Discipline     07/05/20 -  Opal Tyler OTR/L Occupational Therapist OT                OT Recommendation and Plan  Outcome Summary/Treatment Plan (OT)  Anticipated Discharge Disposition (OT): home with assist  Therapy Frequency (OT Eval): evaluation only  Plan of Care Review  Plan of Care Reviewed With: patient  Plan of Care Reviewed With: patient  Outcome Summary: OT eval completed. Pt demo's decreased activity tolerance/endurance. Independent with bed mobility, LB dressing and all aspects of bathing. Supervision for sit <> stand t/fs and all functional mobility with 2L O2/NC. Pt reports increased fatigue with all mobility. Pt is independent with all aspects of adl care. Will defer endruance training to PT. OT to sign off. Anticipate d/c home with assist.          Outcome Measures     Row Name 07/12/20 1500             How much help from another is currently needed...    Putting on and taking off regular lower body clothing?  4  -JJ      Bathing (including washing, rinsing, and drying)  4  -JJ      Toileting (which includes using toilet bed pan or urinal)  4  -JJ      Putting on and taking off regular upper body clothing  4  -JJ      Taking care of personal grooming (such as brushing teeth)  4  -JJ      Eating meals  4  -J      AM-PAC 6 Clicks Score (OT)  24  -         Functional Assessment    Outcome Measure Options  AM-PAC 6 Clicks Daily Activity (OT)  -        User Key  (r) = Recorded By, (t) = Taken By, (c) = Cosigned By    Initials Name Provider Type    Opal Acosta OTR/L Occupational Therapist          Time Calculation:   Time Calculation- OT     Row Name 07/12/20 1512             Time Calculation- OT    OT Start Time  1335 add 10 minutes for chart review   -      OT Stop Time  1403  -PADMA       OT Time Calculation (min)  28 min  -PADMA      OT Received On  07/12/20  -PADMA        User Key  (r) = Recorded By, (t) = Taken By, (c) = Cosigned By    Initials Name Provider Type    Opal Acosta OTR/L Occupational Therapist        Therapy Suggested Charges     Code   Minutes Charges    None           Therapy Charges for Today     Code Description Service Date Service Provider Modifiers Qty    30554922317 HC OT EVAL LOW COMPLEXITY 3 7/12/2020 Opal Tyler OTR/L GO 1               OT Discharge Summary  Anticipated Discharge Disposition (OT): home with assist  Reason for Discharge: At baseline function  Outcomes Achieved: Other(eval x1)  Discharge Destination: Home with assist    LEWIS Macias  7/12/2020

## 2020-07-12 NOTE — PROGRESS NOTES
PULMONARY AND CRITICAL CARE PROGRESS NOTE - [facility]    Patient: Wong Patrick  1955   MR# [id number]   Acct# [id number]  07/12/20   13:27  Referring Provider: Jonathan Hurst MD    Chief Complaint: Shortness of breath, acute exacerbation of chronic obstructive pulmonary disease,    Interval history: He is feeling little better today denies any shortness of breath.  He is on 2 L of oxygen.  He is feeling better on bronchodilator treatment and after starting steroid.  He is currently on 2 L of oxygen and is breathing better.  He scheduled for bronchoscopy tomorrow.  No other acute events overnight.        Meds:    arformoterol 15 mcg Nebulization BID - RT   budesonide 0.5 mg Nebulization BID - RT   busPIRone 15 mg Oral BID   cefTRIAXone 1 g Intravenous Q24H   enoxaparin 30 mg Subcutaneous Nightly   ipratropium-albuterol 3 mL Nebulization 4x Daily - RT   levoFLOXacin 500 mg Intravenous Q24H   losartan 50 mg Oral Daily   methylPREDNISolone sodium succinate 40 mg Intravenous Q6H   nicotine 1 patch Transdermal Q24H   pantoprazole 40 mg Oral Q AM   QUEtiapine 100 mg Oral Nightly   sodium chloride 10 mL Intravenous Q12H        Review of Systems:   Review of Systems   All other systems reviewed and are negative.  A 14 point systems review was done.  Positive findings as described above.  Physical Exam:  SpO2 Percentage    07/12/20 1104 07/12/20 1112 07/12/20 1123   SpO2: 97% 95% 93%     Temp:  [97.9 °F (36.6 °C)-98.3 °F (36.8 °C)] 98.3 °F (36.8 °C)  Heart Rate:  [] 103  Resp:  [18-26] 18  BP: (113-137)/(67-76) 122/71    Intake/Output Summary (Last 24 hours) at 7/12/2020 1327  Last data filed at 7/12/2020 0901  Gross per 24 hour   Intake 1160 ml   Output 1300 ml   Net -140 ml     Physical Exam   Constitutional: He is oriented to person, place, and time. He appears well-developed and well-nourished.   HENT:   Head: Normocephalic and atraumatic.   Eyes: Pupils are equal, round, and  reactive to light. EOM are normal.   Neck: Normal range of motion. Neck supple. No tracheal deviation present. No thyromegaly present.   Cardiovascular: Normal rate, regular rhythm and normal heart sounds. Exam reveals no gallop.   No murmur heard.  Pulmonary/Chest: Effort normal. He has no wheezes. He has no rales.   Abdominal: Soft. Bowel sounds are normal. He exhibits no mass.   Musculoskeletal: Normal range of motion. He exhibits no edema or deformity.   Neurological: He is alert and oriented to person, place, and time. He displays normal reflexes. No cranial nerve deficit.   Skin: Skin is warm and dry.   Psychiatric: He has a normal mood and affect. His behavior is normal. Thought content normal.   Vitals reviewed.    Laboratory Data:  Results from last 7 days   Lab Units 07/12/20  0200 07/11/20  0518 07/10/20  0416   WBC 10*3/mm3 11.14* 9.96 10.40   HEMOGLOBIN g/dL 8.1* 7.8* 8.0*   PLATELETS 10*3/mm3 190 173 195     Results from last 7 days   Lab Units 07/12/20  0200 07/11/20  0518 07/10/20  0416   SODIUM mmol/L 138 136 140   POTASSIUM mmol/L 4.5 4.9 4.1   BUN mg/dL 25* 22 23   CREATININE mg/dL 0.86 0.84 0.83         No results found for: BLOODCX, URINECX, WOUNDCX, MRSACX, RESPCX, STOOLCX  Recent films:  No radiology results for the last day  Films reviewed personally by me.  My interpretation: Same as above.  Pulmonary Assessment:  1. Right lower lobe pneumonia  2. Community-acquired bacterial pneumonia  3. Acute exacerbation of chronic obstructive pulmonary disease  4. Ongoing shortness of breath  5. History of tobacco abuse and alcohol abuse  6. Acute on chronic hypoxic respiratory failure requiring supplemental oxygen,.  7. Tracheal mass waiting for bronchoscopy in 2 days time.  8. Allergic rhinitis  9. Anxiety and depression  10. Patient screen negative for COVID twice      Recommend:   · His respiratory status is stable and improving continue current treatment plan with respiratory care.  Continue  bronchodilator treatment.  · Continue Solu-Medrol for acute exacerbation of chronic obstructive pulmonary disease.  Start tapering steroids and sent home on oral steroid taper  · He has a bronchoscopy scheduled for tracheal mass tomorrow with Dr. Sewell.    · Continue incentive spirometry and flutter valve to improve pulmonary clearance and compliance.  · He appears deconditioned and weak but improving.  Continue physical therapy for deconditioning.  Nutritional support.  · Social work is working on his home environment for questionable bedbug and landers issues.  · Continue DVT ulcer prophylaxis and pain and anxiety control.  Continue nutritional support  · Routine labs as needed.  Follow-up chest x-ray will be needed after bronchoscopy.  · Pulmonary team will continue following and make further recommendations    Noah Arriaga MD  Pulmonologist/Intensivist  7/12/2020 13:27    Electronically signed by Noah Arriaga MD, 07/12/20, 13:27

## 2020-07-12 NOTE — PROGRESS NOTES
[facility] Medicine Services  INPATIENT PROGRESS NOTE    Patient Name: Wong Patrick  Date of Admission: 7/8/2020  Today's Date: 07/12/20  Length of Stay: 4  Primary Care Physician: Provider, No Known    Subjective   Chief Complaint: SOA  HPI   Doiong ok.  Feels better today.  Less SOA.  On 2L NC.  Wheezing less.  Afebrile    Pulmonology planning bronchoscopy tomorrow.          Review of Systems   Constitutional: Negative for fatigue and fever.   HENT: Negative for congestion and ear pain.    Eyes: Negative for redness.   Respiratory: Positive for cough, shortness of breath and wheezing.    Cardiovascular: Negative for chest pain and palpitations.   Gastrointestinal: Negative for abdominal pain, diarrhea, nausea and vomiting.   Endocrine: Negative for cold intolerance and heat intolerance.   Genitourinary: Negative for dysuria and frequency.   Musculoskeletal: Negative for arthralgias and back pain.   Skin: Negative for rash and wound.   Neurological: Negative for dizziness and headaches.   Psychiatric/Behavioral: Negative for confusion. The patient is not nervous/anxious.         All pertinent negatives and positives are as above. All other systems have been reviewed and are negative unless otherwise stated.     Objective    Temp:  [97.9 °F (36.6 °C)-98.3 °F (36.8 °C)] 98.2 °F (36.8 °C)  Heart Rate:  [] 113  Resp:  [18-26] 20  BP: (122-137)/(70-85) 129/85  Physical Exam   Constitutional: He is oriented to person, place, and time. He appears well-developed and well-nourished.   HENT:   Head: Normocephalic and atraumatic.   Right Ear: External ear normal.   Left Ear: External ear normal.   Nose: Nose normal.   Mouth/Throat: Oropharynx is clear and moist.   Eyes: Pupils are equal, round, and reactive to light. Conjunctivae and EOM are normal. Right eye exhibits no discharge. Left eye exhibits no discharge. No scleral icterus.   Neck: Normal range of motion. Neck supple. No  tracheal deviation present. No thyromegaly present.   Cardiovascular: Normal rate, regular rhythm, normal heart sounds and intact distal pulses. Exam reveals no gallop and no friction rub.   No murmur heard.  Pulmonary/Chest: Effort normal. No stridor. No respiratory distress. He has decreased breath sounds. He has wheezes. He has no rales. He exhibits no tenderness.   Abdominal: Soft. Bowel sounds are normal. He exhibits no distension and no mass. There is no tenderness. There is no rebound and no guarding. No hernia.   Musculoskeletal: Normal range of motion. He exhibits no edema or deformity.   Lymphadenopathy:     He has no cervical adenopathy.   Neurological: He is alert and oriented to person, place, and time. He has normal reflexes. He displays normal reflexes. No cranial nerve deficit. He exhibits normal muscle tone. Coordination normal.   Skin: Skin is warm and dry. No rash noted. No erythema. No pallor.   Psychiatric: He has a normal mood and affect. His behavior is normal. Judgment and thought content normal.   Vitals reviewed.          Results Review:  I have reviewed the labs, radiology results, and diagnostic studies.    Laboratory Data:   Results from last 7 days   Lab Units 07/12/20  0200 07/11/20  0518 07/10/20  0416   WBC 10*3/mm3 11.14* 9.96 10.40   HEMOGLOBIN g/dL 8.1* 7.8* 8.0*   HEMATOCRIT % 26.2* 24.4* 25.7*   PLATELETS 10*3/mm3 190 173 195        Results from last 7 days   Lab Units 07/12/20  0200 07/11/20 0518 07/10/20  0416   SODIUM mmol/L 138 136 140   POTASSIUM mmol/L 4.5 4.9 4.1   CHLORIDE mmol/L 104 103 106   CO2 mmol/L 25.0 26.0 24.0   BUN mg/dL 25* 22 23   CREATININE mg/dL 0.86 0.84 0.83   CALCIUM mg/dL 7.9* 8.1* 7.8*   BILIRUBIN mg/dL 0.2 0.2 0.3   ALK PHOS U/L 83 79 83   ALT (SGPT) U/L 33 30 39   AST (SGOT) U/L 53* 46* 73*   GLUCOSE mg/dL 116* 137* 93       Culture Data:   No results found for: BLOODCX, URINECX, WOUNDCX, MRSACX, RESPCX, STOOLCX    Radiology Data:   Imaging Results  (Last 24 Hours)     ** No results found for the last 24 hours. **          I have reviewed the patient's current medications.     Assessment/Plan     Active Hospital Problems    Diagnosis   • **Right lower lobe pneumonia   • Tracheal mass   • Acquired thrombocytopenia (CMS/HCC)   • Chronic hepatitis C (CMS/HCC)   • Hypertension   • Cirrhosis of liver (CMS/HCC)     Labs reviewed:  H&H stable, Plts stable  Electrolyes WNL    1.  PNA--stable  -IV abx  -Pulm following     2.  COPD AE--stable  -IV abx  -Pulm following  -IV steroids  -Brovana  -Pulmicort  -duonebs     3.  Tracheal Mass  -Pulm following, bronch monday     4.  Cirrhosis  -monitor     5.  Thrombocytopenia  -resolved     6.  HTN  -Losartan                     Discharge Planning: I expect the patient to be discharged to home in 2-3 days    Jonathan Hurst MD   07/12/20   17:00

## 2020-07-12 NOTE — PLAN OF CARE
Problem: Patient Care Overview  Goal: Plan of Care Review  Flowsheets  Taken 7/12/2020 1514  Plan of Care Reviewed With: patient  Taken 7/12/2020 7306  Outcome Summary: OT eval completed. Pt demo's decreased activity tolerance/endurance. Independent with bed mobility, LB dressing and all aspects of bathing. Supervision for sit <> stand t/fs and all functional mobility with 2L O2/NC. Pt reports increased fatigue with all mobility. Pt is independent with all aspects of adl care. Will defer endruance training to PT. OT to sign off. Anticipate d/c home with assist.

## 2020-07-13 NOTE — ANESTHESIA POSTPROCEDURE EVALUATION
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])    Surgeon:  Karthik Sewell MD Provider:  Prabhjot Villagran CRNA    Anesthesia Type:  general ASA Status:  3          Anesthesia Type: general    Vitals  Vitals Value Taken Time   /74 7/13/2020  2:50 PM   Temp 97.6 °F (36.4 °C) 7/13/2020  2:50 PM   Pulse 95 7/13/2020  2:50 PM   Resp 16 7/13/2020  2:50 PM   SpO2 99 % 7/13/2020  2:50 PM           Post Anesthesia Care and Evaluation    Patient location during evaluation: PACU  Patient participation: complete - patient participated  Level of consciousness: awake and alert  Pain management: adequate  Airway patency: patent  Anesthetic complications: No anesthetic complications  PONV Status: none  Cardiovascular status: acceptable and hemodynamically stable  Respiratory status: acceptable  Hydration status: acceptable    Comments: Blood pressure 118/59, pulse 91, temperature 97.8 °F (36.6 °C), temperature source Oral, resp. rate 20, height 165.1 cm (65\"), weight 76.7 kg (169 lb), SpO2 98 %.    Patient discharged from PACU based upon Amna score. Please see RN notes for further details      "

## 2020-07-13 NOTE — THERAPY EVALUATION
Patient Name: Wong Patrick  : 1955    MRN: [id number]                              Today's Date: 2020       Admit Date: 2020    Visit Dx:     ICD-10-CM ICD-9-CM   1. Tracheal mass J39.8 786.6   2. Decreased activities of daily living (ADL) Z78.9 V49.89   3. Decreased mobility and endurance Z74.09 780.99     Patient Active Problem List   Diagnosis   • History of colon polyps   • Nausea and vomiting   • Right lower lobe pneumonia   • Tracheal mass   • Acquired thrombocytopenia (CMS/HCC)   • Chronic hepatitis C (CMS/HCC)   • Hypertension   • Cirrhosis of liver (CMS/HCC)     Past Medical History:   Diagnosis Date   • Abnormal weight loss    • Acquired thrombocytopenia (CMS/HCC)    • Adenomatous polyp of colon    • Anxiety    • Biliary calculus     denies N/V, abd pain   • Chronic hepatitis C (CMS/HCC)    • Cirrhosis of liver (CMS/HCC)    • Colon polyp    • Elevated levels of transaminase & lactic acid dehydrogenase    • Generalized abdominal pain     known gallstones   • GERD (gastroesophageal reflux disease)    • Hypertension    • IBS (irritable bowel syndrome)    • Impacted cerumen    • Kidney disease     US suggest renal dz, elevated alpha 2 macroglobulins   • Nausea and vomiting    • Patient noncompliance, general    • Rectal hemorrhage    • Villous adenoma of colon    • Wheezing      Past Surgical History:   Procedure Laterality Date   • COLONOSCOPY  2013   • COLONOSCOPY N/A 2018    Procedure: COLONOSCOPY;  Surgeon: Charles Pham MD;  Location: [facility] ENDOSCOPY;  Service: Gastroenterology   • ENDOSCOPY N/A 2019    Procedure: ESOPHAGOGASTRODUODENOSCOPY;  Surgeon: Bi Ibarra MD;  Location: [facility] ENDOSCOPY;  Service: General   • ENDOSCOPY AND COLONOSCOPY     • ESOPHAGOSCOPY / EGD      with tube   • UPPER GASTROINTESTINAL ENDOSCOPY  2013   • UPPER GASTROINTESTINAL ENDOSCOPY  2019     General Information     Row Name 20 0824          PT Evaluation  Time/Intention    Document Type  evaluation Pt t/f from OSH with pnuemonia. CT Scan: para tracheal mass, lymphadenopathy, R lung pneumonia. Pt PMH of cirrhosis, HTN, hepatitis C. Dx: R LL pneumonia, tracheal mass.  -SB     Mode of Treatment  physical therapy  -SB     Row Name 07/13/20 0824          General Information    Patient Profile Reviewed?  yes  -SB     Prior Level of Function  independent:;all household mobility;ADL's  -SB     Existing Precautions/Restrictions  oxygen therapy device and L/min;fall 1.5L  -SB     Barriers to Rehab  none identified  -SB     Row Name 07/13/20 0824          Relationship/Environment    Lives With  alone  -SB     Row Name 07/13/20 0824          Resource/Environmental Concerns    Current Living Arrangements  home/apartment/condo walk in shower  -SB     Row Name 07/13/20 0824          Home Main Entrance    Number of Stairs, Main Entrance  four  -SB     Stair Railings, Main Entrance  none  -SB     Row Name 07/13/20 0824          Stairs Within Home, Primary    Number of Stairs, Within Home, Primary  none  -SB     Row Name 07/13/20 0824          Cognitive Assessment/Intervention- PT/OT    Orientation Status (Cognition)  oriented x 4;verbal cues/prompts needed for orientation  -SB     Personal Safety Interventions  gait belt;supervised activity;muscle strengthening facilitated;nonskid shoes/slippers when out of bed;fall prevention program maintained  -SB     Row Name 07/13/20 0824          Safety Issues, Functional Mobility    Impairments Affecting Function (Mobility)  endurance/activity tolerance;shortness of breath  -SB       User Key  (r) = Recorded By, (t) = Taken By, (c) = Cosigned By    Initials Name Provider Type    Emmanuelle Alejandro, PT DPT Physical Therapist        Mobility     Row Name 07/13/20 0824          Bed Mobility Assessment/Treatment    Comment (Bed Mobility)  sitting EOB upon arrival and left up in chair  -SB     Row Name 07/13/20 0824          Sit-Stand Transfer     Sit-Stand Sterling (Transfers)  supervision  -SB     Row Name 07/13/20 0824          Gait/Stairs Assessment/Training    Gait/Stairs Assessment/Training  gait/ambulation independence  -SB     S[place]ling Level (Gait)  contact guard  -SB     Distance in Feet (Gait)  100x2  -SB     Pattern (Gait)  step-through  -SB     Comment (Gait/Stairs)  pt with SOA during gait but reports feeling much improved since walking yesterday  -SB       User Key  (r) = Recorded By, (t) = Taken By, (c) = Cosigned By    Initials Name Provider Type    SB Emmanuelle Cordon PT DPT Physical Therapist        Obj/Interventions     Row Name 07/13/20 0824          General ROM    GENERAL ROM COMMENTS  BLE WFL  -SB     Row Name 07/13/20 0824          MMT (Manual Muscle Testing)    General MMT Comments  BLE 4+/5  -SB     Row Name 07/13/20 0824          Static Sitting Balance    Level of Sterling (Unsupported Sitting, Static Balance)  independent  -SB     Sitting Position (Unsupported Sitting, Static Balance)  sitting on edge of bed  -SB     Row Name 07/13/20 0824          Static Standing Balance    Level of [place] (Supported Standing, Static Balance)  supervision  -SB     Row Name 07/13/20 0824          Dynamic Standing Balance    Level of [place], Reaches Outside Midline (Standing, Dynamic Balance)  standby assist  -SB     Time Able to Maintain Position, Reaches Outside Midline (Standing, Dynamic Balance)  15 to 30 seconds  -SB     Comment, Reaches Outside Midline (Standing, Dynamic Balance)  resisted mild-mod perturbations with feet together eyes open, no LOB  -SB     Row Name 07/13/20 0824          Sensory Assessment/Intervention    Sensory General Assessment  no sensation deficits identified  -SB       User Key  (r) = Recorded By, (t) = Taken By, (c) = Cosigned By    Initials Name Provider Type    Emmanuelle Alejandro PT DPT Physical Therapist        Goals/Plan     Row Name 07/13/20 0824          Transfer Goal 1 (PT)     Activity/Assistive Device (Transfer Goal 1, PT)  sit-to-stand/stand-to-sit;bed-to-chair/chair-to-bed  -SB     [place] Level/Cues Needed (Transfer Goal 1, PT)  independent  -SB     Time Frame (Transfer Goal 1, PT)  long term goal (LTG)  -SB     Progress/Outcome (Transfer Goal 1, PT)  goal ongoing  -SB     Row Name 07/13/20 0824          Gait Training Goal 1 (PT)    Activity/Assistive Device (Gait Training Goal 1, PT)  gait (walking locomotion);improve balance and speed;increase endurance/gait distance;decrease fall risk;increase energy conservation  -SB     [place] Level (Gait Training Goal 1, PT)  supervision required  -SB     Distance (Gait Goal 1, PT)  300  -SB     Time Frame (Gait Training Goal 1, PT)  long term goal (LTG)  -SB     Progress/Outcome (Gait Training Goal 1, PT)  goal ongoing  -SB     Row Name 07/13/20 0824          Stairs Goal 1 (PT)    Activity/Assistive Device (Stairs Goal 1, PT)  stairs, all skills  -SB     [place] Level/Cues Needed (Stairs Goal 1, PT)  supervision required  -SB     Number of Stairs (Stairs Goal 1, PT)  4  -SB     Time Frame (Stairs Goal 1, PT)  long term goal (LTG)  -SB     Progress/Outcome (Stairs Goal 1, PT)  goal ongoing  -SB       User Key  (r) = Recorded By, (t) = Taken By, (c) = Cosigned By    Initials Name Provider Type    Emmanuelle Alejandro, PT DPT Physical Therapist        Clinical Impression     Row Name 07/13/20 0824          Pain Assessment    Additional Documentation  Pain Scale: Numbers Pre/Post-Treatment (Group)  -SB     Row Name 07/13/20 0824          Pain Scale: Numbers Pre/Post-Treatment    Pain Scale: Numbers, Pretreatment  0/10 - no pain  -SB     Pain Intervention(s)  Medication (See MAR);Ambulation/increased activity;Repositioned  -SB     Row Name 07/13/20 0824          Plan of Care Review    Plan of Care Reviewed With  patient  -SB     Progress  no change  -SB     Outcome Summary  PT eval completed. Pt alert and oriented x4 with cues and  options. Pt sitting on EOB upon arrival and displays good strength in BLE. Pt oin 1.5L O2 and sats at 96%. Pt performed t/f with sup and gait with CGA, ambulating 100 feet x2 with reports of feeling much better as compared to walk on 7/12. Pt still displays SOA and will benefit from skilled PT for endurance training, stair training and energy conservation. Recommend d/c home with assist.   -SB     Row Name 07/13/20 0824          Physical Therapy Clinical Impression    Patient/Family Goals Statement (PT Clinical Impression)  go home  -SB     Criteria for Skilled Interventions Met (PT Clinical Impression)  yes;treatment indicated  -SB     Rehab Potential (PT Clinical Summary)  good, to achieve stated therapy goals  -SB     Predicted Duration of Therapy (PT)  until d/c  -SB     Row Name 07/13/20 0824          Vital Signs    Pre SpO2 (%)  96  -SB     O2 Delivery Pre Treatment  supplemental O2 1.5L  -SB     O2 Delivery Intra Treatment  supplemental O2  -SB     O2 Delivery Post Treatment  supplemental O2  -SB     Row Name 07/13/20 0824          Positioning and Restraints    Pre-Treatment Position  in bed  -SB     Post Treatment Position  chair  -SB     In Chair  encouraged to call for assist;call light within reach;sitting  -SB       User Key  (r) = Recorded By, (t) = Taken By, (c) = Cosigned By    Initials Name Provider Type    Emmanuelle Alejandro, PT DPT Physical Therapist        Outcome Measures     Row Name 07/13/20 0824          How much help from another person do you currently need...    Turning from your back to your side while in flat bed without using bedrails?  4  -SB     Moving from lying on back to sitting on the side of a flat bed without bedrails?  4  -SB     Moving to and from a bed to a chair (including a wheelchair)?  4  -SB     Standing up from a chair using your arms (e.g., wheelchair, bedside chair)?  4  -SB     Climbing 3-5 steps with a railing?  3  -SB     To walk in hospital room?  4  -SB     AM-PAC  6 Clicks Score (PT)  23  -SB     Row Name 07/13/20 0824          Functional Assessment    Outcome Measure Options  AM-PAC 6 Clicks Basic Mobility (PT)  -SB       User Key  (r) = Recorded By, (t) = Taken By, (c) = Cosigned By    Initials Name Provider Type    Emmanuelle Alejandro, CHELSI DPT Physical Therapist        Physical Therapy Education                 Title: PT OT SLP Therapies (In Progress)     Topic: Physical Therapy (In Progress)     Point: Mobility training (Done)     Description:   Instruct learner(s) on safety and technique for assisting patient out of bed, chair or wheelchair.  Instruct in the proper use of assistive devices, such as walker, crutches, cane or brace.              Patient Friendly Description:   It's important to get you on your feet again, but we need to do so in a way that is safe for you. Falling has serious consequences, and your personal safety is the most important thing of all.        When it's time to get out of bed, one of us or a family member will sit next to you on the bed to give you support.     If your doctor or nurse tells you to use a walker, crutches, a cane, or a brace, be sure you use it every time you get out of bed, even if you think you don't need it.    Learning Progress Summary           Patient Acceptance, E, VU by SB at 7/13/2020 0956    Comment:  pt edu on POC, benefits of act, d/c plans                   Point: Home exercise program (Not Started)     Description:   Instruct learner(s) on appropriate technique for monitoring, assisting and/or progressing patient with therapeutic exercises and activities.              Learner Progress:   Not documented in this visit.          Point: Body mechanics (Not Started)     Description:   Instruct learner(s) on proper positioning and spine alignment for patient and/or caregiver during mobility tasks and/or exercises.              Learner Progress:   Not documented in this visit.          Point: Precautions (Done)      Description:   Instruct learner(s) on prescribed precautions during mobility and gait tasks              Learning Progress Summary           Patient Acceptance, E, VU by SB at 7/13/2020 0956    Comment:  pt edu on POC, benefits of act, d/c plans                               User Key     Initials Effective Dates Name Provider Type Discipline     10/31/19 -  Emmanuelle Cordon PT DPT Physical Therapist PT              PT Recommendation and Plan  Planned Therapy Interventions (PT Eval): balance training, gait training, home exercise program, transfer training, patient/family education, stair training, strengthening  Outcome Summary/Treatment Plan (PT)  Anticipated Discharge Disposition (PT): home with assist  Plan of Care Reviewed With: patient  Progress: no change  Outcome Summary: PT eval completed. Pt alert and oriented x4 with cues and options. Pt sitting on EOB upon arrival and displays good strength in BLE. Pt oin 1.5L O2 and sats at 96%. Pt performed t/f with sup and gait with CGA, ambulating 100 feet x2 with reports of feeling much better as compared to walk on 7/12. Pt still displays SOA and will benefit from skilled PT for endurance training, stair training and energy conservation. Recommend d/c home with assist.      Time Calculation:   PT Charges     Row Name 07/13/20 0957             Time Calculation    Start Time  0913 10 min chart review for a total of 42 min  -SB      Stop Time  0945  -SB      Time Calculation (min)  32 min  -SB      PT Received On  07/13/20  -SB      PT Goal Re-Cert Due Date  07/23/20  -SB        User Key  (r) = Recorded By, (t) = Taken By, (c) = Cosigned By    Initials Name Provider Type    Emmanuelle Alejandro, PT DPT Physical Therapist        Therapy Charges for Today     Code Description Service Date Service Provider Modifiers Qty    [id number]  PT EVAL LOW COMPLEXITY 3 7/13/2020 Emmanuelle Cordon, PT DPT GP 1          PT G-Codes  Outcome Measure Options: AM-PAC 6 Clicks Basic  Mobility (PT)  AM-PAC 6 Clicks Score (PT): 23  AM-PAC 6 Clicks Score (OT): 24    Emmanuelle Cordon PT DPT  7/13/2020

## 2020-07-13 NOTE — PROGRESS NOTES
Continued Stay Note   Eryn     Patient Name: Wong Patrick  MRN: [id number]  Today's Date: 7/13/2020    Admit Date: 7/8/2020    Discharge Plan     Row Name 07/13/20 0903       Plan    Plan  Home    Patient/Family in Agreement with Plan  yes    Plan Comments  Pt plans to return home alone as before.  His residence is infested with bed bugs and or roaches.  He told SW upon last visit he did not want health dept. contacted and did not have [facility] information to contact.  Will follow.         Discharge Codes    No documentation.             SKYLAR Álvarez

## 2020-07-13 NOTE — PLAN OF CARE
Problem: Patient Care Overview  Goal: Plan of Care Review  Outcome: Ongoing (interventions implemented as appropriate)  Flowsheets  Taken 7/13/2020 0956 by Emmanuelle Cordon, PT DPT  Progress: no change  Taken 7/13/2020 0913 by Prerna Clark RN  Plan of Care Reviewed With: patient  Taken 7/13/2020 0824 by Emmanuelle Cordon, PT DPT  Outcome Summary: PT eval completed. Pt alert and oriented x4 with cues and options. Pt sitting on EOB upon arrival and displays good strength in BLE. Pt oin 1.5L O2 and sats at 96%. Pt performed t/f with sup and gait with CGA, ambulating 100 feet x2 with reports of feeling much better as compared to walk on 7/12. Pt still displays SOA and will benefit from skilled PT for endurance training, stair training and energy conservation. Recommend d/c home with assist.

## 2020-07-13 NOTE — PLAN OF CARE
Problem: Patient Care Overview  Goal: Plan of Care Review  Outcome: Ongoing (interventions implemented as appropriate)  Flowsheets (Taken 7/13/2020 0322)  Progress: no change  Plan of Care Reviewed With: patient  Note:   NPO for bronchoscopy per pulmonology today.  IV abx given overnight.  Solumedrol continued.  Denies cough and pain.  Continue to monitor.   Goal: Individualization and Mutuality  Outcome: Ongoing (interventions implemented as appropriate)  Flowsheets (Taken 7/11/2020 0432)  Patient Specific Goals (Include Timeframe): better breathing  Patient Specific Interventions: IV abx  Patient Specific Preferences: door closed  Goal: Discharge Needs Assessment  Outcome: Ongoing (interventions implemented as appropriate)  Goal: Interprofessional Rounds/Family Conf  Outcome: Ongoing (interventions implemented as appropriate)     Problem: Pneumonia (Adult)  Goal: Signs and Symptoms of Listed Potential Problems Will be Absent, Minimized or Managed (Pneumonia)  Outcome: Ongoing (interventions implemented as appropriate)

## 2020-07-13 NOTE — ANESTHESIA PREPROCEDURE EVALUATION
Anesthesia Evaluation     no history of anesthetic complications:  NPO Solid Status: > 8 hours  NPO Liquid Status: > 8 hours           Airway   Mallampati: II  TM distance: >3 FB  Neck ROM: full  No difficulty expected  Dental      Pulmonary    (+) pneumonia , a smoker Current,     ROS comment: Pt  Presented to hospital in [place] complaining of worsening soa. CT reportedly showed pneumonia as well as tracheal mass. He was transferred to [facility]    Per pulmonology consult:  Patient had CT scan of the chest done in outside facility and the results are available which showed a right lower lobe pneumonia and a paratracheal mass  Cardiovascular   Exercise tolerance: poor (<4 METS)    (+) hypertension,       Neuro/Psych  (+) psychiatric history Anxiety,     (-) seizures, TIA, CVA  GI/Hepatic/Renal/Endo    (+)  GERD,  hepatitis C, liver disease (cirrhosis), renal disease,   (-) diabetes    Musculoskeletal     Abdominal    Substance History   (+) alcohol use (former),      OB/GYN          Other                        Anesthesia Plan    ASA 3     general     intravenous induction     Anesthetic plan, all risks, benefits, and alternatives have been provided, discussed and informed consent has been obtained with: patient.

## 2020-07-13 NOTE — ACP (ADVANCE CARE PLANNING)
Date of [facility] ACP interview: 7/13/2020  Location of interview: Pt's room  [facility] ACP Facilitator: Tori Roberts RN  Referral Source: nurse  Present for facilitation: Patient    SUMMARY OF ADVANCE CARE PLANNING DISCUSSION:  Wong visited for consult for [facility] facilitation. We reviewed purpose and goals for advance care planning.       Wong states he was interested in a Last Will and testiment.  Goals of medical care for severe, permanent brain injury were explored: Yes     Education materials were provided on: Advance Care Planning and Healthcare Agent Selection    Each section of the advance care/living will document was reviewed and discussed.    Advance care/living will document finished during this facilitation? no    Time spent on preparation, facilitation and documentation was under 30 minutes.    RECOMMENDATIONS/FOLLOW-UP:  Not interested, wants a Last Will and testament    CONSULT/NOTE ROUTED  yes    Tori Roberts, RN

## 2020-07-13 NOTE — OP NOTE
Bronchoscopy Procedure Note (Endobronchial Ultrasound Bronchoscopy)    Date of Operation: 07/13/20  Pre-op Diagnosis: Right lower lobe pneumonia, mediastinal adenopathy  Post-op Diagnosis: Same  Surgeon: Karthik Sewell MD  Anesthesia: General    Operation: Flexible fiberoptic bronchoscopy Bronchoscopy, Diagnostic and Endobronchial ultrasound (failed) was performed  Findings: mucoid impaction of airways of RLL, otherwise normal endobronchial anatomy.  Unable to visualize on ultrasound due to ultrasound failure  Specimen:   Specimens     ID Source Type Tests Collected By Collected At Frozen?      A Bronchus Wash · NON-GYNECOLOGIC CYTOLOGY  · FUNGAL CULTURE  · MEGHNA PREP  · AFB CULTURE  · RESPIRATORY CULTURE   Karthik Sewell MD 7/13/20 1424          Estimated Blood Loss: None  Complications: none    Indications and History:  The patient is a 65 y.o.  male with Right lower lobe pneumonia.  The risks, benefits, complications, treatment options and expected outcomes were discussed with the patient.  The possibilities of reaction to medication, pulmonary aspiration, perforation of a viscus, bleeding, failure to diagnose a condition and creating a complication requiring transfusion or operation were discussed with the patient who freely signed the consent.  Time out was taken prior to the procedure.    Description of Procedure:    After the induction of general anesthesia, the patient was positioned supine and the bronchoscope was passed through the endotracheal tube.  The scope was then passed into the trachea.     The trachea, mainstem bronchi, RUL, RML, Bronchus Intermedius, RLL, KEVEN, KEVEN upper division and lingula, and LLL, and all primary segmental airways were examined and were normal except as described below:    Endobronchial findings:  Trachea: Normal mucosa  Suly: Sharp  Right main bronchus: Normal mucosa  Right upper lobe bronchus: Normal mucosa  Right middle lobe bronchus: mild mucus secretion  Right  lower lobe bronchus: mucus plugs removed with suction aspiration and saline irrigation  Left main bronchus: Normal mucosa  Left upper lobe bronchus: Normal mucosa  Left lower lobe bronchus: Normal mucosa    The conventional bronchoscope was removed and the EBUS scope was inserted and we attempted to examine the mediastinum via ultrasound.  Findings: unable to image due to failure of ultrasound apparatus; unable to correct problem with change of scope and cables.  TBNA under ultrasound guidance was planned from station 7 but could not be collected,  After it was clear that we would not be able to visualize pathology to collect specimen, and after satisfactory confirmation of no abnormal bleeding or other complication, the bronchoscope was removed.    The Patient was taken to the Recovery Room in satisfactory condition.      Karthik Sewell MD at 14:34, 7/13/2020

## 2020-07-13 NOTE — PAYOR COMM NOTE
"7/13/20 [facility] [phone]  [phone]    NO NOTES AT THIS TIME. PATIENT IS IN IN SURGERY FOR  BRONCH.    WILL HAVE TO SEND RESULTS AND NOTES . I DO NOT HAVE.    FAXING PROGRESS NOTES FROM YESTERDAY.                Mabel Patrick (65 y.o. Male)     Date of Birth Social Security Number Address Home Phone MRN    1955  [address] [facility] [address] [phone] [id number]    Spiritism Marital Status          Scientologist Single       Admission Date Admission Type Admitting Provider Attending Provider Department, Room/Bed    7/8/20 Urgent Jonathan Hurst MD Moore, Jonathan Scott, MD [facility] OR, PAD OR/NONE    Discharge Date Discharge Disposition Discharge Destination                       Attending Provider:  Jonathan Hurst MD    Allergies:  Codeine, Penicillins    Isolation:  None   Infection:  None   Code Status:  CPR    Ht:  165.1 cm (65\")   Wt:  76.7 kg (169 lb)    Admission Cmt:  None   Principal Problem:  Right lower lobe pneumonia [J18.9]                 Active Insurance as of 7/8/2020     Primary Coverage     Payor Plan Insurance Group Employer/Plan Group    [facility] MEDICARE REPLACEMENT WELL[employer] MEDICARE REPLACEMENT      Payor Plan Address Payor Plan Phone Number Payor Plan Fax Number Effective Dates    [address] [address] [phone]  7/8/2020 - None Entered    [facility] [id number]       Subscriber Name Subscriber Birth Date Member ID       Mabel Patrick 1955 [id number]           Secondary Coverage     Payor Plan Insurance Group Employer/Plan Group    [employer] [facility] [place] [facility] [place]      Payor Plan Address Payor Plan Phone Number Payor Plan Fax Number Effective Dates    [address] [address]   1/1/2014 - None Entered    [place] [address]       Subscriber Name Subscriber Birth Date Member ID       MABEL PATRICK 1955 [id number]                 Emergency Contacts      (Rel.) Home " Phone Work Phone Mobile Phone    Yinka Shelton (Friend) [phone] -- [phone]    Volodymyr Patrick (Relative) [phone] -- [phone]        Jonathan Hurst MD   Physician   Medicine   Progress Notes   Signed   Date of Service:  07/12/20 1700   Creation Time:  07/12/20 1700            Signed             Show:Clear all  [x]Manual[x]Template[x]Copied    Added by:  [x]Jonathan Hurst MD    []Desiree for details       [facility] Medicine Services  INPATIENT PROGRESS NOTE     Patient Name: Wong Patrick  Date of Admission: 7/8/2020  Today's Date: 07/12/20  Length of Stay: 4  Primary Care Physician: Provider, No Known     Subjective   Chief Complaint: SOA  HPI   Doiong ok.  Feels better today.  Less SOA.  On 2L NC.  Wheezing less.  Afebrile     Pulmonology planning bronchoscopy tomorrow.          Subjective   Chief Complaint: SOA  HPI   Doiong ok.  Feels better today.  Less SOA.  On 2L NC.  Wheezing less.  Afebrile     Pulmonology planning bronchoscopy tomorrow.             Review of Systems   Constitutional: Negative for fatigue and fever.   HENT: Negative for congestion and ear pain.    Eyes: Negative for redness.   Respiratory: Positive for cough, shortness of breath and wheezing.    Cardiovascular: Negative for chest pain and palpitations.   Gastrointestinal: Negative for abdominal pain, diarrhea, nausea and vomiting.   Endocrine: Negative for cold intolerance and heat intolerance.   Genitourinary: Negative for dysuria and frequency.   Musculoskeletal: Negative for arthralgias and back pain.   Skin: Negative for rash and wound.   Neurological: Negative for dizziness and headaches.   Psychiatric/Behavioral: Negative for confusion. The patient is not nervous/anxious.          All pertinent negatives and positives are as above. All other systems have been reviewed and are negative unless otherwise stated.      Objective    Temp:  [97.9 °F (36.6 °C)-98.3 °F (36.8  °C)] 98.2 °F (36.8 °C)  Heart Rate:  [] 113  Resp:  [18-26] 20  BP: (122-137)/(70-85) 129/85  Physical Exam   Constitutional: He is oriented to person, place, and time. He appears well-developed and well-nourished.   HENT:   Head: Normocephalic and atraumatic.   Right Ear: External ear normal.   Left Ear: External ear normal.   Nose: Nose normal.   Mouth/Throat: Oropharynx is clear and moist.   Eyes: Pupils are equal, round, and reactive to light. Conjunctivae and EOM are normal. Right eye exhibits no discharge. Left eye exhibits no discharge. No scleral icterus.   Neck: Normal range of motion. Neck supple. No tracheal deviation present. No thyromegaly present.   Cardiovascular: Normal rate, regular rhythm, normal heart sounds and intact distal pulses. Exam reveals no gallop and no friction rub.   No murmur heard.  Pulmonary/Chest: Effort normal. No stridor. No respiratory distress. He has decreased breath sounds. He has wheezes. He has no rales. He exhibits no tenderness.   Abdominal: Soft. Bowel sounds are normal. He exhibits no distension and no mass. There is no tenderness. There is no rebound and no guarding. No hernia.   Musculoskeletal: Normal range of motion. He exhibits no edema or deformity.   Lymphadenopathy:     He has no cervical adenopathy.   Neurological: He is alert and oriented to person, place, and time. He has normal reflexes. He displays normal reflexes. No cranial nerve deficit. He exhibits normal muscle tone. Coordination normal.   Skin: Skin is warm and dry. No rash noted. No erythema. No pallor.   Psychiatric: He has a normal mood and affect. His behavior is normal. Judgment and thought content normal.   Vitals reviewed.    reviewed the labs, radiology results, and diagnostic studies.     Laboratory Data:          Results from last 7 days   Lab Units 07/12/20  0200 07/11/20  0518 07/10/20  0416   WBC 10*3/mm3 11.14* 9.96 10.40   HEMOGLOBIN g/dL 8.1* 7.8* 8.0*   HEMATOCRIT % 26.2* 24.4*  25.7*   PLATELETS 10*3/mm3 190 173 195                Results from last 7 days   Lab Units 07/12/20  0200 07/11/20  0518 07/10/20  0416   SODIUM mmol/L 138 136 140   POTASSIUM mmol/L 4.5 4.9 4.1   CHLORIDE mmol/L 104 103 106   CO2 mmol/L 25.0 26.0 24.0   BUN mg/dL 25* 22 23   CREATININE mg/dL 0.86 0.84 0.83   CALCIUM mg/dL 7.9* 8.1* 7.8*   BILIRUBIN mg/dL 0.2 0.2 0.3   ALK PHOS U/L 83 79 83   ALT (SGPT) U/L 33 30 39   AST (SGOT) U/L 53* 46* 73*   GLUCOSE mg/dL 116* 137* 93         Culture Data:   No results found for: BLOODCX, URINECX, WOUNDCX, MRSACX, RESPCX, STOOLCX     Radiology Data:       Imaging Results (Last 24 Hours)      ** No results found for the last 24 hours. **          Hospital Problems     Diagnosis   • **Right lower lobe pneumonia   • Tracheal mass   • Acquired thrombocytopenia (CMS/HCC)   • Chronic hepatitis C (CMS/HCC)   • Hypertension   • Cirrhosis of liver (CMS/HCC)      Labs reviewed:  H&H stable, Plts stable  Electrolyes WNL     1.  PNA--stable  -IV abx  -Pulm following     2.  COPD AE--stable  -IV abx  -Pulm following  -IV steroids  -Brovana  -Pulmicort  -duonebs     3.  Tracheal Mass  -Pulm following, bronch monday     4.  Cirrhosis  -monitor     5.  Thrombocytopenia  -resolved     6.  HTN  -Losartan           Noah Arriaga MD   Physician   Pulmonology   Progress Notes   Signed   Date of Service:  07/12/20 1327   Creation Time:  07/12/20 1327            Signed             Show:Clear all  [x]Manual[x]Template[x]Copied    Added by:  [x]Noah Arriaga MD    []Desiree for details       PULMONARY AND CRITICAL CARE PROGRESS NOTE - [facility]     Patient: Wong Patrick  1955   MR# [id number]   Acct# [id number]  07/12/20   13:27  Referring Provider: Jonathan Hurst MD     Chief Complaint: Shortness of breath, acute exacerbation of chronic obstructive pulmonary disease,     Interval history: He is feeling little better today denies any shortness of breath.  He is on  2 L of oxygen.  He is feeling better on bronchodilator treatment and after starting steroid.  He is currently on 2 L of oxygen and is breathing better.  He scheduled for bronchoscopy tomorrow.  No other acute events overnight.                Review of Systems:   Review of Systems   All other systems reviewed and are negative.  A 14 point systems review was done.  Positive findings as described above.  Physical Exam:        SpO2 Percentage     07/12/20 1104 07/12/20 1112 07/12/20 1123   SpO2: 97% 95% 93%      Temp:  [97.9 °F (36.6 °C)-98.3 °F (36.8 °C)] 98.3 °F (36.8 °C)  Heart Rate:  [] 103  Resp:  [18-26] 18  BP: (113-137)/(67-76) 122/71     Intake/Output Summary (Last 24 hours) at 7/12/2020 1327  Last data filed at 7/12/2020 0901      Gross per 24 hour   Intake 1160 ml   Output 1300 ml   Net -140 ml      Physical Exam   Constitutional: He is oriented to person, place, and time. He appears well-developed and well-nourished.   HENT:   Head: Normocephalic and atraumatic.   Eyes: Pupils are equal, round, and reactive to light. EOM are normal.   Neck: Normal range of motion. Neck supple. No tracheal deviation present. No thyromegaly present.   Cardiovascular: Normal rate, regular rhythm and normal heart sounds. Exam reveals no gallop.   No murmur heard.  Pulmonary/Chest: Effort normal. He has no wheezes. He has no rales.   Abdominal: Soft. Bowel sounds are normal. He exhibits no mass.   Musculoskeletal: Normal range of motion. He exhibits no edema or deformity.   Neurological: He is alert and oriented to person, place, and time. He displays normal reflexes. No cranial nerve deficit.   Skin: Skin is warm and dry.   Psychiatric: He has a normal mood and affect. His behavior is normal. Thought content normal.   Vitals reviewed.     Laboratory Data:         Results from last 7 days   Lab Units 07/12/20  0200 07/11/20  0518 07/10/20  0416   WBC 10*3/mm3 11.14* 9.96 10.40   HEMOGLOBIN g/dL 8.1* 7.8* 8.0*   PLATELETS  10*3/mm3 190 173 195             Results from last 7 days   Lab Units 07/12/20  0200 07/11/20  0518 07/10/20  0416   SODIUM mmol/L 138 136 140   POTASSIUM mmol/L 4.5 4.9 4.1   BUN mg/dL 25* 22 23   CREATININE mg/dL 0.86 0.84 0.83          No results found for: BLOODCX, URINECX, WOUNDCX, MRSACX, RESPCX, STOOLCX  Recent films:  No radiology results for the last day  Films reviewed personally by me.  My interpretation: Same as above.  Pulmonary Assessment:  1. Right lower lobe pneumonia  2. Community-acquired bacterial pneumonia  3. Acute exacerbation of chronic obstructive pulmonary disease  4. Ongoing shortness of breath  5. History of tobacco abuse and alcohol abuse  6. Acute on chronic hypoxic respiratory failure requiring supplemental oxygen,.  1. Tracheal Tracheal mass waiting for bronchoscopy in 2 days time.  2. Allergic rhinitis  3. Anxiety and depression  4. Patient screen negative for COVID twice        Recommend:   · His respiratory status is stable and improving continue current treatment plan with respiratory care.  Continue bronchodilator treatment.  · Continue Solu-Medrol for acute exacerbation of chronic obstructive pulmonary disease.  Start tapering steroids and sent home on oral steroid taper  · He has a bronchoscopy scheduled for tracheal mass tomorrow with Dr. Sewell.    · Continue incentive spirometry and flutter valve to improve pulmonary clearance and compliance.  · He appears deconditioned and weak but improving.  Continue physical therapy for deconditioning.  Nutritional support.  · Social work is working on his home environment for questionable bedbug and landers issues.  · Continue DVT ulcer prophylaxis and pain and anxiety control.  Continue nutritional support  · Routine labs as needed.  Follow-up chest x-ray will be needed after bronchoscopy.  · Pulmonary team will continue following and make further recommendations     Noah Arriaga MD  Pulmonologist/Intensivist  7/12/2020 13:27      Contains abnormal data Comprehensive Metabolic Panel   Order: [id number]   Status:  Final result   Visible to patient:  No (Not Released) Next appt:  None   Specimen Information: Blood        Component  Ref Range & Units 02:26 1d ago   Glucose  65 - 99 mg/dL 120High   116High     BUN  8 - 23 mg/dL 29High   25High     Creatinine  0.76 - 1.27 mg/dL 1.07  0.86    Sodium  136 - 145 mmol/L 138  138    Potassium  3.5 - 5.2 mmol/L 4.6  4.5    Comment: Specimen hemolyzed.  Results may be affected.   Chloride  98 - 107 mmol/L 103  104    CO2  22.0 - 29.0 mmol/L 26.0  25.0    Calcium  8.6 - 10.5 mg/dL 7.8Low   7.9Low     Total Protein  6.0 - 8.5 g/dL 6.3  6.5    Albumin  3.50 - 5.20 g/dL 2.50Low   2.50Low     ALT (SGPT)  1 - 41 U/L 40  33    AST (SGOT)  1 - 40 U/L 63High   53High     Alkaline Phosphatase  39 - 117 U/L 82  83    Total Bilirubin  0.0 - 1.2 mg/dL 0.2  0.2    eGFR Non African Amer  >60 mL/min/1.73 70  90    Globulin  gm/dL 3.8  4.0    A/G Ratio  g/dL 0.7  0.6    BUN/Creatinine Ratio  7.0 - 25.0 27.1High   29.1High     Anion Gap  5.0 - 15.0 mmol/L 9.0  9.0    Resulting Agency  [facility]      Narrative   Performed by: [facility]   GFR Normal >60  Chronic Kidney Disease <60  Kidney Failure <15      Specimen Collected: 07/13/20 02:26 Last Resulted: 07/13/20 04:25 Lab Flowsheet Order Details View Encounter Lab and Collection Details Routing Result History            Result Read / Acknowledged      Acknowledge result     7.                            Current Facility-Administered Medications   Medication Dose Route Frequency Provider Last Rate Last Dose   • [MAR Hold] acetaminophen (TYLENOL) tablet 650 mg  650 mg Oral Q4H PRN Maged Casillas MD       • [MAR Hold] albuterol (PROVENTIL) nebulizer solution 0.083% 2.5 mg/3mL  2.5 mg Nebulization Q6H PRN Noah Arriaga MD       • [MAR Hold] arformoterol (BROVANA) nebulizer solution 15 mcg  15 mcg Nebulization BID - RT Noah Arriaga MD   15 mcg at  07/13/20 0711   • [MAR Hold] budesonide (PULMICORT) nebulizer solution 0.5 mg  0.5 mg Nebulization BID - RT Noah Arriaga MD   0.5 mg at 07/13/20 0707   • [MAR Hold] busPIRone (BUSPAR) tablet 15 mg  15 mg Oral BID Maged Casillas MD   15 mg at 07/13/20 0920   • [MAR Hold] enoxaparin (LOVENOX) syringe 30 mg  30 mg Subcutaneous Nightly Bryan Braxton MD   30 mg at 07/12/20 2058   • [MAR Hold] HYDROcodone-acetaminophen (NORCO) 5-325 MG per tablet 1 tablet  1 tablet Oral Q6H PRN Bryan Braxton MD   1 tablet at 07/12/20 2100   • [MAR Hold] ipratropium-albuterol (DUO-NEB) nebulizer solution 3 mL  3 mL Nebulization 4x Daily - RT Noah Arriaga MD   3 mL at 07/13/20 1107   • lactated ringers infusion  100 mL/hr Intravenous Continuous Ene Cedeño MD       • lidocaine PF 1% (XYLOCAINE) injection 0.5 mL  0.5 mL Injection Once PRN Ene Cedeño MD       • losartan (COZAAR) tablet 50 mg  50 mg Oral Daily Maged Casillas MD   50 mg at 07/13/20 0920   • [MAR Hold] meclizine (ANTIVERT) tablet 25 mg  25 mg Oral BID PRN Maged Casillas MD       • [MAR Hold] methylPREDNISolone sodium succinate (SOLU-Medrol) injection 40 mg  40 mg Intravenous Q6H Noah Arriaga MD   40 mg at 07/13/20 0508   • midazolam (VERSED) injection 1 mg  1 mg Intravenous Q10 Min PRN Ene Cedeño MD       • [MAR Hold] nicotine (NICODERM CQ) 21 MG/24HR patch 1 patch  1 patch Transdermal Q24H Bryan Braxton MD       • [MAR Hold] ondansetron (ZOFRAN) injection 4 mg  4 mg Intravenous Q6H PRN Maged Casillas MD       • [MAR Hold] pantoprazole (PROTONIX) EC tablet 40 mg  40 mg Oral Q AM Maged Casillas MD   40 mg at 07/12/20 0534   • [MAR Hold] QUEtiapine (SEROquel) tablet 100 mg  100 mg Oral Nightly Maged Casillas MD   100 mg at 07/12/20 2058   • [MAR Hold] sodium chloride 0.9 % flush 10 mL  10 mL Intravenous Q12H Maged Casillas MD   10 mL at 07/13/20 0919   • [MAR  Hold] sodium chloride 0.9 % flush 10 mL  10 mL Intravenous PRN Maged Casillas MD   10 mL at 07/12/20 0012   • sodium chloride 0.9 % flush 3 mL  3 mL Intravenous Q12H Ene Cedeño MD       • sodium chloride 0.9 % flush 3-10 mL  3-10 mL Intravenous PRN Ene Cedeño MD         Facility-Administered Medications Ordered in Other Encounters   Medication Dose Route Frequency Provider Last Rate Last Dose   • lidocaine (cardiac) (XYLOCAINE) injection   Intravenous PRN Prabhjot Villagran, CRNA   100 mg at 07/13/20 1351   • Propofol (DIPRIVAN) injection    PRN Prabhjot Villagran, CRNA   100 mg at 07/13/20 1351   • rocuronium (ZEMURON) injection    PRN Prabhjot Villagran, CRNA   10 mg at 07/13/20 1352   • succinylcholine (ANECTINE) injection    PRN Prabhjot Villagran, CRNA   100 mg at 07/13/20 1352   • Vasopressin (PITRESSIN) injection    PRN Prabhjot Villagran, CRNA   2 Units at 07/13/20 1404

## 2020-07-13 NOTE — PLAN OF CARE
Problem: Patient Care Overview  Goal: Plan of Care Review  Outcome: Ongoing (interventions implemented as appropriate)  Flowsheets (Taken 7/13/2020 4010)  Progress: improving  Plan of Care Reviewed With: patient  Outcome Summary: VSS this shift. Patient went for bronchoscopy, the ultrasound wasn't complete due to a broken machine. Yeast seen in a culture from the bronchoscopy. IV solumedrol switched to PO prednisone. Safety maintained, no falls. Will cont to monitor and notify MD of any changes. S 77-86 on tele with [facility].

## 2020-07-14 NOTE — THERAPY DISCHARGE NOTE
Acute Care - Physical Therapy Progress Note/Discharge  [facility]     Patient Name: Wong Patrick  : 1955  MRN: [id number]  Today's Date: 2020  Onset of Illness/Injury or Date of Surgery: 20     Referring Physician: Dr. Hurst    Admit Date: 2020    Visit Dx:    ICD-10-CM ICD-9-CM   1. Tracheal mass J39.8 786.6   2. Decreased activities of daily living (ADL) Z78.9 V49.89   3. Decreased mobility and endurance Z74.09 780.99   4. Mediastinal adenopathy R59.0 785.6     Patient Active Problem List   Diagnosis   • History of colon polyps   • Nausea and vomiting   • Right lower lobe pneumonia   • Tracheal mass   • Acquired thrombocytopenia (CMS/HCC)   • Chronic hepatitis C (CMS/HCC)   • Hypertension   • Cirrhosis of liver (CMS/HCC)       Physical Therapy Education                 Title: PT OT SLP Therapies (Resolved)     Topic: Physical Therapy (Resolved)     Point: Mobility training (Resolved)     Description:   Instruct learner(s) on safety and technique for assisting patient out of bed, chair or wheelchair.  Instruct in the proper use of assistive devices, such as walker, crutches, cane or brace.              Patient Friendly Description:   It's important to get you on your feet again, but we need to do so in a way that is safe for you. Falling has serious consequences, and your personal safety is the most important thing of all.        When it's time to get out of bed, one of us or a family member will sit next to you on the bed to give you support.     If your doctor or nurse tells you to use a walker, crutches, a cane, or a brace, be sure you use it every time you get out of bed, even if you think you don't need it.    Learning Progress Summary           Patient Acceptance, E, VU by SB at 2020 0956    Comment:  pt edu on POC, benefits of act, d/c plans                   Point: Home exercise program (Resolved)     Description:   Instruct learner(s) on appropriate technique for monitoring,  assisting and/or progressing patient with therapeutic exercises and activities.              Learning Progress Summary           Patient Acceptance, E,D, DU,VU by ADALBERTO at 7/14/2020 1018    Comment:  Home exercises                   Point: Body mechanics (Resolved)     Description:   Instruct learner(s) on proper positioning and spine alignment for patient and/or caregiver during mobility tasks and/or exercises.              Learner Progress:   Not documented in this visit.          Point: Precautions (Resolved)     Description:   Instruct learner(s) on prescribed precautions during mobility and gait tasks              Learning Progress Summary           Patient Acceptance, E, VU by SB at 7/13/2020 0956    Comment:  pt edu on POC, benefits of act, d/c plans                               User Key     Initials Effective Dates Name Provider Type Discipline     08/02/16 -  Nidhi Carmen PTA Physical Therapy Assistant PT    SB 10/31/19 -  Emmanuelle Cordon PT DPT Physical Therapist PT              Rehab Goal Summary     Row Name 07/14/20 1400             Transfer Goal 1 (PT)    [place] Level/Cues Needed (Transfer Goal 1, PT)  independent Goal:independent  -ADALBERTO      Progress/Outcome (Transfer Goal 1, PT)  goal met  -ADALBERTO         Gait Training Goal 1 (PT)    [place] Level (Gait Training Goal 1, PT)  supervision required Goal: supervision  -ADALBERTO      Distance (Gait Goal 1, PT)  400 Goal: 300  -ADALBERTO      Progress/Outcome (Gait Training Goal 1, PT)  goal met  -ADALBERTO         Stairs Goal 1 (PT)    [place] Level/Cues Needed (Stairs Goal 1, PT)  -- Goal: supervision  -ADALBERTO      Number of Stairs (Stairs Goal 1, PT)  0 Not attempted.  -ADALBERTO      Progress/Outcome (Stairs Goal 1, PT)  goal not met  -ADALBERTO        User Key  (r) = Recorded By, (t) = Taken By, (c) = Cosigned By    Initials Name Provider Type Discipline     Nidhi Carmen PTA Physical Therapy Assistant PT        Therapy Treatment  Rehabilitation Treatment Summary      Row Name 07/14/20 0955             Treatment Time/Intention    Discipline  physical therapy assistant  -ADALBERTO      Document Type  therapy note (daily note)  -ADALBERTO      Subjective Information  no complaints  -JP2      Existing Precautions/Restrictions  fall  -JP2      Recorded by [ADALBERTO] Nidhi Carmen, [facility] 07/14/20 0955  [JP2] Nidhi Carmen, [facility] 07/14/20 1013      Row Name 07/14/20 0955             Vital Signs    Pre SpO2 (%)  95  -ADALBERTO      O2 Delivery Pre Treatment  room air  -ADALBERTO      Post SpO2 (%)  94  -ADALBERTO      O2 Delivery Post Treatment  room air  -ADALBERTO      Recorded by [ADALBERTO] Nidhi Carmen, [facility] 07/14/20 1013      Row Name 07/14/20 0955             Bed Mobility Assessment/Treatment    Supine-Sit [place] (Bed Mobility)  independent  -ADALBERTO      Sit-Supine [place] (Bed Mobility)  independent  -ADALBERTO      Recorded by [ADALBERTO] Nidhi Carmen, [facility] 07/14/20 1013      Row Name 07/14/20 0955             Sit-Stand Transfer    Sit-Stand [place] (Transfers)  independent  -ADALBERTO      Recorded by [ADALBERTO] Nidhi Carmen, [facility] 07/14/20 1013      Row Name 07/14/20 0955             Stand-Sit Transfer    Stand-Sit [place] (Transfers)  independent  -ADALBERTO      Recorded by [ADALBERTO] Nidhi Carmen, [facility] 07/14/20 1013      Row Name 07/14/20 0955             Gait/Stairs Assessment/Training    [place] Level (Gait)  supervision  -ADALBERTO      Distance in Feet (Gait)  400  -ADALBERTO      Recorded by [ADALBERTO] Nidhi Carmen, [facility] 07/14/20 1014      Row Name 07/14/20 0955             Therapeutic Exercise    Upper Extremity Range of Motion (Therapeutic Exercise)  shoulder flexion/extension, bilateral;shoulder abduction/adduction, bilateral;elbow flexion/extension, bilateral  -ADALBERTO      Lower Extremity Range of Motion (Therapeutic Exercise)  hip flexion/extension, bilateral;hip abduction/adduction, bilateral;knee flexion/extension, bilateral;ankle dorsiflexion/plantar flexion, bilateral  -ADALBERTO      Exercise Type (Therapeutic Exercise)  AROM (active  range of motion)  -ADALBERTO      Position (Therapeutic Exercise)  seated  -ADALBERTO      Sets/Reps (Therapeutic Exercise)  15-20  -ADALBERTO      Recorded by [ADALBERTO] Nidhi Carmen, [facility] 07/14/20 1018      Row Name 07/14/20 0955             Static Sitting Balance    Level of [place] (Unsupported Sitting, Static Balance)  independent  -ADALBERTO      Sitting Position (Unsupported Sitting, Static Balance)  sitting on edge of bed  -ADALBERTO      Recorded by [ADALBERTO] Nidhi Carmen, [facility] [place] 07/14/20 1014      Row Name 07/14/20 0955             Positioning and Restraints    Pre-Treatment Position  in bed  -ADALBERTO      Post Treatment Position  bed  -ADALBERTO      In Bed  supine;call light within reach  -ADALBERTO      Recorded by [ADALBERTO] Nidhi Carmen, [facility] [place] [facility] 07/14/20 1018      Row Name 07/14/20 0955             Pain Scale: Numbers Pre/Post-Treatment    Pain Scale: Numbers, Pretreatment  0/10 - no pain  -ADALBERTO      Pain Scale: Numbers, Post-Treatment  0/10 - no pain  -ADALBERTO      Recorded by [ADALBERTO] Nidhi Carmen, [facility] [place] [facility] 07/14/20 1018        User Key  (r) = Recorded By, (t) = Taken By, (c) = Cosigned By    Initials Name Effective Dates Discipline    ADALBERTO Nidhi Carmen, [facility] [place] 08/02/16 -  PT             PT Recommendation and Plan  Anticipated Discharge Disposition (PT): home with assist  Outcome Summary/Treatment Plan (PT)  Anticipated Discharge Disposition (PT): home with assist  Outcome Summary: Pt. is independent in bed mobility, transfers. Ambulates 400. Actively works thru LE ex's. Tolerates all well. RA SAT 94-96%.    Outcome Measures     Row Name 07/12/20 1500             How much help from another is currently needed...    Putting on and taking off regular lower body clothing?  4  -JJ      Bathing (including washing, rinsing, and drying)  4  -JJ      Toileting (which includes using toilet bed pan or urinal)  4  -JJ      Putting on and taking off regular upper body clothing  4  -JJ      Taking care of personal grooming (such as brushing teeth)  4  -JJ      Eating meals  4  -JJ       AM-PAC 6 Clicks Score (OT)  24  -         Functional Assessment    Outcome Measure Options  AM-PAC 6 Clicks Daily Activity (OT)  -        User Key  (r) = Recorded By, (t) = Taken By, (c) = Cosigned By    Initials Name Provider Type    Opal Acosta, OTR/L Occupational Therapist           Time Calculation:   PT Charges     Row Name 07/14/20 1021             Time Calculation    Start Time  0955  -ADALBERTO      Stop Time  1018  -ADALBERTO      Time Calculation (min)  23 min  -ADALBERTO         Timed Charges    41468 - PT Therapeutic Exercise Minutes  11  -ADALBERTO      90243 - Gait Training Minutes   12  -ADALBERTO        User Key  (r) = Recorded By, (t) = Taken By, (c) = Cosigned By    Initials Name Provider Type    Nidhi Shukla PTA Physical Therapy Assistant        Therapy Charges for Today     Code Description Service Date Service Provider Modifiers Qty    16770462084 HC GAIT TRAINING EA 15 MIN 7/14/2020 Nidhi Carmen, ADONAY GP 1    16251973822 HC PT THER PROC EA 15 MIN 7/14/2020 Nidhi Carmen PTA GP 1          PT G-Codes  Outcome Measure Options: AM-PAC 6 Clicks Basic Mobility (PT)  AM-PAC 6 Clicks Score (PT): 23  AM-PAC 6 Clicks Score (OT): 24    PT Discharge Summary  Anticipated Discharge Disposition (PT): home with assist  Reason for Discharge: Discharge from facility  Outcomes Achieved: Patient able to partially acheive established goals  Discharge Destination: Home with assist    Nidhi Carmen PTA  7/14/2020

## 2020-07-14 NOTE — PLAN OF CARE
Problem: Patient Care Overview  Goal: Plan of Care Review  Outcome: Ongoing (interventions implemented as appropriate)  Note:   VSS.  S/ST  PVC, Coup.  Patient c/o back pain twice.  Treated with prn meds.  Safety intact.  No falls. Cont. To monitor.  Call for concerns.

## 2020-07-14 NOTE — PLAN OF CARE
Problem: Patient Care Overview  Goal: Plan of Care Review  Outcome: Ongoing (interventions implemented as appropriate)  Flowsheets (Taken 7/14/2020 1019)  Outcome Summary: Pt. is independent in bed mobility, transfers. Ambulates 400. Actively works thru LE ex's. Tolerates all well. RA SAT 94-96%.

## 2020-07-14 NOTE — DISCHARGE SUMMARY
"    [facility] Medicine Services  DISCHARGE SUMMARY       Date of Admission: 7/8/2020  Date of Discharge:  7/14/2020  Primary Care Physician: Provider, No Known    Presenting Problem/History of Present Illness:  Pneumonia [J18.9]     Final Discharge Diagnoses:  Active Hospital Problems    Diagnosis   • **Right lower lobe pneumonia   • Tracheal mass   • Acquired thrombocytopenia (CMS/HCC)   • Chronic hepatitis C (CMS/HCC)   • Hypertension   • Cirrhosis of liver (CMS/HCC)   1.  PNA--stable  -IV abx completed  -Pulm following     2.  COPD AE--stable  -IV abx completed  -Pulm following  -IV steroids to PO  -Brovana  -Pulmicort  -duonebs     3.  Tracheal Mass  -Pulm following, bronch monday     4.  Cirrhosis  -monitor     5.  Thrombocytopenia  -resolved     6.  HTN  -Losartan          Consults: Pulmonology    Procedures Performed: Bronchoscopy    Pertinent Test Results: N/A    Chief Complaint on Day of Discharge:  \"I want to go home.\"    History of Present Illness on Day of Discharge:   Doing well, no SOA, tolerating room air    Hospital Course:  The patient is a 65 y.o. male who presented to [facility] with SOA.  He was found to have pneumonia and a COPD Exacerbation.  Imaging was also concerning for a tracheal mass.      He was started on IV antibiotics, IV steroids, and nebulizer treatments.  Pulmonology was consulted.  He underwent bronchoscopy with plans for EBUS and biopsy, however the ultrasound machine broke.  Pulmonology is arranging to have EBUS performed again.    He is comfortable with being discharged and with the discharge plan.  He was given the chance to ask questions and all questions were answered to his satisfaction.        Condition on Discharge:  Stable    Physical Exam on Discharge:  /65 (BP Location: Left arm, Patient Position: Sitting)   Pulse 109   Temp 98.2 °F (36.8 °C) (Oral)   Resp 16   Ht 165.1 cm (65\")   Wt 77.6 kg (171 lb 1.6 oz)   " SpO2 94%   BMI 28.47 kg/m²   Physical Exam   Constitutional: He is oriented to person, place, and time. He appears well-developed and well-nourished.   HENT:   Head: Normocephalic and atraumatic.   Right Ear: External ear normal.   Left Ear: External ear normal.   Nose: Nose normal.   Mouth/Throat: Oropharynx is clear and moist.   Eyes: Pupils are equal, round, and reactive to light. Conjunctivae and EOM are normal. Right eye exhibits no discharge. Left eye exhibits no discharge. No scleral icterus.   Neck: Normal range of motion. Neck supple. No tracheal deviation present. No thyromegaly present.   Cardiovascular: Normal rate, regular rhythm, normal heart sounds and intact distal pulses. Exam reveals no gallop and no friction rub.   No murmur heard.  Pulmonary/Chest: Effort normal and breath sounds normal. No stridor. No respiratory distress. He has no wheezes. He has no rales. He exhibits no tenderness.   Abdominal: Soft. Bowel sounds are normal. He exhibits no distension and no mass. There is no tenderness. There is no rebound and no guarding. No hernia.   Musculoskeletal: Normal range of motion. He exhibits no edema or deformity.   Lymphadenopathy:     He has no cervical adenopathy.   Neurological: He is alert and oriented to person, place, and time. He has normal reflexes. He displays normal reflexes. No cranial nerve deficit. He exhibits normal muscle tone. Coordination normal.   Skin: Skin is warm and dry. No rash noted. No erythema. No pallor.   Psychiatric: He has a normal mood and affect. His behavior is normal. Judgment and thought content normal.   Vitals reviewed.        Discharge Disposition:  Home  Home or Self Care    Discharge Medications:     Discharge Medications      New Medications      Instructions Start Date   budesonide-formoterol 160-4.5 MCG/ACT inhaler  Commonly known as:  Symbicort   2 puffs, Inhalation, 2 Times Daily - RT      predniSONE 10 MG tablet  Commonly known as:  DELTASONE   4  tabs daily x 3 days, then 3 tabs daily x 3 days, then 2 tabs daily x 3 days, then 1 tab daily x 3 days         Continue These Medications      Instructions Start Date   albuterol sulfate  (90 Base) MCG/ACT inhaler  Commonly known as:  PROVENTIL HFA;VENTOLIN HFA;PROAIR HFA   2 puffs, Inhalation, Every 4 Hours PRN      busPIRone 15 MG tablet  Commonly known as:  BUSPAR   15 mg, Oral, 2 Times Daily      fluticasone 50 MCG/ACT nasal spray  Commonly known as:  FLONASE   2 sprays, Nasal, Daily      loperamide 2 MG capsule  Commonly known as:  IMODIUM   2 mg, Oral, 4 Times Daily PRN      loratadine 10 MG tablet  Commonly known as:  CLARITIN   10 mg, Oral, Daily      losartan 50 MG tablet  Commonly known as:  COZAAR   50 mg, Oral, Daily      meclizine 25 MG tablet  Commonly known as:  ANTIVERT   25 mg, Oral, 2 times daily      omeprazole 20 MG capsule  Commonly known as:  priLOSEC   20 mg, Oral, Daily      QUEtiapine 100 MG tablet  Commonly known as:  SEROquel   100 mg, Oral, Every Night at Bedtime             Discharge Diet: regular    Activity at Discharge: as tolerated    Discharge Care Plan/Instructions: Go to ER For fever or SOA    Follow-up Appointments:   Future Appointments   Date Time Provider Department Center   7/20/2020  2:00 PM Deny Heller PA-C [facility] None       Test Results Pending at Discharge: N/A    Jonathan Hurst MD  07/14/20  12:59    Time: 40 minutes

## 2020-07-14 NOTE — PROGRESS NOTES
PULMONARY AND CRITICAL CARE PROGRESS NOTE - [facility]    Patient: Wong Patrick  1955   MR# [id number]   Acct# [id number]  07/14/20   09:25  Referring Provider: Jonathan Hurst MD    Chief Complaint: Chest lesion  Interval history: No new complaints.  We reviewed the situation we encountered at bronchoscopy yesterday.  Waiting on cytology and micro studies from bronch wash; not sure how diagnostic that will be but not confident we will have an answer from that.  No fever or increased dyspnea in the past day  Meds:    arformoterol 15 mcg Nebulization BID - RT   budesonide 0.5 mg Nebulization BID - RT   busPIRone 15 mg Oral BID   enoxaparin 30 mg Subcutaneous Nightly   ipratropium-albuterol 3 mL Nebulization 4x Daily - RT   losartan 50 mg Oral Daily   nicotine 1 patch Transdermal Q24H   pantoprazole 40 mg Oral Q AM   predniSONE 20 mg Oral Q12H   QUEtiapine 100 mg Oral Nightly   sodium chloride 10 mL Intravenous Q12H   sodium chloride 3 mL Intravenous Q12H       lactated ringers 100 mL/hr     Review of Systems:   Review of Systems   Respiratory: Negative for cough, shortness of breath and stridor.    Cardiovascular: Negative for chest pain.   Gastrointestinal: Negative for nausea and vomiting.     Physical Exam:  SpO2 Percentage    07/14/20 0722 07/14/20 0759 07/14/20 0807   SpO2: 98% 97% 97%     Temp:  [97.3 °F (36.3 °C)-98.8 °F (37.1 °C)] 97.4 °F (36.3 °C)  Heart Rate:  [] 78  Resp:  [16-26] 18  BP: (103-127)/(51-81) 123/70    Intake/Output Summary (Last 24 hours) at 7/14/2020 0925  Last data filed at 7/14/2020 0816  Gross per 24 hour   Intake --   Output 1025 ml   Net -1025 ml     Physical Exam   Constitutional: He appears well-developed. No distress.   HENT:   Nose: Nose normal.   Eyes: EOM are normal. No scleral icterus.   Neck: No JVD present. No tracheal deviation present.   Cardiovascular: Normal rate and regular rhythm.   Pulmonary/Chest: No respiratory distress.    Abdominal: Soft.   Skin: He is not diaphoretic.     Laboratory Data:  Results from last 7 days   Lab Units 07/14/20  0427 07/13/20  0226 07/12/20  0200   WBC 10*3/mm3 6.93 8.27 11.14*   HEMOGLOBIN g/dL 7.7* 8.3* 8.1*   PLATELETS 10*3/mm3 159 151 190     Results from last 7 days   Lab Units 07/14/20  0427 07/13/20  0559 07/13/20  0226 07/12/20  0200   SODIUM mmol/L 137  --  138 138   POTASSIUM mmol/L 4.8  --  4.6 4.5   BUN mg/dL 28*  --  29* 25*   CREATININE mg/dL 0.95  --  1.07 0.86   INR   --  1.27*  --   --            Pulmonary Assessment:  1. Subcarinal mediastinal adenopathy  2. Pulmonary infiltrate  3. Anemia stable  4. Cirrhosis stable    Recommend:   · Ok with me to home  · Will try to schedule bronchoscopy next week.  He reports he lives in [place] and it would be easiest for him from transportation standpoint to go to [place] if EBUS service is available there.  Will investigate that and try to arrange evaluation in [place], or I would be glad to do this as outpatient next week.  I am waiting on a call back from the [place] office.    Electronically signed by Karthik Sewell MD, 07/14/20, 09:25

## 2020-07-14 NOTE — PROGRESS NOTES
PULMONARY AND CRITICAL CARE PROGRESS NOTE - [facility]    Patient: Wong Patrick  1955   MR# [id number]   Acct# [id number]  07/13/20   20:54  Referring Provider: Jonathan Hurst MD    Chief Complaint: Chest lesion  Interval history: We have taken for bronchoscopy today.  Unfortunately we were plagued by a severe equipment malfunction which we could not correct and so therefore we could not get the ultrasound directed specimens that we wanted.  I was able to get a lot of secretions and mucous plugs from his lower lobe segments and post op he did indicate he felt like he could breathe better.  He has mild continuous shortness of breath in the chest improved for the past couple of days, anxious to get to the bottom of this problem.  Meds:    arformoterol 15 mcg Nebulization BID - RT   budesonide 0.5 mg Nebulization BID - RT   busPIRone 15 mg Oral BID   enoxaparin 30 mg Subcutaneous Nightly   ipratropium-albuterol 3 mL Nebulization 4x Daily - RT   losartan 50 mg Oral Daily   nicotine 1 patch Transdermal Q24H   pantoprazole 40 mg Oral Q AM   predniSONE 20 mg Oral Q12H   QUEtiapine 100 mg Oral Nightly   sodium chloride 10 mL Intravenous Q12H   sodium chloride 3 mL Intravenous Q12H       lactated ringers 100 mL/hr     Review of Systems:   Review of Systems   Constitutional: Negative for chills and fever.   Cardiovascular: Negative for chest pain.   Gastrointestinal: Negative for nausea and vomiting.     Physical Exam:  SpO2 Percentage    07/13/20 1519 07/13/20 1544 07/13/20 1753   SpO2: 98% 98% 98%     Temp:  [97.3 °F (36.3 °C)-98.8 °F (37.1 °C)] 98.8 °F (37.1 °C)  Heart Rate:  [] 102  Resp:  [16-30] 20  BP: (106-133)/(52-81) 116/63    Intake/Output Summary (Last 24 hours) at 7/13/2020 2054  Last data filed at 7/13/2020 1050  Gross per 24 hour   Intake 560 ml   Output 1350 ml   Net -790 ml     Physical Exam   Constitutional: He appears well-developed. No distress.   Eyes: EOM are  normal. No scleral icterus.   Neck: No JVD present. No tracheal deviation present.   Cardiovascular: Normal rate and regular rhythm.   Pulmonary/Chest: Effort normal. No accessory muscle usage. No respiratory distress. He has rhonchi. He has no rales.   Abdominal: Soft. There is no tenderness. There is no guarding.   Musculoskeletal: He exhibits no edema.   Skin: Skin is warm and dry. He is not diaphoretic.   Psychiatric: He has a normal mood and affect. His behavior is normal.     Laboratory Data:  Results from last 7 days   Lab Units 07/13/20 0226 07/12/20 0200 07/11/20 0518   WBC 10*3/mm3 8.27 11.14* 9.96   HEMOGLOBIN g/dL 8.3* 8.1* 7.8*   PLATELETS 10*3/mm3 151 190 173     Results from last 7 days   Lab Units 07/13/20 0559 07/13/20 0226 07/12/20 0200 07/11/20 0518   SODIUM mmol/L  --  138 138 136   POTASSIUM mmol/L  --  4.6 4.5 4.9   BUN mg/dL  --  29* 25* 22   CREATININE mg/dL  --  1.07 0.86 0.84   INR  1.27*  --   --   --            Pulmonary Assessment:  1. Subcarinal mediastinal adenopathy  2. Pulmonary infiltrate  3. Anemia stable  4. Cirrhosis stable    Recommend:   · Case was discussed with Dr. Hurst  · Situation regarding the ultrasound equipment was discussed with the patient.  This was a great disappointment.  · The endoscopy department is working on this problem.  I received a phone call later in the day from them indicating that might have the equipment up and working again in the near future and if that is the case I will be able to take him back for a second bronchoscopy Wednesday afternoon if he stays here or on an outpatient basis if he were to go home.    Electronically signed by Karthik Sewell MD, 07/13/20, 20:54

## 2020-07-15 PROBLEM — R59.0 MEDIASTINAL ADENOPATHY: Status: ACTIVE | Noted: 2020-01-01

## 2020-07-15 NOTE — PAYOR COMM NOTE
"  IL HOME 7-14-20  [id number]  PatrickMabel Bi (65 y.o. Male)     Date of Birth Social Security Number Address Home Phone MRN    1955  [address]  [address] [facility] [address] [phone] [id number]    Latter-day Marital Status          Evangelical Single       Admission Date Admission Type Admitting Provider Attending Provider Department, Room/Bed    7/8/20 Urgent Jonathan Hurst MD  [facility] 4B, 448/1    Discharge Date Discharge Disposition Discharge Destination        7/14/2020 Home or Self Care              Attending Provider:  (none)   Allergies:  Codeine, Penicillins    Isolation:  None   Infection:  None   Code Status:  Prior    Ht:  165.1 cm (65\")   Wt:  77.6 kg (171 lb 1.6 oz)    Admission Cmt:  None   Principal Problem:  Right lower lobe pneumonia [J18.9]                 Active Insurance as of 7/8/2020     Primary Coverage     Payor Plan Insurance Group Employer/Plan Group    [facility] MEDICARE REPLACEMENT [facility] MEDICARE REPLACEMENT      Payor Plan Address Payor Plan Phone Number Payor Plan Fax Number Effective Dates    [address] [address] [phone]  7/8/2020 - None Entered    [facility] [id number]       Subscriber Name Subscriber Birth Date Member ID       Mabel Patrick 1955 [id number]           Secondary Coverage     Payor Plan Insurance Group Employer/Plan Group    [employer] [place]      Payor Plan Address Payor Plan Phone Number Payor Plan Fax Number Effective Dates    [address] [address]   1/1/2014 - None Entered    [place] [address]       Subscriber Name Subscriber Birth Date Member ID       MABEL PATRICK 1955 [id number]                 Emergency Contacts      (Rel.) Home Phone Work Phone Mobile Phone    Yinka Shelton (Friend) [phone] -- [phone]    DarnellVolodymyr (Relative) [phone] -- [phone]               Discharge Summary      Jonathan Hurst MD at 07/14/20 1247      " "        [facility] Medicine Services  DISCHARGE SUMMARY       Date of Admission: 7/8/2020  Date of Discharge:  7/14/2020  Primary Care Physician: Provider, No Known    Presenting Problem/History of Present Illness:  Pneumonia [J18.9]     Final Discharge Diagnoses:  Active Hospital Problems    Diagnosis   • **Right lower lobe pneumonia   • Tracheal mass   • Acquired thrombocytopenia (CMS/HCC)   • Chronic hepatitis C (CMS/HCC)   • Hypertension   • Cirrhosis of liver (CMS/HCC)   1.  PNA--stable  -IV abx completed  -Pulm following     2.  COPD AE--stable  -IV abx completed  -Pulm following  -IV steroids to PO  -Brovana  -Pulmicort  -duonebs     3.  Tracheal Mass  -Pulm following, bronch monday     4.  Cirrhosis  -monitor     5.  Thrombocytopenia  -resolved     6.  HTN  -Losartan          Consults: Pulmonology    Procedures Performed: Bronchoscopy    Pertinent Test Results: N/A    Chief Complaint on Day of Discharge:  \"I want to go home.\"    History of Present Illness on Day of Discharge:   Doing well, no SOA, tolerating room air    Hospital Course:  The patient is a 65 y.o. male who presented to [facility] with SOA.  He was found to have pneumonia and a COPD Exacerbation.  Imaging was also concerning for a tracheal mass.      He was started on IV antibiotics, IV steroids, and nebulizer treatments.  Pulmonology was consulted.  He underwent bronchoscopy with plans for EBUS and biopsy, however the ultrasound machine broke.  Pulmonology is arranging to have EBUS performed again.    He is comfortable with being discharged and with the discharge plan.  He was given the chance to ask questions and all questions were answered to his satisfaction.        Condition on Discharge:  Stable    Physical Exam on Discharge:  /65 (BP Location: Left arm, Patient Position: Sitting)   Pulse 109   Temp 98.2 °F (36.8 °C) (Oral)   Resp 16   Ht 165.1 cm (65\")   Wt 77.6 kg (171 lb 1.6 oz)  "  SpO2 94%   BMI 28.47 kg/m²    Physical Exam   Constitutional: He is oriented to person, place, and time. He appears well-developed and well-nourished.   HENT:   Head: Normocephalic and atraumatic.   Right Ear: External ear normal.   Left Ear: External ear normal.   Nose: Nose normal.   Mouth/Throat: Oropharynx is clear and moist.   Eyes: Pupils are equal, round, and reactive to light. Conjunctivae and EOM are normal. Right eye exhibits no discharge. Left eye exhibits no discharge. No scleral icterus.   Neck: Normal range of motion. Neck supple. No tracheal deviation present. No thyromegaly present.   Cardiovascular: Normal rate, regular rhythm, normal heart sounds and intact distal pulses. Exam reveals no gallop and no friction rub.   No murmur heard.  Pulmonary/Chest: Effort normal and breath sounds normal. No stridor. No respiratory distress. He has no wheezes. He has no rales. He exhibits no tenderness.   Abdominal: Soft. Bowel sounds are normal. He exhibits no distension and no mass. There is no tenderness. There is no rebound and no guarding. No hernia.   Musculoskeletal: Normal range of motion. He exhibits no edema or deformity.   Lymphadenopathy:     He has no cervical adenopathy.   Neurological: He is alert and oriented to person, place, and time. He has normal reflexes. He displays normal reflexes. No cranial nerve deficit. He exhibits normal muscle tone. Coordination normal.   Skin: Skin is warm and dry. No rash noted. No erythema. No pallor.   Psychiatric: He has a normal mood and affect. His behavior is normal. Judgment and thought content normal.   Vitals reviewed.        Discharge Disposition:  Home  Home or Self Care    Discharge Medications:     Discharge Medications      New Medications      Instructions Start Date   budesonide-formoterol 160-4.5 MCG/ACT inhaler  Commonly known as:  Symbicort   2 puffs, Inhalation, 2 Times Daily - RT      predniSONE 10 MG tablet  Commonly known as:  DELTASONE   4  tabs daily x 3 days, then 3 tabs daily x 3 days, then 2 tabs daily x 3 days, then 1 tab daily x 3 days         Continue These Medications      Instructions Start Date   albuterol sulfate  (90 Base) MCG/ACT inhaler  Commonly known as:  PROVENTIL HFA;VENTOLIN HFA;PROAIR HFA   2 puffs, Inhalation, Every 4 Hours PRN      busPIRone 15 MG tablet  Commonly known as:  BUSPAR   15 mg, Oral, 2 Times Daily      fluticasone 50 MCG/ACT nasal spray  Commonly known as:  FLONASE   2 sprays, Nasal, Daily      loperamide 2 MG capsule  Commonly known as:  IMODIUM   2 mg, Oral, 4 Times Daily PRN      loratadine 10 MG tablet  Commonly known as:  CLARITIN   10 mg, Oral, Daily      losartan 50 MG tablet  Commonly known as:  COZAAR   50 mg, Oral, Daily      meclizine 25 MG tablet  Commonly known as:  ANTIVERT   25 mg, Oral, 2 times daily      omeprazole 20 MG capsule  Commonly known as:  priLOSEC   20 mg, Oral, Daily      QUEtiapine 100 MG tablet  Commonly known as:  SEROquel   100 mg, Oral, Every Night at Bedtime             Discharge Diet: regular    Activity at Discharge: as tolerated    Discharge Care Plan/Instructions: Go to ER For fever or SOA    Follow-up Appointments:   Future Appointments   Date Time Provider Department Center   7/20/2020  2:00 PM Deyn Heller PA-C [facility] None       Test Results Pending at Discharge: N/A    Jonathan Hurst MD  07/14/20  12:59    Time: 40 minutes      Electronically signed by Jonathan Hurst MD at 07/14/20 1300

## 2020-07-15 NOTE — OUTREACH NOTE
Prep Survey      Responses   [facility] facility patient discharged from?  [place]   Is LACE score < 7 ?  No   Eligibility  Readm Mgmt   Discharge diagnosis  PNA/COPD   COVID-19 Test Status  Negative   Does the patient have one of the following disease processes/diagnoses(primary or secondary)?  COPD/Pneumonia   Does the patient have Home health ordered?  No   Is there a DME ordered?  No   Comments regarding appointments  see AVS   Prep survey completed?  Yes          Michelle Gonsalves RN

## 2020-07-17 NOTE — OUTREACH NOTE
"COPD/PN Week 1 Survey      Responses   [facility] patient discharged from?  [place]   COVID-19 Test Status  Negative   Does the patient have one of the following disease processes/diagnoses(primary or secondary)?  COPD/Pneumonia   Is there a successful TCM telephone encounter documented?  No   Was the primary reason for admission:  Pneumonia   Week 1 attempt successful?  Yes   Call start time  1453   Call end time  1457   Discharge diagnosis  PNA/COPD   Meds reviewed with patient/caregiver?  Yes   Is the patient having any side effects they believe may be caused by any medication additions or changes?  No   Does the patient have all medications ordered at discharge?  Yes   Prescription comments  Enc pt to rinse out his mouth after inhaler   Is the patient taking all medications as directed (includes completed medication regime)?  Yes   Does the patient have a primary care provider?   Yes   Does the patient have an appointment with their PCP or pulmonologist within 7 days of discharge?  Yes   Has the patient kept scheduled appointments due by today?  N/A   Has home health visited the patient within 72 hours of discharge?  N/A   Pulse Ox monitoring  None   Psychosocial issues?  No   What is the patient's perception of their health status since discharge?  Improving   Nursing Interventions  Nurse provided patient education   Are the patient's immunizations up to date?   Yes   Nursing interventions  Educated on importance of maintaining up to date immunizations as advised by provider   Is the patient/caregiver able to teach back the hierarchy of who to call/visit for symptoms/problems? PCP, Specialist, Home health nurse, Urgent Care, ED, 911  Yes   Additional teach back comments  Pt states he is feeling good. He is getting ready to \"Have a piece of pie with milk.\"   Is the patient/caregiver able to teach back signs and symptoms of worsening condition:  Fever/chills, Shortness of breath, Chest pain   Is the " patient/caregiver able to teach back importance of completing antibiotic course of treatment?  -- [No antibx]   Week 1 call completed?  Yes          Tiffani Herrera RN

## 2020-07-20 NOTE — PATIENT INSTRUCTIONS

## 2020-07-20 NOTE — PROGRESS NOTES
Chief Complaint   Patient presents with   • Cirrhosis   • Heartburn       ENDO PROCEDURE ORDERED:    Subjective    Wong Patrick is a 65 y.o. male. he is here today for follow-up.    History of Present Illness    Patient seen on a recheck of his GERD, cirrhosis, history of hepatitis C. Last seen 01/08/2020, F3. Patient currently denies abdominal pian. He is on Prilosec for chronic heartburn. He denies nausea, vomiting, dysphagia. Bowels are moving without blood or mucus. Weight is down 18 pounds since last visit. He does not think he is doing anything different. He denies any nosebleeds or other bleeding. Last EGD showed gastritis 07/09/2019. Last colonoscopy showed tubular adenoma 08/22/2018.     Patient was hospitalized in [place] 07/08 - 07/14/2020. He had a negative brushing from the lung on 07/13/2020. He is scheduled for followup bronchoscopy with endoscopic ultrasound with Dr. Sewell on 07/22/2020. He was inpatient at [place], a chest x-ray showed pleural effusions with questionable mediastinal hilar adenopathy. I did not see a CT of the chest. He has been a longstanding smoker.     Ultrasound liver on 06/17/2020 showed coarsening nodular liver with gallstones. Laboratory on 07/14/2020, CMP showed glucose 119, BUN 28, calcium 8.2, albumin 2.7, AST 56, otherwise normal. CBC showed profound anemia with hemoglobin 7.7, hematocrit 24.6.     ASSESSMENT/PLAN: Patient with recent adenopathy with abnormal findings on lung, certainly a lesion is of concern. Consider sarcoidosis. I did recommend angiotensin converting enzyme. He is due for AFP testing for hepatoma. Will also check HCV FibroSure, repeat H and H, and iron studies given his significant drop in hemoglobin. Will consider endoscopic evaluation. I would also consider CT imaging of the chest. He did appear to be symptomatic from his anemia. Will see what his numbers are doing, he may need supplementation as well. Patient is agreeable and will plan further  pending clinical course and the results of the above.       The following portions of the patient's history were reviewed and updated as appropriate:   Past Medical History:   Diagnosis Date   • Abnormal weight loss    • Acquired thrombocytopenia (CMS/HCC)    • Adenomatous polyp of colon    • Anxiety    • Biliary calculus     denies N/V, abd pain   • Chronic hepatitis C (CMS/HCC)    • Cirrhosis of liver (CMS/HCC)    • Colon polyp    • COPD (chronic obstructive pulmonary disease) (CMS/HCC)    • Elevated levels of transaminase & lactic acid dehydrogenase    • Generalized abdominal pain     known gallstones   • GERD (gastroesophageal reflux disease)    • Hypertension    • IBS (irritable bowel syndrome)    • Impacted cerumen    • Kidney disease     US suggest renal dz, elevated alpha 2 macroglobulins   • Nausea and vomiting    • Patient noncompliance, general    • Rectal hemorrhage    • Tracheal mass    • Villous adenoma of colon    • Wheezing      Past Surgical History:   Procedure Laterality Date   • BRONCHOSCOPY N/A 7/13/2020    Procedure: BRONCHOSCOPY WITH ENDOBRONCHIAL ULTRASOUND;  Surgeon: Karthik Sewell MD;  Location: [facility];  Service: Pulmonary;  Laterality: N/A;  preop; tracheal mass; pneumonia  postop  PCP    • COLONOSCOPY  06/03/2013   • COLONOSCOPY N/A 8/22/2018    Procedure: COLONOSCOPY;  Surgeon: Charles Pham MD;  Location: [facility] ENDOSCOPY;  Service: Gastroenterology   • ENDOSCOPY N/A 7/9/2019    Procedure: ESOPHAGOGASTRODUODENOSCOPY;  Surgeon: Bi Ibarra MD;  Location: [facility] ENDOSCOPY;  Service: General   • ENDOSCOPY AND COLONOSCOPY     • ESOPHAGOSCOPY / EGD      with tube   • UPPER GASTROINTESTINAL ENDOSCOPY  06/03/2013   • UPPER GASTROINTESTINAL ENDOSCOPY  07/09/2019     Family History   Problem Relation Age of Onset   • Heart disease Other    • Diabetes Other        Allergies   Allergen Reactions   • Codeine Unknown - High Severity     Pt stated it caused swelling in arms  and legs; caused skin irritation; excessive sleepiness   • Penicillins Hives     Social History     Socioeconomic History   • Marital status: Single     Spouse name: Not on file   • Number of children: Not on file   • Years of education: Not on file   • Highest education level: Not on file   Tobacco Use   • Smoking status: Current Every Day Smoker     Packs/day: 1.00     Types: Cigarettes   • Smokeless tobacco: Never Used   Substance and Sexual Activity   • Alcohol use: No   • Drug use: No   • Sexual activity: Defer     Current Medications:  Prior to Admission medications    Medication Sig Start Date End Date Taking? Authorizing Provider   albuterol (PROVENTIL HFA;VENTOLIN HFA) 108 (90 BASE) MCG/ACT inhaler Inhale 2 puffs Every 4 (Four) Hours As Needed for Wheezing.   Yes Rafael Wang MD   budesonide-formoterol (Symbicort) 160-4.5 MCG/ACT inhaler Inhale 2 puffs 2 (Two) Times a Day. 7/14/20  Yes Jonathan Hurst MD   busPIRone (BUSPAR) 15 MG tablet Take 15 mg by mouth 2 (two) times a day.   Yes Rafael Wang MD   fluticasone (FLONASE) 50 MCG/ACT nasal spray 2 sprays into each nostril Daily.   Yes Rafael Wang MD   loperamide (IMODIUM) 2 MG capsule Take 2 mg by mouth 4 (Four) Times a Day As Needed for Diarrhea.   Yes Rafael Wang MD   loratadine (CLARITIN) 10 MG tablet Take 10 mg by mouth Daily. 5/11/17  Yes Rafael Wang MD   losartan (COZAAR) 50 MG tablet Take 50 mg by mouth daily.   Yes Rafael Wang MD   meclizine (ANTIVERT) 25 MG tablet Take 25 mg by mouth 2 (two) times a day.   Yes Rafael Wang MD   omeprazole (priLOSEC) 20 MG capsule Take 1 capsule by mouth Daily. 6/20/19  Yes Deny Heller PA-C   predniSONE (DELTASONE) 10 MG tablet 4 tabs daily x 3 days, then 3 tabs daily x 3 days, then 2 tabs daily x 3 days, then 1 tab daily x 3 days 7/14/20  Yes Jonathan Hurst MD   QUEtiapine (SEROquel) 100 MG tablet Take 100 mg by mouth every  "night at bedtime.   Yes Provider, MD Rafael     Review of Systems  Review of Systems       Objective    /95 (BP Location: Left arm)   Pulse 116   Ht 162.6 cm (64\")   Wt 73 kg (161 lb)   BMI 27.64 kg/m²   Physical Exam   Constitutional: He is oriented to person, place, and time. He appears well-developed and well-nourished. No distress.   HENT:   Head: Normocephalic and atraumatic.   Eyes: Pupils are equal, round, and reactive to light. EOM are normal.   Neck: Normal range of motion.   Cardiovascular: Normal rate, regular rhythm and normal heart sounds.   Pulmonary/Chest: Effort normal and breath sounds normal.   Abdominal: Soft. Bowel sounds are normal. He exhibits no shifting dullness, no distension, no abdominal bruit, no ascites and no mass. There is no hepatosplenomegaly. There is tenderness. There is no rigidity, no rebound, no guarding and no CVA tenderness. No hernia. Hernia confirmed negative in the ventral area.   mild   Musculoskeletal: Normal range of motion.   Neurological: He is alert and oriented to person, place, and time.   Skin: Skin is warm and dry.   Psychiatric: He has a normal mood and affect. His behavior is normal. Judgment and thought content normal.   Nursing note and vitals reviewed.    Assessment/Plan      1. Cirrhosis of liver without ascites, unspecified hepatic cirrhosis type (CMS/HCC)    2. Gallstones    3. Gastroesophageal reflux disease with esophagitis    4. Weight loss    5. Acute anemia    6. Adenopathy, hilar    .   Wong was seen today for cirrhosis and heartburn.    Diagnoses and all orders for this visit:    Cirrhosis of liver without ascites, unspecified hepatic cirrhosis type (CMS/HCC)  -     Hemoglobin & Hematocrit, Blood  -     Iron Profile  -     Ferritin  -     HCV FibroSURE  -     Protime-INR  -     Angiotensin Converting Enzyme    Gallstones    Gastroesophageal reflux disease with esophagitis  -     Hemoglobin & Hematocrit, Blood  -     Iron Profile  -    "  Ferritin  -     HCV FibroSURE  -     Protime-INR  -     Angiotensin Converting Enzyme    Weight loss  -     Hemoglobin & Hematocrit, Blood  -     Iron Profile  -     Ferritin  -     HCV FibroSURE  -     Protime-INR  -     Angiotensin Converting Enzyme    Acute anemia  -     Hemoglobin & Hematocrit, Blood  -     Iron Profile  -     Ferritin  -     HCV FibroSURE  -     Protime-INR  -     Angiotensin Converting Enzyme    Adenopathy, hilar  -     Angiotensin Converting Enzyme    Other orders  -     ferrous sulfate 325 (65 FE) MG EC tablet; Take 1 tablet by mouth Daily With Breakfast.        Orders placed during this encounter include:  Orders Placed This Encounter   Procedures   • Hemoglobin & Hematocrit, Blood   • Iron Profile   • Ferritin   • HCV FibroSURE   • Protime-INR   • Angiotensin Converting Enzyme       Medications prescribed:  New Medications Ordered This Visit   Medications   • ferrous sulfate 325 (65 FE) MG EC tablet     Sig: Take 1 tablet by mouth Daily With Breakfast.     Dispense:  30 tablet     Refill:  2       Requested Prescriptions     Signed Prescriptions Disp Refills   • ferrous sulfate 325 (65 FE) MG EC tablet 30 tablet 2     Sig: Take 1 tablet by mouth Daily With Breakfast.       Review and/or summary of lab tests, radiology, procedures, medications. Review and summary of old records and obtaining of history. The risks and benefits of my recommendations, as well as other treatment options were discussed with the patient today. Questions were answered.    Follow-up: Return if symptoms worsen or fail to improve, for After the above.     * Surgery not found *      This document has been electronically signed by Deny Heller PA-C on July 27, 2020 17:16      Results for orders placed or performed in visit on 07/20/20   HCV FibroSURE   Result Value Ref Range    Fibrosis Score 0.35 (H) 0.00 - 0.21    Fibrosis Stage F1-F2     Necroinflammat Activity Score 0.17 0.00 - 0.17    Necroinflammat Activity  Grade A0-No activity     Alpha 2-Macroglobulins, Qn 201 110 - 276 mg/dL    Haptoglobin 95 32 - 363 mg/dL    Apolipoprotein A-1 124 101 - 178 mg/dL    Total Bilirubin 0.4 0.0 - 1.2 mg/dL    GGT 27 0 - 65 IU/L    ALT (SGPT) 32 0 - 55 IU/L    HCV Qual Interp Comment     Fibrosis Scoring: Comment     Necroinflamm Activity Scoring: Comment     Limitations: Comment     Comment Comment    Iron Profile   Result Value Ref Range    Iron 60 59 - 158 mcg/dL    Iron Saturation 13 (L) 20 - 50 %    Transferrin 308 200 - 360 mg/dL    TIBC 459 298 - 536 mcg/dL   Hemoglobin & Hematocrit, Blood   Result Value Ref Range    Hemoglobin 10.3 (L) 13.0 - 17.7 g/dL    Hematocrit 29.9 (L) 37.5 - 51.0 %   Protime-INR   Result Value Ref Range    Protime 13.5 11.1 - 15.3 Seconds    INR 0.99 0.80 - 1.20   Angiotensin Converting Enzyme   Result Value Ref Range    Angiotensin Converting Enzyme 57 14 - 82 U/L   Ferritin   Result Value Ref Range    Ferritin 150.00 30.00 - 400.00 ng/mL   Results for orders placed or performed during the hospital encounter of 07/08/20   Slide Review, Hematology   Result Value Ref Range    Performed by: Jae Mckay MD     Pathologist Interpretation Absolute lymphocytosis.    COVID-19, [facility] IN-HOUSE,NP Swab (NO TRANSPORT MEDIA) 2 HR TAT - Swab, Nasopharynx   Result Value Ref Range    COVID19 Not Detected Not Detected - Ref. Range   COVID-19, [facility] IN-HOUSE,NP Swab (NO TRANSPORT MEDIA) 2 HR TAT - Swab, Nasopharynx   Result Value Ref Range    COVID19 Not Detected Not Detected - Ref. Range   Respiratory Panel, PCR - Swab, Nasopharynx   Result Value Ref Range    ADENOVIRUS, PCR Not Detected Not Detected    Coronavirus 229E Not Detected Not Detected    Coronavirus HKU1 Not Detected Not Detected    Coronavirus NL63 Not Detected Not Detected    Coronavirus OC43 Not Detected Not Detected    Human Metapneumovirus Not Detected Not Detected    Human Rhinovirus/Enterovirus Not Detected Not Detected    Influenza B PCR Not  Detected Not Detected    Parainfluenza Virus 1 Not Detected Not Detected    Parainfluenza Virus 2 Not Detected Not Detected    Parainfluenza Virus 3 Not Detected Not Detected    Parainfluenza Virus 4 Not Detected Not Detected    Bordetella pertussis pcr Not Detected Not Detected    Influenza A H1 2009 PCR Not Detected Not Detected    Chlamydophila pneumoniae PCR Not Detected Not Detected    Mycoplasma pneumo by PCR Not Detected Not Detected    Influenza A PCR Not Detected Not Detected    Influenza A H3 Not Detected Not Detected    Influenza A H1 Not Detected Not Detected    RSV, PCR Not Detected Not Detected    Bordetella parapertussis PCR Not Detected Not Detected   S. Pneumo Ag Urine or CSF - Urine, Urine, Clean Catch   Result Value Ref Range    Strep Pneumo Ag Negative Negative   Respiratory Culture - Wash, Bronchus   Result Value Ref Range    Respiratory Culture Scant growth (1+) Normal Respiratory Jazzmine     Gram Stain Epithelial cells seen     Gram Stain Gram positive cocci     Gram Stain WBCs seen    CBC Auto Differential   Result Value Ref Range    WBC 6.93 3.40 - 10.80 10*3/mm3    RBC 2.61 (L) 4.14 - 5.80 10*6/mm3    Hemoglobin 7.7 (L) 13.0 - 17.7 g/dL    Hematocrit 24.6 (L) 37.5 - 51.0 %    MCV 94.3 79.0 - 97.0 fL    MCH 29.5 26.6 - 33.0 pg    MCHC 31.3 (L) 31.5 - 35.7 g/dL    RDW 15.6 (H) 12.3 - 15.4 %    RDW-SD 52.9 37.0 - 54.0 fl    MPV 10.0 6.0 - 12.0 fL    Platelets 159 140 - 450 10*3/mm3    Neutrophil % 38.4 (L) 42.7 - 76.0 %    Lymphocyte % 54.8 (H) 19.6 - 45.3 %    Monocyte % 4.6 (L) 5.0 - 12.0 %    Eosinophil % 0.1 (L) 0.3 - 6.2 %    Basophil % 0.1 0.0 - 1.5 %    Immature Grans % 2.0 (H) 0.0 - 0.5 %    Neutrophils, Absolute 2.65 1.70 - 7.00 10*3/mm3    Lymphocytes, Absolute 3.80 (H) 0.70 - 3.10 10*3/mm3    Monocytes, Absolute 0.32 0.10 - 0.90 10*3/mm3    Eosinophils, Absolute 0.01 0.00 - 0.40 10*3/mm3    Basophils, Absolute 0.01 0.00 - 0.20 10*3/mm3    Immature Grans, Absolute 0.14 (H) 0.00 - 0.05  10*3/mm3    nRBC 0.0 0.0 - 0.2 /100 WBC   CBC Auto Differential   Result Value Ref Range    WBC 8.27 3.40 - 10.80 10*3/mm3    RBC 2.81 (L) 4.14 - 5.80 10*6/mm3    Hemoglobin 8.3 (L) 13.0 - 17.7 g/dL    Hematocrit 26.5 (L) 37.5 - 51.0 %    MCV 94.3 79.0 - 97.0 fL    MCH 29.5 26.6 - 33.0 pg    MCHC 31.3 (L) 31.5 - 35.7 g/dL    RDW 15.9 (H) 12.3 - 15.4 %    RDW-SD 54.4 (H) 37.0 - 54.0 fl    MPV 11.4 6.0 - 12.0 fL    Platelets 151 140 - 450 10*3/mm3    Neutrophil % 42.5 (L) 42.7 - 76.0 %    Lymphocyte % 51.4 (H) 19.6 - 45.3 %    Monocyte % 3.5 (L) 5.0 - 12.0 %    Eosinophil % 0.1 (L) 0.3 - 6.2 %    Basophil % 0.2 0.0 - 1.5 %    Immature Grans % 2.3 (H) 0.0 - 0.5 %    Neutrophils, Absolute 3.51 1.70 - 7.00 10*3/mm3    Lymphocytes, Absolute 4.25 (H) 0.70 - 3.10 10*3/mm3    Monocytes, Absolute 0.29 0.10 - 0.90 10*3/mm3    Eosinophils, Absolute 0.01 0.00 - 0.40 10*3/mm3    Basophils, Absolute 0.02 0.00 - 0.20 10*3/mm3    Immature Grans, Absolute 0.19 (H) 0.00 - 0.05 10*3/mm3    nRBC 0.0 0.0 - 0.2 /100 WBC     *Note: Due to a large number of results and/or encounters for the requested time period, some results have not been displayed. A complete set of results can be found in Results Review.       Some portions of this note have been dictated using voice recognition software and may contain errors and/or omissions.

## 2020-07-21 NOTE — TELEPHONE ENCOUNTER
I spoke to the patient today because he was a No Show for his COVID testing yesterday.  The EBUS scheduled for 07/22/20  I told him we would reschedule for next week and he agreed.  I put him on for Wednesday 07/29/20 at 1:30pm.   Prework is Friday 07/24/20 at 1:00pm. Case# [id number].  Hospital will call him to schedule COVID testing.  Pt has been informed of all of these appointments and wrote them down.  I am not sure he fully understood everything I was telling him.

## 2020-07-24 NOTE — OUTREACH NOTE
COPD/PN Week 2 Survey      Responses   [facility] patient discharged from?  [place]   COVID-19 Test Status  Negative   Does the patient have one of the following disease processes/diagnoses(primary or secondary)?  COPD/Pneumonia   Was the primary reason for admission:  Pneumonia   Week 2 attempt successful?  Yes   Call start time  1322   Call end time  1324   Discharge diagnosis  PNA/COPD   Meds reviewed with patient/caregiver?  Yes   Is the patient having any side effects they believe may be caused by any medication additions or changes?  No   Does the patient have all medications ordered at discharge?  Yes   Is the patient taking all medications as directed (includes completed medication regime)?  Yes   Does the patient have a primary care provider?   Yes   Does the patient have an appointment with their PCP or pulmonologist within 7 days of discharge?  Yes   Has the patient kept scheduled appointments due by today?  Yes   Has home health visited the patient within 72 hours of discharge?  N/A   Pulse Ox monitoring  None   Psychosocial issues?  No   Did the patient receive a copy of their discharge instructions?  Yes   Nursing interventions  Reviewed instructions with patient   What is the patient's perception of their health status since discharge?  Improving   Nursing Interventions  Nurse provided patient education   Are the patient's immunizations up to date?   Yes   Nursing interventions  Educated on importance of maintaining up to date immunizations as advised by provider   Is the patient/caregiver able to teach back the hierarchy of who to call/visit for symptoms/problems? PCP, Specialist, Home health nurse, Urgent Care, ED, 911  Yes   Is the patient/caregiver able to teach back signs and symptoms of worsening condition:  Fever/chills, Shortness of breath, Chest pain   Is the patient/caregiver able to teach back importance of completing antibiotic course of treatment?  Yes   Week 2 call completed?  Yes           Deny Delgado, RN

## 2020-07-31 NOTE — OUTREACH NOTE
COPD/PN Week 3 Survey      Responses   [facility] patient discharged from?  [place]   COVID-19 Test Status  Negative   Does the patient have one of the following disease processes/diagnoses(primary or secondary)?  COPD/Pneumonia   Was the primary reason for admission:  Pneumonia   Week 3 attempt successful?  Yes   Call start time  1109   Call end time  1111   Discharge diagnosis  PNA/COPD   Meds reviewed with patient/caregiver?  Yes   Is the patient taking all medications as directed (includes completed medication regime)?  Yes   Has the patient kept scheduled appointments due by today?  Yes   Pulse Ox monitoring  None   What is the patient's perception of their health status since discharge?  Improving   Week 3 call completed?  Yes          Ondina Suh RN

## 2020-08-03 NOTE — PATIENT INSTRUCTIONS

## 2020-08-03 NOTE — PROGRESS NOTES
Chief Complaint   Patient presents with   • Cirrhosis   • Heartburn   • Weight Loss   • Anemia       ENDO PROCEDURE ORDERED:    Subjective    Wong Patrick is a 65 y.o. male. he is here today for follow-up.    History of Present Illness    Patient seen on a recheck of his GARVEY cirrhosis, GERD, severe anemia, gallstone, weight loss. Last seen 07/20/2020. Patient did not get his bronchoscopy done, it is apparently scheduled on 09/05/2020. He is feeling better. Prior fibrosis was S3. He had his laboratories done at last visit, angiotensin converting enzyme was normal, normal INR. HCV FibroSure 0.35/F1-F2, 0.17/A0. Ferritin 150, iron 60 with 13% saturation. H and H had improved to 10.3/29.9 from hemoglobin of 7.7. Last EGD showed gastritis 07/09/2019, last colonoscopy showed tubular adenoma 08/22/2018. He is taking iron. He has seen no blood in his stool. The iron has upset his stomach. He is on Prilosec. Denied dysphagia. Weight is up less than 1 pound since last visit.     ASSESSMENT/PLAN: Patient with recent abnormal findings on imaging of the lungs with concern for malignancy. He is feeling better. He is to have the bronchoscopy. He is encouraged to keep his followup in [place] for further evaluation. Anemia has improved. Would need to consider repeat EGD/colonoscopy but i wanted to defer because of his respiratory issues. Will plan followup in 6 weeks with repeat CBC, iron, AFP, LFT's prior. Further pending clinical course and the results of the above. Patient was agreeable.        The following portions of the patient's history were reviewed and updated as appropriate:   Past Medical History:   Diagnosis Date   • Abnormal weight loss    • Acquired thrombocytopenia (CMS/HCC)    • Adenomatous polyp of colon    • Anxiety    • Biliary calculus     denies N/V, abd pain   • Chronic hepatitis C (CMS/HCC)    • Cirrhosis of liver (CMS/HCC)    • Colon polyp    • COPD (chronic obstructive pulmonary disease) (CMS/HCC)    •  Elevated levels of transaminase & lactic acid dehydrogenase    • Generalized abdominal pain     known gallstones   • GERD (gastroesophageal reflux disease)    • Hypertension    • IBS (irritable bowel syndrome)    • Impacted cerumen    • Kidney disease     US suggest renal dz, elevated alpha 2 macroglobulins   • Nausea and vomiting    • Patient noncompliance, general    • Rectal hemorrhage    • Tracheal mass    • Villous adenoma of colon    • Wheezing      Past Surgical History:   Procedure Laterality Date   • BRONCHOSCOPY N/A 7/13/2020    Procedure: BRONCHOSCOPY WITH ENDOBRONCHIAL ULTRASOUND;  Surgeon: Karthik Sewell MD;  Location: [facility] OR;  Service: Pulmonary;  Laterality: N/A;  preop; tracheal mass; pneumonia  postop  PCP    • COLONOSCOPY  06/03/2013   • COLONOSCOPY N/A 8/22/2018    Procedure: COLONOSCOPY;  Surgeon: Charles Pham MD;  Location: [facility] ENDOSCOPY;  Service: Gastroenterology   • ENDOSCOPY N/A 7/9/2019    Procedure: ESOPHAGOGASTRODUODENOSCOPY;  Surgeon: Bi Ibarra MD;  Location: [facility] ENDOSCOPY;  Service: General   • ENDOSCOPY AND COLONOSCOPY     • ESOPHAGOSCOPY / EGD      with tube   • UPPER GASTROINTESTINAL ENDOSCOPY  06/03/2013   • UPPER GASTROINTESTINAL ENDOSCOPY  07/09/2019     Family History   Problem Relation Age of Onset   • Heart disease Other    • Diabetes Other        Allergies   Allergen Reactions   • Codeine Unknown - High Severity     Pt stated it caused swelling in arms and legs; caused skin irritation; excessive sleepiness   • Penicillins Hives     Social History     Socioeconomic History   • Marital status: Single     Spouse name: Not on file   • Number of children: Not on file   • Years of education: Not on file   • Highest education level: Not on file   Tobacco Use   • Smoking status: Current Every Day Smoker     Packs/day: 1.00     Types: Cigarettes   • Smokeless tobacco: Never Used   Substance and Sexual Activity   • Alcohol use: No   • Drug use: No   •  "Sexual activity: Defer     Current Medications:  Prior to Admission medications    Medication Sig Start Date End Date Taking? Authorizing Provider   albuterol (PROVENTIL HFA;VENTOLIN HFA) 108 (90 BASE) MCG/ACT inhaler Inhale 2 puffs Every 4 (Four) Hours As Needed for Wheezing.   Yes Rafael Wang MD   busPIRone (BUSPAR) 15 MG tablet Take 15 mg by mouth 2 (two) times a day.   Yes Rafael Wang MD   ferrous sulfate 325 (65 FE) MG EC tablet Take 1 tablet by mouth Daily With Breakfast. 7/27/20 7/27/21 Yes Deny Heller PA-C   loperamide (IMODIUM) 2 MG capsule Take 2 mg by mouth 4 (Four) Times a Day As Needed for Diarrhea.   Yes Rafael Wang MD   loratadine (CLARITIN) 10 MG tablet Take 10 mg by mouth Daily. 5/11/17  Yes Rafael Wang MD   losartan (COZAAR) 50 MG tablet Take 50 mg by mouth daily.   Yes Rafael Wang MD   meclizine (ANTIVERT) 25 MG tablet Take 25 mg by mouth 2 (two) times a day.   Yes Rafael Wang MD   omeprazole (priLOSEC) 20 MG capsule Take 1 capsule by mouth Daily. 6/20/19  Yes Deny Heller PA-C   predniSONE (DELTASONE) 10 MG tablet 4 tabs daily x 3 days, then 3 tabs daily x 3 days, then 2 tabs daily x 3 days, then 1 tab daily x 3 days 7/14/20  Yes Jonathan Hurst MD   budesonide-formoterol (Symbicort) 160-4.5 MCG/ACT inhaler Inhale 2 puffs 2 (Two) Times a Day. 7/14/20   Jonathan Hurst MD   fluticasone (FLONASE) 50 MCG/ACT nasal spray 2 sprays into each nostril Daily.  8/3/20  Rafael Wang MD   QUEtiapine (SEROquel) 100 MG tablet Take 100 mg by mouth every night at bedtime.  8/3/20  Rafael Wang MD     Review of Systems  Review of Systems       Objective    /67 (BP Location: Right arm)   Pulse 116   Ht 162.6 cm (64\")   Wt 73.4 kg (161 lb 12.8 oz)   BMI 27.77 kg/m²   Physical Exam   Constitutional: He is oriented to person, place, and time. He appears well-developed and well-nourished. No distress. "   HENT:   Head: Normocephalic and atraumatic.   Eyes: Pupils are equal, round, and reactive to light. EOM are normal.   Neck: Normal range of motion.   Cardiovascular: Normal rate, regular rhythm and normal heart sounds.   Pulmonary/Chest: Effort normal and breath sounds normal.   Abdominal: Soft. Bowel sounds are normal. He exhibits no shifting dullness, no distension, no abdominal bruit, no ascites and no mass. There is no hepatosplenomegaly. There is tenderness. There is no rigidity, no rebound, no guarding and no CVA tenderness. No hernia. Hernia confirmed negative in the ventral area.   mild   Musculoskeletal: Normal range of motion.   Neurological: He is alert and oriented to person, place, and time.   Skin: Skin is warm and dry.   Psychiatric: He has a normal mood and affect. His behavior is normal. Judgment and thought content normal.   Nursing note and vitals reviewed.    Assessment/Plan      1. Cirrhosis of liver without ascites, unspecified hepatic cirrhosis type (CMS/HCC)    2. Gallstones    3. Gastroesophageal reflux disease with esophagitis    4. Acute anemia    .   Wong was seen today for cirrhosis, heartburn, weight loss and anemia.    Diagnoses and all orders for this visit:    Cirrhosis of liver without ascites, unspecified hepatic cirrhosis type (CMS/HCC)  -     Iron; Future  -     CBC & Differential; Future  -     AFP Tumor Marker; Future  -     Hepatic Function Panel; Future    Gallstones  -     Iron; Future  -     CBC & Differential; Future  -     AFP Tumor Marker; Future  -     Hepatic Function Panel; Future    Gastroesophageal reflux disease with esophagitis  -     Iron; Future  -     CBC & Differential; Future  -     AFP Tumor Marker; Future  -     Hepatic Function Panel; Future    Acute anemia  -     Iron; Future  -     CBC & Differential; Future  -     AFP Tumor Marker; Future  -     Hepatic Function Panel; Future        Orders placed during this encounter include:  Orders Placed This  Encounter   Procedures   • Iron     Due before follow up     Standing Status:   Future     Standing Expiration Date:   10/3/2020   • AFP Tumor Marker     Due before follow up     Standing Status:   Future     Standing Expiration Date:   10/3/2020   • Hepatic Function Panel     Due before follow up     Standing Status:   Future     Standing Expiration Date:   10/3/2020   • CBC & Differential     Due before follow up     Standing Status:   Future     Standing Expiration Date:   10/3/2020     Order Specific Question:   Manual Differential     Answer:   No       Medications prescribed:  No orders of the defined types were placed in this encounter.    Discontinued Medications       Reason for Discontinue     fluticasone (FLONASE) 50 MCG/ACT nasal spray    *Therapy completed     QUEtiapine (SEROquel) 100 MG tablet    Discontinued by another clinician        Requested Prescriptions      No prescriptions requested or ordered in this encounter       Review and/or summary of lab tests, radiology, procedures, medications. Review and summary of old records and obtaining of history. The risks and benefits of my recommendations, as well as other treatment options were discussed with the patient today. Questions were answered.    Follow-up: Return in about 6 weeks (around 9/14/2020), or if symptoms worsen or fail to improve, for lab prior.     * Surgery not found *      This document has been electronically signed by Deny Heller PA-C on August 10, 2020 19:05      Results for orders placed or performed in visit on 08/05/20   CBC (No Diff)   Result Value Ref Range    WBC 8.58 3.40 - 10.80 10*3/mm3    RBC 3.93 (L) 4.14 - 5.80 10*6/mm3    Hemoglobin 11.3 (L) 13.0 - 17.7 g/dL    Hematocrit 35.1 (L) 37.5 - 51.0 %    MCV 89.3 79.0 - 97.0 fL    MCH 28.8 26.6 - 33.0 pg    MCHC 32.2 31.5 - 35.7 g/dL    RDW 15.0 12.3 - 15.4 %    RDW-SD 49.7 37.0 - 54.0 fl    MPV 9.3 6.0 - 12.0 fL    Platelets 235 140 - 450 10*3/mm3   Basic Metabolic  Panel   Result Value Ref Range    Glucose 129 (H) 65 - 99 mg/dL    BUN 10 8 - 23 mg/dL    Creatinine 1.00 0.76 - 1.27 mg/dL    Sodium 137 136 - 145 mmol/L    Potassium 4.3 3.5 - 5.2 mmol/L    Chloride 99 98 - 107 mmol/L    CO2 29.0 22.0 - 29.0 mmol/L    Calcium 9.2 8.6 - 10.5 mg/dL    eGFR Non African Amer 75 >60 mL/min/1.73    BUN/Creatinine Ratio 10.0 7.0 - 25.0    Anion Gap 9.0 5.0 - 15.0 mmol/L   Results for orders placed or performed in visit on 07/20/20   HCV FibroSURE   Result Value Ref Range    Fibrosis Score 0.35 (H) 0.00 - 0.21    Fibrosis Stage F1-F2     Necroinflammat Activity Score 0.17 0.00 - 0.17    Necroinflammat Activity Grade A0-No activity     Alpha 2-Macroglobulins, Qn 201 110 - 276 mg/dL    Haptoglobin 95 32 - 363 mg/dL    Apolipoprotein A-1 124 101 - 178 mg/dL    Total Bilirubin 0.4 0.0 - 1.2 mg/dL    GGT 27 0 - 65 IU/L    ALT (SGPT) 32 0 - 55 IU/L    HCV Qual Interp Comment     Fibrosis Scoring: Comment     Necroinflamm Activity Scoring: Comment     Limitations: Comment     Comment Comment    Iron Profile   Result Value Ref Range    Iron 60 59 - 158 mcg/dL    Iron Saturation 13 (L) 20 - 50 %    Transferrin 308 200 - 360 mg/dL    TIBC 459 298 - 536 mcg/dL   Hemoglobin & Hematocrit, Blood   Result Value Ref Range    Hemoglobin 10.3 (L) 13.0 - 17.7 g/dL    Hematocrit 29.9 (L) 37.5 - 51.0 %   Protime-INR   Result Value Ref Range    Protime 13.5 11.1 - 15.3 Seconds    INR 0.99 0.80 - 1.20   Angiotensin Converting Enzyme   Result Value Ref Range    Angiotensin Converting Enzyme 57 14 - 82 U/L   Ferritin   Result Value Ref Range    Ferritin 150.00 30.00 - 400.00 ng/mL   Results for orders placed or performed during the hospital encounter of 07/08/20   Slide Review, Hematology   Result Value Ref Range    Performed by: Jae Mckay MD     Pathologist Interpretation Absolute lymphocytosis.    COVID-19, [facility] IN-HOUSE,NP Swab (NO TRANSPORT MEDIA) 2 HR TAT - Swab, Nasopharynx   Result Value Ref Range     COVID19 Not Detected Not Detected - Ref. Range   COVID-19, [facility] IN-HOUSE,NP Swab (NO TRANSPORT MEDIA) 2 HR TAT - Swab, Nasopharynx   Result Value Ref Range    COVID19 Not Detected Not Detected - Ref. Range   Respiratory Panel, PCR - Swab, Nasopharynx   Result Value Ref Range    ADENOVIRUS, PCR Not Detected Not Detected    Coronavirus 229E Not Detected Not Detected    Coronavirus HKU1 Not Detected Not Detected    Coronavirus NL63 Not Detected Not Detected    Coronavirus OC43 Not Detected Not Detected    Human Metapneumovirus Not Detected Not Detected    Human Rhinovirus/Enterovirus Not Detected Not Detected    Influenza B PCR Not Detected Not Detected    Parainfluenza Virus 1 Not Detected Not Detected    Parainfluenza Virus 2 Not Detected Not Detected    Parainfluenza Virus 3 Not Detected Not Detected    Parainfluenza Virus 4 Not Detected Not Detected    Bordetella pertussis pcr Not Detected Not Detected    Influenza A H1 2009 PCR Not Detected Not Detected    Chlamydophila pneumoniae PCR Not Detected Not Detected    Mycoplasma pneumo by PCR Not Detected Not Detected    Influenza A PCR Not Detected Not Detected    Influenza A H3 Not Detected Not Detected    Influenza A H1 Not Detected Not Detected    RSV, PCR Not Detected Not Detected    Bordetella parapertussis PCR Not Detected Not Detected   S. Pneumo Ag Urine or CSF - Urine, Urine, Clean Catch   Result Value Ref Range    Strep Pneumo Ag Negative Negative   Respiratory Culture - Wash, Bronchus   Result Value Ref Range    Respiratory Culture Scant growth (1+) Normal Respiratory Jazzmine     Gram Stain Epithelial cells seen     Gram Stain Gram positive cocci     Gram Stain WBCs seen    CBC Auto Differential   Result Value Ref Range    WBC 6.93 3.40 - 10.80 10*3/mm3    RBC 2.61 (L) 4.14 - 5.80 10*6/mm3    Hemoglobin 7.7 (L) 13.0 - 17.7 g/dL    Hematocrit 24.6 (L) 37.5 - 51.0 %    MCV 94.3 79.0 - 97.0 fL    MCH 29.5 26.6 - 33.0 pg    MCHC 31.3 (L) 31.5 - 35.7 g/dL     RDW 15.6 (H) 12.3 - 15.4 %    RDW-SD 52.9 37.0 - 54.0 fl    MPV 10.0 6.0 - 12.0 fL    Platelets 159 140 - 450 10*3/mm3    Neutrophil % 38.4 (L) 42.7 - 76.0 %    Lymphocyte % 54.8 (H) 19.6 - 45.3 %    Monocyte % 4.6 (L) 5.0 - 12.0 %    Eosinophil % 0.1 (L) 0.3 - 6.2 %    Basophil % 0.1 0.0 - 1.5 %    Immature Grans % 2.0 (H) 0.0 - 0.5 %    Neutrophils, Absolute 2.65 1.70 - 7.00 10*3/mm3    Lymphocytes, Absolute 3.80 (H) 0.70 - 3.10 10*3/mm3    Monocytes, Absolute 0.32 0.10 - 0.90 10*3/mm3    Eosinophils, Absolute 0.01 0.00 - 0.40 10*3/mm3    Basophils, Absolute 0.01 0.00 - 0.20 10*3/mm3    Immature Grans, Absolute 0.14 (H) 0.00 - 0.05 10*3/mm3    nRBC 0.0 0.0 - 0.2 /100 WBC     *Note: Due to a large number of results and/or encounters for the requested time period, some results have not been displayed. A complete set of results can be found in Results Review.       Some portions of this note have been dictated using voice recognition software and may contain errors and/or omissions.

## 2020-08-05 NOTE — DISCHARGE INSTRUCTIONS
DAY OF SURGERY INSTRUCTIONS        YOUR SURGEON: DEANDRA COLUNGA    PROCEDURE: BRONCHOSCOPY WITH ENDOBRONCHIAL ULTRASOUND TRANSBRONCHIAL NEEDLE ASPIRATE    DATE OF SURGERY: 8/12/2020    ARRIVAL TIME: AS DIRECTED BY OFFICE    YOU MAY TAKE THE FOLLOWING MEDICATION(S) THE MORNING OF SURGERY WITH A SIP OF WATER: 0    ALL OTHER HOME MEDICATIONS CHECK WITH YOUR DOCTOR    DO NOT TAKE ANY ERECTILE DYSFUNCTION MEDICATIONS (EX:  CIALIS, VIAGRA) 24 HOURS PRIOR TO SURGERY              MANAGING PAIN AFTER SURGERY    We know you are probably wondering what your pain will be like after surgery.  Following surgery it is unrealistic to expect you will not have pain.   Pain is how our bodies let us know that something is wrong or cautions us to be careful.  That said, our goal is to make your pain tolerable.    Methods we may use to treat your pain include (oral or IV medications, PCAs, epidurals, nerve blocks, etc.)   While some procedures require IV pain medications for a short time after surgery, transitioning to pain medications by mouth allows for better management of pain.   Your nurse will encourage you to take oral pain medications whenever possible.  IV medications work almost immediately, but only last a short while.  Taking medications by mouth allows for a more constant level of medication in your blood stream for a longer period of time.      Once your pain is out of control it is harder to get back under control.  It is important you are aware when your next dose of pain medication is due.  If you are admitted, your nurse may write the time of your next dose on the white board in your room to help you remember.      We are interested in your pain and encourage you to inform us about aggravating factors during your visit.   Many times a simple repositioning every few hours can make a big difference.    If your physician says it is okay, do not let your pain prevent you from getting out of bed. Be sure to call your nurse for  assistance prior to getting up so you do not fall.      Before surgery, please decide your tolerable pain goal.  These faces help describe the pain ratings we use on a 0-10 scale.   Be prepared to tell us your goal and whether or not you take pain or anxiety medications at home.      BEFORE YOU COME TO THE HOSPITAL  (Pre-op instructions)  • Do not eat, drink, smoke or chew gum after midnight the night before surgery.  This also includes no mints.  • Morning of surgery take only the medicines you have been instructed with a sip of water unless otherwise instructed  by your physician.  • Do not shave, wear makeup or dark nail polish.  • Remove all jewelry including rings.  • Leave anything you consider valuable at home.  • Leave your suitcase in the car until after your surgery.  • Bring the following with you if applicable:  o Picture ID and insurance, Medicare or Medicaid cards  o Co-pay/deductible required by insurance (cash, check, credit card)  o Copy of advance directive, living will or power-of- documents if not brought to PAT  o CPAP or BIPAP mask and tubing  o Relaxation aids ( book, magazine), etc.  o Hearing aids                                 ON THE DAY OF SURGERY  · On the day of surgery check in at registration located at the main entrance of the hospital.   ? You will be registered and given a beeper with instructions where to wait in the main lobby.  ? When your beeper lights up and vibrates a member of the Outpatient Surgery staff will meet you at the double doors under the stair steps and escort you to your preoperative room.   · You may have cloth compression devices placed on your legs. These help to prevent blood clots and reduce swelling in your legs.  · An IV may be inserted into one of your veins.  · In the operating room, you may be given one or more of the following:  ? A medicine to help you relax (sedative).  ? A medicine to numb the area (local anesthetic).  ? A medicine to make  "you fall asleep (general anesthetic).  ? A medicine that is injected into an area of your body to numb everything below the injection site (regional anesthetic).  · Your surgical site will be marked or identified.  · You may be given an antibiotic through your IV to help prevent infection.  Contact a health care provider if you:  · Develop a fever of more than 100.4°F (38°C) or other feelings of illness during the 48 hours before your surgery.  · Have symptoms that get worse.  Have questions or concerns about your surgery    General Anesthesia/Surgery, Adult  General anesthesia is the use of medicines to make a person \"go to sleep\" (unconscious) for a medical procedure. General anesthesia must be used for certain procedures, and is often recommended for procedures that:  · Last a long time.  · Require you to be still or in an unusual position.  · Are major and can cause blood loss.  The medicines used for general anesthesia are called general anesthetics. As well as making you unconscious for a certain amount of time, these medicines:  · Prevent pain.  · Control your blood pressure.  · Relax your muscles.  Tell a health care provider about:  · Any allergies you have.  · All medicines you are taking, including vitamins, herbs, eye drops, creams, and over-the-counter medicines.  · Any problems you or family members have had with anesthetic medicines.  · Types of anesthetics you have had in the past.  · Any blood disorders you have.  · Any surgeries you have had.  · Any medical conditions you have.  · Any recent upper respiratory, chest, or ear infections.  · Any history of:  ? Heart or lung conditions, such as heart failure, sleep apnea, asthma, or chronic obstructive pulmonary disease (COPD).  ?  service.  ? Depression or anxiety.  · Any tobacco or drug use, including marijuana or alcohol use.  · Whether you are pregnant or may be pregnant.  What are the risks?  Generally, this is a safe procedure. However, " problems may occur, including:  · Allergic reaction.  · Lung and heart problems.  · Inhaling food or liquid from the stomach into the lungs (aspiration).  · Nerve injury.  · Air in the bloodstream, which can lead to stroke.  · Extreme agitation or confusion (delirium) when you wake up from the anesthetic.  · Waking up during your procedure and being unable to move. This is rare.  These problems are more likely to develop if you are having a major surgery or if you have an advanced or serious medical condition. You can prevent some of these complications by answering all of your health care provider's questions thoroughly and by following all instructions before your procedure.  General anesthesia can cause side effects, including:  · Nausea or vomiting.  · A sore throat from the breathing tube.  · Hoarseness.  · Wheezing or coughing.  · Shaking chills.  · Tiredness.  · Body aches.  · Anxiety.  · Sleepiness or drowsiness.  · Confusion or agitation.  RISKS AND COMPLICATIONS OF SURGERY  Your health care provider will discuss possible risks and complications with you before surgery. Common risks and complications include:    · Problems due to the use of anesthetics.  · Blood loss and replacement (does not apply to minor surgical procedures).  · Temporary increase in pain due to surgery.  · Uncorrected pain or problems that the surgery was meant to correct.  · Infection.  · New damage.    What happens before the procedure?    Medicines  Ask your health care provider about:  · Changing or stopping your regular medicines. This is especially important if you are taking diabetes medicines or blood thinners.  · Taking medicines such as aspirin and ibuprofen. These medicines can thin your blood. Do not take these medicines unless your health care provider tells you to take them.  · Taking over-the-counter medicines, vitamins, herbs, and supplements. Do not take these during the week before your procedure unless your health  care provider approves them.  General instructions  · Starting 3-6 weeks before the procedure, do not use any products that contain nicotine or tobacco, such as cigarettes and e-cigarettes. If you need help quitting, ask your health care provider.  · If you brush your teeth on the morning of the procedure, make sure to spit out all of the toothpaste.  · Tell your health care provider if you become ill or develop a cold, cough, or fever.  · If instructed by your health care provider, bring your sleep apnea device with you on the day of your surgery (if applicable).  · Ask your health care provider if you will be going home the same day, the following day, or after a longer hospital stay.  ? Plan to have someone take you home from the hospital or clinic.  ? Plan to have a responsible adult care for you for at least 24 hours after you leave the hospital or clinic. This is important.  What happens during the procedure?  · You will be given anesthetics through both of the following:  ? A mask placed over your nose and mouth.  ? An IV in one of your veins.  · You may receive a medicine to help you relax (sedative).  · After you are unconscious, a breathing tube may be inserted down your throat to help you breathe. This will be removed before you wake up.  · An anesthesia specialist will stay with you throughout your procedure. He or she will:  ? Keep you comfortable and safe by continuing to give you medicines and adjusting the amount of medicine that you get.  ? Monitor your blood pressure, pulse, and oxygen levels to make sure that the anesthetics do not cause any problems.  The procedure may vary among health care providers and hospitals.  What happens after the procedure?  · Your blood pressure, temperature, heart rate, breathing rate, and blood oxygen level will be monitored until the medicines you were given have worn off.  · You will wake up in a recovery area. You may wake up slowly.  · If you feel anxious or  agitated, you may be given medicine to help you calm down.  · If you will be going home the same day, your health care provider may check to make sure you can walk, drink, and urinate.  · Your health care provider will treat any pain or side effects you have before you go home.  · Do not drive for 24 hours if you were given a sedative.  Summary  · General anesthesia is used to keep you still and prevent pain during a procedure.  · It is important to tell your healthcare provider about your medical history and any surgeries you have had, and previous experience with anesthesia.  · Follow your healthcare provider’s instructions about when to stop eating, drinking, or taking certain medicines before your procedure.  · Plan to have someone take you home from the hospital or clinic.  This information is not intended to replace advice given to you by your health care provider. Make sure you discuss any questions you have with your health care provider.  Document Released: 03/26/2009 Document Revised: 08/03/2018 Document Reviewed: 08/03/2018  [employer] Interactive Patient Education © 2019 [employer]      Fall Prevention in Hospitals, Adult  As a hospital patient, your condition and the treatments you receive can increase your risk for falls. Some additional risk factors for falls in a hospital include:  · Being in an unfamiliar environment.  · Being on bed rest.  · Your surgery.  · Taking certain medicines.  · Your tubing requirements, such as intravenous (IV) therapy or catheters.  It is important that you learn how to decrease fall risks while at the hospital. Below are important tips that can help prevent falls.  SAFETY TIPS FOR PREVENTING FALLS  Talk about your risk of falling.  · Ask your health care provider why you are at risk for falling. Is it your medicine, illness, tubing placement, or something else?  · Make a plan with your health care provider to keep you safe from falls.  · Ask your health care provider or  pharmacist about side effects of your medicines. Some medicines can make you dizzy or affect your coordination.  Ask for help.  · Ask for help before getting out of bed. You may need to press your call button.  · Ask for assistance in getting safely to the toilet.  · Ask for a walker or cane to be put at your bedside. Ask that most of the side rails on your bed be placed up before your health care provider leaves the room.  · Ask family or friends to sit with you.  · Ask for things that are out of your reach, such as your glasses, hearing aids, telephone, bedside table, or call button.  Follow these tips to avoid falling:  · Stay lying or seated, rather than standing, while waiting for help.  · Wear rubber-soled slippers or shoes whenever you walk in the hospital.  · Avoid quick, sudden movements.  ¨ Change positions slowly.  ¨ Sit on the side of your bed before standing.  ¨ Stand up slowly and wait before you start to walk.  · Let your health care provider know if there is a spill on the floor.  · Pay careful attention to the medical equipment, electrical cords, and tubes around you.  · When you need help, use your call button by your bed or in the bathroom. Wait for one of your health care providers to help you.  · If you feel dizzy or unsure of your footing, return to bed and wait for assistance.  · Avoid being distracted by the TV, telephone, or another person in your room.  · Do not lean or support yourself on rolling objects, such as IV poles or bedside tables.     This information is not intended to replace advice given to you by your health care provider. Make sure you discuss any questions you have with your health care provider.     Document Released: 12/15/2001 Document Revised: 01/08/2016 Document Reviewed: 08/25/2013  [employer] Interactive Patient Education ©2016 [employer]            PATIENT/FAMILY/RESPONSIBLE PARTY VERBALIZES UNDERSTANDING OF ABOVE EDUCATION.  COPY OF PAIN SCALE GIVEN AND REVIEWED  WITH VERBALIZED UNDERSTANDING.

## 2020-08-05 NOTE — TELEPHONE ENCOUNTER
GERARDO contacted oupt surgery back to speak with ASHUTOSH Kothari about pt coming in with visible bugs on him. Nurse sadia directed ASHUTOSH Kothari to contact SW. GERARDO spoke with ASHUTOSH Kothari who states that pt is A&O and states that pt states that his landlord is aware of infestation. GERARDO advised RN and nurse sup that SW is not able to help with this scenario due to pt being A&O and able to make his own decisions on living conditions. ASHUTOSH Kothari states that she has left a message with Concha Mancia RN who will be aware of this scenario due to pt needing surgery soon.

## 2020-08-05 NOTE — PAT
Pt had visible bugs on his skin and clothing, stated his apartment was infested and landlord wouldn't take care of the problem.  notified and enviromental control.

## 2020-08-06 NOTE — TELEPHONE ENCOUNTER
Patient is scheduled for an EBUS on 08/12/20.  He lives 2 hours away and wants to know if it is okay with you to do rapid COVID testing the morning of his surgery.  It has already been approved by the COVID scheduling department.

## 2020-08-07 NOTE — OUTREACH NOTE
COPD/PN Week 4 Survey      Responses   [facility] from?  [facility]   COVID-19 Test Status  Negative   Does the patient have one of the following disease processes/diagnoses(primary or secondary)?  COPD/Pneumonia   Was the primary reason for admission:  Pneumonia   Week 4 attempt successful?  No          Melanie Mcnamara LPN

## 2020-08-10 NOTE — TELEPHONE ENCOUNTER
Per Donna at [facility], pt has a positive AFB from Bronch wash from 7/13.  They are sending to state today.

## 2020-08-11 NOTE — TELEPHONE ENCOUNTER
I routed the report to Denny.  We can't do his bronchoscopy, scheduled for Wednesday, until this comes back.  So we need to have him get a quantiferon gold and check whether he has ever been around anyone with tb.  postpone

## 2020-08-11 NOTE — TELEPHONE ENCOUNTER
Notified patient's caregiver to get the quantiferon gold tomorrow. Bronch is cancelled until cultures are completed. No known TB exposures.  Please put the order in for the quantiferon gold. He will go to  in the morning.

## 2020-08-25 PROBLEM — N17.9 AKI (ACUTE KIDNEY INJURY) (HCC): Status: ACTIVE | Noted: 2020-01-01

## 2020-08-25 PROBLEM — A41.9 SEPSIS DUE TO PNEUMONIA (HCC): Status: ACTIVE | Noted: 2020-01-01

## 2020-08-25 PROBLEM — J44.1 COPD WITH ACUTE EXACERBATION (HCC): Status: ACTIVE | Noted: 2020-01-01

## 2020-08-25 PROBLEM — J96.01 ACUTE RESPIRATORY FAILURE WITH HYPOXIA (HCC): Status: ACTIVE | Noted: 2020-01-01

## 2020-08-25 PROBLEM — Z20.822 SUSPECTED COVID-19 VIRUS INFECTION: Status: ACTIVE | Noted: 2020-01-01

## 2020-08-25 PROBLEM — J18.9 PNEUMONIA OF BOTH LUNGS DUE TO INFECTIOUS ORGANISM: Status: ACTIVE | Noted: 2020-01-01

## 2020-08-25 PROBLEM — J18.9 SEPSIS DUE TO PNEUMONIA (HCC): Status: ACTIVE | Noted: 2020-01-01

## 2020-08-31 PROBLEM — J13 PNEUMONIA OF LEFT UPPER LOBE DUE TO STREPTOCOCCUS PNEUMONIAE (HCC): Status: ACTIVE | Noted: 2020-01-01

## 2020-09-01 PROBLEM — Z20.822 SUSPECTED COVID-19 VIRUS INFECTION: Status: RESOLVED | Noted: 2020-01-01 | Resolved: 2020-01-01

## 2020-09-01 NOTE — PROGRESS NOTES
[facility]  INPATIENT PROGRESS NOTE    Length of Stay: 7  Date of Admission: 8/25/2020  Primary Care Physician: Provider, No Known    Subjective   Chief Complaint: No complaints    HPI:     9/1/2020: On evaluation this morning, the patient is agitated that he is not able to eat.  We discussed the reason withholding food secondary to his upcoming bronchoscopy.  Patient states he is extremely nervous thinking about everything going on.      8/31/2020: No complaints today.  Patient states he feels his breathing is a little better.  Patient underwent abdominal ultrasound and it only shows a small amount of ascites.  Patient is scheduled to undergo bronchoscopy with ultrasound in the a.m. with Dr. Jacques.    This is a 65-year-old  male with past medical history of thrombocytopenia, chronic hepatitis C, cirrhosis of the liver, COPD, GERD, hypertension, IBS, and CKD that presented to [facility] on 8/25/2020 with complaints of worsening shortness of air, nausea, vomiting, diarrhea and fatigue x 4- 5 days.  Patient was admitted for sepsis due to bilateral pneumonia and streptococcus pneumonia bacteremia.    Chest x-ray showed a patchy airspace opacity involving almost the entirety of the left lung with air bronchograms.  MRSA screening COVID-19 were negative.  Patient was also also found to have COPD with acute exacerbation, bilateral pleural effusions, supra and subdiaphragmatic enlarged lymph nodes and an acute kidney injury.  CT scan from 8/26/2020 indicated near complete opacification of the left lung superimposed on emphysema with a numerous amount of blebs and bullae in the left lung.  He was noticed to have calcification in the posterior left pleura as well as multiple lymphadenopathy involving bilateral axilla, cervical and supraclavicular and mediastinal nodes.  In addition, he was noticed to have splenomegaly with spleen size measuring  17.5.    Patient is a poor historian but records reviewed from chart review indicate the patient was recently admitted (7/8-7/14/20) at [facility] as a transfer from [facility] secondary to a possible paratracheal mass seen on CT, strep pneumoniae bacteremia and right lower lobe pneumonia.  The patient underwent bronchoscopy on 7/13/2020 and cultures were obtained but due to ultrasound malfunction the patient was not able to have evaluation of the suspicious mass.  He was scheduled to undergo bronchoscopy after admission but that was postponed secondary to his AFB resulting with Mycobacterium Gordonae.  The patient underwent a QuantiFERON gold TB test and that was negative.  A progress note dated 8/24/2020 states the patient was notified of Mycobacterium Gordonae and that this does not need to be treated.    The patient nor his listed relative (Ashkan) are aware if there is a scheduled appointment for outpatient bronchoscopy in [place] in the future.    Review of Systems   Constitutional: Positive for activity change, fatigue and unexpected weight change.   HENT: Negative for ear pain and sore throat.    Eyes: Negative for pain and discharge.   Respiratory: Positive for shortness of breath and wheezing. Negative for cough.    Cardiovascular: Negative for chest pain and palpitations.   Gastrointestinal: Positive for abdominal distention, abdominal pain, diarrhea, nausea and vomiting.   Endocrine: Negative for cold intolerance and heat intolerance.   Genitourinary: Negative for difficulty urinating and dysuria.   Musculoskeletal: Positive for arthralgias and back pain. Negative for gait problem.   Skin: Negative for color change and rash.   Neurological: Positive for weakness. Negative for dizziness.   Psychiatric/Behavioral: Negative for agitation and confusion.        Objective    Temp:  [96.6 °F (35.9 °C)-97.8 °F (36.6 °C)] 97.8 °F (36.6 °C)  Heart Rate:  [] 88  Resp:  [18-22] 20  BP:  ()/(55-73) 118/64    Physical Exam   Constitutional: He is oriented to person, place, and time. He appears well-developed and well-nourished.   HENT:   Head: Normocephalic and atraumatic.   Eyes: Pupils are equal, round, and reactive to light. EOM are normal.   Neck: Normal range of motion. Neck supple.   Cardiovascular: Normal rate and regular rhythm.   Pulmonary/Chest: Effort normal. He has wheezes.   Abdominal: Soft. Bowel sounds are normal.   Musculoskeletal: Normal range of motion.   Neurological: He is alert and oriented to person, place, and time.   Skin: Skin is warm and dry.   Psychiatric: He has a normal mood and affect. His behavior is normal.     Results Review:  I have reviewed the labs, radiology results, and diagnostic studies.    Laboratory Data:   Results from last 7 days   Lab Units 09/01/20  0633 08/31/20  0519 08/30/20  0611   SODIUM mmol/L 136 135* 139   POTASSIUM mmol/L 4.6 4.6 4.7   CHLORIDE mmol/L 97* 98 100   CO2 mmol/L 33.0* 31.0* 31.0*   BUN mg/dL 21 21 26*   CREATININE mg/dL 0.77 0.75* 0.74*   GLUCOSE mg/dL 95 86 89   CALCIUM mg/dL 8.1* 8.2* 8.3*   BILIRUBIN mg/dL 0.3  --   --    ALK PHOS U/L 69  --   --    ALT (SGPT) U/L 16  --   --    AST (SGOT) U/L 24  --   --    ANION GAP mmol/L 6.0 6.0 8.0     Estimated Creatinine Clearance: 93.6 mL/min (by C-G formula based on SCr of 0.77 mg/dL).          Results from last 7 days   Lab Units 09/01/20  0633 08/31/20  0519 08/30/20  0611 08/29/20  0450 08/28/20  0555   WBC 10*3/mm3 7.51 10.21 9.05 10.88* 9.52   HEMOGLOBIN g/dL 8.8* 9.0* 8.5* 8.8* 9.9*   HEMATOCRIT % 28.2* 28.0* 26.5* 28.9* 32.0*   PLATELETS 10*3/mm3 168 207 217 265 257     Results from last 7 days   Lab Units 08/31/20  0720   INR  1.20       Culture Data:   No results found for: BLOODCX  No results found for: URINECX  Respiratory Culture   Date Value Ref Range Status   08/31/2020   Preliminary    Rare Normal Respiratory Jazzmine: NO S.aureus/MRSA or Pseudomonas aeruginosa     No  results found for: WOUNDCX  No results found for: STOOLCX  No components found for: BODYFLD    Radiology Data:   Imaging Results (Last 24 Hours)     ** No results found for the last 24 hours. **          I have reviewed the patient's current medications.     Assessment/Plan     Active Hospital Problems    Diagnosis POA   • **Pneumonia of both lungs due to infectious organism [J18.9] Yes   • Sepsis due to pneumonia (CMS/[facility]) [J18.9, A41.9] Yes   • Suspected COVID-19 virus infection [Z20.828] Yes   • COPD with acute exacerbation (CMS/[facility]) [J44.1] Yes   • IBRAHIMA (acute kidney injury) (CMS/[facility]) [N17.9] Yes   • Acute respiratory failure with hypoxia (CMS/[facility]) [J96.01] Yes   • Pneumonia of left upper lobe due to Streptococcus pneumoniae (CMS/[facility]) [J13] Unknown     Added automatically from request for surgery [id number]     • Mediastinal adenopathy [R59.0] Unknown     Added automatically from request for surgery [id number]     • Hypertension [I10] Yes   • Cirrhosis of liver (CMS/[facility]) [K74.60] Yes   • 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  Sepsis, resolved, likely related to bilateral pneumonia and Streptococcus pneumonia bacteremia: Patient is hemodynamically stable.  We will continue to monitor.  2.  Pneumonia, bilateral: Continue IV Levaquin. Scheduled duonebs. Legionella and strep pneumo negative.  Patient is currently on 2 L of oxygen.  Respiratory culture pending. Pulmonary consult appreciated.   3.  Bacteremia, Streptococcus pneumonia: Blood culture from 8/27/2020 is negative.  Continue IV Levaquin day# 6/10. First negative culture 8/27/20.  4.  COPD with acute exacerbation: Continue IV steroids.  We will continue to wean.    5.  Acute kidney injury, resolved: We will continue to monitor.  6.  Hypertension: Continue the patient's home losartan.  7.  Cirrhosis of the liver:  Follows with ALYSE ROGERS. Ultrasound of the abdomen shows a small amount of ascites.     8.  History of tracheal mass: Lung CT results were  "obtained from [facility] that indicates \"the patient has a tumor in the subcarinal region that appears to be a solid mass associated with lymphadenopathy along the right side of the trachea with large nodes present.  The mass in the subcarinal region is very amendable to biopsy.  The patient has some pleural calcification in the left lung base related to asbestos apparently.  This involves the entire medial pleural surface of the left lung\".  Original copy will be scanned into media. Dr. Jacques recommends proceeding with EBUS bronchoscopy on 9/1/20 Tuesday.  9.  Lymphadenopathy, axillary, supraclavicular and mediastinal nodes: Hematology/oncology consult appreciated.    10.  Pleural effusion, bilateral, right greater than left: May need thoracentesis if respiratory status worsens.     11.  Deconditioning:  Continue PT/OT.    12.  Anxiety: Ativan 0.25 every 4 hours as needed.        Discharge Planning: I expect patient to be discharged to home in 2-3 days.      This document has been electronically signed by ANA Barajas on September 1, 2020 12:57        "

## 2020-09-01 NOTE — ANESTHESIA POSTPROCEDURE EVALUATION
Patient: Wong Patrick    Procedure Summary     Date:  09/01/20 Room / Location:  [facility] OR 06 / [facility] OR    Anesthesia Start:  1314 Anesthesia Stop:  1415    Procedure:  BRONCHOSCOPY WITH ENDOBRONCHIAL ULTRASOUND and needle biopsy, with possible bronchoalveolar lavage, brush, and/or endobronchial biopsy (N/A Bronchus) Diagnosis:       Pneumonia of left upper lobe due to Streptococcus pneumoniae (CMS/HCC)      Mediastinal adenopathy      (Pneumonia of left upper lobe due to Streptococcus pneumoniae (CMS/HCC) [J13])      (Mediastinal adenopathy [R59.0])    Surgeon:  Sandra Jacques MD Provider:  Jody Hyde CRNA    Anesthesia Type:  general ASA Status:  3          Anesthesia Type: general    Vitals  No vitals data found for the desired time range.          Post Anesthesia Care and Evaluation    Patient location during evaluation: PACU  Patient participation: complete - patient participated  Level of consciousness: awake and awake and alert  Pain score: 0  Pain management: adequate  Airway patency: patent  Anesthetic complications: No anesthetic complications  PONV Status: none  Cardiovascular status: acceptable  Respiratory status: acceptable, spontaneous ventilation and face mask  Hydration status: acceptable

## 2020-09-01 NOTE — SIGNIFICANT NOTE
09/01/20 1526   Rehab Treatment   Discipline occupational therapy assistant   Reason Treatment Not Performed patient/family declined treatment, not feeling well  (Pt declined tx this pm due to N/V.)

## 2020-09-01 NOTE — PLAN OF CARE
Problem: Patient Care Overview  Goal: Plan of Care Review  Outcome: Ongoing (interventions implemented as appropriate)  Flowsheets (Taken 9/1/2020 0031)  Progress: improving  Plan of Care Reviewed With: patient  Note:   Patient resting in bed. Vss. NPO at midnight, plan for a Bronch today. Will continue to monitor this pt.

## 2020-09-01 NOTE — ANESTHESIA PREPROCEDURE EVALUATION
Anesthesia Evaluation     Patient summary reviewed   no history of anesthetic complications:  NPO Solid Status: > 8 hours  NPO Liquid Status: > 8 hours           Airway   Mallampati: II  TM distance: >3 FB  Neck ROM: full  Narrow palate  Dental    (+) poor dentition    Pulmonary    (+) pneumonia , a smoker Current, COPD, asthma,shortness of breath, rhonchi,     ROS comment: Pt  Presented to hospital in [place] complaining of worsening soa. CT reportedly showed pneumonia as well as tracheal mass. He was transferred to [facility]    Per pulmonology consult:  Patient had CT scan of the chest done in outside facility and the results are available which showed a right lower lobe pneumonia and a paratracheal mass  Cardiovascular   Exercise tolerance: poor (<4 METS)    ECG reviewed    (+) hypertension, NOLASCO, friction rub,       Neuro/Psych  (+) weakness, psychiatric history Anxiety and Depression,     (-) seizures, TIA, CVA  GI/Hepatic/Renal/Endo    (+)  GERD, GI bleeding , hepatitis C, liver disease (cirrhosis), renal disease,   (-) diabetes    Musculoskeletal     (+) back pain, chronic pain, gait problem,   Abdominal    Substance History   (+) alcohol use (former),      OB/GYN          Other   arthritis,                        Anesthesia Plan    ASA 3     general   (Patient with pneumonia but covid negative per yesterday test presents for bronch, possible tissue biopsy of a tracheal mass. He was  Uneventfully intubated a month ago in [place] for similar procedure.  He understand that postop ventilation is a possibility.)  intravenous induction     Anesthetic plan, all risks, benefits, and alternatives have been provided, discussed and informed consent has been obtained with: patient.

## 2020-09-01 NOTE — SIGNIFICANT NOTE
09/01/20 1428   Rehab Treatment   Discipline physical therapy assistant   Reason Treatment Not Performed unavailable for treatment   Pt gone for procedure   09/01/20 1428   Rehab Treatment   Discipline physical therapy assistant   Reason Treatment Not Performed unavailable for treatment

## 2020-09-01 NOTE — PLAN OF CARE
Bronch complete. Pt does not report pain at this time. O2 weaned to 1 L NC; no home o2; will continue to monitor.

## 2020-09-01 NOTE — ANESTHESIA PROCEDURE NOTES
Airway  Urgency: elective    Date/Time: 9/1/2020 1:20 PM  Airway not difficult    General Information and Staff    Patient location during procedure: OR  CRNA: Jody Hyde CRNA    Indications and Patient Condition  Indications for airway management: airway protection    Preoxygenated: yes  Mask difficulty assessment: 0 - not attempted    Final Airway Details  Final airway type: endotracheal airway      Successful airway: ETT  Cuffed: yes   Successful intubation technique: video laryngoscopy  Facilitating devices/methods: intubating stylet  Endotracheal tube insertion site: oral  Blade: Gross  Blade size: 4  ETT size (mm): 8.0  Cormack-Lehane Classification: grade I - full view of glottis  Placement verified by: chest auscultation and capnometry   Measured from: lips  ETT/EBT  to lips (cm): 23  Number of attempts at approach: 1  Assessment: lips, teeth, and gum same as pre-op and atraumatic intubation

## 2020-09-02 NOTE — SIGNIFICANT NOTE
09/02/20 1322   Rehab Treatment   Discipline physical therapy assistant   Reason Treatment Not Performed   (pt. not feeling well and stated he has been vomiting.)

## 2020-09-02 NOTE — PLAN OF CARE
Problem: Patient Care Overview  Goal: Plan of Care Review  Outcome: Ongoing (interventions implemented as appropriate)  Flowsheets  Taken 9/1/2020 0031 by Ana Kamara RN  Progress: improving  Taken 9/1/2020 2207 by Kristi Babin RN  Plan of Care Reviewed With: patient  Taken 9/1/2020 1431 by Lupe Spears RN  Outcome Summary: vss, dc per anesthesia protocol, transfer to 3W for post op care, tolerating ice chips.  Goal: Individualization and Mutuality  Outcome: Ongoing (interventions implemented as appropriate)  Goal: Discharge Needs Assessment  Outcome: Ongoing (interventions implemented as appropriate)  Goal: Interprofessional Rounds/Family Conf  Outcome: Ongoing (interventions implemented as appropriate)     Problem: Pneumonia (Adult)  Goal: Signs and Symptoms of Listed Potential Problems Will be Absent, Minimized or Managed (Pneumonia)  Outcome: Ongoing (interventions implemented as appropriate)     Problem: Skin Injury Risk (Adult)  Goal: Identify Related Risk Factors and Signs and Symptoms  Outcome: Ongoing (interventions implemented as appropriate)  Goal: Skin Health and Integrity  Outcome: Ongoing (interventions implemented as appropriate)     Problem: Wound (Includes Pressure Injury) (Adult)  Goal: Signs and Symptoms of Listed Potential Problems Will be Absent, Minimized or Managed (Wound)  Outcome: Ongoing (interventions implemented as appropriate)     Problem: Nausea/Vomiting (Adult)  Goal: Identify Related Risk Factors and Signs and Symptoms  Outcome: Ongoing (interventions implemented as appropriate)  Goal: Symptom Relief  Outcome: Ongoing (interventions implemented as appropriate)  Goal: Adequate Hydration  Outcome: Ongoing (interventions implemented as appropriate)     Problem: Diarrhea (Adult)  Goal: Identify Related Risk Factors and Signs and Symptoms  Outcome: Ongoing (interventions implemented as appropriate)  Goal: Improved/Reduced Symptoms  Outcome: Ongoing (interventions  implemented as appropriate)     Problem: Sepsis/Septic Shock (Adult)  Goal: Signs and Symptoms of Listed Potential Problems Will be Absent, Minimized or Managed (Sepsis/Septic Shock)  Outcome: Ongoing (interventions implemented as appropriate)     Problem: Fall Risk (Adult)  Goal: Identify Related Risk Factors and Signs and Symptoms  Outcome: Ongoing (interventions implemented as appropriate)  Goal: Absence of Fall  Outcome: Ongoing (interventions implemented as appropriate)     Problem: Chronic Obstructive Pulmonary Disease (Adult)  Goal: Signs and Symptoms of Listed Potential Problems Will be Absent, Minimized or Managed (Chronic Obstructive Pulmonary Disease)  Outcome: Ongoing (interventions implemented as appropriate)     Problem: Renal Failure/Kidney Injury, Acute (Adult)  Goal: Signs and Symptoms of Listed Potential Problems Will be Absent, Minimized or Managed (Renal Failure/Kidney Injury, Acute)  Outcome: Ongoing (interventions implemented as appropriate)     Problem: Anemia (Adult)  Goal: Identify Related Risk Factors and Signs and Symptoms  Outcome: Ongoing (interventions implemented as appropriate)  Goal: Symptom Improvement  Outcome: Ongoing (interventions implemented as appropriate)     Problem: Activity Intolerance (Adult)  Goal: Identify Related Risk Factors and Signs and Symptoms  Outcome: Ongoing (interventions implemented as appropriate)  Goal: Activity Tolerance  Outcome: Ongoing (interventions implemented as appropriate)  Goal: Effective Energy Conservation Techniques  Outcome: Ongoing (interventions implemented as appropriate)

## 2020-09-02 NOTE — PROGRESS NOTES
PULMONARY PROGRESS NOTE  Sandra Jacques MD    [address]  9/2/2020        Wong Patrick  [id number]  1955  65 y.o. male             LOS: 8 days   Cherelle Romeo MD    Subjective     CC/Reason for visit: Follow-up pneumonia, adenopathy    HPI: EBUS yesterday with BAL and TBNA's performed.  Cultures negative thus far.  Patient on 1 L oxygen.  Complains of nausea and vomiting, but admits that he was able to eat some breakfast.  Abdominal ultrasound showed a small amount of ascites.  Denies dyspnea at this time.  Minimal cough.    Review of Systems:  Respiratory ROS:  Review of Systems   Constitutional: Negative for fever and unexpected weight change.   Respiratory: Positive for cough. Negative for chest tightness, shortness of breath and wheezing.    Cardiovascular: Negative for chest pain.   Gastrointestinal: Positive for abdominal distention, nausea and vomiting. Negative for abdominal pain.     All other systems were reviewed and were negative except as above in the HPI.    Objective     Vital Sign Min/Max for last 24 hours:  Temp  Min: 96.8 °F (36 °C)  Max: 98.7 °F (37.1 °C)   BP  Min: 96/55  Max: 136/73   Pulse  Min: 57  Max: 102   Resp  Min: 18  Max: 23   SpO2  Min: 90 %  Max: 100 %   Flow (L/min)  Min: 1  Max: 10   No data recorded     Physical Exam:  96.8 °F (36 °C) (Oral) 82 113/66 18 90% 76.2 kg (167 lb 14.4 oz) Body mass index is 27.94 kg/m².  Physical Exam   Constitutional: He is oriented to person, place, and time. Vital signs are normal. He appears well-developed and well-nourished.   HENT:   Head: Normocephalic and atraumatic.   Nose: Nose normal.   Eyes: EOM are normal.   Cardiovascular: Normal rate, regular rhythm and normal heart sounds. Exam reveals no gallop.   No murmur heard.  Pulmonary/Chest: Effort normal. No respiratory distress. He has decreased breath sounds in the right lower field and the left lower field. He has no wheezes. He has no rhonchi.  He has no rales.   Abdominal: Soft. Normal appearance and bowel sounds are normal. He exhibits distension and fluid wave. There is no tenderness.   Musculoskeletal:   1+ Ble pitting edema     Vascular Status -  His right foot exhibits abnormal foot edema. His left foot exhibits abnormal foot edema.  Neurological: He is alert and oriented to person, place, and time.   Skin: Skin is warm and dry. No cyanosis. Nails show no clubbing.   Psychiatric: He has a normal mood and affect. His behavior is normal. Judgment normal. Cognition and memory are normal.       Scheduled meds:      acidophilus 1 tablet Oral Daily   budesonide-formoterol 2 puff Inhalation BID - RT   busPIRone 15 mg Oral Q12H   cetirizine 10 mg Oral Daily   folic acid 1 mg Oral Daily   ipratropium-albuterol 3 mL Nebulization 4x Daily - RT   levoFLOXacin 750 mg Intravenous Q24H   losartan 50 mg Oral Daily   magic butt ointment  Topical BID   [MAR Hold] methylPREDNISolone sodium succinate 20 mg Intravenous Daily   sodium chloride 30 mL/kg Intravenous Once   sodium chloride 10 mL Intravenous Q12H   sodium chloride 3 mL Intravenous Q12H   vitamin C 1,000 mg Oral TID With Meals       IV meds:                         electrolyte-148 1,000 mL Last Rate: 1,000 mL (09/01/20 1241)       Data Review: I personally reviewed the patient's medications and new clinical results.  Lab Results   Component Value Date    CALCIUM 8.1 (L) 09/02/2020     Results from last 7 days   Lab Units 09/02/20  0536 09/01/20  0633 08/31/20  0519   AST (SGOT) U/L  --  24  --    SODIUM mmol/L 136 136 135*   POTASSIUM mmol/L 4.4 4.6 4.6   CHLORIDE mmol/L 95* 97* 98   CO2 mmol/L 34.0* 33.0* 31.0*   BUN mg/dL 17 21 21   CREATININE mg/dL 0.82 0.77 0.75*   GLUCOSE mg/dL 95 95 86   CALCIUM mg/dL 8.1* 8.1* 8.2*   WBC 10*3/mm3 8.48 7.51 10.21   HEMOGLOBIN g/dL 8.9* 8.8* 9.0*   PLATELETS 10*3/mm3 163 168 207   ALT (SGPT) U/L  --  16  --                Radiology: I independently reviewed the patient's  new imaging, including images and reports.  Imaging Results (Last 24 Hours)     ** No results found for the last 24 hours. 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  Baseline is room air  -Continue Levaquin for invasive pneumococcal disease  -Follow-up cultures and cytology  -Continue Symbicort twice daily and scheduled duo nebs  -Consider GI consult given persistent nausea and vomiting.  Defer to primary team  -Diet as tolerated  -PPX: Heparin 3 times daily          Total time spent: 21 minutes      This document has been electronically signed by Sandra Jacques MD on September 2, 2020 09:29      [phone]    Dictated using Dragon

## 2020-09-02 NOTE — PROGRESS NOTES
Subjective     Wong Bi Patrick was seen in follow up for lymphadenopathy.  Patient is a very poor historian and not interested in talking.  He continued to report severe abdominal pain.  He reports nausea and emesis however when I entered the room he was eating his lunch without any issue.  He is not interested in talking about any other health issues at present.  He is frustrated by inability to sleep at night while he has been in the hospital.    Past Medical History, Past Surgical History, Social History, Family History have been reviewed and are without significant changes except as mentioned.    Review of Systems       CONSTITUTIONAL: fatigue + weakness + No weight loss, fever, chills.  HEENT: Eyes: No visual loss, blurred vision, double vision or yellow sclerae. Ears, Nose, Throat: No hearing loss, sneezing, congestion, runny nose or sore throat.  SKIN: No rash or itching.  CARDIOVASCULAR: No chest pain, chest pressure or chest discomfort. No palpitations or edema.  RESPIRATORY: Cough + Sob +   GASTROINTESTINAL: abdominal pain + No anorexia, nausea, vomiting or diarrhea. No  blood.  GENITOURINARY: Negative for urgency, frequency or dysuria.   NEUROLOGICAL: No headache, dizziness, syncope, paralysis, ataxia, numbness or tingling in the extremities. No change in bowel or bladder control.  MUSCULOSKELETAL: No muscle, back pain, joint pain or stiffness.  HEMATOLOGIC: anemia + No  bleeding or bruising.  LYMPHATICS: enlarged nodes + . No history of splenectomy.  PSYCHIATRIC: No history of depression or anxiety.  ENDOCRINOLOGIC: No reports of sweating, cold or heat intolerance. No polyuria or polydipsia.  ALLERGIES: No history of asthma, hives, eczema or rhinitis.      Medications:  The current medication list was reviewed in the EMR    ALLERGIES:    Allergies   Allergen Reactions   • Codeine Unknown - High Severity     Pt stated it caused swelling in arms and legs; caused skin irritation; excessive sleepiness   •  Penicillins Hives       Objective      Vitals:    09/02/20 0709 09/02/20 0744 09/02/20 1019 09/02/20 1117   BP: 113/66   104/64   BP Location: Right arm   Left arm   Patient Position: Lying   Lying   Pulse: 87 82 88 104   Resp: 18   18   Temp: 96.8 °F (36 °C)   97 °F (36.1 °C)   TempSrc: Oral   Oral   SpO2: 90%  92% 91%   Weight:       Height:         No flowsheet data found.    Physical Exam      GENERAL: Alert, awake, oriented.  Well dressed.  Not in apparent distress. Vitals as above.   HEAD: Normocephalic, atraumatic.   EYES: PERRL, EOMI.  vision is grossly intact.  Conjunctival pallor +   NECK: Supple, no adenopathy or thyromegaly.   THROAT: Normal oral cavity and pharynx. No inflammation, swelling, exudate, or lesions.  CARDIAC: Normal S1 and S2. No S3, S4 or murmurs. Rhythm is regular.  Extremities are warm and well perfused.   LUNGS: Clear to auscultation and percussion without rales, rhonchi, wheezing or diminished breath sounds.  ABDOMEN: Abdominal distention with ascites + + BS   BACK:  No bony tenderness.   EXTREMITIES: edema + No significant deformity or joint abnormality.  Peripheral pulses intact.   SKIN: No rash or bruising.  NEUROLOGICAL: Grossly non-focal exam. No focal weakness.   PSYCH: Mood and affect normal. No hallucination or suicidal thoughts.   LYMPHATIC: Small axillary and inguinal adenopathy + .       RECENT LABS:Independently reviewed and summarized.  Hematology WBC   Date Value Ref Range Status   09/02/2020 8.48 3.40 - 10.80 10*3/mm3 Final     RBC   Date Value Ref Range Status   09/02/2020 3.09 (L) 4.14 - 5.80 10*6/mm3 Final     Hemoglobin   Date Value Ref Range Status   09/02/2020 8.9 (L) 13.0 - 17.7 g/dL Final     Hematocrit   Date Value Ref Range Status   09/02/2020 28.4 (L) 37.5 - 51.0 % Final     Platelets   Date Value Ref Range Status   09/02/2020 163 140 - 450 10*3/mm3 Final            Diagnosis:   (1) SLL/CLL   (2) Splenomegaly   (3) Anemia   (4) Iron deficiency   (5) Folic acid  deficiency   (6) Pneumococcal pneumonia     Assessment/Plan       (1) SLL/CLL  Peripheral blood flow cytometry suggest possible SLL/CLL.  This is likely the etiology behind his generalized lymphadenopathy.  Patient had bronchoscopy and biopsy of mediastinal nodes performed which is pending.  Patient will need outpatient evaluation for his SLL /CLL.  He remains asymptomatic from his generalized lymphadenopathy.  No indication of treatment currently present.    (2) Splenomegaly   Patient does have history of cirrhosis however his splenomegaly could be due to possible lymphoma.  Patient has chronic abdominal pain at baseline and I am not convinced that his abdominal pain is secondary to enlarged spleen.  He will need outpatient bone marrow aspiration biopsy to figure out whether his splenomegaly is due to lymphoma versus cirrhosis of liver.    (3) Anemia (4) Iron deficiency (5) Folic acid deficiency     Even though it is possible that his anemia secondary to CLL he was diagnosed with folic acid and iron deficiency.  He has received IV iron in the hospital and his hemoglobin has gotten somewhat better at 8.9.  We will arrange for outpatient bone marrow aspiration biopsy to see if he has bone marrow involvement from lymphoma.    (6) Pneumococcal pneumonia   This could be due to immunocompromise status from CLL/SLL.  I ordered quantitative immunoglobulins which is pending.  If his IgG comes back low he could benefit from IVIG infusions as outpatient.      Outpatient follow up with hem/onc in 4 weeks.   I will sign off.   Dr Redd to cover for me if any questions arise.     9/2/2020      CC:

## 2020-09-02 NOTE — H&P (VIEW-ONLY)
SUBJECTIVE:   9/2/2020    Name: Wong Patrick  DOD: 1955    REASON FOR CONSULT: Nausea vomiting and diarrhea    Chief Complaint:     Chief Complaint   Patient presents with   • Nausea   • Diarrhea       Subjective     Patient is 65 y.o. male with past medical history of colon polyps, anxiety, gallstones, chronic hepatitis C, cirrhosis, CLL, IBS, GERD, COPD, hypertension, presents with cough and dyspnea.  Was found to have bacterial pneumonia and was started on antibiotics.  He was also noted to have extensive lymphadenopathy and underwent bronchoscopy.  He has intermittent bouts of epigastric pain associated with nausea, vomiting and diarrhea for past several years.  Symptoms are worse in the recent past.  Denied constipation, rectal bleeding and has significant weight loss in the recent past which he was unable to quantify.  His last colonoscopy by Dr. Pham was in 2018 and was consistent with 3 sigmoid colon polyps and Dr. Pham recommended repeat colonoscopy in 3 years.  Last EGD by Dr. Ibarra was in 2019 and was consistent with bilious fluid in the stomach and patchy erythema in the gastric cardia.  He denied NSAID usage.  Hemoglobin today is 9.2.  CT abdomen pelvis was consistent with small amount of ascites     ROS/HISTORY/ CURRENT MEDICATIONS/OBJECTIVE/VS/PE:   Review of Systems:   Review of Systems  Review of Systems   Constitutional: Negative for activity change, appetite change, chills, diaphoresis, fatigue, fever and has unexpected weight change.   HENT: Negative for congestion, sore throat and trouble swallowing.    Neck: no adenopathy, thyromegaly or masses.  Respiratory: Negative for apnea, has cough, denied choking, chest tightness, has shortness of breath, denied wheezing and stridor.    Cardiovascular: Negative for chest pain, palpitations and leg swelling.   Gastrointestinal: Negative for abdominal distention, has abdominal pain, denied anal bleeding, blood in stool, constipation,  has diarrhea, nausea and vomiting.   Musculoskeletal: Negative for arthralgias.   Extremities: no edema, cyanosis or clubbing.  Skin: Negative for color change, pallor, rash and wound.   Neurological: Negative for dizziness, syncope, weakness, light-headedness and headaches.   Psychiatric/Behavioral: Negative for agitation, behavioral problems, confusion and decreased concentration.     History:     Past Medical History:   Diagnosis Date   • Abnormal weight loss    • Acquired thrombocytopenia (CMS/HCC)    • Adenomatous polyp of colon    • Anxiety    • Biliary calculus     denies N/V, abd pain   • Chronic hepatitis C (CMS/HCC)    • Cirrhosis of liver (CMS/HCC)    • Colon polyp    • COPD (chronic obstructive pulmonary disease) (CMS/HCC)    • Elevated levels of transaminase & lactic acid dehydrogenase    • Generalized abdominal pain     known gallstones   • GERD (gastroesophageal reflux disease)    • Hypertension    • IBS (irritable bowel syndrome)    • Impacted cerumen    • Kidney disease     US suggest renal dz, elevated alpha 2 macroglobulins   • Nausea and vomiting    • Patient noncompliance, general    • Rectal hemorrhage    • Tracheal mass    • Villous adenoma of colon    • Wheezing      Past Surgical History:   Procedure Laterality Date   • BRONCHOSCOPY N/A 7/13/2020    Procedure: BRONCHOSCOPY WITH ENDOBRONCHIAL ULTRASOUND;  Surgeon: Karthik Sewell MD;  Location: [facility] OR;  Service: Pulmonary;  Laterality: N/A;  preop; tracheal mass; pneumonia  postop  PCP    • COLONOSCOPY  06/03/2013   • COLONOSCOPY N/A 8/22/2018    Procedure: COLONOSCOPY;  Surgeon: Charles Pham MD;  Location: [facility] ENDOSCOPY;  Service: Gastroenterology   • ENDOSCOPY N/A 7/9/2019    Procedure: ESOPHAGOGASTRODUODENOSCOPY;  Surgeon: Bi Ibarra MD;  Location: [facility] ENDOSCOPY;  Service: General   • ENDOSCOPY AND COLONOSCOPY     • ESOPHAGOSCOPY / EGD      with tube   • UPPER GASTROINTESTINAL ENDOSCOPY  06/03/2013   • UPPER  GASTROINTESTINAL ENDOSCOPY  07/09/2019     Family History   Problem Relation Age of Onset   • Heart disease Other    • Diabetes Other      Social History     Tobacco Use   • Smoking status: Current Every Day Smoker     Packs/day: 1.00     Types: Cigarettes   • Smokeless tobacco: Never Used   Substance Use Topics   • Alcohol use: No   • Drug use: No     Medications Prior to Admission   Medication Sig Dispense Refill Last Dose   • albuterol (PROVENTIL HFA;VENTOLIN HFA) 108 (90 BASE) MCG/ACT inhaler Inhale 2 puffs Every 4 (Four) Hours As Needed for Wheezing.   Taking   • budesonide-formoterol (Symbicort) 160-4.5 MCG/ACT inhaler Inhale 2 puffs 2 (Two) Times a Day. 6 g 12 Unknown   • busPIRone (BUSPAR) 15 MG tablet Take 15 mg by mouth 2 (two) times a day.   Taking   • ferrous sulfate 325 (65 FE) MG EC tablet Take 1 tablet by mouth Daily With Breakfast. 30 tablet 2 Taking   • loperamide (IMODIUM) 2 MG capsule Take 2 mg by mouth 4 (Four) Times a Day As Needed for Diarrhea.   Taking   • loratadine (CLARITIN) 10 MG tablet Take 10 mg by mouth Daily.  3 Taking   • losartan (COZAAR) 50 MG tablet Take 50 mg by mouth daily.   Taking   • meclizine (ANTIVERT) 25 MG tablet Take 25 mg by mouth 2 (two) times a day.   Taking   • omeprazole (priLOSEC) 20 MG capsule Take 1 capsule by mouth Daily. 30 capsule 5 Taking   • predniSONE (DELTASONE) 10 MG tablet 4 tabs daily x 3 days, then 3 tabs daily x 3 days, then 2 tabs daily x 3 days, then 1 tab daily x 3 days 30 tablet 0 Taking     Allergies:  Codeine and Penicillins    I have reviewed the patient's medical history, surgical history and family history in the available medical record system.     Current Medications:     Current Facility-Administered Medications   Medication Dose Route Frequency Provider Last Rate Last Dose   • acetaminophen (TYLENOL) tablet 650 mg  650 mg Oral Once PRN Jody Hyde CRNA        Or   • acetaminophen (TYLENOL) suppository 650 mg  650 mg Rectal Once PRN  Barrett, Jody C, CRNA       • acetaminophen (TYLENOL) tablet 650 mg  650 mg Oral Q4H PRN Sandra Jacques MD   650 mg at 08/31/20 2007   • acidophilus (FLORANEX) tablet 1 tablet  1 tablet Oral Daily Sandra Jacques MD   1 tablet at 09/02/20 0805   • albuterol sulfate HFA (PROVENTIL HFA;VENTOLIN HFA;PROAIR HFA) inhaler 2 puff  2 puff Inhalation Q4H PRN Sandra Jacques MD   2 puff at 08/28/20 1940   • budesonide-formoterol (SYMBICORT) 160-4.5 MCG/ACT inhaler 2 puff  2 puff Inhalation BID - RT Sandra Jacques MD   2 puff at 09/02/20 0651   • busPIRone (BUSPAR) tablet 15 mg  15 mg Oral Q12H Sandra Jacques MD   15 mg at 09/02/20 0805   • cetirizine (zyrTEC) tablet 10 mg  10 mg Oral Daily Sandra Jacques MD   10 mg at 09/02/20 0805   • ePHEDrine injection 5 mg  5 mg Intravenous Once PRN Jody Hyde CRNA       • folic acid (FOLVITE) tablet 1 mg  1 mg Oral Daily Sandra Jacques MD   1 mg at 09/02/20 0805   • guaiFENesin (ROBITUSSIN) 100 MG/5ML oral solution 200 mg  200 mg Oral Q4H PRN Sandra Jacques MD       • heparin (porcine) 5000 UNIT/ML injection 5,000 Units  5,000 Units Subcutaneous Q8H Sandra Jacques MD   5,000 Units at 09/02/20 1319   • HYDROmorphone (DILAUDID) injection 0.5 mg  0.5 mg Intravenous Q15 Min PRN Jody Hyde CRNA       • ipratropium-albuterol (DUO-NEB) nebulizer solution 3 mL  3 mL Nebulization 4x Daily - RT Sandra Jacques MD   3 mL at 09/02/20 1449   • labetalol (NORMODYNE,TRANDATE) injection 5 mg  5 mg Intravenous Q5 Min PRN Jody Hyde CRNA       • levoFLOXacin (LEVAQUIN) 750 mg/150 mL D5W (premix) (LEVAQUIN) 750 mg  750 mg Intravenous Q24H Sandra Jacques MD   750 mg at 09/02/20 1320   • lidocaine PF 1% (XYLOCAINE) injection 0.1 mL  0.1 mL Intradermal Once PRN Sandra Jacques MD       • loperamide (IMODIUM) capsule 2 mg  2 mg Oral 4x Daily PRN Sandra Jacques MD       • LORazepam (ATIVAN) injection 0.25 mg  0.25 mg Intravenous Q4H PRN Sandra Jacques MD        • losartan (COZAAR) tablet 50 mg  50 mg Oral Daily Sandra Jacques MD   50 mg at 09/02/20 0805   • magic butt ointment   Topical BID Sandra Jacques MD       • ondansetron (ZOFRAN) injection 4 mg  4 mg Intravenous Q6H PRN Sandra Jacques MD   4 mg at 09/02/20 0807   • ondansetron (ZOFRAN) injection 4 mg  4 mg Intravenous Once PRN Jody Hyde CRNA       • prochlorperazine (COMPAZINE) tablet 10 mg  10 mg Oral Q6H PRN Sandra Jacques MD       • sodium chloride 0.9 % bolus 2,217 mL  30 mL/kg Intravenous Once Sandra Jacques MD       • sodium chloride 0.9 % flush 10 mL  10 mL Intravenous Q12H Sandra Jacques MD   10 mL at 09/02/20 0806   • sodium chloride 0.9 % flush 10 mL  10 mL Intravenous PRN Sandra Jacques MD   10 mL at 08/26/20 2106   • sodium chloride 0.9 % flush 10 mL  10 mL Intravenous PRN Karthik Sewell MD       • sodium chloride 0.9 % flush 3 mL  3 mL Intravenous Q12H Karthik Sewell MD       • vitamin C (ASCORBIC ACID) tablet 1,000 mg  1,000 mg Oral TID With Meals Sandra Jacques MD   1,000 mg at 09/02/20 1319   • zolpidem (AMBIEN) tablet 5 mg  5 mg Oral Nightly PRN Sandra Jacques MD           Objective     Physical Exam:   Temp:  [96.8 °F (36 °C)-98.7 °F (37.1 °C)] 97.4 °F (36.3 °C)  Heart Rate:  [] 104  Resp:  [18] 18  BP: ()/(52-73) 95/52    Physical Exam:  General Appearance:    Alert, cooperative, in no acute distress   Head:    Normocephalic, without obvious abnormality, atraumatic   Eyes:            Lids and lashes normal, conjunctivae and sclerae normal, no   icterus, no pallor, corneas clear, PERRLA   Ears:    Ears appear intact with no abnormalities noted   Throat:   No oral lesions, no thrush, oral mucosa moist   Neck:   No adenopathy, supple, trachea midline, no thyromegaly, no     carotid bruit, no JVD   Back:     No kyphosis present, no scoliosis present, no skin lesions,       erythema or scars, no tenderness to percussion or                    palpation,   range of motion normal   Lungs:     Clear to auscultation,respirations regular, even and                   unlabored    Heart:    Regular rhythm and normal rate, normal S1 and S2, no            murmur, no gallop, no rub, no click   Breast Exam:    Deferred   Abdomen:     Normal bowel sounds, no masses, no organomegaly, soft        nontender, nondistended, no guarding, no rebound                 tenderness   Genitalia:    Deferred   Extremities:   Moves all extremities well, no edema, no cyanosis, no              redness   Pulses:   Pulses palpable and equal bilaterally   Skin:   No bleeding, bruising or rash   Lymph nodes:   No palpable adenopathy   Neurologic:   Cranial nerves 2 - 12 grossly intact, sensation intact, DTR        present and equal bilaterally      Results Review:     Lab Results   Component Value Date    WBC 8.48 09/02/2020    WBC 7.51 09/01/2020    WBC 10.21 08/31/2020    HGB 8.9 (L) 09/02/2020    HGB 8.8 (L) 09/01/2020    HGB 9.0 (L) 08/31/2020    HCT 28.4 (L) 09/02/2020    HCT 28.2 (L) 09/01/2020    HCT 28.0 (L) 08/31/2020     09/02/2020     09/01/2020     08/31/2020     Results from last 7 days   Lab Units 09/01/20  0633   ALK PHOS U/L 69   ALT (SGPT) U/L 16   AST (SGOT) U/L 24     Results from last 7 days   Lab Units 09/01/20  0633   BILIRUBIN mg/dL 0.3   ALK PHOS U/L 69     No results found for: LIPASE  Lab Results   Component Value Date    INR 1.06 09/01/2020    INR 1.20 08/31/2020    INR 0.99 07/20/2020         Radiology Review:  Imaging Results (Last 72 Hours)     Procedure Component Value Units Date/Time    US Abdomen Limited [[id number]] Collected:  08/31/20 0937     Updated:  08/31/20 1121    Narrative:       EXAM: US ABDOMEN LIMITED    INDICATION: assess amount of ascites, J18.9 Pneumonia,  unspecified organism R50.9 Fever, unspecified Z74.09 Other  reduced mobility Z78.9 Other specified health status Z74.09 Other  reduced mobility J13 Pneumonia due to  Streptococcus pneumoniae  R59.0 Localized enlarged lymph nodes    COMPARISONS: CT abdomen and pelvis 8/28/2020    FINDINGS:  Small amount of perihepatic and perisplenic fluid is seen within  the right and left upper quadrants.    Small amount of fluid is seen within the pelvis.      Impression:       Small amount of ascites within the upper abdomen and pelvis.    Electronically signed by:  Jose Guadalupe Farley MD  8/31/2020  11:20 AM CDT Workstation: [id number]          I reviewed the patient's new clinical results.    I reviewed the patient's new imaging results and agree with the interpretation.     ASSESSMENT/PLAN:   ASSESSMENT: 1.  Epigastric pain with nausea and vomiting rule out gastritis, peptic ulcer disease and lymphoma  2.  History of chronic hepatitis C with cirrhosis, LFTs normal.  3.  Extensive lymphadenopathy and weight loss work-up is in progress for lymphoma.  4.  Anemia, likely anemia of chronic disease.  Need to rule out GI blood loss.  5.  Diarrhea rule out colitis and colon polyps.  6.  Bacterial pneumonia, improving.  7.History of colon polyps  PLAN: 1.  Continue PPI and antiemetics.  2.  Proceed with EGD in a.m. for further evaluation.  3.  Continue antibiotics.  4.  May need colonoscopy if symptoms persist.  5.  Obtain AFP  The risks, benefits, and alternatives of this procedure have been discussed with the patient or the responsible party. The patient understands and agrees to proceed.         Yoni Simon MD  09/02/20  17:06

## 2020-09-02 NOTE — PROGRESS NOTES
[facility] Medicine Services  INPATIENT PROGRESS NOTE    Length of Stay: 8  Date of Admission: 8/25/2020  Primary Care Physician: Provider, No Known    Subjective   Chief Complaint: No complaints    HPI:     9/2/2020: Patient underwent EBUS yesterday.  Cultures so far negative.  He has been weaned down to 1 L of oxygen.  Still complains of persistent nausea with intermittent vomiting.    9/1/2020: On evaluation this morning, the patient is agitated that he is not able to eat.  We discussed the reason withholding food secondary to his upcoming bronchoscopy.  Patient states he is extremely nervous thinking about everything going on.      8/31/2020: No complaints today.  Patient states he feels his breathing is a little better.  Patient underwent abdominal ultrasound and it only shows a small amount of ascites.  Patient is scheduled to undergo bronchoscopy with ultrasound in the a.m. with Dr. Jacques.    This is a 65-year-old  male with past medical history of thrombocytopenia, chronic hepatitis C, cirrhosis of the liver, COPD, GERD, hypertension, IBS, and CKD that presented to [facility] on 8/25/2020 with complaints of worsening shortness of air, nausea, vomiting, diarrhea and fatigue x 4- 5 days.  Patient was admitted for sepsis due to bilateral pneumonia and streptococcus pneumonia bacteremia.    Chest x-ray showed a patchy airspace opacity involving almost the entirety of the left lung with air bronchograms.  MRSA screening COVID-19 were negative.  Patient was also also found to have COPD with acute exacerbation, bilateral pleural effusions, supra and subdiaphragmatic enlarged lymph nodes and an acute kidney injury.  CT scan from 8/26/2020 indicated near complete opacification of the left lung superimposed on emphysema with a numerous amount of blebs and bullae in the left lung.  He was noticed to have calcification in the posterior left pleura as well as  multiple lymphadenopathy involving bilateral axilla, cervical and supraclavicular and mediastinal nodes.  In addition, he was noticed to have splenomegaly with spleen size measuring 17.5.    Patient is a poor historian but records reviewed from chart review indicate the patient was recently admitted (7/8-7/14/20) at [facility] as a transfer from [facility] secondary to a possible paratracheal mass seen on CT, strep pneumoniae bacteremia and right lower lobe pneumonia.  The patient underwent bronchoscopy on 7/13/2020 and cultures were obtained but due to ultrasound malfunction the patient was not able to have evaluation of the suspicious mass.  He was scheduled to undergo bronchoscopy after admission but that was postponed secondary to his AFB resulting with Mycobacterium Gordonae.  The patient underwent a QuantiFERON gold TB test and that was negative.  A progress note dated 8/24/2020 states the patient was notified of Mycobacterium Gordonae and that this does not need to be treated.    The patient nor his listed relative (Ashkan) are aware if there is a scheduled appointment for outpatient bronchoscopy in [place] in the future.    Review of Systems   Constitutional: Positive for activity change, fatigue and unexpected weight change.   HENT: Negative for ear pain and sore throat.    Eyes: Negative for pain and discharge.   Respiratory: Positive for shortness of breath and wheezing. Negative for cough.    Cardiovascular: Negative for chest pain and palpitations.   Gastrointestinal: Positive for abdominal distention, abdominal pain, diarrhea, nausea and vomiting.   Endocrine: Negative for cold intolerance and heat intolerance.   Genitourinary: Negative for difficulty urinating and dysuria.   Musculoskeletal: Positive for arthralgias and back pain. Negative for gait problem.   Skin: Negative for color change and rash.   Neurological: Positive for weakness. Negative for dizziness.    Psychiatric/Behavioral: Negative for agitation and confusion.        Objective    Temp:  [96.8 °F (36 °C)-98.7 °F (37.1 °C)] 96.8 °F (36 °C)  Heart Rate:  [] 88  Resp:  [18-23] 18  BP: ()/(55-73) 113/66    Physical Exam   Constitutional: He is oriented to person, place, and time. He appears well-developed and well-nourished.   HENT:   Head: Normocephalic and atraumatic.   Eyes: Pupils are equal, round, and reactive to light. EOM are normal.   Neck: Normal range of motion. Neck supple.   Cardiovascular: Normal rate and regular rhythm.   Pulmonary/Chest: Effort normal. He has wheezes.   Abdominal: Soft. Bowel sounds are normal.   Musculoskeletal: Normal range of motion.   Neurological: He is alert and oriented to person, place, and time.   Skin: Skin is warm and dry.   Psychiatric: He has a normal mood and affect. His behavior is normal.     Results Review:  I have reviewed the labs, radiology results, and diagnostic studies.    Laboratory Data:   Results from last 7 days   Lab Units 09/02/20 0536 09/01/20 0633 08/31/20  0519   SODIUM mmol/L 136 136 135*   POTASSIUM mmol/L 4.4 4.6 4.6   CHLORIDE mmol/L 95* 97* 98   CO2 mmol/L 34.0* 33.0* 31.0*   BUN mg/dL 17 21 21   CREATININE mg/dL 0.82 0.77 0.75*   GLUCOSE mg/dL 95 95 86   CALCIUM mg/dL 8.1* 8.1* 8.2*   BILIRUBIN mg/dL  --  0.3  --    ALK PHOS U/L  --  69  --    ALT (SGPT) U/L  --  16  --    AST (SGOT) U/L  --  24  --    ANION GAP mmol/L 7.0 6.0 6.0     Estimated Creatinine Clearance: 87.9 mL/min (by C-G formula based on SCr of 0.82 mg/dL).          Results from last 7 days   Lab Units 09/02/20  0536 09/01/20  0633 08/31/20  0519 08/30/20  0611 08/29/20  0450   WBC 10*3/mm3 8.48 7.51 10.21 9.05 10.88*   HEMOGLOBIN g/dL 8.9* 8.8* 9.0* 8.5* 8.8*   HEMATOCRIT % 28.4* 28.2* 28.0* 26.5* 28.9*   PLATELETS 10*3/mm3 163 168 207 217 265     Results from last 7 days   Lab Units 09/01/20  1754 08/31/20  0720   INR  1.06 1.20       Culture Data:   No results  found for: BLOODCX  No results found for: URINECX  Respiratory Culture   Date Value Ref Range Status   09/01/2020   Preliminary    Scant growth (1+) Normal Respiratory Jazzmine: NO S.aureus/MRSA or Pseudomonas aeruginosa   09/01/2020   Preliminary    Scant growth (1+) Normal Respiratory Jazzmine: NO S.aureus/MRSA or Pseudomonas aeruginosa   08/31/2020   Final    Scant growth (1+) Normal Respiratory Jazzmine: NO S.aureus/MRSA or Pseudomonas aeruginosa     No results found for: WOUNDCX  No results found for: STOOLCX  No components found for: BODYFLD    Radiology Data:   Imaging Results (Last 24 Hours)     ** No results found for the last 24 hours. **          I have reviewed the patient's current medications.     Assessment/Plan     Active Hospital Problems    Diagnosis POA   • Sepsis due to pneumonia (CMS/[facility]) [J18.9, A41.9] Yes   • COPD with acute exacerbation (CMS/[facility]) [J44.1] Yes   • IBRAHIMA (acute kidney injury) (CMS/[facility]) [N17.9] Yes   • Acute respiratory failure with hypoxia (CMS/[facility]) [J96.01] Yes   • Pneumonia of left upper lobe due to Streptococcus pneumoniae (CMS/[facility]) [J13] Unknown     Added automatically from request for surgery [id number]     • Mediastinal adenopathy [R59.0] Unknown     Added automatically from request for surgery [id number]     • Hypertension [I10] Yes   • Cirrhosis of liver (CMS/[facility]) [K74.60] Yes       Plan:    1.  Sepsis, resolved, likely related to bilateral pneumonia and Streptococcus pneumonia bacteremia: Patient is hemodynamically stable.  We will continue to monitor.  2.  Pneumonia, bilateral: Continue IV Levaquin. Scheduled duonebs. Legionella and strep pneumo negative.  Patient is currently on 1 L of oxygen.  Respiratory culture pending. Pulmonary consult appreciated.   3.  Bacteremia, Streptococcus pneumonia: Blood culture from 8/27/2020 is negative.  Continue IV Levaquin day# 7/10. First negative culture 8/27/20.  4.  COPD with acute exacerbation: Continue IV steroids.  We will continue to wean.   "  5.  Acute kidney injury, resolved: We will continue to monitor.  6.  Hypertension: Continue the patient's home losartan.  7.  Cirrhosis of the liver:  Follows with ALYSE ROGERS. Ultrasound of the abdomen shows a small amount of ascites.     8.  History of tracheal mass: Lung CT results were obtained from [facility] that indicates \"the patient has a tumor in the subcarinal region that appears to be a solid mass associated with lymphadenopathy along the right side of the trachea with large nodes present.  The mass in the subcarinal region is very amendable to biopsy.  The patient has some pleural calcification in the left lung base related to asbestos apparently.  This involves the entire medial pleural surface of the left lung\".  Original copy will be scanned into media.  The patient underwent EBUS bronchoscopy on 9/1/20 by Dr. Jacques.  Cultures pending.  9.  Lymphadenopathy, axillary, supraclavicular and mediastinal nodes: Hematology/oncology consult appreciated.    10.  Pleural effusion, bilateral, right greater than left: May need thoracentesis if respiratory status worsens.   Patient is currently on 1 L of oxygen.  11.  Deconditioning:  Continue PT/OT.    12.  Anxiety: Ativan 0.25 every 4 hours as needed.   13.  Persistent nausea and vomiting.  GI consult appreciated.        Discharge Planning: I expect patient to be discharged to home in 2-3 days.      This document has been electronically signed by ANA Barajas on September 2, 2020 10:34        "

## 2020-09-02 NOTE — CONSULTS
SUBJECTIVE:   9/2/2020    Name: Wong Patrick  DOD: 1955    REASON FOR CONSULT: Nausea vomiting and diarrhea    Chief Complaint:     Chief Complaint   Patient presents with   • Nausea   • Diarrhea       Subjective     Patient is 65 y.o. male with past medical history of colon polyps, anxiety, gallstones, chronic hepatitis C, cirrhosis, CLL, IBS, GERD, COPD, hypertension, presents with cough and dyspnea.  Was found to have bacterial pneumonia and was started on antibiotics.  He was also noted to have extensive lymphadenopathy and underwent bronchoscopy.  He has intermittent bouts of epigastric pain associated with nausea, vomiting and diarrhea for past several years.  Symptoms are worse in the recent past.  Denied constipation, rectal bleeding and has significant weight loss in the recent past which he was unable to quantify.  His last colonoscopy by Dr. Pham was in 2018 and was consistent with 3 sigmoid colon polyps and Dr. Pham recommended repeat colonoscopy in 3 years.  Last EGD by Dr. Ibarra was in 2019 and was consistent with bilious fluid in the stomach and patchy erythema in the gastric cardia.  He denied NSAID usage.  Hemoglobin today is 9.2.  CT abdomen pelvis was consistent with small amount of ascites     ROS/HISTORY/ CURRENT MEDICATIONS/OBJECTIVE/VS/PE:   Review of Systems:   Review of Systems  Review of Systems   Constitutional: Negative for activity change, appetite change, chills, diaphoresis, fatigue, fever and has unexpected weight change.   HENT: Negative for congestion, sore throat and trouble swallowing.    Neck: no adenopathy, thyromegaly or masses.  Respiratory: Negative for apnea, has cough, denied choking, chest tightness, has shortness of breath, denied wheezing and stridor.    Cardiovascular: Negative for chest pain, palpitations and leg swelling.   Gastrointestinal: Negative for abdominal distention, has abdominal pain, denied anal bleeding, blood in stool, constipation,  has diarrhea, nausea and vomiting.   Musculoskeletal: Negative for arthralgias.   Extremities: no edema, cyanosis or clubbing.  Skin: Negative for color change, pallor, rash and wound.   Neurological: Negative for dizziness, syncope, weakness, light-headedness and headaches.   Psychiatric/Behavioral: Negative for agitation, behavioral problems, confusion and decreased concentration.     History:     Past Medical History:   Diagnosis Date   • Abnormal weight loss    • Acquired thrombocytopenia (CMS/HCC)    • Adenomatous polyp of colon    • Anxiety    • Biliary calculus     denies N/V, abd pain   • Chronic hepatitis C (CMS/HCC)    • Cirrhosis of liver (CMS/HCC)    • Colon polyp    • COPD (chronic obstructive pulmonary disease) (CMS/HCC)    • Elevated levels of transaminase & lactic acid dehydrogenase    • Generalized abdominal pain     known gallstones   • GERD (gastroesophageal reflux disease)    • Hypertension    • IBS (irritable bowel syndrome)    • Impacted cerumen    • Kidney disease     US suggest renal dz, elevated alpha 2 macroglobulins   • Nausea and vomiting    • Patient noncompliance, general    • Rectal hemorrhage    • Tracheal mass    • Villous adenoma of colon    • Wheezing      Past Surgical History:   Procedure Laterality Date   • BRONCHOSCOPY N/A 7/13/2020    Procedure: BRONCHOSCOPY WITH ENDOBRONCHIAL ULTRASOUND;  Surgeon: Karthik Sewell MD;  Location: [facility] OR;  Service: Pulmonary;  Laterality: N/A;  preop; tracheal mass; pneumonia  postop  PCP    • COLONOSCOPY  06/03/2013   • COLONOSCOPY N/A 8/22/2018    Procedure: COLONOSCOPY;  Surgeon: Charles Pham MD;  Location: [facility] ENDOSCOPY;  Service: Gastroenterology   • ENDOSCOPY N/A 7/9/2019    Procedure: ESOPHAGOGASTRODUODENOSCOPY;  Surgeon: Bi Ibarra MD;  Location: [facility] ENDOSCOPY;  Service: General   • ENDOSCOPY AND COLONOSCOPY     • ESOPHAGOSCOPY / EGD      with tube   • UPPER GASTROINTESTINAL ENDOSCOPY  06/03/2013   • UPPER  GASTROINTESTINAL ENDOSCOPY  07/09/2019     Family History   Problem Relation Age of Onset   • Heart disease Other    • Diabetes Other      Social History     Tobacco Use   • Smoking status: Current Every Day Smoker     Packs/day: 1.00     Types: Cigarettes   • Smokeless tobacco: Never Used   Substance Use Topics   • Alcohol use: No   • Drug use: No     Medications Prior to Admission   Medication Sig Dispense Refill Last Dose   • albuterol (PROVENTIL HFA;VENTOLIN HFA) 108 (90 BASE) MCG/ACT inhaler Inhale 2 puffs Every 4 (Four) Hours As Needed for Wheezing.   Taking   • budesonide-formoterol (Symbicort) 160-4.5 MCG/ACT inhaler Inhale 2 puffs 2 (Two) Times a Day. 6 g 12 Unknown   • busPIRone (BUSPAR) 15 MG tablet Take 15 mg by mouth 2 (two) times a day.   Taking   • ferrous sulfate 325 (65 FE) MG EC tablet Take 1 tablet by mouth Daily With Breakfast. 30 tablet 2 Taking   • loperamide (IMODIUM) 2 MG capsule Take 2 mg by mouth 4 (Four) Times a Day As Needed for Diarrhea.   Taking   • loratadine (CLARITIN) 10 MG tablet Take 10 mg by mouth Daily.  3 Taking   • losartan (COZAAR) 50 MG tablet Take 50 mg by mouth daily.   Taking   • meclizine (ANTIVERT) 25 MG tablet Take 25 mg by mouth 2 (two) times a day.   Taking   • omeprazole (priLOSEC) 20 MG capsule Take 1 capsule by mouth Daily. 30 capsule 5 Taking   • predniSONE (DELTASONE) 10 MG tablet 4 tabs daily x 3 days, then 3 tabs daily x 3 days, then 2 tabs daily x 3 days, then 1 tab daily x 3 days 30 tablet 0 Taking     Allergies:  Codeine and Penicillins    I have reviewed the patient's medical history, surgical history and family history in the available medical record system.     Current Medications:     Current Facility-Administered Medications   Medication Dose Route Frequency Provider Last Rate Last Dose   • acetaminophen (TYLENOL) tablet 650 mg  650 mg Oral Once PRN Jody Hyde CRNA        Or   • acetaminophen (TYLENOL) suppository 650 mg  650 mg Rectal Once PRN  Barrett, Jody C, CRNA       • acetaminophen (TYLENOL) tablet 650 mg  650 mg Oral Q4H PRN Sandra Jacques MD   650 mg at 08/31/20 2007   • acidophilus (FLORANEX) tablet 1 tablet  1 tablet Oral Daily Sandra Jacques MD   1 tablet at 09/02/20 0805   • albuterol sulfate HFA (PROVENTIL HFA;VENTOLIN HFA;PROAIR HFA) inhaler 2 puff  2 puff Inhalation Q4H PRN Sandra Jacques MD   2 puff at 08/28/20 1940   • budesonide-formoterol (SYMBICORT) 160-4.5 MCG/ACT inhaler 2 puff  2 puff Inhalation BID - RT Sandra Jacques MD   2 puff at 09/02/20 0651   • busPIRone (BUSPAR) tablet 15 mg  15 mg Oral Q12H Sandra Jacques MD   15 mg at 09/02/20 0805   • cetirizine (zyrTEC) tablet 10 mg  10 mg Oral Daily Sandra Jacques MD   10 mg at 09/02/20 0805   • ePHEDrine injection 5 mg  5 mg Intravenous Once PRN Jody Hyde CRNA       • folic acid (FOLVITE) tablet 1 mg  1 mg Oral Daily Sandra Jacques MD   1 mg at 09/02/20 0805   • guaiFENesin (ROBITUSSIN) 100 MG/5ML oral solution 200 mg  200 mg Oral Q4H PRN Sandra Jacques MD       • heparin (porcine) 5000 UNIT/ML injection 5,000 Units  5,000 Units Subcutaneous Q8H Sandra Jacques MD   5,000 Units at 09/02/20 1319   • HYDROmorphone (DILAUDID) injection 0.5 mg  0.5 mg Intravenous Q15 Min PRN Jody Hyde CRNA       • ipratropium-albuterol (DUO-NEB) nebulizer solution 3 mL  3 mL Nebulization 4x Daily - RT Sandra Jacques MD   3 mL at 09/02/20 1449   • labetalol (NORMODYNE,TRANDATE) injection 5 mg  5 mg Intravenous Q5 Min PRN Jody Hyde CRNA       • levoFLOXacin (LEVAQUIN) 750 mg/150 mL D5W (premix) (LEVAQUIN) 750 mg  750 mg Intravenous Q24H Sandra Jacques MD   750 mg at 09/02/20 1320   • lidocaine PF 1% (XYLOCAINE) injection 0.1 mL  0.1 mL Intradermal Once PRN Sandra Jacques MD       • loperamide (IMODIUM) capsule 2 mg  2 mg Oral 4x Daily PRN Sandra Jacques MD       • LORazepam (ATIVAN) injection 0.25 mg  0.25 mg Intravenous Q4H PRN Sandra Jacques MD        • losartan (COZAAR) tablet 50 mg  50 mg Oral Daily Sandra Jacques MD   50 mg at 09/02/20 0805   • magic butt ointment   Topical BID Sandra Jacques MD       • ondansetron (ZOFRAN) injection 4 mg  4 mg Intravenous Q6H PRN Sandra Jacques MD   4 mg at 09/02/20 0807   • ondansetron (ZOFRAN) injection 4 mg  4 mg Intravenous Once PRN Jody Hyde CRNA       • prochlorperazine (COMPAZINE) tablet 10 mg  10 mg Oral Q6H PRN Sandra Jacques MD       • sodium chloride 0.9 % bolus 2,217 mL  30 mL/kg Intravenous Once Sandra Jacques MD       • sodium chloride 0.9 % flush 10 mL  10 mL Intravenous Q12H Sandra Jacques MD   10 mL at 09/02/20 0806   • sodium chloride 0.9 % flush 10 mL  10 mL Intravenous PRN Sandra Jacques MD   10 mL at 08/26/20 2106   • sodium chloride 0.9 % flush 10 mL  10 mL Intravenous PRN Karthik Sewell MD       • sodium chloride 0.9 % flush 3 mL  3 mL Intravenous Q12H Karthik Sewell MD       • vitamin C (ASCORBIC ACID) tablet 1,000 mg  1,000 mg Oral TID With Meals Sandra Jacques MD   1,000 mg at 09/02/20 1319   • zolpidem (AMBIEN) tablet 5 mg  5 mg Oral Nightly PRN Sandra Jacques MD           Objective     Physical Exam:   Temp:  [96.8 °F (36 °C)-98.7 °F (37.1 °C)] 97.4 °F (36.3 °C)  Heart Rate:  [] 104  Resp:  [18] 18  BP: ()/(52-73) 95/52    Physical Exam:  General Appearance:    Alert, cooperative, in no acute distress   Head:    Normocephalic, without obvious abnormality, atraumatic   Eyes:            Lids and lashes normal, conjunctivae and sclerae normal, no   icterus, no pallor, corneas clear, PERRLA   Ears:    Ears appear intact with no abnormalities noted   Throat:   No oral lesions, no thrush, oral mucosa moist   Neck:   No adenopathy, supple, trachea midline, no thyromegaly, no     carotid bruit, no JVD   Back:     No kyphosis present, no scoliosis present, no skin lesions,       erythema or scars, no tenderness to percussion or                    palpation,   range of motion normal   Lungs:     Clear to auscultation,respirations regular, even and                   unlabored    Heart:    Regular rhythm and normal rate, normal S1 and S2, no            murmur, no gallop, no rub, no click   Breast Exam:    Deferred   Abdomen:     Normal bowel sounds, no masses, no organomegaly, soft        nontender, nondistended, no guarding, no rebound                 tenderness   Genitalia:    Deferred   Extremities:   Moves all extremities well, no edema, no cyanosis, no              redness   Pulses:   Pulses palpable and equal bilaterally   Skin:   No bleeding, bruising or rash   Lymph nodes:   No palpable adenopathy   Neurologic:   Cranial nerves 2 - 12 grossly intact, sensation intact, DTR        present and equal bilaterally      Results Review:     Lab Results   Component Value Date    WBC 8.48 09/02/2020    WBC 7.51 09/01/2020    WBC 10.21 08/31/2020    HGB 8.9 (L) 09/02/2020    HGB 8.8 (L) 09/01/2020    HGB 9.0 (L) 08/31/2020    HCT 28.4 (L) 09/02/2020    HCT 28.2 (L) 09/01/2020    HCT 28.0 (L) 08/31/2020     09/02/2020     09/01/2020     08/31/2020     Results from last 7 days   Lab Units 09/01/20  0633   ALK PHOS U/L 69   ALT (SGPT) U/L 16   AST (SGOT) U/L 24     Results from last 7 days   Lab Units 09/01/20  0633   BILIRUBIN mg/dL 0.3   ALK PHOS U/L 69     No results found for: LIPASE  Lab Results   Component Value Date    INR 1.06 09/01/2020    INR 1.20 08/31/2020    INR 0.99 07/20/2020         Radiology Review:  Imaging Results (Last 72 Hours)     Procedure Component Value Units Date/Time    US Abdomen Limited [[id number]] Collected:  08/31/20 0937     Updated:  08/31/20 1121    Narrative:       EXAM: US ABDOMEN LIMITED    INDICATION: assess amount of ascites, J18.9 Pneumonia,  unspecified organism R50.9 Fever, unspecified Z74.09 Other  reduced mobility Z78.9 Other specified health status Z74.09 Other  reduced mobility J13 Pneumonia due to  Streptococcus pneumoniae  R59.0 Localized enlarged lymph nodes    COMPARISONS: CT abdomen and pelvis 8/28/2020    FINDINGS:  Small amount of perihepatic and perisplenic fluid is seen within  the right and left upper quadrants.    Small amount of fluid is seen within the pelvis.      Impression:       Small amount of ascites within the upper abdomen and pelvis.    Electronically signed by:  Jose Guadalupe Farley MD  8/31/2020  11:20 AM CDT Workstation: [id number]          I reviewed the patient's new clinical results.    I reviewed the patient's new imaging results and agree with the interpretation.     ASSESSMENT/PLAN:   ASSESSMENT: 1.  Epigastric pain with nausea and vomiting rule out gastritis, peptic ulcer disease and lymphoma  2.  History of chronic hepatitis C with cirrhosis, LFTs normal.  3.  Extensive lymphadenopathy and weight loss work-up is in progress for lymphoma.  4.  Anemia, likely anemia of chronic disease.  Need to rule out GI blood loss.  5.  Diarrhea rule out colitis and colon polyps.  6.  Bacterial pneumonia, improving.  7.History of colon polyps  PLAN: 1.  Continue PPI and antiemetics.  2.  Proceed with EGD in a.m. for further evaluation.  3.  Continue antibiotics.  4.  May need colonoscopy if symptoms persist.  5.  Obtain AFP  The risks, benefits, and alternatives of this procedure have been discussed with the patient or the responsible party. The patient understands and agrees to proceed.         Yoni Simon MD  09/02/20  17:06

## 2020-09-03 PROBLEM — R78.81 BACTEREMIA DUE TO STREPTOCOCCUS PNEUMONIAE: Status: ACTIVE | Noted: 2020-01-01

## 2020-09-03 PROBLEM — C91.10 CLL (CHRONIC LYMPHOCYTIC LEUKEMIA) (HCC): Status: ACTIVE | Noted: 2020-01-01

## 2020-09-03 PROBLEM — B95.3 BACTEREMIA DUE TO STREPTOCOCCUS PNEUMONIAE: Status: ACTIVE | Noted: 2020-01-01

## 2020-09-03 PROBLEM — R11.10 NON-INTRACTABLE VOMITING: Status: ACTIVE | Noted: 2020-01-01

## 2020-09-03 NOTE — PLAN OF CARE
Problem: Patient Care Overview  Goal: Plan of Care Review  Outcome: Ongoing (interventions implemented as appropriate)  Flowsheets  Taken 9/3/2020 0442  Progress: no change  Outcome Summary: VSS.  No s/s of pain or discomfort.  Pt rested comfortably on this shift.  Will continue to monitor this pt.  Taken 9/2/2020 2120  Plan of Care Reviewed With: patient

## 2020-09-03 NOTE — PLAN OF CARE
Problem: Patient Care Overview  Goal: Plan of Care Review  Outcome: Ongoing (interventions implemented as appropriate)  Flowsheets (Taken 9/3/2020 1333 by Cleopatra Rich RN)  Plan of Care Reviewed With: patient  Goal: Individualization and Mutuality  Outcome: Ongoing (interventions implemented as appropriate)  Goal: Discharge Needs Assessment  Outcome: Ongoing (interventions implemented as appropriate)  Goal: Interprofessional Rounds/Family Conf  Outcome: Ongoing (interventions implemented as appropriate)     Problem: Pneumonia (Adult)  Goal: Signs and Symptoms of Listed Potential Problems Will be Absent, Minimized or Managed (Pneumonia)  Outcome: Ongoing (interventions implemented as appropriate)     Problem: Skin Injury Risk (Adult)  Goal: Identify Related Risk Factors and Signs and Symptoms  Outcome: Ongoing (interventions implemented as appropriate)  Goal: Skin Health and Integrity  Outcome: Ongoing (interventions implemented as appropriate)     Problem: Wound (Includes Pressure Injury) (Adult)  Goal: Signs and Symptoms of Listed Potential Problems Will be Absent, Minimized or Managed (Wound)  Outcome: Ongoing (interventions implemented as appropriate)     Problem: Nausea/Vomiting (Adult)  Goal: Identify Related Risk Factors and Signs and Symptoms  Outcome: Ongoing (interventions implemented as appropriate)  Goal: Symptom Relief  Outcome: Ongoing (interventions implemented as appropriate)  Goal: Adequate Hydration  Outcome: Ongoing (interventions implemented as appropriate)     Problem: Diarrhea (Adult)  Goal: Identify Related Risk Factors and Signs and Symptoms  Outcome: Ongoing (interventions implemented as appropriate)  Goal: Improved/Reduced Symptoms  Outcome: Ongoing (interventions implemented as appropriate)     Problem: Sepsis/Septic Shock (Adult)  Goal: Signs and Symptoms of Listed Potential Problems Will be Absent, Minimized or Managed (Sepsis/Septic Shock)  Outcome: Ongoing (interventions implemented  as appropriate)     Problem: Fall Risk (Adult)  Goal: Identify Related Risk Factors and Signs and Symptoms  Outcome: Ongoing (interventions implemented as appropriate)  Goal: Absence of Fall  Outcome: Ongoing (interventions implemented as appropriate)     Problem: Chronic Obstructive Pulmonary Disease (Adult)  Goal: Signs and Symptoms of Listed Potential Problems Will be Absent, Minimized or Managed (Chronic Obstructive Pulmonary Disease)  Outcome: Ongoing (interventions implemented as appropriate)     Problem: Renal Failure/Kidney Injury, Acute (Adult)  Goal: Signs and Symptoms of Listed Potential Problems Will be Absent, Minimized or Managed (Renal Failure/Kidney Injury, Acute)  Outcome: Ongoing (interventions implemented as appropriate)     Problem: Anemia (Adult)  Goal: Identify Related Risk Factors and Signs and Symptoms  Outcome: Ongoing (interventions implemented as appropriate)  Goal: Symptom Improvement  Outcome: Ongoing (interventions implemented as appropriate)     Problem: Activity Intolerance (Adult)  Goal: Identify Related Risk Factors and Signs and Symptoms  Outcome: Ongoing (interventions implemented as appropriate)  Goal: Activity Tolerance  Outcome: Ongoing (interventions implemented as appropriate)  Goal: Effective Energy Conservation Techniques  Outcome: Ongoing (interventions implemented as appropriate)

## 2020-09-03 NOTE — SIGNIFICANT NOTE
09/03/20 1040   Rehab Treatment   Discipline physical therapy assistant   Reason Treatment Not Performed patient/family declined treatment  (pt going down for EGD today, defers PT tx today)

## 2020-09-03 NOTE — PROGRESS NOTES
[facility]  INPATIENT PROGRESS NOTE    Length of Stay: 9  Date of Admission: 8/25/2020  Primary Care Physician: Provider, No Known    Subjective   Chief Complaint: Nausea, shortness of breath  HPI:  65 year old male with a history of COPD ,cirrhosis of the liver with ascites, chronic hepatitis C, GERD, HTN, and irritable bowel syndrome who presented with shortness of breath, nausea, vomiting, diarrhea, and fatigue over 4-5 days.  Chest x-ray showed patchy airspace opacity involving almost the entirety of the left lung with air bronchograms. He also has a known tracheal/mediastinal mass.  He met sepsis criteria and was admitted for sepsis from pneumonia and respiratory failure.  He was admitted to CCU on IV antibiotics and IV fluid.  He was initiated on IV solumedrol and bronchodilators.  COVID 19 is negative.  Blood cultures were positive for Streptococcus pneumoniae sensitive to Levaquin.  Antibiotics were targeted. Oncology and pulmonology were consulted. He underwent EBUS.  Peripheral blood flow cytometry is suggestive of SLL/CLL and he is recommended observation at this time and outpatient oncology follow-up. FNA is also consistent with SLL/CLL. GI was consulted for persistent nausea.  He is to undergo EGD today.  He denies nausea this morning. He remains on oxygen.    Review of Systems   Constitutional: Negative for chills and fever.   Respiratory: Negative for shortness of breath.    Cardiovascular: Negative for chest pain.   Gastrointestinal: Negative for abdominal pain, nausea and vomiting.   All other systems reviewed and are negative.       All pertinent negatives and positives are as above. All other systems have been reviewed and are negative unless otherwise stated.     Objective    Temp:  [97.4 °F (36.3 °C)-98.9 °F (37.2 °C)] 98.2 °F (36.8 °C)  Heart Rate:  [] 89  Resp:  [16-24] 24  BP: ()/(52-68) 158/65    Physical Exam    Constitutional: He is oriented to person, place, and time. He appears well-developed and well-nourished. No distress.   HENT:   Head: Normocephalic and atraumatic.   Eyes: Conjunctivae are normal.   Cardiovascular: Normal rate, regular rhythm and intact distal pulses.   Pulmonary/Chest: Effort normal and breath sounds normal. No respiratory distress.   Abdominal: Soft. Bowel sounds are normal. He exhibits no distension. There is no tenderness.   Musculoskeletal: He exhibits edema (bilateral lower extremity). He exhibits no deformity.   Neurological: He is alert and oriented to person, place, and time.   Skin: Skin is warm and dry. He is not diaphoretic.   Vitals reviewed.      Results Review:  I have reviewed the labs, radiology results, and diagnostic studies.    Laboratory Data:   Results from last 7 days   Lab Units 09/03/20  0630 09/02/20  0536 09/01/20  0633   SODIUM mmol/L 136 136 136   POTASSIUM mmol/L 4.5 4.4 4.6   CHLORIDE mmol/L 96* 95* 97*   CO2 mmol/L 34.0* 34.0* 33.0*   BUN mg/dL 15 17 21   CREATININE mg/dL 0.74* 0.82 0.77   GLUCOSE mg/dL 99 95 95   CALCIUM mg/dL 8.3* 8.1* 8.1*   BILIRUBIN mg/dL  --   --  0.3   ALK PHOS U/L  --   --  69   ALT (SGPT) U/L  --   --  16   AST (SGOT) U/L  --   --  24   ANION GAP mmol/L 6.0 7.0 6.0     Estimated Creatinine Clearance: 103.9 mL/min (A) (by C-G formula based on SCr of 0.74 mg/dL (L)).          Results from last 7 days   Lab Units 09/03/20  0630 09/02/20  0536 09/01/20  0633 08/31/20  0519 08/30/20  0611   WBC 10*3/mm3 8.05 8.48 7.51 10.21 9.05   HEMOGLOBIN g/dL 9.1* 8.9* 8.8* 9.0* 8.5*   HEMATOCRIT % 29.1* 28.4* 28.2* 28.0* 26.5*   PLATELETS 10*3/mm3 134* 163 168 207 217     Results from last 7 days   Lab Units 09/01/20  1754 08/31/20  0720   INR  1.06 1.20       Culture Data:   No results found for: BLOODCX  No results found for: URINECX  Respiratory Culture   Date Value Ref Range Status   09/01/2020   Final    Scant growth (1+) Normal Respiratory [place]: NO  S.aureus/MRSA or Pseudomonas aeruginosa   09/01/2020   Final    Scant growth (1+) Normal Respiratory [place]: NO S.aureus/MRSA or Pseudomonas aeruginosa   08/31/2020   Final    Scant growth (1+) Normal Respiratory [place]: NO S.aureus/MRSA or Pseudomonas aeruginosa     No results found for: WOUNDCX  No results found for: STOOLCX  No components found for: BODYFLD    Radiology Data:   Imaging Results (Last 24 Hours)     ** No results found for the last 24 hours. **          I have reviewed the patient's current medications.     Assessment/Plan     Active Hospital Problems    Diagnosis   • **Non-intractable vomiting     Added automatically from request for surgery [id number]     • CLL (chronic lymphocytic leukemia) (CMS/HCC)     Likely based on flow cytometry     • Bacteremia due to Streptococcus pneumoniae   • Sepsis due to pneumonia (CMS/HCC)   • COPD with acute exacerbation (CMS/HCC)   • IBRAHIMA (acute kidney injury) (CMS/HCC)   • Acute respiratory failure with hypoxia (CMS/HCC)   • Pneumonia of left upper lobe due to Streptococcus pneumoniae (CMS/HCC)     Added automatically from request for surgery [id number]     • Mediastinal adenopathy     Added automatically from request for surgery [id number]     • Hypertension   • Cirrhosis of liver (CMS/HCC)       Plan:    Wean O2, baseline room air  Levaquin day 9/14  Follow cultures  Symbicort, Duonebs  Pulmonology consultation appreciated  Outpatient evaluation of SLL/CLL, Follow-up 4 weeks with heme/onc  Heme/Onc consultation appreciated  EGD today  Antiemetics PRN  GI consultation appreciated  IBRAHIMA resolved  BP control: Cozaar  PT/OT  VTE PPx: Heparin  Discharge planning        The patient was evaluated during the global COVID-19 pandemic, and the diagnosis was suspected/considered upon their initial presentation.  Evaluation, treatment, and testing were consistent with current guidelines for patients who present with complaints or symptoms that may be related to COVID-19.        This  document has been electronically signed by ANA Edmonds on September 3, 2020 11:48

## 2020-09-03 NOTE — ANESTHESIA PREPROCEDURE EVALUATION
Anesthesia Evaluation     Patient summary reviewed and Nursing notes reviewed   NPO Solid Status: > 8 hours  NPO Liquid Status: > 2 hours           Airway   Mallampati: III  No difficulty expected  Dental    (+) edentulous    Pulmonary - normal exam   Cardiovascular - normal exam    (+) hypertension,       Neuro/Psych  (+) psychiatric history,     GI/Hepatic/Renal/Endo    (+)  GERD, GI bleeding , hepatitis, liver disease,     Musculoskeletal     Abdominal    Substance History      OB/GYN          Other                          Anesthesia Plan    ASA 3     MAC     intravenous induction     Anesthetic plan, all risks, benefits, and alternatives have been provided, discussed and informed consent has been obtained with: patient.

## 2020-09-03 NOTE — ANESTHESIA POSTPROCEDURE EVALUATION
Patient: Wong Patrick    Procedure Summary     Date:  09/03/20 Room / Location:  [facility] ENDOSCOPY 2 / [facility] ENDOSCOPY    Anesthesia Start:  1513 Anesthesia Stop:  1526    Procedure:  ESOPHAGOGASTRODUODENOSCOPY (N/A ) Diagnosis:       Non-intractable vomiting with nausea, unspecified vomiting type      (Non-intractable vomiting with nausea, unspecified vomiting type [R11.2])    Surgeon:  Yoni Simon MD Provider:  Blaine Dumont CRNA    Anesthesia Type:  MAC ASA Status:  3          Anesthesia Type: MAC    Vitals  No vitals data found for the desired time range.          Post Anesthesia Care and Evaluation    Patient location during evaluation: bedside  Patient participation: complete - patient participated  Level of consciousness: awake  Pain score: 0  Pain management: adequate  Airway patency: patent  Anesthetic complications: No anesthetic complications  PONV Status: none  Cardiovascular status: acceptable  Respiratory status: acceptable  Hydration status: acceptable

## 2020-09-03 NOTE — THERAPY TREATMENT NOTE
Acute Care - Occupational Therapy Treatment Note  [facility]     Patient Name: Wong Patrick  : 1955  MRN: [id number]  Today's Date: 9/3/2020  Onset of Illness/Injury or Date of Surgery: 20  Date of Referral to OT: 20  Referring Physician: MD Jennifer    Admit Date: 2020       ICD-10-CM ICD-9-CM   1. Pneumonia of both lungs due to infectious organism, unspecified part of lung J18.9 483.8   2. Fever, unspecified fever cause R50.9 780.60   3. Impaired mobility and ADLs Z74.09 V49.89    Z78.9    4. Impaired functional mobility, balance, gait, and endurance Z74.09 V49.89   5. Pneumonia of left upper lobe due to Streptococcus pneumoniae (CMS/HCC) J13 481   6. Mediastinal adenopathy R59.0 785.6   7. Non-intractable vomiting with nausea, unspecified vomiting type R11.2 787.01     Patient Active Problem List   Diagnosis   • History of colon polyps   • Nausea and vomiting   • Right lower lobe pneumonia   • Acquired thrombocytopenia (CMS/HCC)   • Chronic hepatitis C (CMS/HCC)   • Hypertension   • Cirrhosis of liver (CMS/HCC)   • Mediastinal adenopathy   • Sepsis due to pneumonia (CMS/HCC)   • COPD with acute exacerbation (CMS/HCC)   • IBRAHIMA (acute kidney injury) (CMS/HCC)   • Acute respiratory failure with hypoxia (CMS/HCC)   • Pneumonia of left upper lobe due to Streptococcus pneumoniae (CMS/HCC)   • Non-intractable vomiting   • CLL (chronic lymphocytic leukemia) (CMS/HCC)   • Bacteremia due to Streptococcus pneumoniae     Past Medical History:   Diagnosis Date   • Abnormal weight loss    • Acquired thrombocytopenia (CMS/HCC)    • Adenomatous polyp of colon    • Anxiety    • Biliary calculus     denies N/V, abd pain   • Chronic hepatitis C (CMS/HCC)    • Cirrhosis of liver (CMS/HCC)    • Colon polyp    • COPD (chronic obstructive pulmonary disease) (CMS/HCC)    • Elevated levels of transaminase & lactic acid dehydrogenase    • Generalized abdominal pain     known gallstones   • GERD  (gastroesophageal reflux disease)    • Hypertension    • IBS (irritable bowel syndrome)    • Impacted cerumen    • Kidney disease     US suggest renal dz, elevated alpha 2 macroglobulins   • Nausea and vomiting    • Patient noncompliance, general    • Rectal hemorrhage    • Tracheal mass    • Villous adenoma of colon    • Wheezing      Past Surgical History:   Procedure Laterality Date   • BRONCHOSCOPY N/A 7/13/2020    Procedure: BRONCHOSCOPY WITH ENDOBRONCHIAL ULTRASOUND;  Surgeon: Karthik Sewell MD;  Location: [facility] OR;  Service: Pulmonary;  Laterality: N/A;  preop; tracheal mass; pneumonia  postop  PCP    • COLONOSCOPY  06/03/2013   • COLONOSCOPY N/A 8/22/2018    Procedure: COLONOSCOPY;  Surgeon: Charles Pham MD;  Location: [facility] ENDOSCOPY;  Service: Gastroenterology   • ENDOSCOPY N/A 7/9/2019    Procedure: ESOPHAGOGASTRODUODENOSCOPY;  Surgeon: Bi Ibarra MD;  Location: [facility] ENDOSCOPY;  Service: General   • ENDOSCOPY AND COLONOSCOPY     • ESOPHAGOSCOPY / EGD      with tube   • UPPER GASTROINTESTINAL ENDOSCOPY  06/03/2013   • UPPER GASTROINTESTINAL ENDOSCOPY  07/09/2019       Therapy Treatment    Rehabilitation Treatment Summary     Row Name 09/03/20 0755             Treatment Time/Intention    Discipline  occupational therapy assistant  -RC      Document Type  therapy note (daily note)  -RC      Subjective Information  no complaints  -RC      Mode of Treatment  occupational therapy;individual therapy  -RC2      Patient/Family Observations  no visitors present  -RC2      Total Minutes, Occupational Therapy Treatment  55  -RC2      Therapy Frequency (OT Eval)  -- 5-7 days per week   -RC2      Patient Effort  good  -RC2      Recorded by [RC] Sarahy Garcia COTA/L 09/03/20 0850  [RC2] Sarahy Garcia COTA/L 09/03/20 1302      Row Name 09/03/20 0755             Vital Signs    Pre Systolic BP Rehab  120  -RC      Pre Treatment Diastolic BP  68  -RC      Post Systolic BP Rehab  113  -RC2       Post Treatment Diastolic BP  63  -RC2      Posttreatment Heart Rate (beats/min)  102  -RC2      Intra SpO2 (%)  93  -RC2      O2 Delivery Intra Treatment  supplemental O2  -RC2      Post SpO2 (%)  90  -RC2      O2 Delivery Post Treatment  supplemental O2  -RC2      Recorded by [RC] Sarahy Garcia BURKETT/L 09/03/20 1307  [RC2] Sarahy Garcia BURKETT/L 09/03/20 0850      Row Name 09/03/20 0755             Cognitive Assessment/Intervention- PT/OT    Orientation Status (Cognition)  oriented x 4  -RC      Recorded by [RC] Sarahy Garcia BURKETT/L 09/03/20 1307      Row Name 09/03/20 0755             Bed Mobility Assessment/Treatment    Supine-Sit [place] (Bed Mobility)  supervision  -RC      Sit-Supine [place] (Bed Mobility)  supervision  -RC      Recorded by [RC] Sarahy Garcia BURKETT/L 09/03/20 1307      Row Name 09/03/20 0755             Bathing Assessment/Intervention    Bathing [place] Level  set up  -RC      Assistive Devices (Bathing)  -- wipes   -RC      Bathing Position  edge of bed sitting  -RC      Recorded by [RC] Sarahy Garcia BURKETT/L 09/03/20 1307      Row Name 09/03/20 0755             Lower Body Dressing Assessment/Training    Lower Body Dressing [place] Level  don;doff;socks;supervision  -RC      Recorded by [RC] Sarahy Garcia BURKETT/L 09/03/20 1307      Row Name 09/03/20 0755             Grooming Assessment/Training    [place] Level (Grooming)  set up  -RC      Recorded by [RC] Sarahy Garcia BURKETT/L 09/03/20 1307      Row Name 09/03/20 0755             Therapeutic Exercise    Upper Extremity Range of Motion (Therapeutic Exercise)  shoulder flexion/extension, bilateral;elbow flexion/extension, bilateral rowing, punching   -RC      Exercise Type (Therapeutic Exercise)  AROM (active range of motion)  -RC      Position (Therapeutic Exercise)  seated  -RC      Sets/Reps (Therapeutic Exercise)  15  -RC      Expected Outcome (Therapeutic Exercise)  improve functional  tolerance, self-care activity;improve performance, BADLs  -RC      Recorded by [RC] Sarahy Garcia BURKETT/L 09/03/20 1307      Row Name 09/03/20 0755             Sitting Balance Activity    Activities Performed (Sitting, Balance Training)  -- w/ self care ac   -RC      Comment (Sitting, Balance Training)  30 min conditional I   -RC      Recorded by [RC] Sarahy Garcia COTA/L 09/03/20 1307      Row Name 09/03/20 0755             Positioning and Restraints    Pre-Treatment Position  in bed  -RC      Post Treatment Position  bed  -RC      In Bed  call light within reach;encouraged to call for assist;exit alarm on  -RC      Recorded by [RC] Sarahy Garcia COTA/L 09/03/20 1307      Row Name 09/03/20 0755             Pain Scale: Numbers Pre/Post-Treatment    Pain Scale: Numbers, Pretreatment  0/10 - no pain  -RC      Pain Scale: Numbers, Post-Treatment  0/10 - no pain  -RC      Recorded by [RC] Sarahy Garcia COTA/L 09/03/20 0850      Row Name                Wound 08/26/20 1322 Bilateral gluteal MASD (Moisture associated skin damage)    Wound - Properties Group Date first assessed: 08/26/20 [AK] Time first assessed: 1322 [AK] Present on Hospital Admission: Y [AK] Side: Bilateral [AK] Location: gluteal [AK] Primary Wound Type: MASD [AK2], with open areas  Recorded by:  [AK] Tammy Beverly RN 08/26/20 1347 [AK2] Tammy Beverly RN 08/26/20 1348    Row Name 09/03/20 0755             Outcome Summary/Treatment Plan (OT)    Daily Summary of Progress (OT)  progress toward functional goals as expected  -RC      Plan for Continued Treatment (OT)  cont poc   -RC      Anticipated Discharge Disposition (OT)  anticipate therapy at next level of care  -RC      Recorded by [RC] Sarahy Garcia COTA/L 09/03/20 1307        User Key  (r) = Recorded By, (t) = Taken By, (c) = Cosigned By    Initials Name Effective Dates Discipline    AK Tammy Beverly RN 10/17/16 -  Nurse    RC Sarahy Garcia COTA/L 03/07/18 -  OT         Wound 08/26/20 1322 Bilateral gluteal MASD (Moisture associated skin damage) (Active)   Dressing Appearance open to air 9/3/2020  7:26 AM   Closure Open to air 9/3/2020  7:26 AM   Base blanchable 9/3/2020  7:26 AM   Periwound redness 9/3/2020  7:26 AM   Periwound Temperature warm 9/3/2020  7:26 AM   Periwound Skin Turgor soft 9/3/2020  7:26 AM   Edges irregular 9/3/2020  7:26 AM   Drainage Amount none 9/3/2020  7:26 AM   Care, Wound barrier applied 9/3/2020  7:26 AM     Rehab Goal Summary     Row Name 09/03/20 0755             Occupational Therapy Goals    Transfer Goal Selection (OT)  transfer, OT goal 1  -RC      Bathing Goal Selection (OT)  bathing, OT goal 1  -RC      Dressing Goal Selection (OT)  dressing, OT goal 1  -RC      Toileting Goal Selection (OT)  toileting, OT goal 1  -RC         Transfer Goal 1 (OT)    Activity/Assistive Device (Transfer Goal 1, OT)  bed-to-chair/chair-to-bed;sit-to-stand/stand-to-sit;toilet  -RC      [place] Level/Cues Needed (Transfer Goal 1, OT)  conditional independence  -RC      Time Frame (Transfer Goal 1, OT)  long term goal (LTG);by discharge  -RC      Progress/Outcome (Transfer Goal 1, OT)  goal not met  -RC         Bathing Goal 1 (OT)    Activity/Assistive Device (Bathing Goal 1, OT)  bathing skills, all  -RC      [place] Level/Cues Needed (Bathing Goal 1, OT)  supervision required;set-up required  -RC      Time Frame (Bathing Goal 1, OT)  long term goal (LTG);by discharge  -RC      Progress/Outcomes (Bathing Goal 1, OT)  goal met  -RC         Dressing Goal 1 (OT)    Activity/Assistive Device (Dressing Goal 1, OT)  lower body dressing  -RC      [place]/Cues Needed (Dressing Goal 1, OT)  supervision required;set-up required  -RC      Time Frame (Dressing Goal 1, OT)  long term goal (LTG);by discharge  -RC      Progress/Outcome (Dressing Goal 1, OT)  goal met  -RC         Toileting Goal 1 (OT)    Activity/Device (Toileting Goal 1, OT)  toileting skills, all   -RC      [place] Level/Cues Needed (Toileting Goal 1, OT)  conditional independence  -RC      Time Frame (Toileting Goal 1, OT)  long term goal (LTG);by discharge  -RC      Progress/Outcome (Toileting Goal 1, OT)  goal not met  -RC        User Key  (r) = Recorded By, (t) = Taken By, (c) = Cosigned By    Initials Name Provider Type Discipline    RC Sarahy Garcia COTA/L Occupational Therapy Assistant OT        Occupational Therapy Education                 Title: PT OT SLP Therapies (In Progress)     Topic: Occupational Therapy (In Progress)     Point: ADL training (In Progress)     Description:   Instruct learner(s) on proper safety adaptation and remediation techniques during self care or transfers.   Instruct in proper use of assistive devices.              Learning Progress Summary           Patient Acceptance, E,TB,D, NR by CS at 8/31/2020 1140    Acceptance, E, NR by AS at 8/28/2020 1434    Comment:  role of OT, OT POC, ADL training, transfer training, fall precautions, safe use of RW                   Point: Home exercise program (In Progress)     Description:   Instruct learner(s) on appropriate technique for monitoring, assisting and/or progressing therapeutic exercises/activities.              Learning Progress Summary           Patient Acceptance, E,TB,D, NR by CS at 8/31/2020 1140                   Point: Precautions (In Progress)     Description:   Instruct learner(s) on prescribed precautions during self-care and functional transfers.              Learning Progress Summary           Patient Acceptance, E,TB,D, NR by CS at 8/31/2020 1140    Acceptance, E, NR by AS at 8/28/2020 1434    Comment:  role of OT, OT POC, ADL training, transfer training, fall precautions, safe use of RW                   Point: Body mechanics (In Progress)     Description:   Instruct learner(s) on proper positioning and spine alignment during self-care, functional mobility activities and/or exercises.               Learning Progress Summary           Patient Acceptance, E,TB,D, NR by  at 8/31/2020 1140                               User Key     Initials Effective Dates Name Provider Type Discipline     03/07/18 -  Kamini Heard COTA/SAMANTHA Occupational Therapy Assistant OT    AS 07/05/20 -  Mindy Mark, OT Occupational Therapist OT                OT Recommendation and Plan  Outcome Summary/Treatment Plan (OT)  Daily Summary of Progress (OT): progress toward functional goals as expected  Plan for Continued Treatment (OT): cont poc   Anticipated Discharge Disposition (OT): anticipate therapy at next level of care  Therapy Frequency (OT Eval): (5-7 days per week )  Daily Summary of Progress (OT): progress toward functional goals as expected  Plan of Care Review  Plan of Care Reviewed With: patient  Plan of Care Reviewed With: patient  Outcome Summary: pt agreeable to OT nursing OK'd tx. pt participated in bal act at [facility], bathing w/ us, pt is able to lina/doff socks using cross leg method. iris tx w/o c/o.  cont poc O2 sat 90% or greater during tx.   cont poc.  Outcome Measures     Row Name 09/03/20 0755             How much help from another is currently needed...    Putting on and taking off regular lower body clothing?  3  -RC      Bathing (including washing, rinsing, and drying)  3  -RC      Toileting (which includes using toilet bed pan or urinal)  2  -RC      Putting on and taking off regular upper body clothing  3  -RC      Taking care of personal grooming (such as brushing teeth)  4  -RC      Eating meals  4  -RC      AM-PAC 6 Clicks Score (OT)  19  -RC        User Key  (r) = Recorded By, (t) = Taken By, (c) = Cosigned By    Initials Name Provider Type    RC Sarahy Garcia COTA/L Occupational Therapy Assistant           Time Calculation:   Time Calculation- OT     Row Name 09/03/20 1312             Time Calculation- OT    OT Start Time  0755  -RC      OT Stop Time  0850  -RC      OT Time Calculation (min)  55  min  -      Total Timed Code Minutes- OT  55 minute(s)  -      OT Received On  09/03/20  -        User Key  (r) = Recorded By, (t) = Taken By, (c) = Cosigned By    Initials Name Provider Type    RC Sarahy Garcia BURKETT/L Occupational Therapy Assistant        Therapy Charges for Today     Code Description Service Date Service Provider Modifiers Qty    02999435187 HC OT SELF CARE/MGMT/TRAIN EA 15 MIN 9/3/2020 Sarahy Garcia, BURKETT/L GO 2    70203081057 HC OT THER PROC EA 15 MIN 9/3/2020 Sarahy Garcia, BURKETT/L GO 1    68033448711 HC OT THERAPEUTIC ACT EA 15 MIN 9/3/2020 Sarahy Garcia, BURKETT/L GO 1               Sarahyjohnna Garcia, BURKETT/L  9/3/2020

## 2020-09-03 NOTE — PLAN OF CARE
Problem: Patient Care Overview  Goal: Plan of Care Review  Outcome: Ongoing (interventions implemented as appropriate)  Flowsheets (Taken 9/3/2020 5188)  Progress: improving  Plan of Care Reviewed With: patient  Outcome Summary: pt agreeable to OT nursing OK'd tx. pt participated in bal act at [facility], bathing w/ [place], pt is able to lina/doff socks using cross leg method.  Ue  ex, iris tx w/o c/o.  cont poc O2 sat 90% or greater during tx.   cont poc.

## 2020-09-03 NOTE — PROGRESS NOTES
"    PULMONARY PROGRESS NOTE  Sandra Jacques MD    [address]  9/3/2020        Wong Patrick  [id number]  1955  65 y.o. male             LOS: 9 days   Cherelle Romeo MD    Subjective     CC/Reason for visit: Follow-up pneumonia, adenopathy    HPI: Evaluated by Dr. Simon with GI yesterday; planning for EGD today.  Patient states that his nausea and vomiting have resolved.  He reports eating a good dinner and has not had any issues since that time.  States breathing is \"good\".  No abdominal pain or fevers.  Cytology from BAL did not show any malignant cells.  Cultures remain negative thus far.    Review of Systems:  Respiratory ROS:  Review of Systems   Constitutional: Negative for fever and unexpected weight change.   Respiratory: Negative for cough, chest tightness, shortness of breath and wheezing.    Cardiovascular: Negative for chest pain.   Gastrointestinal: Positive for abdominal distention. Negative for abdominal pain, nausea and vomiting.     All other systems were reviewed and were negative except as above in the HPI.    Objective     Vital Sign Min/Max for last 24 hours:  Temp  Min: 97 °F (36.1 °C)  Max: 98.9 °F (37.2 °C)   BP  Min: 95/52  Max: 120/68   Pulse  Min: 60  Max: 105   Resp  Min: 18  Max: 20   SpO2  Min: 85 %  Max: 96 %   Flow (L/min)  Min: 1  Max: 1   Weight  Min: 71.8 kg (158 lb 6.4 oz)  Max: 71.8 kg (158 lb 6.4 oz)     Physical Exam:  97.5 °F (36.4 °C) (Oral) 103 120/68 18 95% 71.8 kg (158 lb 6.4 oz) Body mass index is 26.36 kg/m².  Physical Exam   Constitutional: He is oriented to person, place, and time. Vital signs are normal. He appears well-developed and well-nourished.   HENT:   Head: Normocephalic and atraumatic.   Nose: Nose normal.   Eyes: EOM are normal.   Cardiovascular: Normal rate, regular rhythm and normal heart sounds. Exam reveals no gallop.   No murmur heard.  Pulmonary/Chest: Effort normal. No respiratory distress. He has decreased " breath sounds in the right lower field and the left lower field. He has no wheezes. He has no rhonchi. He has no rales.   Abdominal: Soft. Normal appearance and bowel sounds are normal. He exhibits distension and fluid wave. There is no tenderness.   Musculoskeletal:   1+ Ble pitting edema     Vascular Status -  His right foot exhibits abnormal foot edema. His left foot exhibits abnormal foot edema.  Neurological: He is alert and oriented to person, place, and time.   Skin: Skin is warm and dry. No cyanosis. Nails show no clubbing.   Psychiatric: He has a normal mood and affect. His behavior is normal. Judgment normal. Cognition and memory are normal.       Scheduled meds:      acidophilus 1 tablet Oral Daily   budesonide-formoterol 2 puff Inhalation BID - RT   busPIRone 15 mg Oral Q12H   cetirizine 10 mg Oral Daily   folic acid 1 mg Oral Daily   heparin (porcine) 5,000 Units Subcutaneous Q8H   ipratropium-albuterol 3 mL Nebulization 4x Daily - RT   levoFLOXacin 750 mg Intravenous Q24H   losartan 50 mg Oral Daily   magic butt ointment  Topical BID   sodium chloride 30 mL/kg Intravenous Once   sodium chloride 10 mL Intravenous Q12H   sodium chloride 3 mL Intravenous Q12H   vitamin C 1,000 mg Oral TID With Meals       IV meds:                            Data Review: I personally reviewed the patient's medications and new clinical results.  Lab Results   Component Value Date    CALCIUM 8.3 (L) 09/03/2020     Results from last 7 days   Lab Units 09/03/20  0630 09/02/20  0536 09/01/20  0633   AST (SGOT) U/L  --   --  24   SODIUM mmol/L 136 136 136   POTASSIUM mmol/L 4.5 4.4 4.6   CHLORIDE mmol/L 96* 95* 97*   CO2 mmol/L 34.0* 34.0* 33.0*   BUN mg/dL 15 17 21   CREATININE mg/dL 0.74* 0.82 0.77   GLUCOSE mg/dL 99 95 95   CALCIUM mg/dL 8.3* 8.1* 8.1*   WBC 10*3/mm3 8.05 8.48 7.51   HEMOGLOBIN g/dL 9.1* 8.9* 8.8*   PLATELETS 10*3/mm3 134* 163 168   ALT (SGPT) U/L  --   --  16               Radiology: I independently reviewed  the patient's new imaging, including images and reports.  Imaging Results (Last 24 Hours)     ** No results found for the last 24 hours. 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  Baseline is room air  -Continue Levaquin for invasive pneumococcal disease  -F/u finalized bronch cultures and LN TBNA cytology  -Continue Symbicort twice daily and scheduled duo nebs  -EGD today per Dr. Simon  -Diet as tolerated  -PPX: Heparin 3 times daily      I will sign off. Please call with questions or if I can be of further assistance.       Total time spent: 22 minutes      This document has been electronically signed by Sandra Jacques MD on September 3, 2020 09:24      [phone]    Dictated using Dragon

## 2020-09-04 PROBLEM — R19.7 DIARRHEA: Status: ACTIVE | Noted: 2020-01-01

## 2020-09-04 NOTE — SIGNIFICANT NOTE
"   09/04/20 1443   Rehab Treatment   Discipline physical therapy assistant   Reason Treatment Not Performed patient/family declined treatment, not feeling well  (pt refused \"I had that testing done today and I don't feel good\" encouraged participation but pt cont to refuse)     "

## 2020-09-04 NOTE — PROGRESS NOTES
[facility] Medicine Services  INPATIENT PROGRESS NOTE    Length of Stay: 10  Date of Admission: 8/25/2020  Primary Care Physician: Provider, No Known    Subjective   Chief Complaint: Nausea, shortness of breath  HPI:  65 year old male with a history of COPD ,cirrhosis of the liver with ascites, chronic hepatitis C, GERD, HTN, and irritable bowel syndrome who presented with shortness of breath, nausea, vomiting, diarrhea, and fatigue over 4-5 days.  Chest x-ray showed patchy airspace opacity involving almost the entirety of the left lung with air bronchograms. He also has a known tracheal/mediastinal mass.  He met sepsis criteria and was admitted for sepsis from pneumonia and respiratory failure.  He was admitted to CCU on IV antibiotics and IV fluid.  He was initiated on IV solumedrol and bronchodilators.  COVID 19 is negative.  Blood cultures were positive for Streptococcus pneumoniae sensitive to Levaquin.  Antibiotics were targeted. Oncology and pulmonology were consulted. He underwent EBUS.  Peripheral blood flow cytometry is suggestive of SLL/CLL and he is recommended observation at this time and outpatient oncology follow-up. FNA is also consistent with SLL/CLL. GI was consulted for persistent nausea.  He underwent EGD which revealed esophagitis, gastritis, and medium-sized hiatal hernia.  He is to undergo colonoscopy today.  He denies nausea today.  He remains on oxygen.     Review of Systems   Constitutional: Negative for chills and fever.   Respiratory: Negative for shortness of breath.    Cardiovascular: Negative for chest pain.   Gastrointestinal: Negative for abdominal pain, nausea and vomiting.   All other systems reviewed and are negative.       All pertinent negatives and positives are as above. All other systems have been reviewed and are negative unless otherwise stated.     Objective    Temp:  [96.1 °F (35.6 °C)-97.6 °F (36.4 °C)] 96.8 °F (36 °C)  Heart  Rate:  [79-98] 85  Resp:  [18-20] 20  BP: (113-127)/(58-74) 116/62    Physical Exam   Constitutional: He is oriented to person, place, and time. He appears well-developed and well-nourished. No distress.   HENT:   Head: Normocephalic and atraumatic.   Eyes: Conjunctivae are normal.   Cardiovascular: Normal rate, regular rhythm and intact distal pulses.   Pulmonary/Chest: Effort normal and breath sounds normal. No respiratory distress.   Abdominal: Soft. Bowel sounds are normal. He exhibits no distension. There is no tenderness.   Musculoskeletal: He exhibits edema (bilateral lower extremity). He exhibits no deformity.   Neurological: He is alert and oriented to person, place, and time.   Skin: Skin is warm and dry. He is not diaphoretic.   Vitals reviewed.      Results Review:  I have reviewed the labs, radiology results, and diagnostic studies.    Laboratory Data:   Results from last 7 days   Lab Units 09/04/20 0533 09/03/20 0630 09/02/20 0536 09/01/20  0633   SODIUM mmol/L 138 136 136 136   POTASSIUM mmol/L 4.5 4.5 4.4 4.6   CHLORIDE mmol/L 100 96* 95* 97*   CO2 mmol/L 28.0 34.0* 34.0* 33.0*   BUN mg/dL 11 15 17 21   CREATININE mg/dL 0.76 0.74* 0.82 0.77   GLUCOSE mg/dL 100* 99 95 95   CALCIUM mg/dL 8.1* 8.3* 8.1* 8.1*   BILIRUBIN mg/dL  --   --   --  0.3   ALK PHOS U/L  --   --   --  69   ALT (SGPT) U/L  --   --   --  16   AST (SGOT) U/L  --   --   --  24   ANION GAP mmol/L 10.0 6.0 7.0 6.0     Estimated Creatinine Clearance: 95 mL/min (by C-G formula based on SCr of 0.76 mg/dL).          Results from last 7 days   Lab Units 09/04/20 0533 09/03/20 0630 09/02/20 0536 09/01/20 0633 08/31/20  0519   WBC 10*3/mm3 7.54 8.05 8.48 7.51 10.21   HEMOGLOBIN g/dL 8.5* 9.1* 8.9* 8.8* 9.0*   HEMATOCRIT % 26.7* 29.1* 28.4* 28.2* 28.0*   PLATELETS 10*3/mm3 109* 134* 163 168 207     Results from last 7 days   Lab Units 09/01/20  1754 08/31/20  0720   INR  1.06 1.20       Culture Data:   No results found for: BLOODCX  No  results found for: URINECX  Respiratory Culture   Date Value Ref Range Status   09/01/2020   Final    Scant growth (1+) Normal Respiratory Jazzmine: NO S.aureus/MRSA or Pseudomonas aeruginosa   09/01/2020   Final    Scant growth (1+) Normal Respiratory Jazzmine: NO S.aureus/MRSA or Pseudomonas aeruginosa     No results found for: WOUNDCX  No results found for: STOOLCX  No components found for: BODYFLD    Radiology Data:   Imaging Results (Last 24 Hours)     ** No results found for the last 24 hours. **          I have reviewed the patient's current medications.     Assessment/Plan     Active Hospital Problems    Diagnosis   • **Non-intractable vomiting     Added automatically from request for surgery [id number]     • CLL (chronic lymphocytic leukemia) (CMS/HCC)     Likely based on flow cytometry     • Bacteremia due to Streptococcus pneumoniae   • Sepsis due to pneumonia (CMS/HCC)   • COPD with acute exacerbation (CMS/HCC)   • IBRAHIMA (acute kidney injury) (CMS/HCC)   • Acute respiratory failure with hypoxia (CMS/HCC)   • Pneumonia of left upper lobe due to Streptococcus pneumoniae (CMS/HCC)     Added automatically from request for surgery [id number]     • Diarrhea     Added automatically from request for surgery [id number]     • Mediastinal adenopathy     Added automatically from request for surgery [id number]     • Hypertension   • Cirrhosis of liver (CMS/HCC)       Plan:    Wean O2, baseline room air, currently on 1 LPM  Levaquin day 10/14  Follow cultures  Symbicort, Duonebs  Pulmonology consultation appreciated  Outpatient evaluation of SLL/CLL, Follow-up 4 weeks with heme/onc  Heme/Onc consultation appreciated  S/P EGD  Colonoscopy today  Antiemetics PRN  GI consultation appreciated  IBRAHIMA resolved  BP control: Cozaar  PT/OT  VTE PPx: Heparin  Discharge planning: may require home O2        The patient was evaluated during the global COVID-19 pandemic, and the diagnosis was suspected/considered upon their initial presentation.   Evaluation, treatment, and testing were consistent with current guidelines for patients who present with complaints or symptoms that may be related to COVID-19.        This document has been electronically signed by ANA Edmonds on September 4, 2020 13:37

## 2020-09-04 NOTE — ANESTHESIA POSTPROCEDURE EVALUATION
Patient: Wong Patrick    Procedure Summary     Date:  09/04/20 Room / Location:  [facility] END[facility] / Health system ENDOSCOPY    Anesthesia Start:  1333 Anesthesia Stop:  1337    Procedure:  COLONOSCOPY (N/A ) Diagnosis:       Diarrhea, unspecified type      (Diarrhea, unspecified type [R19.7])    Surgeon:  Yoni Simon MD Provider:  Emory Brian CRNA    Anesthesia Type:  general ASA Status:  3          Anesthesia Type: general    Vitals  No vitals data found for the desired time range.          Post Anesthesia Care and Evaluation    Patient location during evaluation: bedside  Patient participation: complete - patient participated  Level of consciousness: sleepy but conscious  Pain score: 0  Pain management: adequate  Airway patency: patent  Anesthetic complications: No anesthetic complications  PONV Status: none  Cardiovascular status: acceptable and stable  Respiratory status: acceptable  Hydration status: stable

## 2020-09-04 NOTE — THERAPY TREATMENT NOTE
Acute Care - Occupational Therapy Treatment Note  [facility]     Patient Name: Wong Patrick  : 1955  MRN: [id number]  Today's Date: 2020  Onset of Illness/Injury or Date of Surgery: 20  Date of Referral to OT: 20  Referring Physician: MD Jennifer    Admit Date: 2020       ICD-10-CM ICD-9-CM   1. Pneumonia of both lungs due to infectious organism, unspecified part of lung J18.9 483.8   2. Fever, unspecified fever cause R50.9 780.60   3. Impaired mobility and ADLs Z74.09 V49.89    Z78.9    4. Impaired functional mobility, balance, gait, and endurance Z74.09 V49.89   5. Pneumonia of left upper lobe due to Streptococcus pneumoniae (CMS/HCC) J13 481   6. Mediastinal adenopathy R59.0 785.6   7. Non-intractable vomiting with nausea, unspecified vomiting type R11.2 787.01   8. Diarrhea, unspecified type R19.7 787.91     Patient Active Problem List   Diagnosis   • History of colon polyps   • Nausea and vomiting   • Right lower lobe pneumonia   • Acquired thrombocytopenia (CMS/HCC)   • Chronic hepatitis C (CMS/HCC)   • Hypertension   • Cirrhosis of liver (CMS/HCC)   • Mediastinal adenopathy   • Sepsis due to pneumonia (CMS/HCC)   • COPD with acute exacerbation (CMS/HCC)   • IBRAHIMA (acute kidney injury) (CMS/HCC)   • Acute respiratory failure with hypoxia (CMS/HCC)   • Pneumonia of left upper lobe due to Streptococcus pneumoniae (CMS/HCC)   • Non-intractable vomiting   • CLL (chronic lymphocytic leukemia) (CMS/HCC)   • Bacteremia due to Streptococcus pneumoniae   • Diarrhea     Past Medical History:   Diagnosis Date   • Abnormal weight loss    • Acquired thrombocytopenia (CMS/HCC)    • Adenomatous polyp of colon    • Anxiety    • Biliary calculus     denies N/V, abd pain   • Chronic hepatitis C (CMS/HCC)    • Cirrhosis of liver (CMS/HCC)    • Colon polyp    • COPD (chronic obstructive pulmonary disease) (CMS/HCC)    • Elevated levels of transaminase & lactic acid dehydrogenase    • Generalized  abdominal pain     known gallstones   • GERD (gastroesophageal reflux disease)    • Hypertension    • IBS (irritable bowel syndrome)    • Impacted cerumen    • Kidney disease     US suggest renal dz, elevated alpha 2 macroglobulins   • Nausea and vomiting    • Patient noncompliance, general    • Rectal hemorrhage    • Tracheal mass    • Villous adenoma of colon    • Wheezing      Past Surgical History:   Procedure Laterality Date   • BRONCHOSCOPY N/A 7/13/2020    Procedure: BRONCHOSCOPY WITH ENDOBRONCHIAL ULTRASOUND;  Surgeon: Karthik Sewell MD;  Location: [facility] OR;  Service: Pulmonary;  Laterality: N/A;  preop; tracheal mass; pneumonia  postop  PCP    • COLONOSCOPY  06/03/2013   • COLONOSCOPY N/A 8/22/2018    Procedure: COLONOSCOPY;  Surgeon: Charles Pham MD;  Location: [facility] ENDOSCOPY;  Service: Gastroenterology   • ENDOSCOPY N/A 7/9/2019    Procedure: ESOPHAGOGASTRODUODENOSCOPY;  Surgeon: Bi Ibarra MD;  Location: [facility] ENDOSCOPY;  Service: General   • ENDOSCOPY AND COLONOSCOPY     • ESOPHAGOSCOPY / EGD      with tube   • UPPER GASTROINTESTINAL ENDOSCOPY  06/03/2013   • UPPER GASTROINTESTINAL ENDOSCOPY  07/09/2019       Therapy Treatment    Rehabilitation Treatment Summary     Row Name 09/04/20 1025             Treatment Time/Intention    Discipline  occupational therapy assistant  -RC      Document Type  therapy note (daily note)  -RC      Subjective Information  complains of diahrea   -      Mode of Treatment  occupational therapy;individual therapy  -RC      Patient/Family Observations  no visitors present   -RC      Total Minutes, Occupational Therapy Treatment  25  -RC      Therapy Frequency (OT Eval)  -- 5-7 days per week   -RC      Patient Effort  adequate  -RC      Existing Precautions/Restrictions  fall;oxygen therapy device and L/min  -RC      Recorded by [RC] Sarahy Garcia COTA/L 09/04/20 1218      Row Name 09/04/20 1025             Vital Signs    Pre Systolic BP Rehab   124  -RC      Pre Treatment Diastolic BP  58  -RC      Pre SpO2 (%)  95  -RC      O2 Delivery Pre Treatment  nasal cannula  -RC      Recorded by [RC] Sarahy Garcia BURKETT/L 09/04/20 1218      Row Name 09/04/20 1025             Cognitive Assessment/Intervention- PT/OT    Orientation Status (Cognition)  oriented x 4  -RC      Recorded by [RC] Sarahy Garcia BURKETT/L 09/04/20 1218      Row Name 09/04/20 1025             Bed Mobility Assessment/Treatment    Supine-Sit [place] (Bed Mobility)  supervision  -RC      Sit-Supine [place] (Bed Mobility)  supervision  -RC      Recorded by [RC] Sarahy Garcia BURKETT/L 09/04/20 1218      Row Name 09/04/20 1025             Sit-Stand Transfer    Sit-Stand [place] (Transfers)  contact guard  -RC      Assistive Device (Sit-Stand Transfers)  walker, front-wheeled  -RC      Recorded by [RC] Sarahy Garcia BURKETT/L 09/04/20 1218      Row Name 09/04/20 1025             Stand-Sit Transfer    Stand-Sit [place] (Transfers)  contact guard  -RC      Assistive Device (Stand-Sit Transfers)  walker, front-wheeled  -RC      Recorded by [RC] Sarahy Garcia BURKETT/L 09/04/20 1218      Row Name 09/04/20 1025             Bathing Assessment/Intervention    Bathing [place] Level  minimum assist (75% patient effort)  -RC      Assistive Devices (Bathing)  -- wipes   -RC      Bathing Position  edge of bed sitting;supine  -RC      Recorded by [RC] Sarahy Garcia BURKETT/L 09/04/20 1218      Row Name 09/04/20 1025             Upper Body Dressing Assessment/Training    Comment (Upper Body Dressing)  \[facility] [place] [facility]\"" gown   -RC      Recorded by [RC] Sarahy Garcia BURKETT/L 09/04/20 1218      Row Name 09/04/20 1025             Lower Body Dressing Assessment/Training    Lower Body Dressing [place] Level  doff;don;socks;supervision  -RC      Lower Body Dressing Position  edge of bed sitting  -RC      Recorded by [RC] Sarahy Garcia BURKETT/L 09/04/20 1218      Row Name 09/04/20 1025              Grooming Assessment/Training    [place] Level (Grooming)  set up  -RC      Recorded by [RC] Sarahy Garcia BURKETT/L 09/04/20 1218      Row Name 09/04/20 1025             Toileting Assessment/Training    [place] Level (Toileting)  maximum assist (25% patient effort)  -RC      Comment (Toileting)  pt w/ more than 1 episode of inc of bowel   -RC      Recorded by [RC] Sarahy Garcia COTA/L 09/04/20 1218      Row Name 09/04/20 1025             Positioning and Restraints    Pre-Treatment Position  in bed  -RC      Post Treatment Position  bed  -RC      In Bed  call light within reach;encouraged to call for assist;exit alarm on;notified nsg  -RC      Recorded by [RC] Sarahy Garcia COTA/L 09/04/20 1218      Row Name 09/04/20 1025             Pain Scale: Numbers Pre/Post-Treatment    Pain Scale: Numbers, Pretreatment  0/10 - no pain  -RC      Pain Scale: Numbers, Post-Treatment  0/10 - no pain  -RC      Recorded by [RC] Sarahy Garcia COTA/L 09/04/20 1218      Row Name                Wound 08/26/20 1322 Bilateral gluteal MASD (Moisture associated skin damage)    Wound - Properties Group Date first assessed: 08/26/20 [AK] Time first assessed: 1322 [AK] Present on Hospital Admission: Y [AK] Side: Bilateral [AK] Location: gluteal [AK] Primary Wound Type: MASD [AK2], with open areas  Recorded by:  [AK] Tammy Beverly RN 08/26/20 1347 [AK2] Tammy Beverly RN 08/26/20 1348    Row Name 09/04/20 1025             Outcome Summary/Treatment Plan (OT)    Daily Summary of Progress (OT)  progress toward functional goals as expected  -RC      Plan for Continued Treatment (OT)  cont poc   -RC      Anticipated Discharge Disposition (OT)  anticipate therapy at next level of care  -RC      Recorded by [RC] Sarahy Garcia BURKETT/SAMANTHA 09/04/20 1218        User Key  (r) = Recorded By, (t) = Taken By, (c) = Cosigned By    Initials Name Effective Dates Discipline    AK Tammy Beverly RN 10/17/16 -  Nurse    RC  Radha Sarahy G, BURKETT/L 03/07/18 -  OT        Wound 08/26/20 1322 Bilateral gluteal MASD (Moisture associated skin damage) (Active)   Dressing Appearance open to air 9/4/2020  9:30 AM   Closure Open to air 9/4/2020  9:30 AM   Base blanchable 9/4/2020  9:30 AM   Periwound redness 9/4/2020  9:30 AM   Periwound Temperature warm 9/4/2020  9:30 AM   Periwound Skin Turgor soft 9/4/2020  9:30 AM   Edges irregular 9/4/2020  9:30 AM   Drainage Amount none 9/4/2020  9:30 AM   Care, Wound barrier applied 9/3/2020 10:20 PM     Rehab Goal Summary     Row Name 09/04/20 1025             Occupational Therapy Goals    Transfer Goal Selection (OT)  transfer, OT goal 1  -RC      Bathing Goal Selection (OT)  bathing, OT goal 1  -RC      Dressing Goal Selection (OT)  dressing, OT goal 1  -RC      Toileting Goal Selection (OT)  toileting, OT goal 1  -RC         Transfer Goal 1 (OT)    Activity/Assistive Device (Transfer Goal 1, OT)  bed-to-chair/chair-to-bed;sit-to-stand/stand-to-sit;toilet  -RC      [place] Level/Cues Needed (Transfer Goal 1, OT)  conditional independence  -RC      Time Frame (Transfer Goal 1, OT)  long term goal (LTG);by discharge  -RC      Progress/Outcome (Transfer Goal 1, OT)  goal not met  -RC         Bathing Goal 1 (OT)    Activity/Assistive Device (Bathing Goal 1, OT)  bathing skills, all  -RC      [place] Level/Cues Needed (Bathing Goal 1, OT)  supervision required;set-up required  -RC      Time Frame (Bathing Goal 1, OT)  long term goal (LTG);by discharge  -RC      Progress/Outcomes (Bathing Goal 1, OT)  goal met  -RC         Dressing Goal 1 (OT)    Activity/Assistive Device (Dressing Goal 1, OT)  lower body dressing  -RC      [place]/Cues Needed (Dressing Goal 1, OT)  supervision required;set-up required  -RC      Time Frame (Dressing Goal 1, OT)  long term goal (LTG);by discharge  -RC      Progress/Outcome (Dressing Goal 1, OT)  goal met  -RC         Toileting Goal 1 (OT)    Activity/Device  (Toileting Goal 1, OT)  toileting skills, all  -RC      [place] Level/Cues Needed (Toileting Goal 1, OT)  conditional independence  -RC      Time Frame (Toileting Goal 1, OT)  long term goal (LTG);by discharge  -      Progress/Outcome (Toileting Goal 1, OT)  goal not met  -RC        User Key  (r) = Recorded By, (t) = Taken By, (c) = Cosigned By    Initials Name Provider Type Discipline    Sarahy Salinas COTA/L Occupational Therapy Assistant OT        Occupational Therapy Education                 Title: PT OT SLP Therapies (In Progress)     Topic: Occupational Therapy (In Progress)     Point: ADL training (In Progress)     Description:   Instruct learner(s) on proper safety adaptation and remediation techniques during self care or transfers.   Instruct in proper use of assistive devices.              Learning Progress Summary           Patient Acceptance, E, NR by RC at 9/4/2020 1219    Acceptance, E,TB,D, NR by CS at 8/31/2020 1140    Acceptance, E, NR by AS at 8/28/2020 1434    Comment:  role of OT, OT POC, ADL training, transfer training, fall precautions, safe use of RW                   Point: Home exercise program (In Progress)     Description:   Instruct learner(s) on appropriate technique for monitoring, assisting and/or progressing therapeutic exercises/activities.              Learning Progress Summary           Patient Acceptance, E,TB,D, NR by CS at 8/31/2020 1140                   Point: Precautions (In Progress)     Description:   Instruct learner(s) on prescribed precautions during self-care and functional transfers.              Learning Progress Summary           Patient Acceptance, E, NR by RC at 9/4/2020 1219    Acceptance, E,TB,D, NR by CS at 8/31/2020 1140    Acceptance, E, NR by AS at 8/28/2020 1434    Comment:  role of OT, OT POC, ADL training, transfer training, fall precautions, safe use of RW                   Point: Body mechanics (In Progress)     Description:   Instruct  learner(s) on proper positioning and spine alignment during self-care, functional mobility activities and/or exercises.              Learning Progress Summary           Patient Acceptance, E, NR by  at 9/4/2020 1219    Acceptance, E,TB,D, NR by CS at 8/31/2020 1140                               User Key     Initials Effective Dates Name Provider Type Discipline     03/07/18 -  Sarahy Garcia COTA/L Occupational Therapy Assistant OT    CS 03/07/18 -  Kamini Heard COTA/SAMANTHA Occupational Therapy Assistant OT    AS 07/05/20 -  Mindy Mark, OT Occupational Therapist OT                OT Recommendation and Plan  Outcome Summary/Treatment Plan (OT)  Daily Summary of Progress (OT): progress toward functional goals as expected  Plan for Continued Treatment (OT): cont poc   Anticipated Discharge Disposition (OT): anticipate therapy at next level of care  Therapy Frequency (OT Eval): (5-7 days per week )  Daily Summary of Progress (OT): progress toward functional goals as expected  Plan of Care Review  Plan of Care Reviewed With: patient  Plan of Care Reviewed With: patient  Outcome Summary: Pt has had prep for GI testing and had multi loose stools, max @ for cleanup, bathing was min @ due to bm mgmt, dressing was socks and hospital gown he needed assist w/ hospital gown due to lines.  recommend OT at next level of care.  cont poc  Outcome Measures     Row Name 09/04/20 1025 09/03/20 0755          How much help from another is currently needed...    Putting on and taking off regular lower body clothing?  3  -RC  3  -RC     Bathing (including washing, rinsing, and drying)  3  -RC  3  -RC     Toileting (which includes using toilet bed pan or urinal)  2  -RC  2  -RC     Putting on and taking off regular upper body clothing  3  -RC  3  -RC     Taking care of personal grooming (such as brushing teeth)  4  -RC  4  -RC     Eating meals  4  -RC  4  -RC     AM-PAC 6 Clicks Score (OT)  19  -RC  19  -RC       User Key  (r)  = Recorded By, (t) = Taken By, (c) = Cosigned By    Initials Name Provider Type    RC Yamini Garciajohnna SAEED, BURKETT/L Occupational Therapy Assistant           Time Calculation:   Time Calculation- OT     Row Name 09/04/20 1221             Time Calculation- OT    OT Start Time  1025  -RC      OT Stop Time  1050  -RC      OT Time Calculation (min)  25 min  -      Total Timed Code Minutes- OT  25 minute(s)  -RC      OT Received On  09/04/20  -        User Key  (r) = Recorded By, (t) = Taken By, (c) = Cosigned By    Initials Name Provider Type     Yamini Garciaa CHRISTOPHE, BURKETT/L Occupational Therapy Assistant        Therapy Charges for Today     Code Description Service Date Service Provider Modifiers Qty    78308943269 HC OT SELF CARE/MGMT/TRAIN EA 15 MIN 9/3/2020 Sarahy Garcia, BURKETT/L GO 2    70171880608 HC OT THER PROC EA 15 MIN 9/3/2020 Yamini Garciaa G, BURKETT/L GO 1    90701528040 HC OT THERAPEUTIC ACT EA 15 MIN 9/3/2020 Yamini Garciaa G, BURKETT/L GO 1    98281917688 HC OT SELF CARE/MGMT/TRAIN EA 15 MIN 9/4/2020 Radha, Sarahy G, BURKETT/L GO 2               Sarahy G Radha, BURKETT/L  9/4/2020

## 2020-09-04 NOTE — DISCHARGE PLACEMENT REQUEST
"Mabel Patrick (65 y.o. Male)     Date of Birth Social Security Number Address Home Phone MRN    1955  [address]  [place] [facility] [address] [phone] [id number]    [place] Marital Status          None Single       Admission Date Admission Type Admitting Provider Attending Provider Department, Room/Bed    8/25/20 Emergency Nader Mauro MD Gerlach, Elizabeth T, MD [address], 323/1    Discharge Date Discharge Disposition Discharge Destination                       Attending Provider:  Cherelle Romeo MD    Allergies:  Codeine, Penicillins    Isolation:  None   Infection:  None   Code Status:  CPR    Ht:  165.1 cm (65\")   Wt:  67.5 kg (148 lb 12.8 oz)    Admission Cmt:  None   Principal Problem:  Non-intractable vomiting [R11.10] More...                 Active Insurance as of 8/25/2020     Primary Coverage     Payor Plan Insurance Group Employer/Plan Group    [facility] MEDICARE REPLACEMENT WELLCARE MEDICARE REPLACEMENT [id number]     Payor Plan Address Payor Plan Phone Number Payor Plan Fax Number Effective Dates    [address] [address] [phone]  4/1/2020 - None Entered    [facility] [id number]       Subscriber Name Subscriber Birth Date Member ID       Mabel Patrick 1955 [id number]           Secondary Coverage     Payor Plan Insurance Group Employer/Plan Group    [employer]      Payor Plan Address Payor Plan Phone Number Payor Plan Fax Number Effective Dates    [address] [address]   1/1/2014 - None Entered    [place] [address]       Subscriber Name Subscriber Birth Date Member ID       MABEL PATRICK 1955 [id number]                 Emergency Contacts      (Rel.) Home Phone Work Phone Mobile Phone    Yinka Shelton (Friend) [phone] -- [phone]    DarnellVolodymyr (Relative) [phone] -- [phone]    Jose Luis Pak [phone] -- --            Insurance Information                [facility] " MEDICARE REPLACEMENT/WELLCARE MEDICARE REPLACEMENT Phone: [phone]    Subscriber: Wong Patrick Subscriber#: [id number]    Group#: [id number] Precert#:         [place] [employer] [place] [place] [employer] [place] [place] Phone:     Subscriber: Wong Patrick Subscriber#: [id number]    Group#:  Precert#:

## 2020-09-04 NOTE — PLAN OF CARE
VSS, will continue to monitor.  Problem: Patient Care Overview  Goal: Plan of Care Review  Outcome: Ongoing (interventions implemented as appropriate)  Flowsheets  Taken 9/4/2020 2062 by Sharee Connor, RN  Progress: no change  Taken 9/3/2020 2220 by Hannah Arana, RN  Plan of Care Reviewed With: patient

## 2020-09-04 NOTE — ANESTHESIA PREPROCEDURE EVALUATION
Anesthesia Evaluation     Patient summary reviewed   no history of anesthetic complications:  NPO Solid Status: > 8 hours  NPO Liquid Status: > 8 hours           Airway   Mallampati: II  TM distance: >3 FB  Neck ROM: full  Narrow palate  Dental    (+) poor dentition    Pulmonary    (+) pneumonia , a smoker Current, COPD, asthma,shortness of breath, rhonchi,     ROS comment: Pt  Presented to hospital in [place] complaining of worsening soa. CT reportedly showed pneumonia as well as tracheal mass. He was transferred to [facility]    Per pulmonology consult:  Patient had CT scan of the chest done in outside facility and the results are available which showed a right lower lobe pneumonia and a paratracheal mass  Cardiovascular   Exercise tolerance: poor (<4 METS)    ECG reviewed    (+) hypertension, NOLASCO, friction rub,       Neuro/Psych  (+) weakness, psychiatric history Anxiety and Depression,     (-) seizures, TIA, CVA  GI/Hepatic/Renal/Endo    (+)  GERD, GI bleeding , hepatitis C, liver disease (cirrhosis), renal disease,   (-) diabetes    Musculoskeletal     (+) back pain, chronic pain, gait problem,   Abdominal    Substance History   (+) alcohol use (former),      OB/GYN          Other   arthritis, blood dyscrasia,   history of cancer                      Anesthesia Plan    ASA 3     general   (Patient with pneumonia but covid negative per yesterday test presents for bronch, possible tissue biopsy of a tracheal mass. He was  Uneventfully intubated a month ago in [place] for similar procedure.  He understand that postop ventilation is a possibility.)  intravenous induction     Anesthetic plan, all risks, benefits, and alternatives have been provided, discussed and informed consent has been obtained with: patient.

## 2020-09-04 NOTE — PLAN OF CARE
Problem: Patient Care Overview  Goal: Plan of Care Review  Outcome: Ongoing (interventions implemented as appropriate)  Flowsheets (Taken 9/4/2020 1219)  Plan of Care Reviewed With: patient  Outcome Summary: Pt has had prep for GI testing and had multi loose stools, max @ for cleanup, bathing was min @ due to bm mgmt, dressing was socks and hospital gown he needed assist w/ hospital gown due to lines.  recommend OT at next level of care.  cont poc

## 2020-09-05 PROBLEM — D69.6 THROMBOCYTOPENIA (HCC): Status: ACTIVE | Noted: 2020-01-01

## 2020-09-05 NOTE — PLAN OF CARE
Problem: Patient Care Overview  Goal: Plan of Care Review  Outcome: Ongoing (interventions implemented as appropriate)  Flowsheets (Taken 9/5/2020 0033)  Progress: no change  Plan of Care Reviewed With: patient  Outcome Summary: pt vs stable, on 2L via NC, no new events will continue to monitor.     Problem: Patient Care Overview  Goal: Individualization and Mutuality  Outcome: Ongoing (interventions implemented as appropriate)     Problem: Patient Care Overview  Goal: Discharge Needs Assessment  Outcome: Ongoing (interventions implemented as appropriate)     Problem: Patient Care Overview  Goal: Interprofessional Rounds/Family Conf  Outcome: Ongoing (interventions implemented as appropriate)     Problem: Pneumonia (Adult)  Goal: Signs and Symptoms of Listed Potential Problems Will be Absent, Minimized or Managed (Pneumonia)  Outcome: Ongoing (interventions implemented as appropriate)  Flowsheets  Taken 9/4/2020 0453 by Sharee Connor RN  Problems Assessed (Pneumonia): all  Taken 9/5/2020 0033 by Yudi Tyler RN  Problems Present (Pneumonia): respiratory compromise     Problem: Skin Injury Risk (Adult)  Goal: Identify Related Risk Factors and Signs and Symptoms  Outcome: Ongoing (interventions implemented as appropriate)  Flowsheets (Taken 9/5/2020 0033)  Related Risk Factors (Skin Injury Risk): mobility impaired; moisture     Problem: Skin Injury Risk (Adult)  Goal: Skin Health and Integrity  Outcome: Ongoing (interventions implemented as appropriate)  Flowsheets (Taken 9/5/2020 0033)  Skin Health and Integrity: making progress toward outcome     Problem: Wound (Includes Pressure Injury) (Adult)  Goal: Signs and Symptoms of Listed Potential Problems Will be Absent, Minimized or Managed (Wound)  Outcome: Ongoing (interventions implemented as appropriate)  Flowsheets (Taken 9/4/2020 0453 by Sharee Connor RN)  Problems Assessed (Wound): all  Problems Present (Wound): none     Problem: Nausea/Vomiting  (Adult)  Goal: Identify Related Risk Factors and Signs and Symptoms  Outcome: Ongoing (interventions implemented as appropriate)  Flowsheets (Taken 9/5/2020 0033)  Signs and Symptoms (Nausea/Vomiting): weakness     Problem: Nausea/Vomiting (Adult)  Goal: Symptom Relief  Outcome: Ongoing (interventions implemented as appropriate)  Flowsheets (Taken 9/5/2020 0033)  Symptom Relief: achieves outcome     Problem: Diarrhea (Adult)  Goal: Identify Related Risk Factors and Signs and Symptoms  Outcome: Ongoing (interventions implemented as appropriate)  Flowsheets (Taken 9/5/2020 0033)  Signs and Symptoms (Diarrhea): abdominal distension     Problem: Diarrhea (Adult)  Goal: Improved/Reduced Symptoms  Outcome: Ongoing (interventions implemented as appropriate)  Flowsheets (Taken 9/5/2020 0033)  Improved/Reduced Symptoms: making progress toward outcome     Problem: Sepsis/Septic Shock (Adult)  Goal: Signs and Symptoms of Listed Potential Problems Will be Absent, Minimized or Managed (Sepsis/Septic Shock)  Outcome: Ongoing (interventions implemented as appropriate)  Flowsheets  Taken 9/4/2020 0453 by Sharee Connor RN  Problems Assessed (Sepsis): all  Taken 9/5/2020 0033 by Yudi Tyler RN  Problems Present (Sepsis): hypoxia/hypoxemia     Problem: Fall Risk (Adult)  Goal: Identify Related Risk Factors and Signs and Symptoms  Outcome: Ongoing (interventions implemented as appropriate)  Flowsheets (Taken 9/5/2020 0033)  Related Risk Factors (Fall Risk): fatigue/slow reaction; environment unfamiliar; slippery/uneven surfaces  Signs and Symptoms (Fall Risk): presence of risk factors     Problem: Fall Risk (Adult)  Goal: Absence of Fall  Outcome: Ongoing (interventions implemented as appropriate)  Flowsheets (Taken 9/5/2020 0033)  Absence of Fall: making progress toward outcome     Problem: Chronic Obstructive Pulmonary Disease (Adult)  Goal: Signs and Symptoms of Listed Potential Problems Will be Absent, Minimized or  Managed (Chronic Obstructive Pulmonary Disease)  Outcome: Ongoing (interventions implemented as appropriate)  Flowsheets  Taken 9/4/2020 0453 by Sharee Connor RN  Problems Assessed (Chronic Obstructive Pulmonary Disease (COPD)): all  Taken 9/5/2020 0033 by Yudi Tyler RN  Problems Present (COPD, Bronch/Emphy): respiratory compromise     Problem: Renal Failure/Kidney Injury, Acute (Adult)  Goal: Signs and Symptoms of Listed Potential Problems Will be Absent, Minimized or Managed (Renal Failure/Kidney Injury, Acute)  Outcome: Ongoing (interventions implemented as appropriate)  Flowsheets (Taken 9/4/2020 0453 by Sharee Connor RN)  Problems Assessed (Acute Renal Failure/Kidney Injury): all  Problems Present (ARF/Kidney Injury): none     Problem: Anemia (Adult)  Goal: Identify Related Risk Factors and Signs and Symptoms  Outcome: Ongoing (interventions implemented as appropriate)     Problem: Anemia (Adult)  Goal: Symptom Improvement  Outcome: Ongoing (interventions implemented as appropriate)  Flowsheets (Taken 9/5/2020 0033)  Symptom Improvement: making progress toward outcome     Problem: Activity Intolerance (Adult)  Goal: Identify Related Risk Factors and Signs and Symptoms  Outcome: Ongoing (interventions implemented as appropriate)  Flowsheets (Taken 9/5/2020 0033)  Related Risk Factors (Activity Intolerance): O2 supply/demand imbalance; generalized weakness     Problem: Activity Intolerance (Adult)  Goal: Activity Tolerance  Outcome: Ongoing (interventions implemented as appropriate)  Flowsheets (Taken 9/5/2020 0033)  Activity Tolerance: making progress toward outcome     Problem: Activity Intolerance (Adult)  Goal: Effective Energy Conservation Techniques  Outcome: Ongoing (interventions implemented as appropriate)  Flowsheets (Taken 9/5/2020 0033)  Effective Energy Conservation Techniques: making progress toward outcome

## 2020-09-05 NOTE — PROGRESS NOTES
[facility] Medicine Services  INPATIENT PROGRESS NOTE    Length of Stay: 11  Date of Admission: 8/25/2020  Primary Care Physician: Provider, No Known    Subjective   Chief Complaint: Nausea, shortness of breath  HPI:  65 year old male with a history of COPD ,cirrhosis of the liver with ascites, chronic hepatitis C, GERD, HTN, and irritable bowel syndrome who presented with shortness of breath, nausea, vomiting, diarrhea, and fatigue over 4-5 days.  Chest x-ray showed patchy airspace opacity involving almost the entirety of the left lung with air bronchograms. He also has a known tracheal/mediastinal mass.  He met sepsis criteria and was admitted for sepsis from pneumonia and respiratory failure.  He was admitted to CCU on IV antibiotics and IV fluid.  He was initiated on IV solumedrol and bronchodilators.  COVID 19 is negative.  Blood cultures were positive for Streptococcus pneumoniae sensitive to Levaquin.  Antibiotics were targeted. Oncology and pulmonology were consulted. He underwent EBUS.  Peripheral blood flow cytometry is suggestive of SLL/CLL and he is recommended observation at this time and outpatient oncology follow-up. FNA is also consistent with SLL/CLL. GI was consulted for persistent nausea.  He underwent EGD which revealed esophagitis, gastritis, and medium-sized hiatal hernia.  He underwent colonoscopy which revealed non-bleeding hemorrhoids. Biopsies were taken from the colon. He has weaned to 1 LPM.  Platelets have decreased to 75.    Review of Systems   Constitutional: Negative for chills and fever.   Respiratory: Negative for shortness of breath.    Cardiovascular: Negative for chest pain.   Gastrointestinal: Negative for abdominal pain, nausea and vomiting.   All other systems reviewed and are negative.       All pertinent negatives and positives are as above. All other systems have been reviewed and are negative unless otherwise stated.     Objective     Temp:  [96.1 °F (35.6 °C)-98.8 °F (37.1 °C)] 97.6 °F (36.4 °C)  Heart Rate:  [] 88  Resp:  [18-20] 18  BP: ()/(57-69) 109/59    Physical Exam   Constitutional: He is oriented to person, place, and time. He appears well-developed and well-nourished. No distress.   HENT:   Head: Normocephalic and atraumatic.   Eyes: Conjunctivae are normal.   Cardiovascular: Normal rate, regular rhythm and intact distal pulses.   Pulmonary/Chest: Effort normal and breath sounds normal. No respiratory distress.   Abdominal: Soft. Bowel sounds are normal. He exhibits no distension. There is no tenderness.   Musculoskeletal: He exhibits edema (bilateral lower extremity). He exhibits no deformity.   Neurological: He is alert and oriented to person, place, and time.   Skin: Skin is warm and dry. He is not diaphoretic.   Vitals reviewed.      Results Review:  I have reviewed the labs, radiology results, and diagnostic studies.    Laboratory Data:   Results from last 7 days   Lab Units 09/05/20  0555 09/04/20  0533 09/03/20  0630  09/01/20  0633   SODIUM mmol/L 136 138 136   < > 136   POTASSIUM mmol/L 4.2 4.5 4.5   < > 4.6   CHLORIDE mmol/L 101 100 96*   < > 97*   CO2 mmol/L 29.0 28.0 34.0*   < > 33.0*   BUN mg/dL 12 11 15   < > 21   CREATININE mg/dL 0.75* 0.76 0.74*   < > 0.77   GLUCOSE mg/dL 104* 100* 99   < > 95   CALCIUM mg/dL 7.8* 8.1* 8.3*   < > 8.1*   BILIRUBIN mg/dL  --   --   --   --  0.3   ALK PHOS U/L  --   --   --   --  69   ALT (SGPT) U/L  --   --   --   --  16   AST (SGOT) U/L  --   --   --   --  24   ANION GAP mmol/L 6.0 10.0 6.0   < > 6.0    < > = values in this interval not displayed.     Estimated Creatinine Clearance: 99.1 mL/min (A) (by C-G formula based on SCr of 0.75 mg/dL (L)).          Results from last 7 days   Lab Units 09/05/20  0555 09/04/20  0533 09/03/20  0630 09/02/20  0536 09/01/20  0633   WBC 10*3/mm3 5.64 7.54 8.05 8.48 7.51   HEMOGLOBIN g/dL 8.0* 8.5* 9.1* 8.9* 8.8*   HEMATOCRIT % 24.6* 26.7*  29.1* 28.4* 28.2*   PLATELETS 10*3/mm3 75* 109* 134* 163 168     Results from last 7 days   Lab Units 09/01/20  1754 08/31/20  0720   INR  1.06 1.20       Culture Data:   No results found for: BLOODCX  No results found for: URINECX  No results found for: RESPCX  No results found for: WOUNDCX  No results found for: STOOLCX  No components found for: BODYFLD    Radiology Data:   Imaging Results (Last 24 Hours)     ** No results found for the last 24 hours. **          I have reviewed the patient's current medications.     Assessment/Plan     Active Hospital Problems    Diagnosis   • **Non-intractable vomiting     Added automatically from request for surgery [id number]     • Thrombocytopenia (CMS/HCC)   • CLL (chronic lymphocytic leukemia) (CMS/HCC)     Likely based on flow cytometry     • Bacteremia due to Streptococcus pneumoniae   • Sepsis due to pneumonia (CMS/HCC)   • COPD with acute exacerbation (CMS/HCC)   • IBRAHIMA (acute kidney injury) (CMS/HCC)   • Acute respiratory failure with hypoxia (CMS/HCC)   • Pneumonia of left upper lobe due to Streptococcus pneumoniae (CMS/HCC)     Added automatically from request for surgery [id number]     • Diarrhea     Added automatically from request for surgery [id number]     • Mediastinal adenopathy     Added automatically from request for surgery [id number]     • Hypertension   • Cirrhosis of liver (CMS/HCC)       Plan:    Wean O2, baseline room air, currently on 1 LPM  Levaquin day 11/14  SymbicortJarred  Pulmonology consultation appreciated  Outpatient evaluation of SLL/CLL, Follow-up 4 weeks with heme/onc  Heme/Onc consultation appreciated  S/P EGD  S/P colonoscopy, biopsies pending  Antiemetics PRN  GI consultation appreciated  IBRAHIMA resolved  Thrombocytopenia is suspected secondary to cirrhosis with splenomegaly and consumption from infection.  Cannot exclude HIT so heparin will be discontinued.  Check HIT antibodies.   BP control: Cozaar  PT/OT  VTE PPx: D/C Heparin, start  SCDs  Discharge planning: may require home O2        The patient was evaluated during the global COVID-19 pandemic, and the diagnosis was suspected/considered upon their initial presentation.  Evaluation, treatment, and testing were consistent with current guidelines for patients who present with complaints or symptoms that may be related to COVID-19.        This document has been electronically signed by ANA Edmonds on September 5, 2020 13:01

## 2020-09-06 NOTE — PLAN OF CARE
Problem: Patient Care Overview  Goal: Plan of Care Review  Outcome: Ongoing (interventions implemented as appropriate)  Flowsheets  Taken 9/6/2020 0027  Progress: improving  Outcome Summary: pt vs stable on room air, turning self in bed being more active sitting on side of bed, will continue to montior. closely.  Taken 9/5/2020 1938  Plan of Care Reviewed With: patient     Problem: Patient Care Overview  Goal: Individualization and Mutuality  Outcome: Ongoing (interventions implemented as appropriate)     Problem: Patient Care Overview  Goal: Discharge Needs Assessment  Outcome: Ongoing (interventions implemented as appropriate)     Problem: Patient Care Overview  Goal: Interprofessional Rounds/Family Conf  Outcome: Ongoing (interventions implemented as appropriate)     Problem: Pneumonia (Adult)  Goal: Signs and Symptoms of Listed Potential Problems Will be Absent, Minimized or Managed (Pneumonia)  Outcome: Outcome(s) achieved     Problem: Skin Injury Risk (Adult)  Goal: Identify Related Risk Factors and Signs and Symptoms  Outcome: Ongoing (interventions implemented as appropriate)  Flowsheets (Taken 9/5/2020 0033)  Related Risk Factors (Skin Injury Risk): mobility impaired;moisture     Problem: Skin Injury Risk (Adult)  Goal: Skin Health and Integrity  Outcome: Ongoing (interventions implemented as appropriate)  Flowsheets (Taken 9/6/2020 0027)  Skin Health and Integrity: making progress toward outcome     Problem: Wound (Includes Pressure Injury) (Adult)  Goal: Signs and Symptoms of Listed Potential Problems Will be Absent, Minimized or Managed (Wound)  Outcome: Outcome(s) achieved     Problem: Nausea/Vomiting (Adult)  Goal: Identify Related Risk Factors and Signs and Symptoms  Outcome: Outcome(s) achieved     Problem: Nausea/Vomiting (Adult)  Goal: Symptom Relief  Outcome: Outcome(s) achieved  Flowsheets (Taken 9/6/2020 0027)  Symptom Relief: achieves outcome     Problem: Nausea/Vomiting (Adult)  Goal:  Adequate Hydration  Outcome: Outcome(s) achieved  Flowsheets (Taken 9/6/2020 0027)  Adequate Hydration: achieves outcome     Problem: Diarrhea (Adult)  Goal: Identify Related Risk Factors and Signs and Symptoms  Outcome: Outcome(s) achieved     Problem: Diarrhea (Adult)  Goal: Improved/Reduced Symptoms  Outcome: Ongoing (interventions implemented as appropriate)     Problem: Sepsis/Septic Shock (Adult)  Goal: Signs and Symptoms of Listed Potential Problems Will be Absent, Minimized or Managed (Sepsis/Septic Shock)  Outcome: Outcome(s) achieved     Problem: Fall Risk (Adult)  Goal: Identify Related Risk Factors and Signs and Symptoms  Outcome: Ongoing (interventions implemented as appropriate)  Flowsheets (Taken 9/5/2020 0033)  Related Risk Factors (Fall Risk): fatigue/slow reaction;environment unfamiliar;slippery/uneven surfaces  Signs and Symptoms (Fall Risk): presence of risk factors     Problem: Fall Risk (Adult)  Goal: Absence of Fall  Outcome: Ongoing (interventions implemented as appropriate)  Flowsheets (Taken 9/6/2020 0027)  Absence of Fall: making progress toward outcome     Problem: Chronic Obstructive Pulmonary Disease (Adult)  Goal: Signs and Symptoms of Listed Potential Problems Will be Absent, Minimized or Managed (Chronic Obstructive Pulmonary Disease)  Outcome: Ongoing (interventions implemented as appropriate)  Flowsheets (Taken 9/6/2020 0027)  Problems Assessed (Chronic Obstructive Pulmonary Disease (COPD)): functional deficit  Problems Present (COPD, Bronch/Emphy): functional deficit     Problem: Renal Failure/Kidney Injury, Acute (Adult)  Goal: Signs and Symptoms of Listed Potential Problems Will be Absent, Minimized or Managed (Renal Failure/Kidney Injury, Acute)  Outcome: Ongoing (interventions implemented as appropriate)  Flowsheets (Taken 9/4/2020 0453 by Sharee Connor, RN)  Problems Assessed (Acute Renal Failure/Kidney Injury): all  Problems Present (ARF/Kidney Injury): none     Problem:  Anemia (Adult)  Goal: Identify Related Risk Factors and Signs and Symptoms  Outcome: Ongoing (interventions implemented as appropriate)     Problem: Anemia (Adult)  Goal: Symptom Improvement  Outcome: Ongoing (interventions implemented as appropriate)  Flowsheets (Taken 9/6/2020 0027)  Symptom Improvement: making progress toward outcome     Problem: Activity Intolerance (Adult)  Goal: Identify Related Risk Factors and Signs and Symptoms  Outcome: Ongoing (interventions implemented as appropriate)  Flowsheets (Taken 9/6/2020 0027)  Related Risk Factors (Activity Intolerance): generalized weakness     Problem: Activity Intolerance (Adult)  Goal: Activity Tolerance  Outcome: Ongoing (interventions implemented as appropriate)  Flowsheets (Taken 9/6/2020 0027)  Activity Tolerance: making progress toward outcome     Problem: Activity Intolerance (Adult)  Goal: Effective Energy Conservation Techniques  Outcome: Ongoing (interventions implemented as appropriate)  Flowsheets (Taken 9/6/2020 0027)  Effective Energy Conservation Techniques: making progress toward outcome

## 2020-09-06 NOTE — PROGRESS NOTES
SUBJECTIVE:   9/5/2020  Chief Complaint:     Subjective      Patient feels some better today. Has nausea and denied emesis. Abdominal pain and dyspnea improving. Tolerating diet. hgb 8.0    History:  Past Medical History:   Diagnosis Date   • Abnormal weight loss    • Acquired thrombocytopenia (CMS/HCC)    • Adenomatous polyp of colon    • Anxiety    • Biliary calculus     denies N/V, abd pain   • Chronic hepatitis C (CMS/HCC)    • Cirrhosis of liver (CMS/HCC)    • Colon polyp    • COPD (chronic obstructive pulmonary disease) (CMS/HCC)    • Elevated levels of transaminase & lactic acid dehydrogenase    • Generalized abdominal pain     known gallstones   • GERD (gastroesophageal reflux disease)    • Hypertension    • IBS (irritable bowel syndrome)    • Impacted cerumen    • Kidney disease     US suggest renal dz, elevated alpha 2 macroglobulins   • Nausea and vomiting    • Patient noncompliance, general    • Rectal hemorrhage    • Tracheal mass    • Villous adenoma of colon    • Wheezing      Past Surgical History:   Procedure Laterality Date   • BRONCHOSCOPY N/A 7/13/2020    Procedure: BRONCHOSCOPY WITH ENDOBRONCHIAL ULTRASOUND;  Surgeon: Karthik Sewell MD;  Location: [facility] OR;  Service: Pulmonary;  Laterality: N/A;  preop; tracheal mass; pneumonia  postop  PCP    • BRONCHOSCOPY N/A 9/1/2020    Procedure: BRONCHOSCOPY WITH ENDOBRONCHIAL ULTRASOUND and needle biopsy, with possible bronchoalveolar lavage, brush, and/or endobronchial biopsy;  Surgeon: Sandra Jacques MD;  Location: [facility] OR;  Service: Pulmonary;  Laterality: N/A;   • COLONOSCOPY  06/03/2013   • COLONOSCOPY N/A 8/22/2018    Procedure: COLONOSCOPY;  Surgeon: Charles Pham MD;  Location: [facility] ENDOSCOPY;  Service: Gastroenterology   • ENDOSCOPY N/A 7/9/2019    Procedure: ESOPHAGOGASTRODUODENOSCOPY;  Surgeon: Bi Ibarra MD;  Location: [facility] ENDOSCOPY;  Service: General   • ENDOSCOPY N/A 9/3/2020    Procedure:  ESOPHAGOGASTRODUODENOSCOPY;  Surgeon: Yoni Simon MD;  Location: [facility] ENDOSCOPY;  Service: Gastroenterology;  Laterality: N/A;   • ENDOSCOPY AND COLONOSCOPY     • ESOPHAGOSCOPY / EGD      with tube   • UPPER GASTROINTESTINAL ENDOSCOPY  06/03/2013   • UPPER GASTROINTESTINAL ENDOSCOPY  07/09/2019     Family History   Problem Relation Age of Onset   • Heart disease Other    • Diabetes Other      Social History     Tobacco Use   • Smoking status: Current Every Day Smoker     Packs/day: 1.00     Types: Cigarettes   • Smokeless tobacco: Never Used   Substance Use Topics   • Alcohol use: No   • Drug use: No     Medications Prior to Admission   Medication Sig Dispense Refill Last Dose   • albuterol (PROVENTIL HFA;VENTOLIN HFA) 108 (90 BASE) MCG/ACT inhaler Inhale 2 puffs Every 4 (Four) Hours As Needed for Wheezing.   Taking   • budesonide-formoterol (Symbicort) 160-4.5 MCG/ACT inhaler Inhale 2 puffs 2 (Two) Times a Day. 6 g 12 Unknown   • busPIRone (BUSPAR) 15 MG tablet Take 15 mg by mouth 2 (two) times a day.   Taking   • ferrous sulfate 325 (65 FE) MG EC tablet Take 1 tablet by mouth Daily With Breakfast. 30 tablet 2 Taking   • loperamide (IMODIUM) 2 MG capsule Take 2 mg by mouth 4 (Four) Times a Day As Needed for Diarrhea.   Taking   • loratadine (CLARITIN) 10 MG tablet Take 10 mg by mouth Daily.  3 Taking   • losartan (COZAAR) 50 MG tablet Take 50 mg by mouth daily.   Taking   • meclizine (ANTIVERT) 25 MG tablet Take 25 mg by mouth 2 (two) times a day.   Taking   • omeprazole (priLOSEC) 20 MG capsule Take 1 capsule by mouth Daily. 30 capsule 5 Taking   • predniSONE (DELTASONE) 10 MG tablet 4 tabs daily x 3 days, then 3 tabs daily x 3 days, then 2 tabs daily x 3 days, then 1 tab daily x 3 days 30 tablet 0 Taking     Allergies:  Codeine and Penicillins     CURRENT MEDICATIONS/OBJECTIVE/VS/PE:     Current Medications:     Current Facility-Administered Medications   Medication Dose Route Frequency Provider Last  Rate Last Dose   • acetaminophen (TYLENOL) tablet 650 mg  650 mg Oral Q4H PRN Yoni Simon MD   650 mg at 08/31/20 2007   • acidophilus (FLORANEX) tablet 1 tablet  1 tablet Oral Daily Yoni Simon MD   1 tablet at 09/05/20 0749   • albuterol sulfate HFA (PROVENTIL HFA;VENTOLIN HFA;PROAIR HFA) inhaler 2 puff  2 puff Inhalation Q4H PRN Yoni Simon MD   2 puff at 08/28/20 1940   • budesonide-formoterol (SYMBICORT) 160-4.5 MCG/ACT inhaler 2 puff  2 puff Inhalation BID - RT Yoni Simon MD   2 puff at 09/05/20 2018   • busPIRone (BUSPAR) tablet 15 mg  15 mg Oral Q12H Yoni Simon MD   15 mg at 09/05/20 2014   • cetirizine (zyrTEC) tablet 10 mg  10 mg Oral Daily Yoni Simon MD   10 mg at 09/05/20 0749   • dextrose 5 % and sodium chloride 0.45 % infusion  30 mL/hr Intravenous Continuous PRN Yoni Simon MD       • folic acid (FOLVITE) tablet 1 mg  1 mg Oral Daily Yoni Simon MD   1 mg at 09/05/20 0750   • guaiFENesin (ROBITUSSIN) 100 MG/5ML oral solution 200 mg  200 mg Oral Q4H PRN Yoni Simon MD       • ipratropium-albuterol (DUO-NEB) nebulizer solution 3 mL  3 mL Nebulization 4x Daily - RT Yoni Simon MD   3 mL at 09/05/20 2018   • levoFLOXacin (LEVAQUIN) 750 mg/150 mL D5W (premix) (LEVAQUIN) 750 mg  750 mg Intravenous Q24H Yoni Simon MD   750 mg at 09/05/20 1358   • loperamide (IMODIUM) capsule 2 mg  2 mg Oral 4x Daily PRN Yoni Simon MD       • LORazepam (ATIVAN) injection 0.25 mg  0.25 mg Intravenous Q4H PRN Yoni Simon MD       • losartan (COZAAR) tablet 50 mg  50 mg Oral Daily Yoni Simon MD   50 mg at 09/05/20 0749   • magic butt ointment   Topical BID Yoni Simon MD       • ondansetron (ZOFRAN) injection 4 mg  4 mg Intravenous Q6H PRN Yoni Simon MD   4 mg at 09/04/20 0024   • prochlorperazine (COMPAZINE) tablet 10 mg  10 mg Oral Q6H PRN Yoni Simon MD       • sodium chloride 0.9 % bolus 2,217 mL  30  mL/kg Intravenous Once Yoni Simon MD       • sodium chloride 0.9 % flush 10 mL  10 mL Intravenous Q12H Yoni Simon MD   10 mL at 09/05/20 0750   • sodium chloride 0.9 % flush 10 mL  10 mL Intravenous PRN Yoni Simon MD   10 mL at 08/26/20 2106   • sodium chloride 0.9 % infusion  50 mL/hr Intravenous Continuous Yoni Simon MD 50 mL/hr at 09/05/20 0236 50 mL/hr at 09/05/20 0236   • vitamin C (ASCORBIC ACID) tablet 1,000 mg  1,000 mg Oral TID With Meals Yoni Simon MD   1,000 mg at 09/05/20 1726       Objective     Review of Systems:   Review of Systems   Constitutional: Negative for chills, fatigue, fever and unexpected weight change.   HENT: Negative for congestion, ear discharge, hearing loss, nosebleeds and sore throat.    Eyes: Negative for pain, discharge and redness.   Respiratory: Positive for shortness of breath. Negative for cough, chest tightness and wheezing.    Cardiovascular: Negative for chest pain and palpitations.   Gastrointestinal: Positive for nausea. Negative for abdominal distention, abdominal pain, blood in stool, constipation, diarrhea and vomiting.   Endocrine: Negative for cold intolerance, polydipsia, polyphagia and polyuria.   Genitourinary: Negative for dysuria, flank pain, frequency, hematuria and urgency.   Musculoskeletal: Negative for arthralgias, back pain, joint swelling and myalgias.   Skin: Negative for color change, pallor and rash.   Neurological: Negative for tremors, seizures, syncope, weakness and headaches.   Hematological: Negative for adenopathy. Does not bruise/bleed easily.   Psychiatric/Behavioral: Negative for behavioral problems, confusion, dysphoric mood, hallucinations and suicidal ideas. The patient is not nervous/anxious.        Physical Exam:   Temp:  [96.8 °F (36 °C)-98.8 °F (37.1 °C)] 96.8 °F (36 °C)  Heart Rate:  [] 104  Resp:  [18-20] 18  BP: (100-120)/(55-70) 120/70     Physical Exam:  General Appearance:    Alert,  cooperative, in no acute distress   Head:    Normocephalic, without obvious abnormality, atraumatic   Eyes:            Lids and lashes normal, conjunctivae and sclerae normal, no   icterus, no pallor, corneas clear, PERRLA   Ears:    Ears appear intact with no abnormalities noted   Throat:   No oral lesions, no thrush, oral mucosa moist   Neck:   No adenopathy, supple, trachea midline, no thyromegaly, no     carotid bruit, no JVD   Back:     No kyphosis present, no scoliosis present, no skin lesions,       erythema or scars, no tenderness to percussion or                   palpation,   range of motion normal   Lungs:     Clear to auscultation,respirations regular, even and                   unlabored    Heart:    Regular rhythm and normal rate, normal S1 and S2, no            murmur, no gallop, no rub, no click   Breast Exam:    Deferred   Abdomen:     Normal bowel sounds, no masses, no organomegaly, soft        nontender, nondistended, no guarding, no rebound                 tenderness   Genitalia:    Deferred   Extremities:   Moves all extremities well, no edema, no cyanosis, no              redness   Pulses:   Pulses palpable and equal bilaterally   Skin:   No bleeding, bruising or rash   Lymph nodes:   No palpable adenopathy   Neurologic:   Cranial nerves 2 - 12 grossly intact, sensation intact, DTR        present and equal bilaterally      Results Review:     Lab Results (last 24 hours)     Procedure Component Value Units Date/Time    Heparin-induced Platelet Antibody [[id number]] Collected:  09/05/20 1036    Specimen:  Blood Updated:  09/05/20 1043    Basic Metabolic Panel [[id number]]  (Abnormal) Collected:  09/05/20 0555    Specimen:  Blood Updated:  09/05/20 0655     Glucose 104 mg/dL      BUN 12 mg/dL      Creatinine 0.75 mg/dL      Sodium 136 mmol/L      Potassium 4.2 mmol/L      Comment: Slight hemolysis detected by analyzer. Results may be affected.        Chloride 101 mmol/L      CO2 29.0 mmol/L       Calcium 7.8 mg/dL      eGFR Non African Amer 105 mL/min/1.73      BUN/Creatinine Ratio 16.0     Anion Gap 6.0 mmol/L     Narrative:       GFR Normal >60  Chronic Kidney Disease <60  Kidney Failure <15      CBC & Differential [[id number]] Collected:  09/05/20 0555    Specimen:  Blood Updated:  09/05/20 0636    Narrative:       The following orders were created for panel order CBC & Differential.  Procedure                               Abnormality         Status                     ---------                               -----------         ------                     CBC Auto Differential[[id number]]        Abnormal            Final result                 Please view results for these tests on the individual orders.    CBC Auto Differential [[id number]]  (Abnormal) Collected:  09/05/20 0555    Specimen:  Blood Updated:  09/05/20 0636     WBC 5.64 10*3/mm3      RBC 2.76 10*6/mm3      Hemoglobin 8.0 g/dL      Hematocrit 24.6 %      MCV 89.1 fL      MCH 29.0 pg      MCHC 32.5 g/dL      RDW 17.1 %      RDW-SD 54.5 fl      MPV 11.7 fL      Platelets 75 10*3/mm3      Neutrophil % 59.9 %      Lymphocyte % 27.7 %      Monocyte % 11.0 %      Eosinophil % 0.5 %      Basophil % 0.0 %      Immature Grans % 0.9 %      Neutrophils, Absolute 3.38 10*3/mm3      Lymphocytes, Absolute 1.56 10*3/mm3      Monocytes, Absolute 0.62 10*3/mm3      Eosinophils, Absolute 0.03 10*3/mm3      Basophils, Absolute 0.00 10*3/mm3      Immature Grans, Absolute 0.05 10*3/mm3      nRBC 0.0 /100 WBC            I reviewed the patient's new clinical results.  I reviewed the patient's new imaging results and agree with the interpretation.     ASSESSMENT/PLAN:   ASSESSMENT: 1.  Epigastric pain with nausea and vomiting , improving  2.  History of chronic hepatitis C with cirrhosis, LFTs normal.  3.  Extensive lymphadenopathy and weight loss work-up is in progress for lymphoma.  4.  Anemia, likely anemia of chronic disease.  Need to rule out GI blood loss.  5.   Diarrhea rule out colitis and colon polyps.  6.  Bacterial pneumonia, improving.  7.colon polyps  PLAN: 1.  Continue PPI and antibiotics  2. F/U on path  3. OK to D/C from GI standpoint    The risks, benefits, and alternatives of this procedure have been discussed with the patient or the responsible party- the patient understands and agrees to proceed.         Yoni iSmon MD  09/05/20  20:33

## 2020-09-06 NOTE — THERAPY TREATMENT NOTE
Acute Care - Physical Therapy Treatment Note  [facility]     Patient Name: Wong Patrick  : 1955  MRN: [id number]  Today's Date: 2020  Onset of Illness/Injury or Date of Surgery: 20     Referring Physician: MD Jennifer    Admit Date: 2020    Visit Dx:    ICD-10-CM ICD-9-CM   1. Pneumonia of both lungs due to infectious organism, unspecified part of lung J18.9 483.8   2. Fever, unspecified fever cause R50.9 780.60   3. Impaired mobility and ADLs Z74.09 V49.89    Z78.9    4. Impaired functional mobility, balance, gait, and endurance Z74.09 V49.89   5. Pneumonia of left upper lobe due to Streptococcus pneumoniae (CMS/HCC) J13 481   6. Mediastinal adenopathy R59.0 785.6   7. Non-intractable vomiting with nausea, unspecified vomiting type R11.2 787.01   8. Diarrhea, unspecified type R19.7 787.91     Patient Active Problem List   Diagnosis   • History of colon polyps   • Nausea and vomiting   • Right lower lobe pneumonia   • Acquired thrombocytopenia (CMS/HCC)   • Chronic hepatitis C (CMS/HCC)   • Hypertension   • Cirrhosis of liver (CMS/HCC)   • Mediastinal adenopathy   • Sepsis due to pneumonia (CMS/HCC)   • COPD with acute exacerbation (CMS/HCC)   • IBRAHIMA (acute kidney injury) (CMS/HCC)   • Acute respiratory failure with hypoxia (CMS/HCC)   • Pneumonia of left upper lobe due to Streptococcus pneumoniae (CMS/HCC)   • Non-intractable vomiting   • CLL (chronic lymphocytic leukemia) (CMS/HCC)   • Bacteremia due to Streptococcus pneumoniae   • Diarrhea   • Thrombocytopenia (CMS/HCC)       Therapy Treatment    Rehabilitation Treatment Summary     Row Name 20 1410             Treatment Time/Intention    Discipline  physical therapy assistant  -LN      Document Type  therapy note (daily note)  -LN      Subjective Information  complains of;nausea/vomiting;pain  -LN      Mode of Treatment  individual therapy;physical therapy  -LN      Therapy Frequency (PT Clinical Impression)  other (see  comments) 6-11x/week  -LN      Therapy Frequency (OT Eval)  -- 5-7 days per week   -LN      Patient Effort  adequate  -LN      Existing Precautions/Restrictions  fall;oxygen therapy device and L/min  -LN      Recorded by [LN] Suni Vasquez, PTA 09/06/20 1505      Row Name 09/06/20 1410             Vital Signs    Pre Systolic BP Rehab  108  -LN      Pre Treatment Diastolic BP  58  -LN      Post Systolic BP Rehab  120  -LN      Post Treatment Diastolic BP  56  -LN      Pretreatment Heart Rate (beats/min)  112  -LN      Posttreatment Heart Rate (beats/min)  111  -LN      Pre SpO2 (%)  93  -LN      O2 Delivery Pre Treatment  room air  -LN      Post SpO2 (%)  95  -LN      Pre Patient Position  Side Lying  -LN      Intra Patient Position  Standing  -LN      Post Patient Position  Sitting  -LN      Recorded by [LN] Suni Vasquez, PTA 09/06/20 1505      Row Name 09/06/20 1410             Cognitive Assessment/Intervention- PT/OT    Orientation Status (Cognition)  oriented x 4  -LN      Follows Commands (Cognition)  WFL likely limited by hearing  -LN      Recorded by [LN] Suni Vasquez, PTA 09/06/20 1505      Row Name 09/06/20 1410             Safety Issues, Functional Mobility    Impairments Affecting Function (Mobility)  balance;endurance/activity tolerance;strength  -LN      Recorded by [LN] Suni Vasquez, PTA 09/06/20 1505      Row Name 09/06/20 1410             Bed Mobility Assessment/Treatment    Bed Mobility Assessment/Treatment  supine-sit;sit-supine  -LN      Supine-Sit [place] (Bed Mobility)  supervision  -LN      Sit-Supine [place] (Bed Mobility)  not tested  -LN      Bed Mobility, Safety Issues  decreased use of legs for bridging/pushing  -LN      Assistive Device (Bed Mobility)  head of bed elevated;bed rails  -LN      Recorded by [LN] Suni Vasquez, PTA 09/06/20 1505      Row Name 09/06/20 1410             Functional Mobility    Functional Mobility- Ind. Level  contact guard assist  -LN      Functional  Mobility- Device  rolling walker  -LN      Recorded by [LN] Suni Vasquez, PTA 09/06/20 1505      Row Name 09/06/20 1410             Transfer Assessment/Treatment    Transfer Assessment/Treatment  sit-stand transfer;stand-sit transfer  -LN      Recorded by [LN] Suni Vasquez, PTA 09/06/20 1505      Row Name 09/06/20 1410             Bed-Chair Transfer    Bed-Chair [place] (Transfers)  contact guard  -LN      Assistive Device (Bed-Chair Transfers)  walker, front-wheeled  -LN      Recorded by [LN] Suni Vasquez, PTA 09/06/20 1505      Row Name 09/06/20 1410             Sit-Stand Transfer    Sit-Stand [place] (Transfers)  contact guard  -LN      Assistive Device (Sit-Stand Transfers)  walker, front-wheeled  -LN      Recorded by [LN] Suni Vasquez, PTA 09/06/20 1505      Row Name 09/06/20 1410             Stand-Sit Transfer    Stand-Sit [place] (Transfers)  contact guard  -LN      Assistive Device (Stand-Sit Transfers)  walker, front-wheeled  -LN      Recorded by [LN] Suni Vasquez, PTA 09/06/20 1505      Row Name 09/06/20 1410             Gait/Stairs Assessment/Training    Gait/Stairs Assessment/Training  gait/ambulation assistive device  -LN      [place] Level (Gait)  contact guard  -LN      Assistive Device (Gait)  walker, front-wheeled  -LN      Distance in Feet (Gait)  325  -LN      Pattern (Gait)  step-through  -LN      Recorded by [LN] Suni Vasquez, PTA 09/06/20 1505      Row Name 09/06/20 1410             Positioning and Restraints    Post Treatment Position  chair  -LN      In Chair  sitting cna present and to assist with bath  -LN      Recorded by [LN] Suni Vasquez, PTA 09/06/20 1505      Row Name 09/06/20 1410             Pain Scale: Numbers Pre/Post-Treatment    Pain Scale: Numbers, Pretreatment  -- alot  -LN      Pain Scale: Numbers, Post-Treatment  -- a lot  -LN      Pain Location  abdomen  -LN      Pain Intervention(s)  -- nsg made aware  -LN      Recorded by [LN] Suni Vasquez, PTA  09/06/20 1505      Row Name 09/06/20 1410             Sensory Assessment/Intervention    Sensory General Assessment  no sensation deficits identified BLE light touch assessed  -LN      Recorded by [LN] Suni Vasquez S, PTA 09/06/20 1505      Row Name                Wound 08/26/20 1322 Bilateral gluteal MASD (Moisture associated skin damage)    Wound - Properties Group Date first assessed: 08/26/20 [AK] Time first assessed: 1322 [AK] Present on Hospital Admission: Y [AK] Side: Bilateral [AK] Location: gluteal [AK] Primary Wound Type: MASD [AK2], with open areas  Recorded by:  [AK] Tammy Beverly RN 08/26/20 1347 [AK2] Tammy Beverly RN 08/26/20 1348    Row Name 09/06/20 1410             Coping    Observed Emotional State  accepting;calm;cooperative  -LN      Verbalized Emotional State  acceptance  -LN      Recorded by [LN] Suni Vasquez S, PTA 09/06/20 1505      Row Name 09/06/20 1410             Plan of Care Review    Plan of Care Reviewed With  patient  -LN      Recorded by [LN] Suni Vasquez S, PTA 09/06/20 1505      Row Name 09/06/20 1410             Outcome Summary/Treatment Plan (OT)    Anticipated Discharge Disposition (OT)  anticipate therapy at next level of care  -LN      Recorded by [LN] Suni Vasquez S, PTA 09/06/20 1505      Row Name 09/06/20 1410             Outcome Summary/Treatment Plan (PT)    Anticipated Discharge Disposition (PT)  home with home health;anticipate therapy at next level of care pending progress in acute care  -LN      Recorded by [LN] Suni Vasquez, PTA 09/06/20 1505        User Key  (r) = Recorded By, (t) = Taken By, (c) = Cosigned By    Initials Name Effective Dates Discipline    AK Tammy Beverly, RN 10/17/16 -  Nurse    LN Suni Vasquez, PTA 03/07/18 -  PT          Wound 08/26/20 1322 Bilateral gluteal MASD (Moisture associated skin damage) (Active)   Dressing Appearance open to air 9/6/2020  8:14 AM   Closure Open to air 9/6/2020  8:14 AM   Base blanchable 9/6/2020  8:14 AM    Periwound redness 9/6/2020  8:14 AM   Periwound Temperature warm 9/6/2020  8:14 AM   Periwound Skin Turgor soft 9/6/2020  8:14 AM   Drainage Amount none 9/6/2020  8:14 AM       Rehab Goal Summary     Row Name 09/06/20 1410             Physical Therapy Goals    Bed Mobility Goal Selection (PT)  bed mobility, PT goal 1  -LN      Transfer Goal Selection (PT)  transfer, PT goal 1  -LN      Gait Training Goal Selection (PT)  gait training, PT goal 1  -LN         Bed Mobility Goal 1 (PT)    Activity/Assistive Device (Bed Mobility Goal 1, PT)  supine to sit;sit to supine  -LN      [place] Level/Cues Needed (Bed Mobility Goal 1, PT)  conditional independence  -LN      Time Frame (Bed Mobility Goal 1, PT)  2 days  -LN      Progress/Outcomes (Bed Mobility Goal 1, PT)  goal not met  -LN         Transfer Goal 1 (PT)    Activity/Assistive Device (Transfer Goal 1, PT)  sit-to-stand/stand-to-sit;bed-to-chair/chair-to-bed LRAD PRN  -LN      [place] Level/Cues Needed (Transfer Goal 1, PT)  conditional independence  -LN      Time Frame (Transfer Goal 1, PT)  3 days  -LN      Progress/Outcome (Transfer Goal 1, PT)  goal not met  -LN         Gait Training Goal 1 (PT)    Activity/Assistive Device (Gait Training Goal 1, PT)  gait (walking locomotion);assistive device use;decrease fall risk;improve balance and speed;increase endurance/gait distance LRAD PRN  -LN      [place] Level (Gait Training Goal 1, PT)  conditional independence  -LN      Distance (Gait Goal 1, PT)  100ft or more  -LN      Time Frame (Gait Training Goal 1, PT)  by discharge  -LN      Progress/Outcome (Gait Training Goal 1, PT)  goal not met  -LN         Occupational Therapy Goals    Transfer Goal Selection (OT)  transfer, OT goal 1  -LN      Bathing Goal Selection (OT)  bathing, OT goal 1  -LN      Dressing Goal Selection (OT)  dressing, OT goal 1  -LN      Toileting Goal Selection (OT)  toileting, OT goal 1  -LN         Transfer Goal 1 (OT)     Activity/Assistive Device (Transfer Goal 1, OT)  bed-to-chair/chair-to-bed;sit-to-stand/stand-to-sit;toilet  -LN      [place] Level/Cues Needed (Transfer Goal 1, OT)  conditional independence  -LN      Time Frame (Transfer Goal 1, OT)  long term goal (LTG);by discharge  -LN      Progress/Outcome (Transfer Goal 1, OT)  goal not met  -LN         Bathing Goal 1 (OT)    Activity/Assistive Device (Bathing Goal 1, OT)  bathing skills, all  -LN      [place] Level/Cues Needed (Bathing Goal 1, OT)  supervision required;set-up required  -LN      Time Frame (Bathing Goal 1, OT)  long term goal (LTG);by discharge  -LN      Progress/Outcomes (Bathing Goal 1, OT)  goal met  -LN         Dressing Goal 1 (OT)    Activity/Assistive Device (Dressing Goal 1, OT)  lower body dressing  -LN      [place]/Cues Needed (Dressing Goal 1, OT)  supervision required;set-up required  -LN      Time Frame (Dressing Goal 1, OT)  long term goal (LTG);by discharge  -LN      Progress/Outcome (Dressing Goal 1, OT)  goal met  -LN         Toileting Goal 1 (OT)    Activity/Device (Toileting Goal 1, OT)  toileting skills, all  -LN      [place] Level/Cues Needed (Toileting Goal 1, OT)  conditional independence  -LN      Time Frame (Toileting Goal 1, OT)  long term goal (LTG);by discharge  -LN      Progress/Outcome (Toileting Goal 1, OT)  goal not met  -LN        User Key  (r) = Recorded By, (t) = Taken By, (c) = Cosigned By    Initials Name Provider Type Discipline    Suni King, PTA Physical Therapy Assistant PT          Physical Therapy Education                 Title: PT OT SLP Therapies (In Progress)     Topic: Physical Therapy (In Progress)     Point: Mobility training (Done)     Description:   Instruct learner(s) on safety and technique for assisting patient out of bed, chair or wheelchair.  Instruct in the proper use of assistive devices, such as walker, crutches, cane or brace.              Patient Friendly Description:    It's important to get you on your feet again, but we need to do so in a way that is safe for you. Falling has serious consequences, and your personal safety is the most important thing of all.        When it's time to get out of bed, one of us or a family member will sit next to you on the bed to give you support.     If your doctor or nurse tells you to use a walker, crutches, a cane, or a brace, be sure you use it every time you get out of bed, even if you think you don't need it.    Learning Progress Summary           Patient Acceptance, E,TB, VU,NR by REJI at 9/6/2020 1506    Acceptance, E, VU by KW at 8/28/2020 1603    Comment:  Role of PT, POC, use of gait belt, positioning of AD and hands with t/f                   Point: Home exercise program (Not Started)     Description:   Instruct learner(s) on appropriate technique for monitoring, assisting and/or progressing patient with therapeutic exercises and activities.              Learner Progress:   Not documented in this visit.          Point: Body mechanics (Not Started)     Description:   Instruct learner(s) on proper positioning and spine alignment for patient and/or caregiver during mobility tasks and/or exercises.              Learner Progress:   Not documented in this visit.          Point: Precautions (Done)     Description:   Instruct learner(s) on prescribed precautions during mobility and gait tasks              Learning Progress Summary           Patient Acceptance, E,TB, VU,NR by LN at 9/6/2020 1506    Acceptance, E, VU by KW at 8/28/2020 1603    Comment:  Role of PT, POC, use of gait belt, positioning of AD and hands with t/f                               User Key     Initials Effective Dates Name Provider Type Discipline    REJI 03/07/18 -  Suni Vasquez PTA Physical Therapy Assistant PT    KW 08/09/20 -  Paulette Dean PT Physical Therapist PT                PT Recommendation and Plan  Anticipated Discharge Disposition (PT): home with home health,  anticipate therapy at next level of care(pending progress in acute care)  Therapy Frequency (PT Clinical Impression): other (see comments)(6-11x/week)  Outcome Summary/Treatment Plan (PT)  Anticipated Discharge Disposition (PT): home with home health, anticipate therapy at next level of care(pending progress in acute care)  Plan of Care Reviewed With: patient  Outcome Summary: sup-sit sba,sit-stand-sit cga of 1 from bed,amb 325' with rw and cga of 1-no goals met  Outcome Measures     Row Name 09/06/20 1410 09/04/20 1025          How much help from another person do you currently need...    Turning from your back to your side while in flat bed without using bedrails?  3  -LN  --     Moving from lying on back to sitting on the side of a flat bed without bedrails?  3  -LN  --     Moving to and from a bed to a chair (including a wheelchair)?  3  -LN  --     Standing up from a chair using your arms (e.g., wheelchair, bedside chair)?  3  -LN  --     Climbing 3-5 steps with a railing?  2  -LN  --     To walk in hospital room?  3  -LN  --     AM-PAC 6 Clicks Score (PT)  17  -LN  --        How much help from another is currently needed...    Putting on and taking off regular lower body clothing?  --  3  -RC     Bathing (including washing, rinsing, and drying)  --  3  -RC     Toileting (which includes using toilet bed pan or urinal)  --  2  -RC     Putting on and taking off regular upper body clothing  --  3  -RC     Taking care of personal grooming (such as brushing teeth)  --  4  -RC     Eating meals  --  4  -RC     AM-PAC 6 Clicks Score (OT)  --  19  -RC        Functional Assessment    Outcome Measure Options  AM-PAC 6 Clicks Basic Mobility (PT)  -LN  --       User Key  (r) = Recorded By, (t) = Taken By, (c) = Cosigned By    Initials Name Provider Type    LN Suni Vasquez, PTA Physical Therapy Assistant    Sarahy Salinas COTA/L Occupational Therapy Assistant         Time Calculation:   PT Charges     Row Name  09/06/20 1507             Time Calculation    Start Time  1410  -LN      Stop Time  1425  -LN      Time Calculation (min)  15 min  -LN      PT Received On  09/06/20  -LN         Time Calculation- PT    Total Timed Code Minutes- PT  15 minute(s)  -LN        User Key  (r) = Recorded By, (t) = Taken By, (c) = Cosigned By    Initials Name Provider Type    LN Suni Vasquez PTA Physical Therapy Assistant        Therapy Charges for Today     Code Description Service Date Service Provider Modifiers Qty    [id number] HC GAIT TRAINING EA 15 MIN 9/6/2020 Suni Vasquez PTA GP 1          PT G-Codes  Outcome Measure Options: AM-PAC 6 Clicks Basic Mobility (PT)  AM-PAC 6 Clicks Score (PT): 17  AM-PAC 6 Clicks Score (OT): 19    Suni Vasquez PTA  9/6/2020

## 2020-09-06 NOTE — PLAN OF CARE
Problem: Patient Care Overview  Goal: Plan of Care Review  Outcome: Ongoing (interventions implemented as appropriate)  Flowsheets (Taken 9/6/2020 7424)  Plan of Care Reviewed With: patient  Outcome Summary: sup-sit sba,sit-stand-sit cga of 1 from bed,amb 325' with rw and cga of 1-no goals met

## 2020-09-06 NOTE — PROGRESS NOTES
[facility] Medicine Services  INPATIENT PROGRESS NOTE    Length of Stay: 12  Date of Admission: 8/25/2020  Primary Care Physician: Provider, No Known    Subjective   Chief Complaint: Nausea, shortness of breath  HPI:  65 year old male with a history of COPD ,cirrhosis of the liver with ascites, chronic hepatitis C, GERD, HTN, and irritable bowel syndrome who presented with shortness of breath, nausea, vomiting, diarrhea, and fatigue over 4-5 days.  Chest x-ray showed patchy airspace opacity involving almost the entirety of the left lung with air bronchograms. He also has a known tracheal/mediastinal mass.  He met sepsis criteria and was admitted for sepsis from pneumonia and respiratory failure.  He was admitted to CCU on IV antibiotics and IV fluid.  He was initiated on IV solumedrol and bronchodilators.  COVID 19 is negative.  Blood cultures were positive for Streptococcus pneumoniae sensitive to Levaquin.  Antibiotics were targeted. Oncology and pulmonology were consulted. He underwent EBUS.  Peripheral blood flow cytometry is suggestive of SLL/CLL and he is recommended observation at this time and outpatient oncology follow-up. FNA is also consistent with SLL/CLL. GI was consulted for persistent nausea.  He underwent EGD which revealed esophagitis, gastritis, and medium-sized hiatal hernia.  He underwent colonoscopy which revealed non-bleeding hemorrhoids. Biopsies were taken from the colon. He has weaned to room are.  Platelets are trending up.     Review of Systems   Constitutional: Negative for chills and fever.   Respiratory: Negative for shortness of breath.    Cardiovascular: Negative for chest pain.   Gastrointestinal: Negative for abdominal pain, nausea and vomiting.   All other systems reviewed and are negative.       All pertinent negatives and positives are as above. All other systems have been reviewed and are negative unless otherwise stated.     Objective     Temp:  [96.8 °F (36 °C)-98.9 °F (37.2 °C)] 98.7 °F (37.1 °C)  Heart Rate:  [] 100  Resp:  [18-20] 20  BP: (100-120)/(53-70) 117/70    Physical Exam   Constitutional: He is oriented to person, place, and time. He appears well-developed and well-nourished. No distress.   HENT:   Head: Normocephalic and atraumatic.   Eyes: Conjunctivae are normal.   Cardiovascular: Normal rate, regular rhythm and intact distal pulses.   Pulmonary/Chest: Effort normal and breath sounds normal. No respiratory distress.   Abdominal: Soft. Bowel sounds are normal. He exhibits no distension. There is no tenderness.   Musculoskeletal: He exhibits edema (bilateral lower extremity). He exhibits no deformity.   Neurological: He is alert and oriented to person, place, and time.   Skin: Skin is warm and dry. He is not diaphoretic.   Vitals reviewed.      Results Review:  I have reviewed the labs, radiology results, and diagnostic studies.    Laboratory Data:   Results from last 7 days   Lab Units 09/06/20  0629 09/05/20  0555 09/04/20  0533  09/01/20  0633   SODIUM mmol/L 137 136 138   < > 136   POTASSIUM mmol/L 3.7 4.2 4.5   < > 4.6   CHLORIDE mmol/L 101 101 100   < > 97*   CO2 mmol/L 28.0 29.0 28.0   < > 33.0*   BUN mg/dL 10 12 11   < > 21   CREATININE mg/dL 0.78 0.75* 0.76   < > 0.77   GLUCOSE mg/dL 102* 104* 100*   < > 95   CALCIUM mg/dL 8.0* 7.8* 8.1*   < > 8.1*   BILIRUBIN mg/dL  --   --   --   --  0.3   ALK PHOS U/L  --   --   --   --  69   ALT (SGPT) U/L  --   --   --   --  16   AST (SGOT) U/L  --   --   --   --  24   ANION GAP mmol/L 8.0 6.0 10.0   < > 6.0    < > = values in this interval not displayed.     Estimated Creatinine Clearance: 95.8 mL/min (by C-G formula based on SCr of 0.78 mg/dL).          Results from last 7 days   Lab Units 09/06/20  0637 09/05/20  0555 09/04/20  0533 09/03/20  0630 09/02/20  0536   WBC 10*3/mm3 5.29 5.64 7.54 8.05 8.48   HEMOGLOBIN g/dL 8.1* 8.0* 8.5* 9.1* 8.9*   HEMATOCRIT % 25.5* 24.6* 26.7*  29.1* 28.4*   PLATELETS 10*3/mm3 88* 75* 109* 134* 163     Results from last 7 days   Lab Units 09/01/20  1754 08/31/20  0720   INR  1.06 1.20       Culture Data:   No results found for: BLOODCX  No results found for: URINECX  No results found for: RESPCX  No results found for: WOUNDCX  No results found for: STOOLCX  No components found for: BODYFLD    Radiology Data:   Imaging Results (Last 24 Hours)     ** No results found for the last 24 hours. **          I have reviewed the patient's current medications.     Assessment/Plan     Active Hospital Problems    Diagnosis   • **Non-intractable vomiting     Added automatically from request for surgery [id number]     • Thrombocytopenia (CMS/HCC)   • CLL (chronic lymphocytic leukemia) (CMS/HCC)     Likely based on flow cytometry     • Bacteremia due to Streptococcus pneumoniae   • Sepsis due to pneumonia (CMS/HCC)   • COPD with acute exacerbation (CMS/HCC)   • IBRAHIMA (acute kidney injury) (CMS/HCC)   • Acute respiratory failure with hypoxia (CMS/HCC)   • Pneumonia of left upper lobe due to Streptococcus pneumoniae (CMS/HCC)     Added automatically from request for surgery [id number]     • Diarrhea     Added automatically from request for surgery [id number]     • Mediastinal adenopathy     Added automatically from request for surgery [id number]     • Hypertension   • Cirrhosis of liver (CMS/HCC)       Plan:    Wean O2, weaned to baseline of room air, maintain SaO2 >88%  Levaquin day 12/14  SymbicortBobbyoneteddy  Pulmonology consultation appreciated  Outpatient evaluation of SLL/CLL, Follow-up 4 weeks with heme/onc  Heme/Onc consultation appreciated  S/P EGD  S/P colonoscopy, biopsies pending  Antiemetics PRN  GI consultation appreciated  IBRAHIMA resolved  Thrombocytopenia is suspected secondary to cirrhosis with splenomegaly and consumption from infection.  Cannot exclude HIT so heparin will be discontinued.  Check HIT antibodies. Remain pending.   BP control: Cozaar  PT/OT  VTE PPx:  SCDs  Discharge planning: rehab to home, precert pending        The patient was evaluated during the global COVID-19 pandemic, and the diagnosis was suspected/considered upon their initial presentation.  Evaluation, treatment, and testing were consistent with current guidelines for patients who present with complaints or symptoms that may be related to COVID-19.        This document has been electronically signed by ANA Edmonds on September 6, 2020 11:00

## 2020-09-07 NOTE — PLAN OF CARE
Problem: Patient Care Overview  Goal: Plan of Care Review  Outcome: Ongoing (interventions implemented as appropriate)  Flowsheets (Taken 9/7/2020 4609)  Plan of Care Reviewed With: patient  Outcome Summary: sup-sit-sup cond ind,sit-stand-sit cga of 1,amb 165'x2 with rw and sba of 1,15 reps seated ex-1 new goal met

## 2020-09-07 NOTE — PROGRESS NOTES
[facility] Medicine Services  INPATIENT PROGRESS NOTE    Length of Stay: 13  Date of Admission: 8/25/2020  Primary Care Physician: Provider, No Known    Subjective   Chief Complaint: Nausea, shortness of breath  HPI:  65 year old male with a history of COPD ,cirrhosis of the liver with ascites, chronic hepatitis C, GERD, HTN, and irritable bowel syndrome who presented with shortness of breath, nausea, vomiting, diarrhea, and fatigue over 4-5 days.  Chest x-ray showed patchy airspace opacity involving almost the entirety of the left lung with air bronchograms. He also has a known tracheal/mediastinal mass.  He met sepsis criteria and was admitted for sepsis from pneumonia and respiratory failure.  He was admitted to CCU on IV antibiotics and IV fluid.  He was initiated on IV solumedrol and bronchodilators.  COVID 19 is negative.  Blood cultures were positive for Streptococcus pneumoniae sensitive to Levaquin.  Antibiotics were targeted. Oncology and pulmonology were consulted. He underwent EBUS.  Peripheral blood flow cytometry is suggestive of SLL/CLL and he is recommended observation at this time and outpatient oncology follow-up. FNA is also consistent with SLL/CLL. GI was consulted for persistent nausea.  He underwent EGD which revealed esophagitis, gastritis, and medium-sized hiatal hernia.  He underwent colonoscopy which revealed non-bleeding hemorrhoids. Biopsies were taken from the colon. He has weaned to room air.  Platelets are stable.     Review of Systems   Constitutional: Negative for chills and fever.   Respiratory: Negative for shortness of breath.    Cardiovascular: Negative for chest pain.   Gastrointestinal: Negative for abdominal pain, nausea and vomiting.   All other systems reviewed and are negative.       All pertinent negatives and positives are as above. All other systems have been reviewed and are negative unless otherwise stated.     Objective     Temp:  [97.7 °F (36.5 °C)-98.7 °F (37.1 °C)] 98.2 °F (36.8 °C)  Heart Rate:  [] 104  Resp:  [18-20] 18  BP: (103-122)/(57-65) 109/62    Physical Exam   Constitutional: He is oriented to person, place, and time. He appears well-developed and well-nourished. No distress.   HENT:   Head: Normocephalic and atraumatic.   Eyes: Conjunctivae are normal.   Cardiovascular: Normal rate, regular rhythm and intact distal pulses.   Pulmonary/Chest: Effort normal and breath sounds normal. No respiratory distress.   Abdominal: Soft. Bowel sounds are normal. He exhibits no distension. There is no tenderness.   Musculoskeletal: He exhibits no edema or deformity.   Neurological: He is alert and oriented to person, place, and time.   Skin: Skin is warm and dry. He is not diaphoretic.   Vitals reviewed.      Results Review:  I have reviewed the labs, radiology results, and diagnostic studies.    Laboratory Data:   Results from last 7 days   Lab Units 09/07/20  0449 09/06/20  0629 09/05/20  0555  09/01/20  0633   SODIUM mmol/L 136 137 136   < > 136   POTASSIUM mmol/L 3.9 3.7 4.2   < > 4.6   CHLORIDE mmol/L 101 101 101   < > 97*   CO2 mmol/L 27.0 28.0 29.0   < > 33.0*   BUN mg/dL 10 10 12   < > 21   CREATININE mg/dL 0.84 0.78 0.75*   < > 0.77   GLUCOSE mg/dL 104* 102* 104*   < > 95   CALCIUM mg/dL 8.2* 8.0* 7.8*   < > 8.1*   BILIRUBIN mg/dL  --   --   --   --  0.3   ALK PHOS U/L  --   --   --   --  69   ALT (SGPT) U/L  --   --   --   --  16   AST (SGOT) U/L  --   --   --   --  24   ANION GAP mmol/L 8.0 8.0 6.0   < > 6.0    < > = values in this interval not displayed.     Estimated Creatinine Clearance: 86.8 mL/min (by C-G formula based on SCr of 0.84 mg/dL).          Results from last 7 days   Lab Units 09/07/20  0449 09/06/20  0637 09/05/20  0555 09/04/20  0533 09/03/20  0630   WBC 10*3/mm3 5.69 5.29 5.64 7.54 8.05   HEMOGLOBIN g/dL 8.2* 8.1* 8.0* 8.5* 9.1*   HEMATOCRIT % 24.9* 25.5* 24.6* 26.7* 29.1*   PLATELETS 10*3/mm3 84*  88* 75* 109* 134*     Results from last 7 days   Lab Units 09/01/20  1754   INR  1.06       Culture Data:   No results found for: BLOODCX  No results found for: URINECX  No results found for: RESPCX  No results found for: WOUNDCX  No results found for: STOOLCX  No components found for: BODYFLD    Radiology Data:   Imaging Results (Last 24 Hours)     ** No results found for the last 24 hours. **          I have reviewed the patient's current medications.     Assessment/Plan     Active Hospital Problems    Diagnosis   • **Non-intractable vomiting     Added automatically from request for surgery [id number]     • Thrombocytopenia (CMS/HCC)   • CLL (chronic lymphocytic leukemia) (CMS/HCC)     Likely based on flow cytometry     • Bacteremia due to Streptococcus pneumoniae   • Sepsis due to pneumonia (CMS/HCC)   • COPD with acute exacerbation (CMS/HCC)   • IBRAHIMA (acute kidney injury) (CMS/HCC)   • Acute respiratory failure with hypoxia (CMS/HCC)   • Pneumonia of left upper lobe due to Streptococcus pneumoniae (CMS/HCC)     Added automatically from request for surgery [id number]     • Diarrhea     Added automatically from request for surgery [id number]     • Mediastinal adenopathy     Added automatically from request for surgery [id number]     • Hypertension   • Cirrhosis of liver (CMS/HCC)       Plan:    Wean O2, weaned to baseline of room air, maintain SaO2 >88%  Levaquin day 13/14  Symbicort, Duonebs  Pulmonology consultation appreciated  Outpatient evaluation of SLL/CLL, Follow-up 4 weeks with heme/onc  Heme/Onc consultation appreciated  S/P EGD  S/P colonoscopy, biopsies pending  Antiemetics PRN  GI consultation appreciated  IBRAHIMA resolved  Thrombocytopenia is suspected secondary to cirrhosis with splenomegaly and consumption from infection.  Cannot exclude HIT so heparin will be discontinued.  Check HIT antibodies. Remains pending.   BP control: Cozaar  PT/OT  VTE PPx: SCDs  Discharge planning: rehab to home, precert  pending        The patient was evaluated during the global COVID-19 pandemic, and the diagnosis was suspected/considered upon their initial presentation.  Evaluation, treatment, and testing were consistent with current guidelines for patients who present with complaints or symptoms that may be related to COVID-19.        This document has been electronically signed by ANA Edmonds on September 7, 2020 12:10

## 2020-09-07 NOTE — THERAPY TREATMENT NOTE
Acute Care - Physical Therapy Treatment Note  [facility]     Patient Name: Wong Patrick  : 1955  MRN: [id number]  Today's Date: 2020  Onset of Illness/Injury or Date of Surgery: 20     Referring Physician: MD Jennifer    Admit Date: 2020    Visit Dx:    ICD-10-CM ICD-9-CM   1. Pneumonia of both lungs due to infectious organism, unspecified part of lung J18.9 483.8   2. Fever, unspecified fever cause R50.9 780.60   3. Impaired mobility and ADLs Z74.09 V49.89    Z78.9    4. Impaired functional mobility, balance, gait, and endurance Z74.09 V49.89   5. Pneumonia of left upper lobe due to Streptococcus pneumoniae (CMS/HCC) J13 481   6. Mediastinal adenopathy R59.0 785.6   7. Non-intractable vomiting with nausea, unspecified vomiting type R11.2 787.01   8. Diarrhea, unspecified type R19.7 787.91     Patient Active Problem List   Diagnosis   • History of colon polyps   • Nausea and vomiting   • Right lower lobe pneumonia   • Acquired thrombocytopenia (CMS/HCC)   • Chronic hepatitis C (CMS/HCC)   • Hypertension   • Cirrhosis of liver (CMS/HCC)   • Mediastinal adenopathy   • Sepsis due to pneumonia (CMS/HCC)   • COPD with acute exacerbation (CMS/HCC)   • IBRAHIMA (acute kidney injury) (CMS/HCC)   • Acute respiratory failure with hypoxia (CMS/HCC)   • Pneumonia of left upper lobe due to Streptococcus pneumoniae (CMS/HCC)   • Non-intractable vomiting   • CLL (chronic lymphocytic leukemia) (CMS/HCC)   • Bacteremia due to Streptococcus pneumoniae   • Diarrhea   • Thrombocytopenia (CMS/HCC)       Therapy Treatment    Rehabilitation Treatment Summary     Row Name 20 1359             Treatment Time/Intention    Discipline  physical therapy assistant  -LN      Document Type  therapy note (daily note)  -LN      Subjective Information  complains of;pain  -LN      Mode of Treatment  individual therapy;physical therapy  -LN      Therapy Frequency (PT Clinical Impression)  other (see comments) 6-11x/week   -LN      Therapy Frequency (OT Eval)  -- 5-7 days per week   -LN      Patient Effort  adequate  -LN      Existing Precautions/Restrictions  fall;oxygen therapy device and L/min  -LN      Recorded by [LN] Suni Vasquez, PTA 09/07/20 1536      Row Name 09/07/20 1359             Vital Signs    Pre Systolic BP Rehab  117  -LN      Pre Treatment Diastolic BP  58  -LN      Post Systolic BP Rehab  105  -LN      Post Treatment Diastolic BP  58  -LN      Pretreatment Heart Rate (beats/min)  104  -LN      Intratreatment Heart Rate (beats/min)  107  -LN      Posttreatment Heart Rate (beats/min)  111  -LN      Pre SpO2 (%)  94  -LN      O2 Delivery Pre Treatment  room air  -LN      Intra SpO2 (%)  92  -LN      Post SpO2 (%)  94  -LN      Pre Patient Position  Side Lying  -LN      Intra Patient Position  Standing  -LN      Post Patient Position  Supine  -LN      Recorded by [LN] Suni Vasquez, PTA 09/07/20 1536      Row Name 09/07/20 1359             Cognitive Assessment/Intervention- PT/OT    Orientation Status (Cognition)  oriented x 4  -LN      Follows Commands (Cognition)  WFL likely limited by hearing  -LN      Recorded by [LN] Suni Vasquez, PTA 09/07/20 1536      Row Name 09/07/20 1359             Safety Issues, Functional Mobility    Impairments Affecting Function (Mobility)  balance;endurance/activity tolerance;strength  -LN      Recorded by [LN] Suni Vasquez, PTA 09/07/20 1536      Row Name 09/07/20 1359             Bed Mobility Assessment/Treatment    Bed Mobility Assessment/Treatment  supine-sit;sit-supine  -LN      Supine-Sit [place] (Bed Mobility)  conditional independence  -LN      Sit-Supine [place] (Bed Mobility)  conditional independence  -LN      Assistive Device (Bed Mobility)  head of bed elevated;bed rails  -LN      Recorded by [LN] Suni Vasquez, PTA 09/07/20 1536      Row Name 09/07/20 1359             Functional Mobility    Functional Mobility- Ind. Level  contact guard assist  -LN       Functional Mobility- Device  rolling walker  -LN      Recorded by [LN] Suni Vasquez, PTA 09/07/20 1536      Row Name 09/07/20 1359             Transfer Assessment/Treatment    Transfer Assessment/Treatment  sit-stand transfer;stand-sit transfer  -LN      Recorded by [LN] Suni Vasquez, PTA 09/07/20 1536      Row Name 09/07/20 1359             Bed-Chair Transfer    Bed-Chair [place] (Transfers)  not tested  -LN      Assistive Device (Bed-Chair Transfers)  walker, front-wheeled  -LN      Recorded by [LN] Suni Vasquez, PTA 09/07/20 1536      Row Name 09/07/20 1359             Sit-Stand Transfer    Sit-Stand [place] (Transfers)  contact guard  -LN      Assistive Device (Sit-Stand Transfers)  walker, front-wheeled  -LN      Recorded by [LN] Suni Vasquez, PTA 09/07/20 1536      Row Name 09/07/20 1359             Stand-Sit Transfer    Stand-Sit [place] (Transfers)  contact guard  -LN      Assistive Device (Stand-Sit Transfers)  walker, front-wheeled  -LN      Recorded by [LN] Suni Vasquez, PTA 09/07/20 1536      Row Name 09/07/20 1359             Gait/Stairs Assessment/Training    Gait/Stairs Assessment/Training  gait/ambulation assistive device  -LN      [place] Level (Gait)  supervision  -LN      Assistive Device (Gait)  walker, front-wheeled  -LN      Distance in Feet (Gait)  165x2  -LN      Pattern (Gait)  step-through  -LN      Recorded by [LN] Suni Vasquez, PTA 09/07/20 1536      Row Name 09/07/20 1359             Lower Extremity Seated Therapeutic Exercise    Performed, Seated Lower Extremity (Therapeutic Exercise)  hip flexion/extension;ankle dorsiflexion/plantarflexion;LAQ (long arc quad), knee extension  -LN      Exercise Type, Seated Lower Extremity (Therapeutic Exercise)  AROM (active range of motion)  -LN      Sets/Reps Detail, Seated Lower Extremity (Therapeutic Exercise)  1/15  -LN      Comment, Seated Lower Extremity (Therapeutic Exercise)  needs vc's to attend to task  -LN       Recorded by [LN] Suni Vasquez, PTA 09/07/20 1536      Row Name 09/07/20 1359             Positioning and Restraints    Post Treatment Position  bed  -LN      In Bed  supine;call light within reach;encouraged to call for assist;exit alarm on  -LN      Recorded by [LN] Suni Vasquez, PTA 09/07/20 1536      Row Name 09/07/20 1359             Pain Scale: Numbers Pre/Post-Treatment    Pain Scale: Numbers, Pretreatment  -- alot  -LN      Pain Scale: Numbers, Post-Treatment  -- a lot  -LN      Pain Location  abdomen  -LN      Pain Intervention(s)  -- nsg made aware  -LN      Recorded by [LN] Suni Vasquez, [facility] 09/07/20 1536      Row Name 09/07/20 1359             Sensory Assessment/Intervention    Sensory General Assessment  no sensation deficits identified BLE light touch assessed  -LN      Recorded by [LN] Suni Vasquez, PTA 09/07/20 1536      Row Name 09/07/20 1359             Respiratory WDL    Respiratory WDL  --  -LN      Rhythm/Pattern, Respiratory  --  -LN      Expansion/Accessory Muscles/Retractions  --  -LN      Mucous Membranes  --  -LN      Cough Frequency  --  -LN      Cough Type  --  -LN      Recorded by [LN] Suni Vasquez, PTA 09/07/20 1536      Row Name 09/07/20 1359             Breath Sounds    Breath Sounds  --  -LN      All Lung Fields Breath Sounds  --  -LN      KEVEN Breath Sounds  --  -LN      LLL Breath Sounds  --  -LN      RUL Breath Sounds  --  -LN      RLL Breath Sounds  --  -LN      Recorded by [LN] Suni Vasquez, [facility] 09/07/20 1536      Row Name 09/07/20 1359             Skin WDL    Skin WDL  --  -LN      Skin Color/Characteristics  --  -LN      Skin Temperature  --  -LN      Skin Moisture  --  -LN      Skin Elasticity  --  -LN      Skin Integrity  --  -LN      Recorded by [LN] Suni Vasquez, [facility] 09/07/20 1536      Row Name 09/07/20 1359             Wound 08/26/20 1322 Bilateral gluteal MASD (Moisture associated skin damage)    Wound - Properties Group Date first assessed: 08/26/20 [AK] Time first  assessed: 1322 [AK] Present on Hospital Admission: Y [AK] Side: Bilateral [AK] Location: gluteal [AK] Primary Wound Type: MASD [AK2], with open areas  Recorded by:  [AK] Tammy Beverly RN 08/26/20 1347 [AK2] Tammy Beverly RN 08/26/20 1348    Dressing Appearance  --  -LN      Closure  --  -LN      Base  --  -LN      Periwound  --  -LN      Periwound Temperature  --  -LN      Periwound Skin Turgor  --  -LN      Edges  --  -LN      Drainage Amount  --  -LN      Recorded by [LN] Suni Vasquez, PTA 09/07/20 1536      Row Name 09/07/20 1359             Coping    Observed Emotional State  accepting;calm;cooperative  -LN      Verbalized Emotional State  acceptance  -LN      Recorded by [LN] Suni Vasquez, PTA 09/07/20 1536      Row Name 09/07/20 1359             Plan of Care Review    Plan of Care Reviewed With  patient  -LN      Recorded by [LN] Suni Vasquez, PTA 09/07/20 1536      Row Name 09/07/20 1359             Outcome Summary/Treatment Plan (OT)    Anticipated Discharge Disposition (OT)  anticipate therapy at next level of care  -LN      Recorded by [LN] Suni Vasquez, PTA 09/07/20 1536      Row Name 09/07/20 1359             Outcome Summary/Treatment Plan (PT)    Anticipated Discharge Disposition (PT)  home with home health;anticipate therapy at next level of care pending progress in acute care  -LN      Recorded by [LN] Suni Vasquez, PTA 09/07/20 1536        User Key  (r) = Recorded By, (t) = Taken By, (c) = Cosigned By    Initials Name Effective Dates Discipline    AK Tammy Beverly RN 10/17/16 -  Nurse    LN Suni Vasquez, PTA 03/07/18 -  PT          Wound 08/26/20 1322 Bilateral gluteal MASD (Moisture associated skin damage) (Active)   Dressing Appearance open to air 9/7/2020  8:45 AM   Closure Open to air 9/7/2020  8:45 AM   Base blanchable 9/7/2020  8:45 AM   Drainage Amount none 9/7/2020  8:45 AM   Care, Wound barrier applied 9/7/2020  8:45 AM       Rehab Goal Summary     Row Name 09/07/20 0591              Physical Therapy Goals    Bed Mobility Goal Selection (PT)  bed mobility, PT goal 1  -LN      Transfer Goal Selection (PT)  transfer, PT goal 1  -LN      Gait Training Goal Selection (PT)  gait training, PT goal 1  -LN         Bed Mobility Goal 1 (PT)    Activity/Assistive Device (Bed Mobility Goal 1, PT)  supine to sit;sit to supine  -LN      [place] Level/Cues Needed (Bed Mobility Goal 1, PT)  conditional independence  -LN      Time Frame (Bed Mobility Goal 1, PT)  2 days  -LN      Progress/Outcomes (Bed Mobility Goal 1, PT)  goal met  -LN         Transfer Goal 1 (PT)    Activity/Assistive Device (Transfer Goal 1, PT)  sit-to-stand/stand-to-sit;bed-to-chair/chair-to-bed LRAD PRN  -LN      [place] Level/Cues Needed (Transfer Goal 1, PT)  conditional independence  -LN      Time Frame (Transfer Goal 1, PT)  3 days  -LN      Progress/Outcome (Transfer Goal 1, PT)  goal not met  -LN         Gait Training Goal 1 (PT)    Activity/Assistive Device (Gait Training Goal 1, PT)  gait (walking locomotion);assistive device use;decrease fall risk;improve balance and speed;increase endurance/gait distance LRAD PRN  -LN      [place] Level (Gait Training Goal 1, PT)  conditional independence  -LN      Distance (Gait Goal 1, PT)  100ft or more  -LN      Time Frame (Gait Training Goal 1, PT)  by discharge  -LN      Progress/Outcome (Gait Training Goal 1, PT)  goal not met  -LN        User Key  (r) = Recorded By, (t) = Taken By, (c) = Cosigned By    Initials Name Provider Type Discipline    Suni King, PTA Physical Therapy Assistant PT          Physical Therapy Education                 Title: PT OT SLP Therapies (In Progress)     Topic: Physical Therapy (In Progress)     Point: Mobility training (Done)     Description:   Instruct learner(s) on safety and technique for assisting patient out of bed, chair or wheelchair.  Instruct in the proper use of assistive devices, such as walker, crutches, cane or brace.               Patient Friendly Description:   It's important to get you on your feet again, but we need to do so in a way that is safe for you. Falling has serious consequences, and your personal safety is the most important thing of all.        When it's time to get out of bed, one of us or a family member will sit next to you on the bed to give you support.     If your doctor or nurse tells you to use a walker, crutches, a cane, or a brace, be sure you use it every time you get out of bed, even if you think you don't need it.    Learning Progress Summary           Patient Acceptance, E,TB, VU,NR by LN at 9/7/2020 1537    Acceptance, E,TB, VU,NR by REJI at 9/6/2020 1506    Acceptance, E, VU by RYLIE at 8/28/2020 1603    Comment:  Role of PT, POC, use of gait belt, positioning of AD and hands with t/f                   Point: Home exercise program (Done)     Description:   Instruct learner(s) on appropriate technique for monitoring, assisting and/or progressing patient with therapeutic exercises and activities.              Learning Progress Summary           Patient Acceptance, E,TB, VU,NR by LN at 9/7/2020 1537                   Point: Body mechanics (Not Started)     Description:   Instruct learner(s) on proper positioning and spine alignment for patient and/or caregiver during mobility tasks and/or exercises.              Learner Progress:   Not documented in this visit.          Point: Precautions (Done)     Description:   Instruct learner(s) on prescribed precautions during mobility and gait tasks              Learning Progress Summary           Patient Acceptance, E,TB, VU,NR by LN at 9/7/2020 1537    Acceptance, E,TB, VU,NR by REJI at 9/6/2020 1506    Acceptance, E, VU by RYLIE at 8/28/2020 1603    Comment:  Role of PT, POC, use of gait belt, positioning of AD and hands with t/f                               User Key     Initials Effective Dates Name Provider Type Discipline     03/07/18 -  Suni Vasquez PTA  Physical Therapy Assistant PT    KW 08/09/20 -  Paulette Dean, CHELSI Physical Therapist PT                PT Recommendation and Plan  Anticipated Discharge Disposition (PT): home with home health, anticipate therapy at next level of care(pending progress in acute care)  Therapy Frequency (PT Clinical Impression): other (see comments)(6-11x/week)  Outcome Summary/Treatment Plan (PT)  Anticipated Discharge Disposition (PT): home with home health, anticipate therapy at next level of care(pending progress in acute care)  Plan of Care Reviewed With: patient  Outcome Summary: sup-sit-sup cond ind,sit-stand-sit cga of 1,amb 165'x2 with rw and sba of 1,15 reps seated ex-1 new goal met  Outcome Measures     Row Name 09/07/20 1359 09/06/20 1410          How much help from another person do you currently need...    Turning from your back to your side while in flat bed without using bedrails?  4  -LN  3  -LN     Moving from lying on back to sitting on the side of a flat bed without bedrails?  4  -LN  3  -LN     Moving to and from a bed to a chair (including a wheelchair)?  3  -LN  3  -LN     Standing up from a chair using your arms (e.g., wheelchair, bedside chair)?  3  -LN  3  -LN     Climbing 3-5 steps with a railing?  3  -LN  2  -LN     To walk in hospital room?  3  -LN  3  -LN     AM-PAC 6 Clicks Score (PT)  20  -LN  17  -LN        Functional Assessment    Outcome Measure Options  AM-PAC 6 Clicks Basic Mobility (PT)  -LN  AM-PAC 6 Clicks Basic Mobility (PT)  -LN       User Key  (r) = Recorded By, (t) = Taken By, (c) = Cosigned By    Initials Name Provider Type    LN Suni Vasquez PTA Physical Therapy Assistant         Time Calculation:   PT Charges     Row Name 09/07/20 1538             Time Calculation    Start Time  1359  -LN      Stop Time  1430  -LN      Time Calculation (min)  31 min  -LN      PT Received On  09/07/20  -LN         Time Calculation- PT    Total Timed Code Minutes- PT  31 minute(s)  -LN        User Key   (r) = Recorded By, (t) = Taken By, (c) = Cosigned By    Initials Name Provider Type    Suni King PTA Physical Therapy Assistant        Therapy Charges for Today     Code Description Service Date Service Provider Modifiers Qty    18777292519 HC GAIT TRAINING EA 15 MIN 9/6/2020 Suni Vasquez, ADONAY GP 1    70312772163 HC GAIT TRAINING EA 15 MIN 9/7/2020 Suni Vasquez, PTA GP 1    80270198545 HC PT THER PROC EA 15 MIN 9/7/2020 Suni Vasquez PTA GP 1          PT G-Codes  Outcome Measure Options: AM-PAC 6 Clicks Basic Mobility (PT)  AM-PAC 6 Clicks Score (PT): 20  AM-PAC 6 Clicks Score (OT): 19    Suni Vasquez PTA  9/7/2020

## 2020-09-07 NOTE — THERAPY TREATMENT NOTE
Acute Care - Occupational Therapy Treatment Note  [facility]     Patient Name: Wong Patrick  : 1955  MRN: [id number]  Today's Date: 2020  Onset of Illness/Injury or Date of Surgery: 20  Date of Referral to OT: 20  Referring Physician: MD Jennifer    Admit Date: 2020       ICD-10-CM ICD-9-CM   1. Pneumonia of both lungs due to infectious organism, unspecified part of lung J18.9 483.8   2. Fever, unspecified fever cause R50.9 780.60   3. Impaired mobility and ADLs Z74.09 V49.89    Z78.9    4. Impaired functional mobility, balance, gait, and endurance Z74.09 V49.89   5. Pneumonia of left upper lobe due to Streptococcus pneumoniae (CMS/HCC) J13 481   6. Mediastinal adenopathy R59.0 785.6   7. Non-intractable vomiting with nausea, unspecified vomiting type R11.2 787.01   8. Diarrhea, unspecified type R19.7 787.91     Patient Active Problem List   Diagnosis   • History of colon polyps   • Nausea and vomiting   • Right lower lobe pneumonia   • Acquired thrombocytopenia (CMS/HCC)   • Chronic hepatitis C (CMS/HCC)   • Hypertension   • Cirrhosis of liver (CMS/HCC)   • Mediastinal adenopathy   • Sepsis due to pneumonia (CMS/HCC)   • COPD with acute exacerbation (CMS/HCC)   • IBRAHIMA (acute kidney injury) (CMS/HCC)   • Acute respiratory failure with hypoxia (CMS/HCC)   • Pneumonia of left upper lobe due to Streptococcus pneumoniae (CMS/HCC)   • Non-intractable vomiting   • CLL (chronic lymphocytic leukemia) (CMS/HCC)   • Bacteremia due to Streptococcus pneumoniae   • Diarrhea   • Thrombocytopenia (CMS/HCC)     Past Medical History:   Diagnosis Date   • Abnormal weight loss    • Acquired thrombocytopenia (CMS/HCC)    • Adenomatous polyp of colon    • Anxiety    • Biliary calculus     denies N/V, abd pain   • Chronic hepatitis C (CMS/HCC)    • Cirrhosis of liver (CMS/HCC)    • Colon polyp    • COPD (chronic obstructive pulmonary disease) (CMS/HCC)    • Elevated levels of transaminase & lactic acid  dehydrogenase    • Generalized abdominal pain     known gallstones   • GERD (gastroesophageal reflux disease)    • Hypertension    • IBS (irritable bowel syndrome)    • Impacted cerumen    • Kidney disease     US suggest renal dz, elevated alpha 2 macroglobulins   • Nausea and vomiting    • Patient noncompliance, general    • Rectal hemorrhage    • Tracheal mass    • Villous adenoma of colon    • Wheezing      Past Surgical History:   Procedure Laterality Date   • BRONCHOSCOPY N/A 7/13/2020    Procedure: BRONCHOSCOPY WITH ENDOBRONCHIAL ULTRASOUND;  Surgeon: Karthik Sewell MD;  Location: [facility] OR;  Service: Pulmonary;  Laterality: N/A;  preop; tracheal mass; pneumonia  postop  PCP    • BRONCHOSCOPY N/A 9/1/2020    Procedure: BRONCHOSCOPY WITH ENDOBRONCHIAL ULTRASOUND and needle biopsy, with possible bronchoalveolar lavage, brush, and/or endobronchial biopsy;  Surgeon: Sandra Jacques MD;  Location: [facility] OR;  Service: Pulmonary;  Laterality: N/A;   • COLONOSCOPY  06/03/2013   • COLONOSCOPY N/A 8/22/2018    Procedure: COLONOSCOPY;  Surgeon: Charles Pham MD;  Location: [facility] ENDOSCOPY;  Service: Gastroenterology   • ENDOSCOPY N/A 7/9/2019    Procedure: ESOPHAGOGASTRODUODENOSCOPY;  Surgeon: Bi Ibarra MD;  Location: [facility] ENDOSCOPY;  Service: General   • ENDOSCOPY N/A 9/3/2020    Procedure: ESOPHAGOGASTRODUODENOSCOPY;  Surgeon: Yoni Simon MD;  Location: [facility] ENDOSCOPY;  Service: Gastroenterology;  Laterality: N/A;   • ENDOSCOPY AND COLONOSCOPY     • ESOPHAGOSCOPY / EGD      with tube   • UPPER GASTROINTESTINAL ENDOSCOPY  06/03/2013   • UPPER GASTROINTESTINAL ENDOSCOPY  07/09/2019   Non-skid socks and gait belt in place. Toileting offered. Call light and needs within reach. Pt advised to not get up alone and call the nurse for assistance.  Bed alarm on.     Therapy Treatment    Rehabilitation Treatment Summary     Row Name 09/07/20 1455 09/07/20 1359          Treatment  Time/Intention    Discipline  occupational therapy assistant  -SHANNON  physical therapy assistant  -LN     Document Type  therapy note (daily note)  -SHANNON  therapy note (daily note)  -LN     Subjective Information  no complaints  -SHANNON  complains of;pain  -LN     Mode of Treatment  individual therapy;occupational therapy  -SHANNON  individual therapy;physical therapy  -LN     Therapy Frequency (PT Clinical Impression)  --  other (see comments) 6-11x/week  -LN     Therapy Frequency (OT Eval)  other (see comments) 5-7x/wk  -SHANNON  -- 5-7 days per week   -LN     Patient Effort  good  -SHANNON  adequate  -LN     Existing Precautions/Restrictions  fall;oxygen therapy device and L/min  -SHANNON  fall;oxygen therapy device and L/min  -LN     Recorded by [SHANNON] Altagracia Sotelo, SILVERIO 09/07/20 1549 [LN] Suni Vasquez, PTA 09/07/20 1536     Row Name 09/07/20 1455 09/07/20 1359          Vital Signs    Pre Systolic BP Rehab  --  117  -LN     Pre Treatment Diastolic BP  --  58  -LN     Post Systolic BP Rehab  112  -SHANNON  105  -LN     Post Treatment Diastolic BP  59  -SHANNON  58  -LN     Pretreatment Heart Rate (beats/min)  111  -SHANNON  104  -LN     Intratreatment Heart Rate (beats/min)  123  -SHANNON  107  -LN     Posttreatment Heart Rate (beats/min)  119  -SHANNON  111  -LN     Pre SpO2 (%)  94  -SHANNON  94  -LN     O2 Delivery Pre Treatment  room air  -SHANNON  room air  -LN     Intra SpO2 (%)  83  -SHANNON  92  -LN     O2 Delivery Intra Treatment  room air  -SHANNON  --     Post SpO2 (%)  95  -SHANNON  94  -LN     O2 Delivery Post Treatment  room air  -SHANNON  --     Pre Patient Position  Supine  -SHANNON  Side Lying  -LN     Intra Patient Position  Standing  -SHANNON  Standing  -LN     Post Patient Position  Supine  -SHANNON  Supine  -LN     Recorded by [SHANNON] Altagracia Sotelo SILVERIO 09/07/20 1549 [LN] Suni Vasquez, PTA 09/07/20 1536     Row Name 09/07/20 1455 09/07/20 1359          Cognitive Assessment/Intervention- PT/OT    Orientation Status (Cognition)  oriented x 4  -SHANNON  oriented x 4  -LN     Follows Commands  (Cognition)  WFL  -SHANNON  WFL likely limited by hearing  -LN     Recorded by [SHANNON] Altagracia Sotelo, SILVERIO 09/07/20 1549 [LN] Suni Vasquez, PTA 09/07/20 1536     Row Name 09/07/20 1455 09/07/20 1359          Safety Issues, Functional Mobility    Impairments Affecting Function (Mobility)  balance;endurance/activity tolerance;strength  -SHANNON  balance;endurance/activity tolerance;strength  -LN     Recorded by [SHANNON] Altagracia Sotelo, SILVERIO 09/07/20 1549 [LN] Suni Vasquez, PTA 09/07/20 1536     Row Name 09/07/20 1455 09/07/20 1359          Bed Mobility Assessment/Treatment    Bed Mobility Assessment/Treatment  supine-sit-supine  -SHANNON  supine-sit;sit-supine  -LN     Supine-Sit [place] (Bed Mobility)  conditional independence  -SHANNON  conditional independence  -LN     Sit-Supine [place] (Bed Mobility)  conditional independence  -SHANNON  conditional independence  -LN     Supine-Sit-Supine [place] (Bed Mobility)  conditional independence  -SHANNON  --     Assistive Device (Bed Mobility)  head of bed elevated;bed rails  -SHANNON  head of bed elevated;bed rails  -LN     Recorded by [SHANNON] Altagracia Sotelo, [facility] 09/07/20 1549 [LN] Suni Vasquez, PTA 09/07/20 1536     Row Name 09/07/20 1455 09/07/20 1359          Functional Mobility    Functional Mobility- Ind. Level  contact guard assist  -SHANNON  contact guard assist  -LN     Functional Mobility- Device  rolling walker  -SHANNON  rolling walker  -LN     Functional Mobility-Distance (Feet)  400  -SHANNON  --     Functional Mobility- Comment  Pt requried 1 RB presenting with SOA  -SHANNON  --     Recorded by [SHANNON] Altagracia Sotelo, [facility] 09/07/20 1549 [LN] Suni Vasquez, PTA 09/07/20 1536     Row Name 09/07/20 1455 09/07/20 1359          Transfer Assessment/Treatment    Transfer Assessment/Treatment  sit-stand transfer;stand-sit transfer  -SHANNON  sit-stand transfer;stand-sit transfer  -LN     Recorded by [SHANNON] Altagracia Sotelo, SILVERIO 09/07/20 1549 [LN] Suni Vasquez, PTA 09/07/20 1536     Row Name 09/07/20 1455 09/07/20 1359           Bed-Chair Transfer    Bed-Chair Mark (Transfers)  supervision;contact guard;verbal cues  -SHANNON  not tested  -LN     Assistive Device (Bed-Chair Transfers)  walker, front-wheeled  -SHANNON  walker, front-wheeled  -LN     Recorded by [SHANNON] Altagracia Sotelo, SILVERIO 09/07/20 1549 [LN] Suni Vasquez, PTA 09/07/20 1536     Row Name 09/07/20 1455 09/07/20 1359          Sit-Stand Transfer    Sit-Stand Mark (Transfers)  supervision;verbal cues  -SHANNON  contact guard  -LN     Assistive Device (Sit-Stand Transfers)  walker, 4-wheeled  -SHANNON  walker, front-wheeled  -LN     Recorded by [SHANNON] Altagracia Sotelo SILVERIO 09/07/20 1549 [LN] Suni Vasquez, PTA 09/07/20 1536     Row Name 09/07/20 1455 09/07/20 1359          Stand-Sit Transfer    Stand-Sit Mark (Transfers)  contact guard;verbal cues  -SHANNON  contact guard  -LN     Assistive Device (Stand-Sit Transfers)  walker, front-wheeled  -SHANNON  walker, front-wheeled  -LN     Recorded by [SHANNON] Altagracia Sotelo, SILVERIO 09/07/20 1549 [LN] Suni Vasquez, PTA 09/07/20 1536     Row Name 09/07/20 1359             Gait/Stairs Assessment/Training    Gait/Stairs Assessment/Training  gait/ambulation assistive device  -LN      Mark Level (Gait)  supervision  -LN      Assistive Device (Gait)  walker, front-wheeled  -LN      Distance in Feet (Gait)  165x2  -LN      Pattern (Gait)  step-through  -LN      Recorded by [LN] Suni Vasquez, PTA 09/07/20 1536      Row Name 09/07/20 1359             Lower Extremity Seated Therapeutic Exercise    Performed, Seated Lower Extremity (Therapeutic Exercise)  hip flexion/extension;ankle dorsiflexion/plantarflexion;LAQ (long arc quad), knee extension  -LN      Exercise Type, Seated Lower Extremity (Therapeutic Exercise)  AROM (active range of motion)  -LN      Sets/Reps Detail, Seated Lower Extremity (Therapeutic Exercise)  1/15  -LN      Comment, Seated Lower Extremity (Therapeutic Exercise)  needs vc's to attend to task  -LN      Recorded by [LN] Suni Vasquez  S, [facility] 09/07/20 1536      Row Name 09/07/20 145 09/07/20 1359          Positioning and Restraints    Pre-Treatment Position  in bed  -SHANNON  --     Post Treatment Position  bed  -SHANNON  bed  -LN     In Bed  supine;call light within reach;encouraged to call for assist;exit alarm on;side rails up x2  -SHANNON  supine;call light within reach;encouraged to call for assist;exit alarm on  -LN     Recorded by [SHANNON] Altagracia Sotelo [facility] 09/07/20 1549 [LN] Suni Vasquez, [facility] 09/07/20 1536     Row Name 09/07/20 145 09/07/20 1359          Pain Scale: Numbers Pre/Post-Treatment    Pain Scale: Numbers, Pretreatment  0/10 - no pain  -SHANNON  -- alot  -LN     Pain Scale: Numbers, Post-Treatment  0/10 - no pain  -SHANNON  -- a lot  -LN     Pain Location  --  abdomen  -LN     Pain Intervention(s)  --  -- nsg made aware  -LN     Recorded by [SHANNON] Altagracia Sotelo [facility] 09/07/20 1549 [LN] Suni Vasquez, [facility] 09/07/20 1536     Row Name 09/07/20 1359             Sensory Assessment/Intervention    Sensory General Assessment  no sensation deficits identified BLE light touch assessed  -LN      Recorded by [LN] Suni Vasquez, [facility] 09/07/20 1536      Row Name 09/07/20 1359             Respiratory WDL    Respiratory WDL  --  -LN      Rhythm/Pattern, Respiratory  --  -LN      Expansion/Accessory Muscles/Retractions  --  -LN      Mucous Membranes  --  -LN      Cough Frequency  --  -LN      Cough Type  --  -LN      Recorded by [LN] Suni Vasquez, [facility] 09/07/20 1536      Row Name 09/07/20 1359             Breath Sounds    Breath Sounds  --  -LN      All Lung Fields Breath Sounds  --  -LN      KEVEN Breath Sounds  --  -LN      LLL Breath Sounds  --  -LN      RUL Breath Sounds  --  -LN      RLL Breath Sounds  --  -LN      Recorded by [LN] Suni Vasquez, [facility] 09/07/20 153      Row Name 09/07/20 1359             Skin WDL    Skin WDL  --  -LN      Skin Color/Characteristics  --  -LN      Skin Temperature  --  -LN      Skin Moisture  --  -LN      Skin Elasticity  --  -LN      Skin  Integrity  --  -LN      Recorded by [LN] Suni Vasquez, PTA 09/07/20 1536      Row Name 09/07/20 1359             Wound 08/26/20 1322 Bilateral gluteal MASD (Moisture associated skin damage)    Wound - Properties Group Date first assessed: 08/26/20 [AK] Time first assessed: 1322 [AK] Present on Hospital Admission: Y [AK] Side: Bilateral [AK] Location: gluteal [AK] Primary Wound Type: MASD [AK2], with open areas  Recorded by:  [AK] Tammy Beverly, RN 08/26/20 1347 [AK2] Tammy Beverly RN 08/26/20 1348    Dressing Appearance  --  -LN      Closure  --  -LN      Base  --  -LN      Periwound  --  -LN      Periwound Temperature  --  -LN      Periwound Skin Turgor  --  -LN      Edges  --  -LN      Drainage Amount  --  -LN      Recorded by [LN] Suni Vasquez, PTA 09/07/20 1536      Row Name 09/07/20 1455 09/07/20 1359          Coping    Observed Emotional State  calm;cooperative  -SHANNON  accepting;calm;cooperative  -LN     Verbalized Emotional State  acceptance  -SHANNON  acceptance  -LN     Recorded by [SHANNON] Altagracia Sotelo OTA 09/07/20 1549 [LN] Suni Vasquez, PTA 09/07/20 1536     Row Name 09/07/20 1455 09/07/20 1359          Plan of Care Review    Plan of Care Reviewed With  patient  -SHANNON  patient  -LN     Recorded by [SHANNON] Altagracia Sotelo OTA 09/07/20 1549 [LN] Suni Vasquez, PTA 09/07/20 1536     Row Name 09/07/20 1455 09/07/20 1359          Outcome Summary/Treatment Plan (OT)    Daily Summary of Progress (OT)  progress toward functional goals is good  -SHANNON  --     Anticipated Discharge Disposition (OT)  anticipate therapy at next level of care  -SHANNON  anticipate therapy at next level of care  -LN     Recorded by [SHANNON] Altagracia Sotelo OTA 09/07/20 1549 [LN] Suni Vasquez, PTA 09/07/20 1536     Row Name 09/07/20 1359             Outcome Summary/Treatment Plan (PT)    Anticipated Discharge Disposition (PT)  home with home health;anticipate therapy at next level of care pending progress in acute care  -LN      Recorded by [LN]  Suni Vasquez, PTA 09/07/20 1536        User Key  (r) = Recorded By, (t) = Taken By, (c) = Cosigned By    Initials Name Effective Dates Discipline    Tammy Hines RN 10/17/16 -  Nurse    REJI Suni Vasquez, PTA 03/07/18 -  PT    SHANNON Deepak Altagracia, SILVERIO 11/05/19 -  OT        Wound 08/26/20 1322 Bilateral gluteal MASD (Moisture associated skin damage) (Active)   Dressing Appearance open to air 9/7/2020  8:45 AM   Closure Open to air 9/7/2020  8:45 AM   Base blanchable 9/7/2020  8:45 AM   Drainage Amount none 9/7/2020  8:45 AM   Care, Wound barrier applied 9/7/2020  8:45 AM     Rehab Goal Summary     Row Name 09/07/20 1455 09/07/20 1359          Physical Therapy Goals    Bed Mobility Goal Selection (PT)  --  bed mobility, PT goal 1  -LN     Transfer Goal Selection (PT)  --  transfer, PT goal 1  -LN     Gait Training Goal Selection (PT)  --  gait training, PT goal 1  -LN        Bed Mobility Goal 1 (PT)    Activity/Assistive Device (Bed Mobility Goal 1, PT)  --  supine to sit;sit to supine  -LN     [place] Level/Cues Needed (Bed Mobility Goal 1, PT)  --  conditional independence  -LN     Time Frame (Bed Mobility Goal 1, PT)  --  2 days  -LN     Progress/Outcomes (Bed Mobility Goal 1, PT)  --  goal met  -LN        Transfer Goal 1 (PT)    Activity/Assistive Device (Transfer Goal 1, PT)  --  sit-to-stand/stand-to-sit;bed-to-chair/chair-to-bed LRAD PRN  -LN     [place] Level/Cues Needed (Transfer Goal 1, PT)  --  conditional independence  -LN     Time Frame (Transfer Goal 1, PT)  --  3 days  -LN     Progress/Outcome (Transfer Goal 1, PT)  --  goal not met  -LN        Gait Training Goal 1 (PT)    Activity/Assistive Device (Gait Training Goal 1, PT)  --  gait (walking locomotion);assistive device use;decrease fall risk;improve balance and speed;increase endurance/gait distance LRAD PRN  -LN     [place] Level (Gait Training Goal 1, PT)  --  conditional independence  -LN     Distance (Gait Goal 1, PT)  --   100ft or more  -LN     Time Frame (Gait Training Goal 1, PT)  --  by discharge  -LN     Progress/Outcome (Gait Training Goal 1, PT)  --  goal not met  -LN        Occupational Therapy Goals    Transfer Goal Selection (OT)  transfer, OT goal 1  -SHANNON  --     Bathing Goal Selection (OT)  bathing, OT goal 1  -SHANNON  --     Dressing Goal Selection (OT)  dressing, OT goal 1  -SHANNON  --     Toileting Goal Selection (OT)  toileting, OT goal 1  -SHANNON  --        Transfer Goal 1 (OT)    Activity/Assistive Device (Transfer Goal 1, OT)  bed-to-chair/chair-to-bed;sit-to-stand/stand-to-sit;toilet  -SHANNON  --     [place] Level/Cues Needed (Transfer Goal 1, OT)  conditional independence  -SHANNON  --     Time Frame (Transfer Goal 1, OT)  long term goal (LTG);by discharge  -SHANONN  --     Progress/Outcome (Transfer Goal 1, OT)  goal not met  -SHANNON  --        Bathing Goal 1 (OT)    Activity/Assistive Device (Bathing Goal 1, OT)  bathing skills, all  -SHANNON  --     [place] Level/Cues Needed (Bathing Goal 1, OT)  supervision required;set-up required  -SHANNON  --     Time Frame (Bathing Goal 1, OT)  long term goal (LTG);by discharge  -SHANNON  --     Progress/Outcomes (Bathing Goal 1, OT)  goal met  -SHANNON  --        Dressing Goal 1 (OT)    Activity/Assistive Device (Dressing Goal 1, OT)  lower body dressing  -SHANNON  --     [place]/Cues Needed (Dressing Goal 1, OT)  supervision required;set-up required  -SHANNON  --     Time Frame (Dressing Goal 1, OT)  long term goal (LTG);by discharge  -SHANNON  --     Progress/Outcome (Dressing Goal 1, OT)  goal met  -SHANNON  --        Toileting Goal 1 (OT)    Activity/Device (Toileting Goal 1, OT)  toileting skills, all  -SHANNON  --     [place] Level/Cues Needed (Toileting Goal 1, OT)  conditional independence  -SHANNON  --     Time Frame (Toileting Goal 1, OT)  long term goal (LTG);by discharge  -SHANNON  --     Progress/Outcome (Toileting Goal 1, OT)  goal not met  -SHANNON  --       User Key  (r) = Recorded By, (t) = Taken By, (c) = Cosigned By     Initials Name Provider Type Discipline    LN Suni Vasquez, PTA Physical Therapy Assistant PT    Altagracia Pina OTA Occupational Therapy Assistant OT        Occupational Therapy Education                 Title: PT OT SLP Therapies (In Progress)     Topic: Occupational Therapy (In Progress)     Point: ADL training (In Progress)     Description:   Instruct learner(s) on proper safety adaptation and remediation techniques during self care or transfers.   Instruct in proper use of assistive devices.              Learning Progress Summary           Patient Acceptance, E, NR by RC at 9/4/2020 1219    Acceptance, E,TB,D, NR by CS at 8/31/2020 1140    Acceptance, E, NR by AS at 8/28/2020 1434    Comment:  role of OT, OT POC, ADL training, transfer training, fall precautions, safe use of RW                   Point: Home exercise program (In Progress)     Description:   Instruct learner(s) on appropriate technique for monitoring, assisting and/or progressing therapeutic exercises/activities.              Learning Progress Summary           Patient Acceptance, E,TB,D, NR by CS at 8/31/2020 1140                   Point: Precautions (In Progress)     Description:   Instruct learner(s) on prescribed precautions during self-care and functional transfers.              Learning Progress Summary           Patient Acceptance, E, NR by RC at 9/4/2020 1219    Acceptance, E,TB,D, NR by CS at 8/31/2020 1140    Acceptance, E, NR by AS at 8/28/2020 1434    Comment:  role of OT, OT POC, ADL training, transfer training, fall precautions, safe use of RW                   Point: Body mechanics (Done)     Description:   Instruct learner(s) on proper positioning and spine alignment during self-care, functional mobility activities and/or exercises.              Learning Progress Summary           Patient Acceptance, E, VU,DU,NR by SHANNON at 9/7/2020 1550    Comment:  Pt educated on OT and POC. Pt educated on proper body mechanics when performing  t/f's.    Acceptance, E, NR by  at 9/4/2020 1219    Acceptance, E,TB,D, NR by  at 8/31/2020 1140                               User Key     Initials Effective Dates Name Provider Type Discipline     03/07/18 -  Sarahy Garcia COTA/L Occupational Therapy Assistant OT    CS 03/07/18 -  Kamini Heard COTA/SAMANTHA Occupational Therapy Assistant OT    AS 07/05/20 -  Mindy Mark, OT Occupational Therapist OT    SHANNON 11/05/19 -  Altagracia Sotelo OTA Occupational Therapy Assistant OT                OT Recommendation and Plan  Outcome Summary/Treatment Plan (OT)  Daily Summary of Progress (OT): progress toward functional goals is good  Anticipated Discharge Disposition (OT): anticipate therapy at next level of care  Therapy Frequency (OT Eval): other (see comments)(5-7x/wk)  Daily Summary of Progress (OT): progress toward functional goals is good  Plan of Care Review  Plan of Care Reviewed With: patient  Plan of Care Reviewed With: patient  Outcome Summary: Pt agreed to OT with encouragement. Pt deferred ADL's and requested to perform fx'al mobility. Pt cond I sup<>sit<>sup and supervision sit<>stand. Pt performed ~400' of fx'al mobility with 1RB and SOA. Pt 02 83 with quick recovery to >90. Pt performed sit<>stand<>sit t/f requiring supervision for sit<>stand and CGA for stand<>sit. Pt left supine in bed and all needs in reach.  Outcome Measures     Row Name 09/07/20 1455 09/07/20 1359 09/06/20 1410       How much help from another person do you currently need...    Turning from your back to your side while in flat bed without using bedrails?  --  4  -LN  3  -LN    Moving from lying on back to sitting on the side of a flat bed without bedrails?  --  4  -LN  3  -LN    Moving to and from a bed to a chair (including a wheelchair)?  --  3  -LN  3  -LN    Standing up from a chair using your arms (e.g., wheelchair, bedside chair)?  --  3  -LN  3  -LN    Climbing 3-5 steps with a railing?  --  3  -LN  2  -LN    To walk  in hospital room?  --  3  -LN  3  -LN    AM-PAC 6 Clicks Score (PT)  --  20  -LN  17  -LN       How much help from another is currently needed...    Putting on and taking off regular lower body clothing?  3  -SHANNON  --  --    Bathing (including washing, rinsing, and drying)  3  -SHANNON  --  --    Toileting (which includes using toilet bed pan or urinal)  2  -SHANNON  --  --    Putting on and taking off regular upper body clothing  3  -SHANNON  --  --    Taking care of personal grooming (such as brushing teeth)  4  -SHANNON  --  --    Eating meals  4  -SHANNON  --  --    AM-PAC 6 Clicks Score (OT)  19  -SHANNON  --  --       Functional Assessment    Outcome Measure Options  --  AM-PAC 6 Clicks Basic Mobility (PT)  -LN  AM-PAC 6 Clicks Basic Mobility (PT)  -LN      User Key  (r) = Recorded By, (t) = Taken By, (c) = Cosigned By    Initials Name Provider Type    LN Suni Vasquez PTA Physical Therapy Assistant    Altagracia Pina OTA Occupational Therapy Assistant           Time Calculation:   Time Calculation- OT     Row Name 09/07/20 1556             Time Calculation- OT    OT Start Time  1455  -SHANNON      OT Stop Time  1520  -SHANNON      OT Time Calculation (min)  25 min  -SHANNON      Total Timed Code Minutes- OT  25 minute(s)  -SHANNON      OT Received On  09/07/20  -        User Key  (r) = Recorded By, (t) = Taken By, (c) = Cosigned By    Initials Name Provider Type    Altagracia Pina OTA Occupational Therapy Assistant        Therapy Charges for Today     Code Description Service Date Service Provider Modifiers Qty    [id number]  OT THERAPEUTIC ACT EA 15 MIN 9/7/2020 Altagracia Sotelo OTA GO 2               SILVERIO Arellano  9/7/2020

## 2020-09-07 NOTE — PLAN OF CARE
Problem: Patient Care Overview  Goal: Plan of Care Review  Outcome: Ongoing (interventions implemented as appropriate)  Flowsheets  Taken 9/6/2020 2230  Progress: improving  Outcome Summary: pt vs stable, no new events at this time will continue to monitor.  Taken 9/6/2020 1935  Plan of Care Reviewed With: patient     Problem: Patient Care Overview  Goal: Individualization and Mutuality  Outcome: Ongoing (interventions implemented as appropriate)     Problem: Patient Care Overview  Goal: Discharge Needs Assessment  Outcome: Ongoing (interventions implemented as appropriate)     Problem: Patient Care Overview  Goal: Interprofessional Rounds/Family Conf  Outcome: Ongoing (interventions implemented as appropriate)     Problem: Skin Injury Risk (Adult)  Goal: Identify Related Risk Factors and Signs and Symptoms  Outcome: Ongoing (interventions implemented as appropriate)  Flowsheets (Taken 9/5/2020 0033)  Related Risk Factors (Skin Injury Risk): mobility impaired;moisture     Problem: Skin Injury Risk (Adult)  Goal: Skin Health and Integrity  Outcome: Ongoing (interventions implemented as appropriate)  Flowsheets (Taken 9/6/2020 2230)  Skin Health and Integrity: making progress toward outcome     Problem: Diarrhea (Adult)  Goal: Improved/Reduced Symptoms  Outcome: Outcome(s) achieved  Flowsheets (Taken 9/6/2020 2230)  Improved/Reduced Symptoms: achieves outcome     Problem: Fall Risk (Adult)  Goal: Identify Related Risk Factors and Signs and Symptoms  Outcome: Ongoing (interventions implemented as appropriate)  Flowsheets (Taken 9/5/2020 0033)  Related Risk Factors (Fall Risk): fatigue/slow reaction;environment unfamiliar;slippery/uneven surfaces  Signs and Symptoms (Fall Risk): presence of risk factors     Problem: Fall Risk (Adult)  Goal: Absence of Fall  Outcome: Ongoing (interventions implemented as appropriate)  Flowsheets (Taken 9/6/2020 2230)  Absence of Fall: making progress toward outcome     Problem: Chronic  Obstructive Pulmonary Disease (Adult)  Goal: Signs and Symptoms of Listed Potential Problems Will be Absent, Minimized or Managed (Chronic Obstructive Pulmonary Disease)  Outcome: Outcome(s) achieved  Flowsheets (Taken 9/6/2020 2230)  Problems Present (COPD, Bronch/Emphy): none     Problem: Renal Failure/Kidney Injury, Acute (Adult)  Goal: Signs and Symptoms of Listed Potential Problems Will be Absent, Minimized or Managed (Renal Failure/Kidney Injury, Acute)  Outcome: Ongoing (interventions implemented as appropriate)  Flowsheets (Taken 9/4/2020 0453 by Sharee Connor, RN)  Problems Present (ARF/Kidney Injury): none     Problem: Anemia (Adult)  Goal: Identify Related Risk Factors and Signs and Symptoms  Outcome: Ongoing (interventions implemented as appropriate)     Problem: Anemia (Adult)  Goal: Symptom Improvement  Outcome: Ongoing (interventions implemented as appropriate)  Flowsheets (Taken 9/6/2020 2230)  Symptom Improvement: making progress toward outcome     Problem: Activity Intolerance (Adult)  Goal: Identify Related Risk Factors and Signs and Symptoms  Outcome: Ongoing (interventions implemented as appropriate)  Flowsheets (Taken 9/6/2020 0027)  Related Risk Factors (Activity Intolerance): generalized weakness     Problem: Activity Intolerance (Adult)  Goal: Activity Tolerance  Outcome: Ongoing (interventions implemented as appropriate)  Flowsheets (Taken 9/6/2020 2230)  Activity Tolerance: making progress toward outcome     Problem: Activity Intolerance (Adult)  Goal: Activity Tolerance  Outcome: Ongoing (interventions implemented as appropriate)  Flowsheets (Taken 9/6/2020 2230)  Activity Tolerance: making progress toward outcome     Problem: Activity Intolerance (Adult)  Goal: Effective Energy Conservation Techniques  Outcome: Ongoing (interventions implemented as appropriate)  Flowsheets (Taken 9/6/2020 2230)  Effective Energy Conservation Techniques: making progress toward outcome

## 2020-09-07 NOTE — PROGRESS NOTES
SUBJECTIVE:   9/6/2020  Chief Complaint:     Subjective      Patient feels some better today. Has nausea and denied emesis. Abdominal pain and dyspnea improving. Tolerating diet. hgb 8.1    History:  Past Medical History:   Diagnosis Date   • Abnormal weight loss    • Acquired thrombocytopenia (CMS/HCC)    • Adenomatous polyp of colon    • Anxiety    • Biliary calculus     denies N/V, abd pain   • Chronic hepatitis C (CMS/HCC)    • Cirrhosis of liver (CMS/HCC)    • Colon polyp    • COPD (chronic obstructive pulmonary disease) (CMS/HCC)    • Elevated levels of transaminase & lactic acid dehydrogenase    • Generalized abdominal pain     known gallstones   • GERD (gastroesophageal reflux disease)    • Hypertension    • IBS (irritable bowel syndrome)    • Impacted cerumen    • Kidney disease     US suggest renal dz, elevated alpha 2 macroglobulins   • Nausea and vomiting    • Patient noncompliance, general    • Rectal hemorrhage    • Tracheal mass    • Villous adenoma of colon    • Wheezing      Past Surgical History:   Procedure Laterality Date   • BRONCHOSCOPY N/A 7/13/2020    Procedure: BRONCHOSCOPY WITH ENDOBRONCHIAL ULTRASOUND;  Surgeon: Karthik Sewell MD;  Location: [facility] OR;  Service: Pulmonary;  Laterality: N/A;  preop; tracheal mass; pneumonia  postop  PCP    • BRONCHOSCOPY N/A 9/1/2020    Procedure: BRONCHOSCOPY WITH ENDOBRONCHIAL ULTRASOUND and needle biopsy, with possible bronchoalveolar lavage, brush, and/or endobronchial biopsy;  Surgeon: Sandra Jacques MD;  Location: [facility] OR;  Service: Pulmonary;  Laterality: N/A;   • COLONOSCOPY  06/03/2013   • COLONOSCOPY N/A 8/22/2018    Procedure: COLONOSCOPY;  Surgeon: Charles Pham MD;  Location: [facility] ENDOSCOPY;  Service: Gastroenterology   • ENDOSCOPY N/A 7/9/2019    Procedure: ESOPHAGOGASTRODUODENOSCOPY;  Surgeon: Bi Ibarra MD;  Location: [facility] ENDOSCOPY;  Service: General   • ENDOSCOPY N/A 9/3/2020    Procedure:  ESOPHAGOGASTRODUODENOSCOPY;  Surgeon: Yoni Simon MD;  Location: [facility] ENDOSCOPY;  Service: Gastroenterology;  Laterality: N/A;   • ENDOSCOPY AND COLONOSCOPY     • ESOPHAGOSCOPY / EGD      with tube   • UPPER GASTROINTESTINAL ENDOSCOPY  06/03/2013   • UPPER GASTROINTESTINAL ENDOSCOPY  07/09/2019     Family History   Problem Relation Age of Onset   • Heart disease Other    • Diabetes Other      Social History     Tobacco Use   • Smoking status: Current Every Day Smoker     Packs/day: 1.00     Types: Cigarettes   • Smokeless tobacco: Never Used   Substance Use Topics   • Alcohol use: No   • Drug use: No     Medications Prior to Admission   Medication Sig Dispense Refill Last Dose   • albuterol (PROVENTIL HFA;VENTOLIN HFA) 108 (90 BASE) MCG/ACT inhaler Inhale 2 puffs Every 4 (Four) Hours As Needed for Wheezing.   Taking   • budesonide-formoterol (Symbicort) 160-4.5 MCG/ACT inhaler Inhale 2 puffs 2 (Two) Times a Day. 6 g 12 Unknown   • busPIRone (BUSPAR) 15 MG tablet Take 15 mg by mouth 2 (two) times a day.   Taking   • ferrous sulfate 325 (65 FE) MG EC tablet Take 1 tablet by mouth Daily With Breakfast. 30 tablet 2 Taking   • loperamide (IMODIUM) 2 MG capsule Take 2 mg by mouth 4 (Four) Times a Day As Needed for Diarrhea.   Taking   • loratadine (CLARITIN) 10 MG tablet Take 10 mg by mouth Daily.  3 Taking   • losartan (COZAAR) 50 MG tablet Take 50 mg by mouth daily.   Taking   • meclizine (ANTIVERT) 25 MG tablet Take 25 mg by mouth 2 (two) times a day.   Taking   • omeprazole (priLOSEC) 20 MG capsule Take 1 capsule by mouth Daily. 30 capsule 5 Taking   • predniSONE (DELTASONE) 10 MG tablet 4 tabs daily x 3 days, then 3 tabs daily x 3 days, then 2 tabs daily x 3 days, then 1 tab daily x 3 days 30 tablet 0 Taking     Allergies:  Codeine and Penicillins     CURRENT MEDICATIONS/OBJECTIVE/VS/PE:     Current Medications:     Current Facility-Administered Medications   Medication Dose Route Frequency Provider Last  Rate Last Dose   • acetaminophen (TYLENOL) tablet 650 mg  650 mg Oral Q4H PRN Yoni Simon MD   650 mg at 09/06/20 1555   • acidophilus (FLORANEX) tablet 1 tablet  1 tablet Oral Daily Yoni Simon MD   1 tablet at 09/06/20 0814   • albuterol sulfate HFA (PROVENTIL HFA;VENTOLIN HFA;PROAIR HFA) inhaler 2 puff  2 puff Inhalation Q4H PRN Yoni Simon MD   2 puff at 08/28/20 1940   • budesonide-formoterol (SYMBICORT) 160-4.5 MCG/ACT inhaler 2 puff  2 puff Inhalation BID - RT Yoni Simon MD   2 puff at 09/06/20 1958   • busPIRone (BUSPAR) tablet 15 mg  15 mg Oral Q12H Yoni Simon MD   15 mg at 09/06/20 2027   • cetirizine (zyrTEC) tablet 10 mg  10 mg Oral Daily Yoni Simon MD   10 mg at 09/06/20 0814   • dextrose 5 % and sodium chloride 0.45 % infusion  30 mL/hr Intravenous Continuous PRN Yoni Simon MD       • folic acid (FOLVITE) tablet 1 mg  1 mg Oral Daily Yoni Simon MD   1 mg at 09/06/20 0814   • guaiFENesin (ROBITUSSIN) 100 MG/5ML oral solution 200 mg  200 mg Oral Q4H PRN Yoni Simon MD       • ipratropium-albuterol (DUO-NEB) nebulizer solution 3 mL  3 mL Nebulization 4x Daily - RT Yoni Simon MD   3 mL at 09/06/20 1958   • levoFLOXacin (LEVAQUIN) 750 mg/150 mL D5W (premix) (LEVAQUIN) 750 mg  750 mg Intravenous Q24H Yoni Simon MD   750 mg at 09/06/20 1500   • loperamide (IMODIUM) capsule 2 mg  2 mg Oral 4x Daily PRN Yoni Simon MD       • LORazepam (ATIVAN) injection 0.25 mg  0.25 mg Intravenous Q4H PRN Yoni Simon MD       • losartan (COZAAR) tablet 50 mg  50 mg Oral Daily Yoni Simon MD   50 mg at 09/06/20 0814   • magic butt ointment   Topical BID Yoni Simon MD       • ondansetron (ZOFRAN) injection 4 mg  4 mg Intravenous Q6H PRN Yoni Simon MD   4 mg at 09/04/20 0024   • prochlorperazine (COMPAZINE) tablet 10 mg  10 mg Oral Q6H PRN Yoni Simon MD       • sodium chloride 0.9 % bolus 2,217 mL  30  mL/kg Intravenous Once Yoni Simon MD       • sodium chloride 0.9 % flush 10 mL  10 mL Intravenous Q12H Yoni Simon MD   10 mL at 09/06/20 2027   • sodium chloride 0.9 % flush 10 mL  10 mL Intravenous PRN Yoni Simon MD   10 mL at 08/26/20 2106   • vitamin C (ASCORBIC ACID) tablet 1,000 mg  1,000 mg Oral TID With Meals Yoni Simon MD   1,000 mg at 09/06/20 1846       Objective     Review of Systems:   Review of Systems   Constitutional: Negative for chills, fatigue, fever and unexpected weight change.   HENT: Negative for congestion, ear discharge, hearing loss, nosebleeds and sore throat.    Eyes: Negative for pain, discharge and redness.   Respiratory: Positive for shortness of breath. Negative for cough, chest tightness and wheezing.    Cardiovascular: Negative for chest pain and palpitations.   Gastrointestinal: Positive for nausea. Negative for abdominal distention, abdominal pain, blood in stool, constipation, diarrhea and vomiting.   Endocrine: Negative for cold intolerance, polydipsia, polyphagia and polyuria.   Genitourinary: Negative for dysuria, flank pain, frequency, hematuria and urgency.   Musculoskeletal: Negative for arthralgias, back pain, joint swelling and myalgias.   Skin: Negative for color change, pallor and rash.   Neurological: Negative for tremors, seizures, syncope, weakness and headaches.   Hematological: Negative for adenopathy. Does not bruise/bleed easily.   Psychiatric/Behavioral: Negative for behavioral problems, confusion, dysphoric mood, hallucinations and suicidal ideas. The patient is not nervous/anxious.        Physical Exam:   Temp:  [97.9 °F (36.6 °C)-98.9 °F (37.2 °C)] 98.3 °F (36.8 °C)  Heart Rate:  [] 95  Resp:  [18-20] 18  BP: (103-117)/(53-70) 103/58     Physical Exam:  General Appearance:    Alert, cooperative, in no acute distress   Head:    Normocephalic, without obvious abnormality, atraumatic   Eyes:            Lids and lashes normal,  conjunctivae and sclerae normal, no   icterus, no pallor, corneas clear, PERRLA   Ears:    Ears appear intact with no abnormalities noted   Throat:   No oral lesions, no thrush, oral mucosa moist   Neck:   No adenopathy, supple, trachea midline, no thyromegaly, no     carotid bruit, no JVD   Back:     No kyphosis present, no scoliosis present, no skin lesions,       erythema or scars, no tenderness to percussion or                   palpation,   range of motion normal   Lungs:     Clear to auscultation,respirations regular, even and                   unlabored    Heart:    Regular rhythm and normal rate, normal S1 and S2, no            murmur, no gallop, no rub, no click   Breast Exam:    Deferred   Abdomen:     Normal bowel sounds, no masses, no organomegaly, soft        nontender, nondistended, no guarding, no rebound                 tenderness   Genitalia:    Deferred   Extremities:   Moves all extremities well, no edema, no cyanosis, no              redness   Pulses:   Pulses palpable and equal bilaterally   Skin:   No bleeding, bruising or rash   Lymph nodes:   No palpable adenopathy   Neurologic:   Cranial nerves 2 - 12 grossly intact, sensation intact, DTR        present and equal bilaterally      Results Review:     Lab Results (last 24 hours)     Procedure Component Value Units Date/Time    Fungus Culture - Wash, Lung, L [[id number]] Collected:  09/01/20 1415    Specimen:  Wash from Lung, L Updated:  09/06/20 1447     Fungus Culture No fungus isolated at less than 1 week    AFB Culture - Wash, Lung, L [[id number]] Collected:  09/01/20 1415    Specimen:  Wash from Lung, L Updated:  09/06/20 1447     AFB Culture No AFB isolated at less than 1 week     AFB Stain No acid fast bacilli seen on concentrated smear    Fungus Culture - Lavage, Lung, Lingula [[id number]] Collected:  09/01/20 1403    Specimen:  Lavage from Lung, Lingula Updated:  09/06/20 1446     Fungus Culture No fungus isolated at less than 1 week     AFB Culture - Lavage, Lung, Lingula [[id number]] Collected:  09/01/20 1403    Specimen:  Lavage from Lung, Lingula Updated:  09/06/20 1446     AFB Culture No AFB isolated at less than 1 week     AFB Stain No acid fast bacilli seen on concentrated smear    Scan Slide [[id number]] Collected:  09/06/20 0637    Specimen:  Blood Updated:  09/06/20 0822     Hypochromia Slight/1+     WBC Morphology Normal     Platelet Estimate Decreased    CBC & Differential [[id number]] Collected:  09/06/20 0637    Specimen:  Blood Updated:  09/06/20 0819    Narrative:       The following orders were created for panel order CBC & Differential.  Procedure                               Abnormality         Status                     ---------                               -----------         ------                     CBC Auto Differential[[id number]]        Abnormal            Final result                 Please view results for these tests on the individual orders.    CBC Auto Differential [[id number]]  (Abnormal) Collected:  09/06/20 0637    Specimen:  Blood Updated:  09/06/20 0819     WBC 5.29 10*3/mm3      RBC 2.80 10*6/mm3      Hemoglobin 8.1 g/dL      Hematocrit 25.5 %      MCV 91.1 fL      MCH 28.9 pg      MCHC 31.8 g/dL      RDW 16.9 %      RDW-SD 56.6 fl      MPV 11.4 fL      Platelets 88 10*3/mm3      Neutrophil % 58.4 %      Lymphocyte % 27.0 %      Monocyte % 13.2 %      Eosinophil % 0.8 %      Basophil % 0.2 %      Immature Grans % 0.4 %      Neutrophils, Absolute 3.09 10*3/mm3      Lymphocytes, Absolute 1.43 10*3/mm3      Monocytes, Absolute 0.70 10*3/mm3      Eosinophils, Absolute 0.04 10*3/mm3      Basophils, Absolute 0.01 10*3/mm3      Immature Grans, Absolute 0.02 10*3/mm3      nRBC 0.0 /100 WBC     Basic Metabolic Panel [[id number]]  (Abnormal) Collected:  09/06/20 0629    Specimen:  Blood Updated:  09/06/20 0809     Glucose 102 mg/dL      BUN 10 mg/dL      Creatinine 0.78 mg/dL      Sodium 137 mmol/L      Potassium 3.7 mmol/L       Chloride 101 mmol/L      CO2 28.0 mmol/L      Calcium 8.0 mg/dL      eGFR Non African Amer 101 mL/min/1.73      BUN/Creatinine Ratio 12.8     Anion Gap 8.0 mmol/L     Narrative:       GFR Normal >60  Chronic Kidney Disease <60  Kidney Failure <15             I reviewed the patient's new clinical results.  I reviewed the patient's new imaging results and agree with the interpretation.     ASSESSMENT/PLAN:   ASSESSMENT: 1.  Epigastric pain with nausea and vomiting , improving  2.  History of chronic hepatitis C with cirrhosis, LFTs normal.  3.  Extensive lymphadenopathy and weight loss work-up is in progress for lymphoma.  4.  Anemia, likely anemia of chronic disease.  Need to rule out GI blood loss.  5.  Diarrhea rule out colitis and colon polyps.  6.  Bacterial pneumonia, improving.  7.colon polyps  PLAN: 1.  Continue PPI and antibiotics  2. F/U on path  3. OK to D/C from GI standpoint    The risks, benefits, and alternatives of this procedure have been discussed with the patient or the responsible party- the patient understands and agrees to proceed.         Yoni Simon MD  09/06/20  21:53

## 2020-09-07 NOTE — PLAN OF CARE
Problem: Patient Care Overview  Goal: Plan of Care Review  Flowsheets (Taken 9/7/2020 4882)  Outcome Summary: Pt agreed to OT with encouragement. Pt deferred ADL's and requested to perform fx'al mobility. Pt cond I sup<>sit<>sup and supervision sit<>stand. Pt performed ~400' of fx'al mobility with 1RB and SOA. Pt's 02 down to 83 with quick recovery to >90. Pt performed sit<>stand<>sit t/f requiring supervision for sit<>stand and CGA for stand<>sit. Pt left supine in bed and all needs in reach.

## 2020-09-08 NOTE — PAYOR COMM NOTE
"  Updated clinicals for auth # [id number].    Idalia Connelly, RN  Phone [phone] Fax [phone]      Mabel Patrick (65 y.o. Male)     Date of Birth Social Security Number Address Home Phone MRN    1955  [address]  [place] [address] [phone] [id number]    [employer] Marital Status          None Single       Admission Date Admission Type Admitting Provider Attending Provider Department, Room/Bed    8/25/20 Emergency Nader Mauro MD Gerlach, Elizabeth T, MD [address], 323/1    Discharge Date Discharge Disposition Discharge Destination                       Attending Provider:  Cherelle Romeo MD    Allergies:  Codeine, Penicillins    Isolation:  None   Infection:  COVID Screen (preop/placement) (09/08/20)   Code Status:  CPR    Ht:  165.1 cm (65\")   Wt:  67.7 kg (149 lb 3.2 oz)    Admission Cmt:  None   Principal Problem:  Non-intractable vomiting [R11.10] More...                 Active Insurance as of 8/25/2020     Primary Coverage     Payor Plan Insurance Group Employer/Plan Group    [facility] WELLCARE MEDICARE REPLACEMENT [id number]     Payor Plan Address Payor Plan Phone Number Payor Plan Fax Number Effective Dates    [address] [address] [phone]  4/1/2020 - None Entered    [facility] [id number]       Subscriber Name Subscriber Birth Date Member ID       Mabel Patrick 1955 [id number]           Secondary Coverage     Payor Plan Insurance Group Employer/Plan Group    [place]      Payor Plan Address Payor Plan Phone Number Payor Plan Fax Number Effective Dates    [address] [address]   1/1/2014 - None Entered    [place] [address]       Subscriber Name Subscriber Birth Date Member ID       MABEL PATRICK 1955 [id number]                 Emergency Contacts      (Rel.) Home Phone Work Phone Mobile Phone    Yinka Shelton (Friend) [phone] -- [phone]    Volodymyr Patrick" (Relative) [phone] -- [phone]    Jose Luis Pak [phone] -- --            Insurance Information                [facility] MEDICARE REPLACEMENT/WELLCARE MEDICARE REPLACEMENT Phone: [phone]    Subscriber: Wong Patrick Bi Subscriber#: [id number]    Group#: [id number] Precert#:         [place] Phone:     Subscriber: Wong Patrick Subscriber#: [id number]    Group#:  Precert#:              Physician Progress Notes (last 48 hours) (Notes from 09/06/20 1436 through 09/08/20 1436)      Kenny Fournier APRN at 09/08/20 1009              [facility] Medicine Services  INPATIENT PROGRESS NOTE    Length of Stay: 14  Date of Admission: 8/25/2020  Primary Care Physician: Provider, No Known    Subjective   Chief Complaint: Nausea, shortness of breath  HPI:  65 year old male with a history of COPD ,cirrhosis of the liver with ascites, chronic hepatitis C, GERD, HTN, and irritable bowel syndrome who presented with shortness of breath, nausea, vomiting, diarrhea, and fatigue over 4-5 days.  Chest x-ray showed patchy airspace opacity involving almost the entirety of the left lung with air bronchograms. He also has a known tracheal/mediastinal mass.  He met sepsis criteria and was admitted for sepsis from pneumonia and respiratory failure.  He was admitted to CCU on IV antibiotics and IV fluid.  He was initiated on IV solumedrol and bronchodilators.  COVID 19 is negative.  Blood cultures were positive for Streptococcus pneumoniae sensitive to Levaquin.  Antibiotics were targeted. Oncology and pulmonology were consulted. He underwent EBUS.  Peripheral blood flow cytometry is suggestive of SLL/CLL and he is recommended observation at this time and outpatient oncology follow-up. FNA is also consistent with SLL/CLL. GI was consulted for persistent nausea.  He underwent EGD which revealed esophagitis, gastritis, and medium-sized  hiatal hernia.  He underwent colonoscopy which revealed non-bleeding hemorrhoids. Biopsies were taken from the colon. He has weaned to room air.  Platelets are stable. SNF precert is pending.     Review of Systems   Constitutional: Negative for chills and fever.   Respiratory: Negative for shortness of breath.    Cardiovascular: Negative for chest pain.   Gastrointestinal: Negative for abdominal pain, nausea and vomiting.   All other systems reviewed and are negative.       All pertinent negatives and positives are as above. All other systems have been reviewed and are negative unless otherwise stated.     Objective    Temp:  [97.4 °F (36.3 °C)-98.8 °F (37.1 °C)] 98.3 °F (36.8 °C)  Heart Rate:  [] 128  Resp:  [16-20] 16  BP: ()/(50-62) 93/50    Physical Exam   Constitutional: He is oriented to person, place, and time. He appears well-developed and well-nourished. No distress.   HENT:   Head: Normocephalic and atraumatic.   Eyes: Conjunctivae are normal.   Cardiovascular: Normal rate, regular rhythm and intact distal pulses.   Pulmonary/Chest: Effort normal and breath sounds normal. No respiratory distress.   Abdominal: Soft. Bowel sounds are normal. He exhibits no distension. There is no tenderness.   Musculoskeletal: He exhibits no edema or deformity.   Neurological: He is alert and oriented to person, place, and time.   Skin: Skin is warm and dry. He is not diaphoretic.   Vitals reviewed.      Results Review:  I have reviewed the labs, radiology results, and diagnostic studies.    Laboratory Data:   Results from last 7 days   Lab Units 09/07/20  0449 09/06/20  0629 09/05/20  0555   SODIUM mmol/L 136 137 136   POTASSIUM mmol/L 3.9 3.7 4.2   CHLORIDE mmol/L 101 101 101   CO2 mmol/L 27.0 28.0 29.0   BUN mg/dL 10 10 12   CREATININE mg/dL 0.84 0.78 0.75*   GLUCOSE mg/dL 104* 102* 104*   CALCIUM mg/dL 8.2* 8.0* 7.8*   ANION GAP mmol/L 8.0 8.0 6.0     Estimated Creatinine Clearance: 86.2 mL/min (by C-G  formula based on SCr of 0.84 mg/dL).          Results from last 7 days   Lab Units 09/08/20  0549 09/07/20  0449 09/06/20  0637 09/05/20  0555 09/04/20  0533   WBC 10*3/mm3 4.32 5.69 5.29 5.64 7.54   HEMOGLOBIN g/dL 8.0* 8.2* 8.1* 8.0* 8.5*   HEMATOCRIT % 25.4* 24.9* 25.5* 24.6* 26.7*   PLATELETS 10*3/mm3 80* 84* 88* 75* 109*     Results from last 7 days   Lab Units 09/01/20  1754   INR  1.06       Culture Data:   No results found for: BLOODCX  No results found for: URINECX  No results found for: RESPCX  No results found for: WOUNDCX  No results found for: STOOLCX  No components found for: BODYFLD    Radiology Data:   Imaging Results (Last 24 Hours)     ** No results found for the last 24 hours. **          I have reviewed the patient's current medications.     Assessment/Plan     Active Hospital Problems    Diagnosis   • **Non-intractable vomiting     Added automatically from request for surgery [id number]     • Thrombocytopenia (CMS/HCC)   • CLL (chronic lymphocytic leukemia) (CMS/HCC)     Likely based on flow cytometry     • Bacteremia due to Streptococcus pneumoniae   • Sepsis due to pneumonia (CMS/HCC)   • COPD with acute exacerbation (CMS/HCC)   • IBRAHIMA (acute kidney injury) (CMS/HCC)   • Acute respiratory failure with hypoxia (CMS/HCC)   • Pneumonia of left upper lobe due to Streptococcus pneumoniae (CMS/HCC)     Added automatically from request for surgery [id number]     • Diarrhea     Added automatically from request for surgery [id number]     • Mediastinal adenopathy     Added automatically from request for surgery [id number]     • Hypertension   • Cirrhosis of liver (CMS/HCC)       Plan:    Wean O2, weaned to baseline of room air, maintain SaO2 >88%  Levaquin day 14/14  SymbicortJarred  Pulmonology consultation appreciated  Outpatient evaluation of SLL/CLL, Follow-up 4 weeks with heme/onc  Heme/Onc consultation appreciated  S/P EGD  S/P colonoscopy, biopsies pending  Antiemetics PRN  GI consultation appreciated  IBRAHIMA  resolved  Thrombocytopenia is suspected secondary to cirrhosis with splenomegaly and consumption from infection. HIT antibodies in reference range.  BP control: Cozaar  PT/OT  VTE PPx: SCDs   Discharge planning: rehab to home, precert pending        The patient was evaluated during the global COVID-19 pandemic, and the diagnosis was suspected/considered upon their initial presentation.  Evaluation, treatment, and testing were consistent with current guidelines for patients who present with complaints or symptoms that may be related to COVID-19.        This document has been electronically signed by ANA Edmonds on September 8, 2020 10:09        Electronically signed by Kenny Fournier APRN at 09/08/20 1010     Yoni Simon MD at 09/07/20 2866                  SUBJECTIVE:   9/7/2020  Chief Complaint:     Subjective      Patient feels some better today. Has nausea and denied emesis. Abdominal pain and dyspnea improving. Tolerating diet. hgb 8.2    History:  Past Medical History:   Diagnosis Date   • Abnormal weight loss    • Acquired thrombocytopenia (CMS/HCC)    • Adenomatous polyp of colon    • Anxiety    • Biliary calculus     denies N/V, abd pain   • Chronic hepatitis C (CMS/HCC)    • Cirrhosis of liver (CMS/HCC)    • Colon polyp    • COPD (chronic obstructive pulmonary disease) (CMS/HCC)    • Elevated levels of transaminase & lactic acid dehydrogenase    • Generalized abdominal pain     known gallstones   • GERD (gastroesophageal reflux disease)    • Hypertension    • IBS (irritable bowel syndrome)    • Impacted cerumen    • Kidney disease     US suggest renal dz, elevated alpha 2 macroglobulins   • Nausea and vomiting    • Patient noncompliance, general    • Rectal hemorrhage    • Tracheal mass    • Villous adenoma of colon    • Wheezing      Past Surgical History:   Procedure Laterality Date   • BRONCHOSCOPY N/A 7/13/2020    Procedure: BRONCHOSCOPY WITH ENDOBRONCHIAL ULTRASOUND;   Surgeon: Karthik Sewell MD;  [facility];  Service: Pulmonary;  Laterality: N/A;  preop; tracheal mass; pneumonia  postop  PCP    • BRONCHOSCOPY N/A 9/1/2020    Procedure: BRONCHOSCOPY WITH ENDOBRONCHIAL ULTRASOUND and needle biopsy, with possible bronchoalveolar lavage, brush, and/or endobronchial biopsy;  Surgeon: Sandra Jacques MD;  Location: [facility] OR;  Service: Pulmonary;  Laterality: N/A;   • COLONOSCOPY  06/03/2013   • COLONOSCOPY N/A 8/22/2018    Procedure: COLONOSCOPY;  Surgeon: Charles Pham MD;  Location: [facility] ENDOSCOPY;  Service: Gastroenterology   • ENDOSCOPY N/A 7/9/2019    Procedure: ESOPHAGOGASTRODUODENOSCOPY;  Surgeon: Bi Ibarra MD;  Location: [facility] ENDOSCOPY;  Service: General   • ENDOSCOPY N/A 9/3/2020    Procedure: ESOPHAGOGASTRODUODENOSCOPY;  Surgeon: Yoni Simon MD;  Location: [facility] ENDOSCOPY;  Service: Gastroenterology;  Laterality: N/A;   • ENDOSCOPY AND COLONOSCOPY     • ESOPHAGOSCOPY / EGD      with tube   • UPPER GASTROINTESTINAL ENDOSCOPY  06/03/2013   • UPPER GASTROINTESTINAL ENDOSCOPY  07/09/2019     Family History   Problem Relation Age of Onset   • Heart disease Other    • Diabetes Other      Social History     Tobacco Use   • Smoking status: Current Every Day Smoker     Packs/day: 1.00     Types: Cigarettes   • Smokeless tobacco: Never Used   Substance Use Topics   • Alcohol use: No   • Drug use: No     Medications Prior to Admission   Medication Sig Dispense Refill Last Dose   • albuterol (PROVENTIL HFA;VENTOLIN HFA) 108 (90 BASE) MCG/ACT inhaler Inhale 2 puffs Every 4 (Four) Hours As Needed for Wheezing.   Taking   • budesonide-formoterol (Symbicort) 160-4.5 MCG/ACT inhaler Inhale 2 puffs 2 (Two) Times a Day. 6 g 12 Unknown   • busPIRone (BUSPAR) 15 MG tablet Take 15 mg by mouth 2 (two) times a day.   Taking   • ferrous sulfate 325 (65 FE) MG EC tablet Take 1 tablet by mouth Daily With Breakfast. 30 tablet 2 Taking   • loperamide  (IMODIUM) 2 MG capsule Take 2 mg by mouth 4 (Four) Times a Day As Needed for Diarrhea.   Taking   • loratadine (CLARITIN) 10 MG tablet Take 10 mg by mouth Daily.  3 Taking   • losartan (COZAAR) 50 MG tablet Take 50 mg by mouth daily.   Taking   • meclizine (ANTIVERT) 25 MG tablet Take 25 mg by mouth 2 (two) times a day.   Taking   • omeprazole (priLOSEC) 20 MG capsule Take 1 capsule by mouth Daily. 30 capsule 5 Taking   • predniSONE (DELTASONE) 10 MG tablet 4 tabs daily x 3 days, then 3 tabs daily x 3 days, then 2 tabs daily x 3 days, then 1 tab daily x 3 days 30 tablet 0 Taking     Allergies:  Codeine and Penicillins     CURRENT MEDICATIONS/OBJECTIVE/VS/PE:     Current Medications:     Current Facility-Administered Medications   Medication Dose Route Frequency Provider Last Rate Last Dose   • acetaminophen (TYLENOL) tablet 650 mg  650 mg Oral Q4H PRN Yoni Simon MD   650 mg at 09/07/20 1445   • acidophilus (FLORANEX) tablet 1 tablet  1 tablet Oral Daily Yoni Simon MD   1 tablet at 09/07/20 0834   • albuterol sulfate HFA (PROVENTIL HFA;VENTOLIN HFA;PROAIR HFA) inhaler 2 puff  2 puff Inhalation Q4H PRN Yoni Simon MD   2 puff at 08/28/20 1940   • budesonide-formoterol (SYMBICORT) 160-4.5 MCG/ACT inhaler 2 puff  2 puff Inhalation BID - RT Yoni Simon MD   2 puff at 09/07/20 1915   • busPIRone (BUSPAR) tablet 15 mg  15 mg Oral Q12H Yoni Simon MD   15 mg at 09/07/20 1942   • cetirizine (zyrTEC) tablet 10 mg  10 mg Oral Daily Yoni Simon MD   10 mg at 09/07/20 0834   • dextrose 5 % and sodium chloride 0.45 % infusion  30 mL/hr Intravenous Continuous PRN Yoni Simon MD       • folic acid (FOLVITE) tablet 1 mg  1 mg Oral Daily Yoni Simon MD   1 mg at 09/07/20 0834   • guaiFENesin (ROBITUSSIN) 100 MG/5ML oral solution 200 mg  200 mg Oral Q4H PRN Yoni Simon MD       • ipratropium-albuterol (DUO-NEB) nebulizer solution 3 mL  3 mL Nebulization 4x Daily - RT  Yoni Simon MD   3 mL at 09/07/20 1914   • levoFLOXacin (LEVAQUIN) 750 mg/150 mL D5W (premix) (LEVAQUIN) 750 mg  750 mg Intravenous Q24H Yoni Simon MD   750 mg at 09/07/20 1445   • loperamide (IMODIUM) capsule 2 mg  2 mg Oral 4x Daily PRN Yoni Simon MD       • LORazepam (ATIVAN) injection 0.25 mg  0.25 mg Intravenous Q4H PRN Yoni Simon MD       • losartan (COZAAR) tablet 50 mg  50 mg Oral Daily Yoni Simon MD   50 mg at 09/07/20 0834   • magic butt ointment   Topical BID Yoni Simon MD       • ondansetron (ZOFRAN) injection 4 mg  4 mg Intravenous Q6H PRN Ynoi Simon MD   4 mg at 09/04/20 0024   • prochlorperazine (COMPAZINE) tablet 10 mg  10 mg Oral Q6H PRN Yoni Simon MD       • sodium chloride 0.9 % bolus 2,217 mL  30 mL/kg Intravenous Once Yoni Simon MD       • sodium chloride 0.9 % flush 10 mL  10 mL Intravenous Q12H Yoni Simon MD   10 mL at 09/07/20 1942   • sodium chloride 0.9 % flush 10 mL  10 mL Intravenous PRN Yoni Simon MD   10 mL at 08/26/20 2106   • vitamin C (ASCORBIC ACID) tablet 1,000 mg  1,000 mg Oral TID With Meals Yoni Simon MD   1,000 mg at 09/07/20 1711       Objective     Review of Systems:   Review of Systems   Constitutional: Negative for chills, fatigue, fever and unexpected weight change.   HENT: Negative for congestion, ear discharge, hearing loss, nosebleeds and sore throat.    Eyes: Negative for pain, discharge and redness.   Respiratory: Positive for shortness of breath. Negative for cough, chest tightness and wheezing.    Cardiovascular: Negative for chest pain and palpitations.   Gastrointestinal: Positive for nausea. Negative for abdominal distention, abdominal pain, blood in stool, constipation, diarrhea and vomiting.   Endocrine: Negative for cold intolerance, polydipsia, polyphagia and polyuria.   Genitourinary: Negative for dysuria, flank pain, frequency, hematuria and urgency.    Musculoskeletal: Negative for arthralgias, back pain, joint swelling and myalgias.   Skin: Negative for color change, pallor and rash.   Neurological: Negative for tremors, seizures, syncope, weakness and headaches.   Hematological: Negative for adenopathy. Does not bruise/bleed easily.   Psychiatric/Behavioral: Negative for behavioral problems, confusion, dysphoric mood, hallucinations and suicidal ideas. The patient is not nervous/anxious.        Physical Exam:   Temp:  [97.4 °F (36.3 °C)-98.7 °F (37.1 °C)] 98.3 °F (36.8 °C)  Heart Rate:  [] 97  Resp:  [18-20] 18  BP: (101-122)/(57-65) 108/60     Physical Exam:  General Appearance:    Alert, cooperative, in no acute distress   Head:    Normocephalic, without obvious abnormality, atraumatic   Eyes:            Lids and lashes normal, conjunctivae and sclerae normal, no   icterus, no pallor, corneas clear, PERRLA   Ears:    Ears appear intact with no abnormalities noted   Throat:   No oral lesions, no thrush, oral mucosa moist   Neck:   No adenopathy, supple, trachea midline, no thyromegaly, no     carotid bruit, no JVD   Back:     No kyphosis present, no scoliosis present, no skin lesions,       erythema or scars, no tenderness to percussion or                   palpation,   range of motion normal   Lungs:     Clear to auscultation,respirations regular, even and                   unlabored    Heart:    Regular rhythm and normal rate, normal S1 and S2, no            murmur, no gallop, no rub, no click   Breast Exam:    Deferred   Abdomen:     Normal bowel sounds, no masses, no organomegaly, soft        nontender, nondistended, no guarding, no rebound                 tenderness   Genitalia:    Deferred   Extremities:   Moves all extremities well, no edema, no cyanosis, no              redness   Pulses:   Pulses palpable and equal bilaterally   Skin:   No bleeding, bruising or rash   Lymph nodes:   No palpable adenopathy   Neurologic:   Cranial nerves 2 - 12  grossly intact, sensation intact, DTR        present and equal bilaterally      Results Review:     Lab Results (last 24 hours)     Procedure Component Value Units Date/Time    Heparin-induced Platelet Antibody [[id number]] Collected:  09/05/20 1036    Specimen:  Blood Updated:  09/07/20 1607     Heparin Induced Plt Ab 0.106 OD     Narrative:       Performed at:  [address] [place] [address]  [id number]  : Flavio Garcia MD, Phone:  [phone]    Basic Metabolic Panel [[id number]]  (Abnormal) Collected:  09/07/20 0449    Specimen:  Blood Updated:  09/07/20 0559     Glucose 104 mg/dL      BUN 10 mg/dL      Creatinine 0.84 mg/dL      Sodium 136 mmol/L      Potassium 3.9 mmol/L      Comment: Slight hemolysis detected by analyzer. Results may be affected.        Chloride 101 mmol/L      CO2 27.0 mmol/L      Calcium 8.2 mg/dL      eGFR Non African Amer 92 mL/min/1.73      BUN/Creatinine Ratio 11.9     Anion Gap 8.0 mmol/L     Narrative:       GFR Normal >60  Chronic Kidney Disease <60  Kidney Failure <15      CBC & Differential [[id number]] Collected:  09/07/20 0449    Specimen:  Blood Updated:  09/07/20 0540    Narrative:       The following orders were created for panel order CBC & Differential.  Procedure                               Abnormality         Status                     ---------                               -----------         ------                     CBC Auto Differential[[id number]]        Abnormal            Final result                 Please view results for these tests on the individual orders.    CBC Auto Differential [[id number]]  (Abnormal) Collected:  09/07/20 0449    Specimen:  Blood Updated:  09/07/20 0540     WBC 5.69 10*3/mm3      RBC 2.79 10*6/mm3      Hemoglobin 8.2 g/dL      Hematocrit 24.9 %      MCV 89.2 fL      MCH 29.4 pg      MCHC 32.9 g/dL      RDW 17.2 %      RDW-SD 55.5 fl      MPV 11.5 fL      Platelets 84 10*3/mm3      Neutrophil % 50.1 %       Lymphocyte % 25.0 %      Monocyte % 20.0 %      Eosinophil % 2.6 %      Basophil % 0.2 %      Immature Grans % 2.1 %      Neutrophils, Absolute 2.85 10*3/mm3      Lymphocytes, Absolute 1.42 10*3/mm3      Monocytes, Absolute 1.14 10*3/mm3      Eosinophils, Absolute 0.15 10*3/mm3      Basophils, Absolute 0.01 10*3/mm3      Immature Grans, Absolute 0.12 10*3/mm3      nRBC 0.0 /100 WBC            I reviewed the patient's new clinical results.  I reviewed the patient's new imaging results and agree with the interpretation.     ASSESSMENT/PLAN:   ASSESSMENT: 1.  Epigastric pain with nausea and vomiting , improving  2.  History of chronic hepatitis C with cirrhosis, LFTs normal.  3.  Extensive lymphadenopathy and weight loss work-up is in progress for lymphoma.  4.  Anemia, likely anemia of chronic disease.  Need to rule out GI blood loss.  5.  Diarrhea rule out colitis and colon polyps.  6.  Bacterial pneumonia, improving.  7.colon polyps  PLAN: 1.  Continue PPI and antibiotics  2. F/U on path  3. OK to D/C from GI standpoint    The risks, benefits, and alternatives of this procedure have been discussed with the patient or the responsible party- the patient understands and agrees to proceed.         Yoni Simon MD  09/07/20  23:24          Electronically signed by Yoni Simon MD at 09/07/20 7219     Kenny Fournier APRN at 09/07/20 1209              [facility] Medicine Services  INPATIENT PROGRESS NOTE    Length of Stay: 13  Date of Admission: 8/25/2020  Primary Care Physician: Provider, No Known    Subjective   Chief Complaint: Nausea, shortness of breath  HPI:  65 year old male with a history of COPD ,cirrhosis of the liver with ascites, chronic hepatitis C, GERD, HTN, and irritable bowel syndrome who presented with shortness of breath, nausea, vomiting, diarrhea, and fatigue over 4-5 days.  Chest x-ray showed patchy airspace opacity involving almost the  entirety of the left lung with air bronchograms. He also has a known tracheal/mediastinal mass.  He met sepsis criteria and was admitted for sepsis from pneumonia and respiratory failure.  He was admitted to CCU on IV antibiotics and IV fluid.  He was initiated on IV solumedrol and bronchodilators.  COVID 19 is negative.  Blood cultures were positive for Streptococcus pneumoniae sensitive to Levaquin.  Antibiotics were targeted. Oncology and pulmonology were consulted. He underwent EBUS.  Peripheral blood flow cytometry is suggestive of SLL/CLL and he is recommended observation at this time and outpatient oncology follow-up. FNA is also consistent with SLL/CLL. GI was consulted for persistent nausea.  He underwent EGD which revealed esophagitis, gastritis, and medium-sized hiatal hernia.  He underwent colonoscopy which revealed non-bleeding hemorrhoids. Biopsies were taken from the colon. He has weaned to room air.  Platelets are stable.     Review of Systems   Constitutional: Negative for chills and fever.   Respiratory: Negative for shortness of breath.    Cardiovascular: Negative for chest pain.   Gastrointestinal: Negative for abdominal pain, nausea and vomiting.   All other systems reviewed and are negative.       All pertinent negatives and positives are as above. All other systems have been reviewed and are negative unless otherwise stated.     Objective    Temp:  [97.7 °F (36.5 °C)-98.7 °F (37.1 °C)] 98.2 °F (36.8 °C)  Heart Rate:  [] 104  Resp:  [18-20] 18  BP: (103-122)/(57-65) 109/62    Physical Exam   Constitutional: He is oriented to person, place, and time. He appears well-developed and well-nourished. No distress.   HENT:   Head: Normocephalic and atraumatic.   Eyes: Conjunctivae are normal.   Cardiovascular: Normal rate, regular rhythm and intact distal pulses.   Pulmonary/Chest: Effort normal and breath sounds normal. No respiratory distress.   Abdominal: Soft. Bowel sounds are normal. He  exhibits no distension. There is no tenderness.   Musculoskeletal: He exhibits no edema or deformity.   Neurological: He is alert and oriented to person, place, and time.   Skin: Skin is warm and dry. He is not diaphoretic.   Vitals reviewed.      Results Review:  I have reviewed the labs, radiology results, and diagnostic studies.    Laboratory Data:   Results from last 7 days   Lab Units 09/07/20 0449 09/06/20  0629 09/05/20  0555  09/01/20  0633   SODIUM mmol/L 136 137 136   < > 136   POTASSIUM mmol/L 3.9 3.7 4.2   < > 4.6   CHLORIDE mmol/L 101 101 101   < > 97*   CO2 mmol/L 27.0 28.0 29.0   < > 33.0*   BUN mg/dL 10 10 12   < > 21   CREATININE mg/dL 0.84 0.78 0.75*   < > 0.77   GLUCOSE mg/dL 104* 102* 104*   < > 95   CALCIUM mg/dL 8.2* 8.0* 7.8*   < > 8.1*   BILIRUBIN mg/dL  --   --   --   --  0.3   ALK PHOS U/L  --   --   --   --  69   ALT (SGPT) U/L  --   --   --   --  16   AST (SGOT) U/L  --   --   --   --  24   ANION GAP mmol/L 8.0 8.0 6.0   < > 6.0    < > = values in this interval not displayed.     Estimated Creatinine Clearance: 86.8 mL/min (by C-G formula based on SCr of 0.84 mg/dL).          Results from last 7 days   Lab Units 09/07/20 0449 09/06/20  0637 09/05/20  0555 09/04/20  0533 09/03/20  0630   WBC 10*3/mm3 5.69 5.29 5.64 7.54 8.05   HEMOGLOBIN g/dL 8.2* 8.1* 8.0* 8.5* 9.1*   HEMATOCRIT % 24.9* 25.5* 24.6* 26.7* 29.1*   PLATELETS 10*3/mm3 84* 88* 75* 109* 134*     Results from last 7 days   Lab Units 09/01/20  1754   INR  1.06       Culture Data:   No results found for: BLOODCX  No results found for: URINECX  No results found for: RESPCX  No results found for: WOUNDCX  No results found for: STOOLCX  No components found for: BODYFLD    Radiology Data:   Imaging Results (Last 24 Hours)     ** No results found for the last 24 hours. **          I have reviewed the patient's current medications.     Assessment/Plan     Active Hospital Problems    Diagnosis   • **Non-intractable vomiting     Added  automatically from request for surgery [id number]     • Thrombocytopenia (CMS/HCC)   • CLL (chronic lymphocytic leukemia) (CMS/HCC)     Likely based on flow cytometry     • Bacteremia due to Streptococcus pneumoniae   • Sepsis due to pneumonia (CMS/HCC)   • COPD with acute exacerbation (CMS/HCC)   • IBRAHIMA (acute kidney injury) (CMS/HCC)   • Acute respiratory failure with hypoxia (CMS/HCC)   • Pneumonia of left upper lobe due to Streptococcus pneumoniae (CMS/HCC)     Added automatically from request for surgery [id number]     • Diarrhea     Added automatically from request for surgery [id number]     • Mediastinal adenopathy     Added automatically from request for surgery [id number]     • Hypertension   • Cirrhosis of liver (CMS/HCC)       Plan:    Wean O2, weaned to baseline of room air, maintain SaO2 >88%  Levaquin day 13/14  SymbicortJarred  Pulmonology consultation appreciated  Outpatient evaluation of SLL/CLL, Follow-up 4 weeks with heme/onc  Heme/Onc consultation appreciated  S/P EGD  S/P colonoscopy, biopsies pending  Antiemetics PRN  GI consultation appreciated  IBRAHIMA resolved  Thrombocytopenia is suspected secondary to cirrhosis with splenomegaly and consumption from infection.  Cannot exclude HIT so heparin will be discontinued.  Check HIT antibodies. Remains pending.   BP control: Cozaar  PT/OT  VTE PPx: SCDs  Discharge planning: rehab to home, precert pending        The patient was evaluated during the global COVID-19 pandemic, and the diagnosis was suspected/considered upon their initial presentation.  Evaluation, treatment, and testing were consistent with current guidelines for patients who present with complaints or symptoms that may be related to COVID-19.        This document has been electronically signed by ANA Edmonds on September 7, 2020 12:10        Electronically signed by Kenny Fournier APRN at 09/07/20 1211     Yoni Simon MD at 09/06/20 4664                   SUBJECTIVE:   9/6/2020  Chief Complaint:     Subjective      Patient feels some better today. Has nausea and denied emesis. Abdominal pain and dyspnea improving. Tolerating diet. hgb 8.1    History:  Past Medical History:   Diagnosis Date   • Abnormal weight loss    • Acquired thrombocytopenia (CMS/HCC)    • Adenomatous polyp of colon    • Anxiety    • Biliary calculus     denies N/V, abd pain   • Chronic hepatitis C (CMS/HCC)    • Cirrhosis of liver (CMS/HCC)    • Colon polyp    • COPD (chronic obstructive pulmonary disease) (CMS/HCC)    • Elevated levels of transaminase & lactic acid dehydrogenase    • Generalized abdominal pain     known gallstones   • GERD (gastroesophageal reflux disease)    • Hypertension    • IBS (irritable bowel syndrome)    • Impacted cerumen    • Kidney disease     US suggest renal dz, elevated alpha 2 macroglobulins   • Nausea and vomiting    • Patient noncompliance, general    • Rectal hemorrhage    • Tracheal mass    • Villous adenoma of colon    • Wheezing      Past Surgical History:   Procedure Laterality Date   • BRONCHOSCOPY N/A 7/13/2020    Procedure: BRONCHOSCOPY WITH ENDOBRONCHIAL ULTRASOUND;  Surgeon: Karthik Sewell MD;  Location: [facility] OR;  Service: Pulmonary;  Laterality: N/A;  preop; tracheal mass; pneumonia  postop  PCP    • BRONCHOSCOPY N/A 9/1/2020    Procedure: BRONCHOSCOPY WITH ENDOBRONCHIAL ULTRASOUND and needle biopsy, with possible bronchoalveolar lavage, brush, and/or endobronchial biopsy;  Surgeon: Sandra Jacques MD;  Location: [facility] OR;  Service: Pulmonary;  Laterality: N/A;   • COLONOSCOPY  06/03/2013   • COLONOSCOPY N/A 8/22/2018    Procedure: COLONOSCOPY;  Surgeon: Charles Pham MD;  Location: [facility] ENDOSCOPY;  Service: Gastroenterology   • ENDOSCOPY N/A 7/9/2019    Procedure: ESOPHAGOGASTRODUODENOSCOPY;  Surgeon: Bi Ibarra MD;  Location: [facility] ENDOSCOPY;  Service: General   • ENDOSCOPY N/A 9/3/2020    Procedure:  ESOPHAGOGASTRODUODENOSCOPY;  Surgeon: Yoni Simon MD;  Location: [facility] ENDOSCOPY;  Service: Gastroenterology;  Laterality: N/A;   • ENDOSCOPY AND COLONOSCOPY     • ESOPHAGOSCOPY / EGD      with tube   • UPPER GASTROINTESTINAL ENDOSCOPY  06/03/2013   • UPPER GASTROINTESTINAL ENDOSCOPY  07/09/2019     Family History   Problem Relation Age of Onset   • Heart disease Other    • Diabetes Other      Social History     Tobacco Use   • Smoking status: Current Every Day Smoker     Packs/day: 1.00     Types: Cigarettes   • Smokeless tobacco: Never Used   Substance Use Topics   • Alcohol use: No   • Drug use: No     Medications Prior to Admission   Medication Sig Dispense Refill Last Dose   • albuterol (PROVENTIL HFA;VENTOLIN HFA) 108 (90 BASE) MCG/ACT inhaler Inhale 2 puffs Every 4 (Four) Hours As Needed for Wheezing.   Taking   • budesonide-formoterol (Symbicort) 160-4.5 MCG/ACT inhaler Inhale 2 puffs 2 (Two) Times a Day. 6 g 12 Unknown   • busPIRone (BUSPAR) 15 MG tablet Take 15 mg by mouth 2 (two) times a day.   Taking   • ferrous sulfate 325 (65 FE) MG EC tablet Take 1 tablet by mouth Daily With Breakfast. 30 tablet 2 Taking   • loperamide (IMODIUM) 2 MG capsule Take 2 mg by mouth 4 (Four) Times a Day As Needed for Diarrhea.   Taking   • loratadine (CLARITIN) 10 MG tablet Take 10 mg by mouth Daily.  3 Taking   • losartan (COZAAR) 50 MG tablet Take 50 mg by mouth daily.   Taking   • meclizine (ANTIVERT) 25 MG tablet Take 25 mg by mouth 2 (two) times a day.   Taking   • omeprazole (priLOSEC) 20 MG capsule Take 1 capsule by mouth Daily. 30 capsule 5 Taking   • predniSONE (DELTASONE) 10 MG tablet 4 tabs daily x 3 days, then 3 tabs daily x 3 days, then 2 tabs daily x 3 days, then 1 tab daily x 3 days 30 tablet 0 Taking     Allergies:  Codeine and Penicillins     CURRENT MEDICATIONS/OBJECTIVE/VS/PE:     Current Medications:     Current Facility-Administered Medications   Medication Dose Route Frequency Provider Last  Rate Last Dose   • acetaminophen (TYLENOL) tablet 650 mg  650 mg Oral Q4H PRN Yoni Simon MD   650 mg at 09/06/20 1555   • acidophilus (FLORANEX) tablet 1 tablet  1 tablet Oral Daily Yoni Simon MD   1 tablet at 09/06/20 0814   • albuterol sulfate HFA (PROVENTIL HFA;VENTOLIN HFA;PROAIR HFA) inhaler 2 puff  2 puff Inhalation Q4H PRN Yoni Simon MD   2 puff at 08/28/20 1940   • budesonide-formoterol (SYMBICORT) 160-4.5 MCG/ACT inhaler 2 puff  2 puff Inhalation BID - RT Yoni Simon MD   2 puff at 09/06/20 1958   • busPIRone (BUSPAR) tablet 15 mg  15 mg Oral Q12H Yoni Simon MD   15 mg at 09/06/20 2027   • cetirizine (zyrTEC) tablet 10 mg  10 mg Oral Daily Yoni Simon MD   10 mg at 09/06/20 0814   • dextrose 5 % and sodium chloride 0.45 % infusion  30 mL/hr Intravenous Continuous PRN Yoni Simon MD       • folic acid (FOLVITE) tablet 1 mg  1 mg Oral Daily Yoni Simon MD   1 mg at 09/06/20 0814   • guaiFENesin (ROBITUSSIN) 100 MG/5ML oral solution 200 mg  200 mg Oral Q4H PRN Yoni Simon MD       • ipratropium-albuterol (DUO-NEB) nebulizer solution 3 mL  3 mL Nebulization 4x Daily - RT Yoni Simon MD   3 mL at 09/06/20 1958   • levoFLOXacin (LEVAQUIN) 750 mg/150 mL D5W (premix) (LEVAQUIN) 750 mg  750 mg Intravenous Q24H Yoni Simon MD   750 mg at 09/06/20 1500   • loperamide (IMODIUM) capsule 2 mg  2 mg Oral 4x Daily PRN Yoni Simon MD       • LORazepam (ATIVAN) injection 0.25 mg  0.25 mg Intravenous Q4H PRN Yoni Simon MD       • losartan (COZAAR) tablet 50 mg  50 mg Oral Daily Yoni Simon MD   50 mg at 09/06/20 0814   • magic butt ointment   Topical BID Yoni Simon MD       • ondansetron (ZOFRAN) injection 4 mg  4 mg Intravenous Q6H PRN Yoni Simon MD   4 mg at 09/04/20 0024   • prochlorperazine (COMPAZINE) tablet 10 mg  10 mg Oral Q6H PRN Yoni Simon MD       • sodium chloride 0.9 % bolus 2,217 mL  30  mL/kg Intravenous Once Yoni Simon MD       • sodium chloride 0.9 % flush 10 mL  10 mL Intravenous Q12H Yoni Simon MD   10 mL at 09/06/20 2027   • sodium chloride 0.9 % flush 10 mL  10 mL Intravenous PRN Yoni Simon MD   10 mL at 08/26/20 2106   • vitamin C (ASCORBIC ACID) tablet 1,000 mg  1,000 mg Oral TID With Meals Yoni Simon MD   1,000 mg at 09/06/20 1846       Objective     Review of Systems:   Review of Systems   Constitutional: Negative for chills, fatigue, fever and unexpected weight change.   HENT: Negative for congestion, ear discharge, hearing loss, nosebleeds and sore throat.    Eyes: Negative for pain, discharge and redness.   Respiratory: Positive for shortness of breath. Negative for cough, chest tightness and wheezing.    Cardiovascular: Negative for chest pain and palpitations.   Gastrointestinal: Positive for nausea. Negative for abdominal distention, abdominal pain, blood in stool, constipation, diarrhea and vomiting.   Endocrine: Negative for cold intolerance, polydipsia, polyphagia and polyuria.   Genitourinary: Negative for dysuria, flank pain, frequency, hematuria and urgency.   Musculoskeletal: Negative for arthralgias, back pain, joint swelling and myalgias.   Skin: Negative for color change, pallor and rash.   Neurological: Negative for tremors, seizures, syncope, weakness and headaches.   Hematological: Negative for adenopathy. Does not bruise/bleed easily.   Psychiatric/Behavioral: Negative for behavioral problems, confusion, dysphoric mood, hallucinations and suicidal ideas. The patient is not nervous/anxious.        Physical Exam:   Temp:  [97.9 °F (36.6 °C)-98.9 °F (37.2 °C)] 98.3 °F (36.8 °C)  Heart Rate:  [] 95  Resp:  [18-20] 18  BP: (103-117)/(53-70) 103/58     Physical Exam:  General Appearance:    Alert, cooperative, in no acute distress   Head:    Normocephalic, without obvious abnormality, atraumatic   Eyes:            Lids and lashes normal,  conjunctivae and sclerae normal, no   icterus, no pallor, corneas clear, PERRLA   Ears:    Ears appear intact with no abnormalities noted   Throat:   No oral lesions, no thrush, oral mucosa moist   Neck:   No adenopathy, supple, trachea midline, no thyromegaly, no     carotid bruit, no JVD   Back:     No kyphosis present, no scoliosis present, no skin lesions,       erythema or scars, no tenderness to percussion or                   palpation,   range of motion normal   Lungs:     Clear to auscultation,respirations regular, even and                   unlabored    Heart:    Regular rhythm and normal rate, normal S1 and S2, no            murmur, no gallop, no rub, no click   Breast Exam:    Deferred   Abdomen:     Normal bowel sounds, no masses, no organomegaly, soft        nontender, nondistended, no guarding, no rebound                 tenderness   Genitalia:    Deferred   Extremities:   Moves all extremities well, no edema, no cyanosis, no              redness   Pulses:   Pulses palpable and equal bilaterally   Skin:   No bleeding, bruising or rash   Lymph nodes:   No palpable adenopathy   Neurologic:   Cranial nerves 2 - 12 grossly intact, sensation intact, DTR        present and equal bilaterally      Results Review:     Lab Results (last 24 hours)     Procedure Component Value Units Date/Time    Fungus Culture - Wash, Lung, L [[id number]] Collected:  09/01/20 1415    Specimen:  Wash from Lung, L Updated:  09/06/20 1447     Fungus Culture No fungus isolated at less than 1 week    AFB Culture - Wash, Lung, L [[id number]] Collected:  09/01/20 1415    Specimen:  Wash from Lung, L Updated:  09/06/20 1447     AFB Culture No AFB isolated at less than 1 week     AFB Stain No acid fast bacilli seen on concentrated smear    Fungus Culture - Lavage, Lung, Lingula [[id number]] Collected:  09/01/20 1403    Specimen:  Lavage from Lung, Lingula Updated:  09/06/20 1446     Fungus Culture No fungus isolated at less than 1 week     AFB Culture - Lavage, Lung, Lingula [[id number]] Collected:  09/01/20 1403    Specimen:  Lavage from Lung, Lingula Updated:  09/06/20 1446     AFB Culture No AFB isolated at less than 1 week     AFB Stain No acid fast bacilli seen on concentrated smear    Scan Slide [[id number]] Collected:  09/06/20 0637    Specimen:  Blood Updated:  09/06/20 0822     Hypochromia Slight/1+     WBC Morphology Normal     Platelet Estimate Decreased    CBC & Differential [[id number]] Collected:  09/06/20 0637    Specimen:  Blood Updated:  09/06/20 0819    Narrative:       The following orders were created for panel order CBC & Differential.  Procedure                               Abnormality         Status                     ---------                               -----------         ------                     CBC Auto Differential[[id number]]        Abnormal            Final result                 Please view results for these tests on the individual orders.    CBC Auto Differential [[id number]]  (Abnormal) Collected:  09/06/20 0637    Specimen:  Blood Updated:  09/06/20 0819     WBC 5.29 10*3/mm3      RBC 2.80 10*6/mm3      Hemoglobin 8.1 g/dL      Hematocrit 25.5 %      MCV 91.1 fL      MCH 28.9 pg      MCHC 31.8 g/dL      RDW 16.9 %      RDW-SD 56.6 fl      MPV 11.4 fL      Platelets 88 10*3/mm3      Neutrophil % 58.4 %      Lymphocyte % 27.0 %      Monocyte % 13.2 %      Eosinophil % 0.8 %      Basophil % 0.2 %      Immature Grans % 0.4 %      Neutrophils, Absolute 3.09 10*3/mm3      Lymphocytes, Absolute 1.43 10*3/mm3      Monocytes, Absolute 0.70 10*3/mm3      Eosinophils, Absolute 0.04 10*3/mm3      Basophils, Absolute 0.01 10*3/mm3      Immature Grans, Absolute 0.02 10*3/mm3      nRBC 0.0 /100 WBC     Basic Metabolic Panel [[id number]]  (Abnormal) Collected:  09/06/20 0629    Specimen:  Blood Updated:  09/06/20 0809     Glucose 102 mg/dL      BUN 10 mg/dL      Creatinine 0.78 mg/dL      Sodium 137 mmol/L      Potassium 3.7 mmol/L       Chloride 101 mmol/L      CO2 28.0 mmol/L      Calcium 8.0 mg/dL      eGFR Non African Amer 101 mL/min/1.73      BUN/Creatinine Ratio 12.8     Anion Gap 8.0 mmol/L     Narrative:       GFR Normal >60  Chronic Kidney Disease <60  Kidney Failure <15             I reviewed the patient's new clinical results.  I reviewed the patient's new imaging results and agree with the interpretation.     ASSESSMENT/PLAN:   ASSESSMENT: 1.  Epigastric pain with nausea and vomiting , improving  2.  History of chronic hepatitis C with cirrhosis, LFTs normal.  3.  Extensive lymphadenopathy and weight loss work-up is in progress for lymphoma.  4.  Anemia, likely anemia of chronic disease.  Need to rule out GI blood loss.  5.  Diarrhea rule out colitis and colon polyps.  6.  Bacterial pneumonia, improving.  7.colon polyps  PLAN: 1.  Continue PPI and antibiotics  2. F/U on path  3. OK to D/C from GI standpoint    The risks, benefits, and alternatives of this procedure have been discussed with the patient or the responsible party- the patient understands and agrees to proceed.         Yoni Simon MD  09/06/20  21:53          Electronically signed by Yoni Simon MD at 09/06/20 9723       Consult Notes (last 48 hours) (Notes from 09/06/20 1436 through 09/08/20 1436)    No notes of this type exist for this encounter.

## 2020-09-08 NOTE — PLAN OF CARE
Problem: Patient Care Overview  Goal: Plan of Care Review  Outcome: Ongoing (interventions implemented as appropriate)  Flowsheets (Taken 9/8/2020 1602)  Progress: improving  Plan of Care Reviewed With: patient  Outcome Summary: Pt waiting for placement for rehab to home. Pt short of breath et tachycardic with exertion. Pt refuses therapy et wants to be left alone with door shut. Covid screen negative. Pt currently resting et VSS.

## 2020-09-08 NOTE — PROGRESS NOTES
SUBJECTIVE:   9/7/2020  Chief Complaint:     Subjective      Patient feels some better today. Has nausea and denied emesis. Abdominal pain and dyspnea improving. Tolerating diet. hgb 8.2    History:  Past Medical History:   Diagnosis Date   • Abnormal weight loss    • Acquired thrombocytopenia (CMS/HCC)    • Adenomatous polyp of colon    • Anxiety    • Biliary calculus     denies N/V, abd pain   • Chronic hepatitis C (CMS/HCC)    • Cirrhosis of liver (CMS/HCC)    • Colon polyp    • COPD (chronic obstructive pulmonary disease) (CMS/HCC)    • Elevated levels of transaminase & lactic acid dehydrogenase    • Generalized abdominal pain     known gallstones   • GERD (gastroesophageal reflux disease)    • Hypertension    • IBS (irritable bowel syndrome)    • Impacted cerumen    • Kidney disease     US suggest renal dz, elevated alpha 2 macroglobulins   • Nausea and vomiting    • Patient noncompliance, general    • Rectal hemorrhage    • Tracheal mass    • Villous adenoma of colon    • Wheezing      Past Surgical History:   Procedure Laterality Date   • BRONCHOSCOPY N/A 7/13/2020    Procedure: BRONCHOSCOPY WITH ENDOBRONCHIAL ULTRASOUND;  Surgeon: Karthik Sewell MD;  Location: [facility] OR;  Service: Pulmonary;  Laterality: N/A;  preop; tracheal mass; pneumonia  postop  PCP    • BRONCHOSCOPY N/A 9/1/2020    Procedure: BRONCHOSCOPY WITH ENDOBRONCHIAL ULTRASOUND and needle biopsy, with possible bronchoalveolar lavage, brush, and/or endobronchial biopsy;  Surgeon: Sandra Jacques MD;  Location: [facility] OR;  Service: Pulmonary;  Laterality: N/A;   • COLONOSCOPY  06/03/2013   • COLONOSCOPY N/A 8/22/2018    Procedure: COLONOSCOPY;  Surgeon: Charles Pham MD;  Location: [facility] ENDOSCOPY;  Service: Gastroenterology   • ENDOSCOPY N/A 7/9/2019    Procedure: ESOPHAGOGASTRODUODENOSCOPY;  Surgeon: Bi Ibarra MD;  Location: [facility] ENDOSCOPY;  Service: General   • ENDOSCOPY N/A 9/3/2020    Procedure:  ESOPHAGOGASTRODUODENOSCOPY;  Surgeon: Yoni Simon MD;  Location: [facility] ENDOSCOPY;  Service: Gastroenterology;  Laterality: N/A;   • ENDOSCOPY AND COLONOSCOPY     • ESOPHAGOSCOPY / EGD      with tube   • UPPER GASTROINTESTINAL ENDOSCOPY  06/03/2013   • UPPER GASTROINTESTINAL ENDOSCOPY  07/09/2019     Family History   Problem Relation Age of Onset   • Heart disease Other    • Diabetes Other      Social History     Tobacco Use   • Smoking status: Current Every Day Smoker     Packs/day: 1.00     Types: Cigarettes   • Smokeless tobacco: Never Used   Substance Use Topics   • Alcohol use: No   • Drug use: No     Medications Prior to Admission   Medication Sig Dispense Refill Last Dose   • albuterol (PROVENTIL HFA;VENTOLIN HFA) 108 (90 BASE) MCG/ACT inhaler Inhale 2 puffs Every 4 (Four) Hours As Needed for Wheezing.   Taking   • budesonide-formoterol (Symbicort) 160-4.5 MCG/ACT inhaler Inhale 2 puffs 2 (Two) Times a Day. 6 g 12 Unknown   • busPIRone (BUSPAR) 15 MG tablet Take 15 mg by mouth 2 (two) times a day.   Taking   • ferrous sulfate 325 (65 FE) MG EC tablet Take 1 tablet by mouth Daily With Breakfast. 30 tablet 2 Taking   • loperamide (IMODIUM) 2 MG capsule Take 2 mg by mouth 4 (Four) Times a Day As Needed for Diarrhea.   Taking   • loratadine (CLARITIN) 10 MG tablet Take 10 mg by mouth Daily.  3 Taking   • losartan (COZAAR) 50 MG tablet Take 50 mg by mouth daily.   Taking   • meclizine (ANTIVERT) 25 MG tablet Take 25 mg by mouth 2 (two) times a day.   Taking   • omeprazole (priLOSEC) 20 MG capsule Take 1 capsule by mouth Daily. 30 capsule 5 Taking   • predniSONE (DELTASONE) 10 MG tablet 4 tabs daily x 3 days, then 3 tabs daily x 3 days, then 2 tabs daily x 3 days, then 1 tab daily x 3 days 30 tablet 0 Taking     Allergies:  Codeine and Penicillins     CURRENT MEDICATIONS/OBJECTIVE/VS/PE:     Current Medications:     Current Facility-Administered Medications   Medication Dose Route Frequency Provider Last  Rate Last Dose   • acetaminophen (TYLENOL) tablet 650 mg  650 mg Oral Q4H PRN Yoni Simon MD   650 mg at 09/07/20 1445   • acidophilus (FLORANEX) tablet 1 tablet  1 tablet Oral Daily Yoni Simon MD   1 tablet at 09/07/20 0834   • albuterol sulfate HFA (PROVENTIL HFA;VENTOLIN HFA;PROAIR HFA) inhaler 2 puff  2 puff Inhalation Q4H PRN Yoni Simon MD   2 puff at 08/28/20 1940   • budesonide-formoterol (SYMBICORT) 160-4.5 MCG/ACT inhaler 2 puff  2 puff Inhalation BID - RT Yoni Simon MD   2 puff at 09/07/20 1915   • busPIRone (BUSPAR) tablet 15 mg  15 mg Oral Q12H Yoni Simon MD   15 mg at 09/07/20 1942   • cetirizine (zyrTEC) tablet 10 mg  10 mg Oral Daily Yoni Simon MD   10 mg at 09/07/20 0834   • dextrose 5 % and sodium chloride 0.45 % infusion  30 mL/hr Intravenous Continuous PRN Yoni Simon MD       • folic acid (FOLVITE) tablet 1 mg  1 mg Oral Daily Yoni Simon MD   1 mg at 09/07/20 0834   • guaiFENesin (ROBITUSSIN) 100 MG/5ML oral solution 200 mg  200 mg Oral Q4H PRN Yoni Simon MD       • ipratropium-albuterol (DUO-NEB) nebulizer solution 3 mL  3 mL Nebulization 4x Daily - RT Yoni Simon MD   3 mL at 09/07/20 1914   • levoFLOXacin (LEVAQUIN) 750 mg/150 mL D5W (premix) (LEVAQUIN) 750 mg  750 mg Intravenous Q24H Yoni Simon MD   750 mg at 09/07/20 1445   • loperamide (IMODIUM) capsule 2 mg  2 mg Oral 4x Daily PRN Yoni Simon MD       • LORazepam (ATIVAN) injection 0.25 mg  0.25 mg Intravenous Q4H PRN Yoni Simon MD       • losartan (COZAAR) tablet 50 mg  50 mg Oral Daily Yoni Simon MD   50 mg at 09/07/20 0834   • magic butt ointment   Topical BID Yoni Simon MD       • ondansetron (ZOFRAN) injection 4 mg  4 mg Intravenous Q6H PRN Yoni Simon MD   4 mg at 09/04/20 0024   • prochlorperazine (COMPAZINE) tablet 10 mg  10 mg Oral Q6H PRN Yoni Simon MD       • sodium chloride 0.9 % bolus 2,217 mL  30  mL/kg Intravenous Once Yoni Simon MD       • sodium chloride 0.9 % flush 10 mL  10 mL Intravenous Q12H Yoni Simon MD   10 mL at 09/07/20 1942   • sodium chloride 0.9 % flush 10 mL  10 mL Intravenous PRN Yoni Simon MD   10 mL at 08/26/20 2106   • vitamin C (ASCORBIC ACID) tablet 1,000 mg  1,000 mg Oral TID With Meals Yoni Simon MD   1,000 mg at 09/07/20 1711       Objective     Review of Systems:   Review of Systems   Constitutional: Negative for chills, fatigue, fever and unexpected weight change.   HENT: Negative for congestion, ear discharge, hearing loss, nosebleeds and sore throat.    Eyes: Negative for pain, discharge and redness.   Respiratory: Positive for shortness of breath. Negative for cough, chest tightness and wheezing.    Cardiovascular: Negative for chest pain and palpitations.   Gastrointestinal: Positive for nausea. Negative for abdominal distention, abdominal pain, blood in stool, constipation, diarrhea and vomiting.   Endocrine: Negative for cold intolerance, polydipsia, polyphagia and polyuria.   Genitourinary: Negative for dysuria, flank pain, frequency, hematuria and urgency.   Musculoskeletal: Negative for arthralgias, back pain, joint swelling and myalgias.   Skin: Negative for color change, pallor and rash.   Neurological: Negative for tremors, seizures, syncope, weakness and headaches.   Hematological: Negative for adenopathy. Does not bruise/bleed easily.   Psychiatric/Behavioral: Negative for behavioral problems, confusion, dysphoric mood, hallucinations and suicidal ideas. The patient is not nervous/anxious.        Physical Exam:   Temp:  [97.4 °F (36.3 °C)-98.7 °F (37.1 °C)] 98.3 °F (36.8 °C)  Heart Rate:  [] 97  Resp:  [18-20] 18  BP: (101-122)/(57-65) 108/60     Physical Exam:  General Appearance:    Alert, cooperative, in no acute distress   Head:    Normocephalic, without obvious abnormality, atraumatic   Eyes:            Lids and lashes normal,  conjunctivae and sclerae normal, no   icterus, no pallor, corneas clear, PERRLA   Ears:    Ears appear intact with no abnormalities noted   Throat:   No oral lesions, no thrush, oral mucosa moist   Neck:   No adenopathy, supple, trachea midline, no thyromegaly, no     carotid bruit, no JVD   Back:     No kyphosis present, no scoliosis present, no skin lesions,       erythema or scars, no tenderness to percussion or                   palpation,   range of motion normal   Lungs:     Clear to auscultation,respirations regular, even and                   unlabored    Heart:    Regular rhythm and normal rate, normal S1 and S2, no            murmur, no gallop, no rub, no click   Breast Exam:    Deferred   Abdomen:     Normal bowel sounds, no masses, no organomegaly, soft        nontender, nondistended, no guarding, no rebound                 tenderness   Genitalia:    Deferred   Extremities:   Moves all extremities well, no edema, no cyanosis, no              redness   Pulses:   Pulses palpable and equal bilaterally   Skin:   No bleeding, bruising or rash   Lymph nodes:   No palpable adenopathy   Neurologic:   Cranial nerves 2 - 12 grossly intact, sensation intact, DTR        present and equal bilaterally      Results Review:     Lab Results (last 24 hours)     Procedure Component Value Units Date/Time    Heparin-induced Platelet Antibody [[id number]] Collected:  09/05/20 1036    Specimen:  Blood Updated:  09/07/20 1607     Heparin Induced Plt Ab 0.106 OD     Narrative:       Performed at:  [address] [place] [address]  [id number]  : Flavio Garcia MD, Phone:  [phone]    Basic Metabolic Panel [[id number]]  (Abnormal) Collected:  09/07/20 0449    Specimen:  Blood Updated:  09/07/20 0559     Glucose 104 mg/dL      BUN 10 mg/dL      Creatinine 0.84 mg/dL      Sodium 136 mmol/L      Potassium 3.9 mmol/L      Comment: Slight hemolysis detected by analyzer. Results may be affected.         Chloride 101 mmol/L      CO2 27.0 mmol/L      Calcium 8.2 mg/dL      eGFR Non African Amer 92 mL/min/1.73      BUN/Creatinine Ratio 11.9     Anion Gap 8.0 mmol/L     Narrative:       GFR Normal >60  Chronic Kidney Disease <60  Kidney Failure <15      CBC & Differential [[id number]] Collected:  09/07/20 0449    Specimen:  Blood Updated:  09/07/20 0540    Narrative:       The following orders were created for panel order CBC & Differential.  Procedure                               Abnormality         Status                     ---------                               -----------         ------                     CBC Auto Differential[[id number]]        Abnormal            Final result                 Please view results for these tests on the individual orders.    CBC Auto Differential [[id number]]  (Abnormal) Collected:  09/07/20 0449    Specimen:  Blood Updated:  09/07/20 0540     WBC 5.69 10*3/mm3      RBC 2.79 10*6/mm3      Hemoglobin 8.2 g/dL      Hematocrit 24.9 %      MCV 89.2 fL      MCH 29.4 pg      MCHC 32.9 g/dL      RDW 17.2 %      RDW-SD 55.5 fl      MPV 11.5 fL      Platelets 84 10*3/mm3      Neutrophil % 50.1 %      Lymphocyte % 25.0 %      Monocyte % 20.0 %      Eosinophil % 2.6 %      Basophil % 0.2 %      Immature Grans % 2.1 %      Neutrophils, Absolute 2.85 10*3/mm3      Lymphocytes, Absolute 1.42 10*3/mm3      Monocytes, Absolute 1.14 10*3/mm3      Eosinophils, Absolute 0.15 10*3/mm3      Basophils, Absolute 0.01 10*3/mm3      Immature Grans, Absolute 0.12 10*3/mm3      nRBC 0.0 /100 WBC            I reviewed the patient's new clinical results.  I reviewed the patient's new imaging results and agree with the interpretation.     ASSESSMENT/PLAN:   ASSESSMENT: 1.  Epigastric pain with nausea and vomiting , improving  2.  History of chronic hepatitis C with cirrhosis, LFTs normal.  3.  Extensive lymphadenopathy and weight loss work-up is in progress for lymphoma.  4.  Anemia, likely anemia of chronic  disease.  Need to rule out GI blood loss.  5.  Diarrhea rule out colitis and colon polyps.  6.  Bacterial pneumonia, improving.  7.colon polyps  PLAN: 1.  Continue PPI and antibiotics  2. F/U on path  3. OK to D/C from GI standpoint    The risks, benefits, and alternatives of this procedure have been discussed with the patient or the responsible party- the patient understands and agrees to proceed.         Yoni Simon MD  09/07/20  23:24

## 2020-09-08 NOTE — SIGNIFICANT NOTE
09/08/20 1325   Rehab Treatment   Discipline physical therapy assistant   Reason Treatment Not Performed patient/family declined treatment  (pt states he isn't feeling well today, had an episode of drop in BP this morning and just doesn't feel he can work w/ PT)

## 2020-09-08 NOTE — PLAN OF CARE
Patient Vss. Patient has no complaints of pain at this time. Patient is resting. Will continue to monitor.     Problem: Patient Care Overview  Goal: Plan of Care Review  Outcome: Ongoing (interventions implemented as appropriate)  Flowsheets (Taken 9/7/2020 1940)  Plan of Care Reviewed With: patient

## 2020-09-08 NOTE — PROGRESS NOTES
[facility]  INPATIENT PROGRESS NOTE    Length of Stay: 14  Date of Admission: 8/25/2020  Primary Care Physician: Provider, No Known    Subjective   Chief Complaint: Nausea, shortness of breath  HPI:  65 year old male with a history of COPD ,cirrhosis of the liver with ascites, chronic hepatitis C, GERD, HTN, and irritable bowel syndrome who presented with shortness of breath, nausea, vomiting, diarrhea, and fatigue over 4-5 days.  Chest x-ray showed patchy airspace opacity involving almost the entirety of the left lung with air bronchograms. He also has a known tracheal/mediastinal mass.  He met sepsis criteria and was admitted for sepsis from pneumonia and respiratory failure.  He was admitted to CCU on IV antibiotics and IV fluid.  He was initiated on IV solumedrol and bronchodilators.  COVID 19 is negative.  Blood cultures were positive for Streptococcus pneumoniae sensitive to Levaquin.  Antibiotics were targeted. Oncology and pulmonology were consulted. He underwent EBUS.  Peripheral blood flow cytometry is suggestive of SLL/CLL and he is recommended observation at this time and outpatient oncology follow-up. FNA is also consistent with SLL/CLL. GI was consulted for persistent nausea.  He underwent EGD which revealed esophagitis, gastritis, and medium-sized hiatal hernia.  He underwent colonoscopy which revealed non-bleeding hemorrhoids. Biopsies were taken from the colon. He has weaned to room air.  Platelets are stable. SNF precert is pending.     Review of Systems   Constitutional: Negative for chills and fever.   Respiratory: Negative for shortness of breath.    Cardiovascular: Negative for chest pain.   Gastrointestinal: Negative for abdominal pain, nausea and vomiting.   All other systems reviewed and are negative.       All pertinent negatives and positives are as above. All other systems have been reviewed and are negative unless otherwise  stated.     Objective    Temp:  [97.4 °F (36.3 °C)-98.8 °F (37.1 °C)] 98.3 °F (36.8 °C)  Heart Rate:  [] 128  Resp:  [16-20] 16  BP: ()/(50-62) 93/50    Physical Exam   Constitutional: He is oriented to person, place, and time. He appears well-developed and well-nourished. No distress.   HENT:   Head: Normocephalic and atraumatic.   Eyes: Conjunctivae are normal.   Cardiovascular: Normal rate, regular rhythm and intact distal pulses.   Pulmonary/Chest: Effort normal and breath sounds normal. No respiratory distress.   Abdominal: Soft. Bowel sounds are normal. He exhibits no distension. There is no tenderness.   Musculoskeletal: He exhibits no edema or deformity.   Neurological: He is alert and oriented to person, place, and time.   Skin: Skin is warm and dry. He is not diaphoretic.   Vitals reviewed.      Results Review:  I have reviewed the labs, radiology results, and diagnostic studies.    Laboratory Data:   Results from last 7 days   Lab Units 09/07/20 0449 09/06/20  0629 09/05/20  0555   SODIUM mmol/L 136 137 136   POTASSIUM mmol/L 3.9 3.7 4.2   CHLORIDE mmol/L 101 101 101   CO2 mmol/L 27.0 28.0 29.0   BUN mg/dL 10 10 12   CREATININE mg/dL 0.84 0.78 0.75*   GLUCOSE mg/dL 104* 102* 104*   CALCIUM mg/dL 8.2* 8.0* 7.8*   ANION GAP mmol/L 8.0 8.0 6.0     Estimated Creatinine Clearance: 86.2 mL/min (by C-G formula based on SCr of 0.84 mg/dL).          Results from last 7 days   Lab Units 09/08/20  0549 09/07/20  0449 09/06/20  0637 09/05/20  0555 09/04/20  0533   WBC 10*3/mm3 4.32 5.69 5.29 5.64 7.54   HEMOGLOBIN g/dL 8.0* 8.2* 8.1* 8.0* 8.5*   HEMATOCRIT % 25.4* 24.9* 25.5* 24.6* 26.7*   PLATELETS 10*3/mm3 80* 84* 88* 75* 109*     Results from last 7 days   Lab Units 09/01/20  1754   INR  1.06       Culture Data:   No results found for: BLOODCX  No results found for: URINECX  No results found for: RESPCX  No results found for: WOUNDCX  No results found for: STOOLCX  No components found for:  BODYFLD    Radiology Data:   Imaging Results (Last 24 Hours)     ** No results found for the last 24 hours. **          I have reviewed the patient's current medications.     Assessment/Plan     Active Hospital Problems    Diagnosis   • **Non-intractable vomiting     Added automatically from request for surgery [id number]     • Thrombocytopenia (CMS/HCC)   • CLL (chronic lymphocytic leukemia) (CMS/HCC)     Likely based on flow cytometry     • Bacteremia due to Streptococcus pneumoniae   • Sepsis due to pneumonia (CMS/HCC)   • COPD with acute exacerbation (CMS/HCC)   • IBRAHIMA (acute kidney injury) (CMS/HCC)   • Acute respiratory failure with hypoxia (CMS/HCC)   • Pneumonia of left upper lobe due to Streptococcus pneumoniae (CMS/HCC)     Added automatically from request for surgery [id number]     • Diarrhea     Added automatically from request for surgery [id number]     • Mediastinal adenopathy     Added automatically from request for surgery [id number]     • Hypertension   • Cirrhosis of liver (CMS/HCC)       Plan:    Wean O2, weaned to baseline of room air, maintain SaO2 >88%  Levaquin day 14/14  Symbicort, Jarred  Pulmonology consultation appreciated  Outpatient evaluation of SLL/CLL, Follow-up 4 weeks with heme/onc  Heme/Onc consultation appreciated  S/P EGD  S/P colonoscopy, biopsies pending  Antiemetics PRN  GI consultation appreciated  IBRAHIMA resolved  Thrombocytopenia is suspected secondary to cirrhosis with splenomegaly and consumption from infection. HIT antibodies in reference range.  BP control: Cozaar  PT/OT  VTE PPx: SCDs   Discharge planning: rehab to home, precert pending        The patient was evaluated during the global COVID-19 pandemic, and the diagnosis was suspected/considered upon their initial presentation.  Evaluation, treatment, and testing were consistent with current guidelines for patients who present with complaints or symptoms that may be related to COVID-19.        This document has been electronically  signed by ANA Edmonds on September 8, 2020 10:09

## 2020-09-09 NOTE — PROGRESS NOTES
SUBJECTIVE:   9/9/2020  Chief Complaint:     Subjective      Patient feels some better today. No further nausea or emesis. Abdominal pain, diarrhea and dyspnea improving. Tolerating diet. hgb 8.0. Path was C/W reactive gastropathy and reflux esophagitis. Colon and TI path negative.     History:  Past Medical History:   Diagnosis Date   • Abnormal weight loss    • Acquired thrombocytopenia (CMS/HCC)    • Adenomatous polyp of colon    • Anxiety    • Biliary calculus     denies N/V, abd pain   • Chronic hepatitis C (CMS/HCC)    • Cirrhosis of liver (CMS/HCC)    • Colon polyp    • COPD (chronic obstructive pulmonary disease) (CMS/HCC)    • Elevated levels of transaminase & lactic acid dehydrogenase    • Generalized abdominal pain     known gallstones   • GERD (gastroesophageal reflux disease)    • Hypertension    • IBS (irritable bowel syndrome)    • Impacted cerumen    • Kidney disease     US suggest renal dz, elevated alpha 2 macroglobulins   • Nausea and vomiting    • Patient noncompliance, general    • Rectal hemorrhage    • Tracheal mass    • Villous adenoma of colon    • Wheezing      Past Surgical History:   Procedure Laterality Date   • BRONCHOSCOPY N/A 7/13/2020    Procedure: BRONCHOSCOPY WITH ENDOBRONCHIAL ULTRASOUND;  Surgeon: Karthik Sewell MD;  Location: [facility] OR;  Service: Pulmonary;  Laterality: N/A;  preop; tracheal mass; pneumonia  postop  PCP    • BRONCHOSCOPY N/A 9/1/2020    Procedure: BRONCHOSCOPY WITH ENDOBRONCHIAL ULTRASOUND and needle biopsy, with possible bronchoalveolar lavage, brush, and/or endobronchial biopsy;  Surgeon: Sandra Jacques MD;  Location: [facility] OR;  Service: Pulmonary;  Laterality: N/A;   • COLONOSCOPY  06/03/2013   • COLONOSCOPY N/A 8/22/2018    Procedure: COLONOSCOPY;  Surgeon: Charles Pham MD;  Location: [facility] ENDOSCOPY;  Service: Gastroenterology   • COLONOSCOPY N/A 9/4/2020    Procedure: COLONOSCOPY;  Surgeon: Yoni Simon MD;  Location: [facility]  ENDOSCOPY;  Service: Gastroenterology;  Laterality: N/A;   • ENDOSCOPY N/A 7/9/2019    Procedure: ESOPHAGOGASTRODUODENOSCOPY;  Surgeon: Bi Ibarra MD;  Location: [facility] ENDOSCOPY;  Service: General   • ENDOSCOPY N/A 9/3/2020    Procedure: ESOPHAGOGASTRODUODENOSCOPY;  Surgeon: Yoni Simon MD;  Location: [facility] ENDOSCOPY;  Service: Gastroenterology;  Laterality: N/A;   • ENDOSCOPY AND COLONOSCOPY     • ESOPHAGOSCOPY / EGD      with tube   • UPPER GASTROINTESTINAL ENDOSCOPY  06/03/2013   • UPPER GASTROINTESTINAL ENDOSCOPY  07/09/2019     Family History   Problem Relation Age of Onset   • Heart disease Other    • Diabetes Other      Social History     Tobacco Use   • Smoking status: Current Every Day Smoker     Packs/day: 1.00     Types: Cigarettes   • Smokeless tobacco: Never Used   Substance Use Topics   • Alcohol use: No   • Drug use: No     Medications Prior to Admission   Medication Sig Dispense Refill Last Dose   • albuterol (PROVENTIL HFA;VENTOLIN HFA) 108 (90 BASE) MCG/ACT inhaler Inhale 2 puffs Every 4 (Four) Hours As Needed for Wheezing.   Taking   • budesonide-formoterol (Symbicort) 160-4.5 MCG/ACT inhaler Inhale 2 puffs 2 (Two) Times a Day. 6 g 12 Unknown   • busPIRone (BUSPAR) 15 MG tablet Take 15 mg by mouth 2 (two) times a day.   Taking   • ferrous sulfate 325 (65 FE) MG EC tablet Take 1 tablet by mouth Daily With Breakfast. 30 tablet 2 Taking   • loperamide (IMODIUM) 2 MG capsule Take 2 mg by mouth 4 (Four) Times a Day As Needed for Diarrhea.   Taking   • loratadine (CLARITIN) 10 MG tablet Take 10 mg by mouth Daily.  3 Taking   • losartan (COZAAR) 50 MG tablet Take 50 mg by mouth daily.   Taking   • meclizine (ANTIVERT) 25 MG tablet Take 25 mg by mouth 2 (two) times a day.   Taking   • omeprazole (priLOSEC) 20 MG capsule Take 1 capsule by mouth Daily. 30 capsule 5 Taking   • predniSONE (DELTASONE) 10 MG tablet 4 tabs daily x 3 days, then 3 tabs daily x 3 days, then 2 tabs daily x 3  days, then 1 tab daily x 3 days 30 tablet 0 Taking     Allergies:  Codeine and Penicillins     CURRENT MEDICATIONS/OBJECTIVE/VS/PE:     Current Medications:     Current Facility-Administered Medications   Medication Dose Route Frequency Provider Last Rate Last Dose   • acetaminophen (TYLENOL) tablet 650 mg  650 mg Oral Q4H PRN Yoni Simon MD   650 mg at 09/07/20 1445   • acidophilus (FLORANEX) tablet 1 tablet  1 tablet Oral Daily Yoni Simon MD   1 tablet at 09/09/20 0932   • albuterol sulfate HFA (PROVENTIL HFA;VENTOLIN HFA;PROAIR HFA) inhaler 2 puff  2 puff Inhalation Q4H PRN Yoni Simon MD   2 puff at 08/28/20 1940   • budesonide-formoterol (SYMBICORT) 160-4.5 MCG/ACT inhaler 2 puff  2 puff Inhalation BID - RT Yoni Simon MD   2 puff at 09/08/20 2055   • busPIRone (BUSPAR) tablet 15 mg  15 mg Oral Q12H Yoni Simon MD   15 mg at 09/09/20 0932   • cetirizine (zyrTEC) tablet 10 mg  10 mg Oral Daily Yoni Simon MD   10 mg at 09/09/20 0932   • dextrose 5 % and sodium chloride 0.45 % infusion  30 mL/hr Intravenous Continuous PRN Yoni Simon MD       • folic acid (FOLVITE) tablet 1 mg  1 mg Oral Daily Yoni Simon MD   1 mg at 09/09/20 0932   • guaiFENesin (ROBITUSSIN) 100 MG/5ML oral solution 200 mg  200 mg Oral Q4H PRN Yoni Simno MD       • ipratropium-albuterol (DUO-NEB) nebulizer solution 3 mL  3 mL Nebulization 4x Daily - RT Yoni Simon MD   3 mL at 09/09/20 1450   • loperamide (IMODIUM) capsule 2 mg  2 mg Oral 4x Daily PRN Yoni Simon MD       • losartan (COZAAR) tablet 50 mg  50 mg Oral Daily Yoni Simon MD   50 mg at 09/09/20 0932   • magic butt ointment   Topical BID Yoni Simon MD       • ondansetron (ZOFRAN) injection 4 mg  4 mg Intravenous Q6H PRN Yoni Simon MD   4 mg at 09/09/20 0937   • prochlorperazine (COMPAZINE) tablet 10 mg  10 mg Oral Q6H PRN Yoni Simon MD       • sodium chloride 0.9 % bolus  2,217 mL  30 mL/kg Intravenous Once Yoni Simon MD       • sodium chloride 0.9 % flush 10 mL  10 mL Intravenous Q12H Yoni Simon MD   10 mL at 09/09/20 0932   • sodium chloride 0.9 % flush 10 mL  10 mL Intravenous PRN Yoni Simon MD   10 mL at 08/26/20 2106   • vitamin C (ASCORBIC ACID) tablet 1,000 mg  1,000 mg Oral TID With Meals Yoni Simon MD   1,000 mg at 09/09/20 1135       Objective     Review of Systems:   Review of Systems   Constitutional: Negative for chills, fatigue, fever and unexpected weight change.   HENT: Negative for congestion, ear discharge, hearing loss, nosebleeds and sore throat.    Eyes: Negative for pain, discharge and redness.   Respiratory: Positive for shortness of breath. Negative for cough, chest tightness and wheezing.    Cardiovascular: Negative for chest pain and palpitations.   Gastrointestinal: Negative for abdominal distention, abdominal pain, blood in stool, constipation, diarrhea and vomiting.   Endocrine: Negative for cold intolerance, polydipsia, polyphagia and polyuria.   Genitourinary: Negative for dysuria, flank pain, frequency, hematuria and urgency.   Musculoskeletal: Negative for arthralgias, back pain, joint swelling and myalgias.   Skin: Negative for color change, pallor and rash.   Neurological: Negative for tremors, seizures, syncope, weakness and headaches.   Hematological: Negative for adenopathy. Does not bruise/bleed easily.   Psychiatric/Behavioral: Negative for behavioral problems, confusion, dysphoric mood, hallucinations and suicidal ideas. The patient is not nervous/anxious.        Physical Exam:   Temp:  [96.7 °F (35.9 °C)-98.7 °F (37.1 °C)] 98.6 °F (37 °C)  Heart Rate:  [] 90  Resp:  [18-20] 18  BP: ()/(48-62) 102/48     Physical Exam:  General Appearance:    Alert, cooperative, in no acute distress   Head:    Normocephalic, without obvious abnormality, atraumatic   Eyes:            Lids and lashes normal,  conjunctivae and sclerae normal, no   icterus, no pallor, corneas clear, PERRLA   Ears:    Ears appear intact with no abnormalities noted   Throat:   No oral lesions, no thrush, oral mucosa moist   Neck:   No adenopathy, supple, trachea midline, no thyromegaly, no     carotid bruit, no JVD   Back:     No kyphosis present, no scoliosis present, no skin lesions,       erythema or scars, no tenderness to percussion or                   palpation,   range of motion normal   Lungs:     Clear to auscultation,respirations regular, even and                   unlabored    Heart:    Regular rhythm and normal rate, normal S1 and S2, no            murmur, no gallop, no rub, no click   Breast Exam:    Deferred   Abdomen:     Normal bowel sounds, no masses, no organomegaly, soft        nontender, nondistended, no guarding, no rebound                 tenderness   Genitalia:    Deferred   Extremities:   Moves all extremities well, no edema, no cyanosis, no              redness   Pulses:   Pulses palpable and equal bilaterally   Skin:   No bleeding, bruising or rash   Lymph nodes:   No palpable adenopathy   Neurologic:   Cranial nerves 2 - 12 grossly intact, sensation intact, DTR        present and equal bilaterally      Results Review:     Lab Results (last 24 hours)     Procedure Component Value Units Date/Time    COVID PRE-OP / PRE-PROCEDURE SCREENING ORDER (NO ISOLATION) - Swab, Nasopharynx [[id number]] Collected:  09/08/20 1044    Specimen:  Swab from Nasopharynx Updated:  09/08/20 7047    Narrative:       The following orders were created for panel order COVID PRE-OP / PRE-PROCEDURE SCREENING ORDER (NO ISOLATION) - Swab, Nasopharynx.  Procedure                               Abnormality         Status                     ---------                               -----------         ------                     COVID-19, [facility] IN-HOUS...[[id number]]  Normal              Final result                 Please view results for these tests  on the individual orders.    COVID-19, [facility] IN-HOUSE, NP SWAB IN TRANSPORT MEDIA 8-10 HR TAT - Swab, Nasopharynx [[id number]]  (Normal) Collected:  09/08/20 1044    Specimen:  Swab from Nasopharynx Updated:  09/08/20 1757     COVID19 Not Detected    Narrative:       Testing performed by Real Time RT-PCR  This test has not been approved by the [facility] but is authorized under the Emergency Use Act (EUA)    Fact sheet for providers: https://www.fda.gov/media/061737/download    Fact sheet for patients: https://www.fda.gov/media/720788/download               I reviewed the patient's new clinical results.  I reviewed the patient's new imaging results and agree with the interpretation.     ASSESSMENT/PLAN:   ASSESSMENT: 1.  Epigastric pain with nausea and vomiting , improving  2.  History of chronic hepatitis C with cirrhosis, LFTs normal.  3.  Extensive lymphadenopathy and weight loss work-up is in progress for lymphoma.  4.  Anemia, likely anemia of chronic disease.  Need to rule out GI blood loss.  5.  Diarrhea, improving  6.  Bacterial pneumonia, improving.  7.colon polyps  PLAN: 1.  Continue PPI and antibiotics  2. Add carafate  3. Cont current care  4. OK to D/C from GI standpoint  The risks, benefits, and alternatives of this procedure have been discussed with the patient or the responsible party- the patient understands and agrees to proceed.         Yoni Simon MD  09/09/20  17:12

## 2020-09-09 NOTE — PROGRESS NOTES
SUBJECTIVE:   9/8/2020  Chief Complaint:     Subjective      Patient feels some better today. Has nausea and denied emesis. Abdominal pain, diarrhea and dyspnea improving. Tolerating diet. hgb 8.0. Path was C/W reactive gastropathy and reflux esophagitis. Colon and TI path negative.     History:  Past Medical History:   Diagnosis Date   • Abnormal weight loss    • Acquired thrombocytopenia (CMS/HCC)    • Adenomatous polyp of colon    • Anxiety    • Biliary calculus     denies N/V, abd pain   • Chronic hepatitis C (CMS/HCC)    • Cirrhosis of liver (CMS/HCC)    • Colon polyp    • COPD (chronic obstructive pulmonary disease) (CMS/HCC)    • Elevated levels of transaminase & lactic acid dehydrogenase    • Generalized abdominal pain     known gallstones   • GERD (gastroesophageal reflux disease)    • Hypertension    • IBS (irritable bowel syndrome)    • Impacted cerumen    • Kidney disease     US suggest renal dz, elevated alpha 2 macroglobulins   • Nausea and vomiting    • Patient noncompliance, general    • Rectal hemorrhage    • Tracheal mass    • Villous adenoma of colon    • Wheezing      Past Surgical History:   Procedure Laterality Date   • BRONCHOSCOPY N/A 7/13/2020    Procedure: BRONCHOSCOPY WITH ENDOBRONCHIAL ULTRASOUND;  Surgeon: Karthik Sewell MD;  Location: [facility] OR;  Service: Pulmonary;  Laterality: N/A;  preop; tracheal mass; pneumonia  postop  PCP    • BRONCHOSCOPY N/A 9/1/2020    Procedure: BRONCHOSCOPY WITH ENDOBRONCHIAL ULTRASOUND and needle biopsy, with possible bronchoalveolar lavage, brush, and/or endobronchial biopsy;  Surgeon: Sandra Jacques MD;  Location: [facility] OR;  Service: Pulmonary;  Laterality: N/A;   • COLONOSCOPY  06/03/2013   • COLONOSCOPY N/A 8/22/2018    Procedure: COLONOSCOPY;  Surgeon: Charles Pham MD;  Location: [facility] ENDOSCOPY;  Service: Gastroenterology   • ENDOSCOPY N/A 7/9/2019    Procedure: ESOPHAGOGASTRODUODENOSCOPY;  Surgeon: Bi Ibarra MD;   Location: [facility] ENDOSCOPY;  Service: General   • ENDOSCOPY N/A 9/3/2020    Procedure: ESOPHAGOGASTRODUODENOSCOPY;  Surgeon: Yoni Simon MD;  Location: [facility] ENDOSCOPY;  Service: Gastroenterology;  Laterality: N/A;   • ENDOSCOPY AND COLONOSCOPY     • ESOPHAGOSCOPY / EGD      with tube   • UPPER GASTROINTESTINAL ENDOSCOPY  06/03/2013   • UPPER GASTROINTESTINAL ENDOSCOPY  07/09/2019     Family History   Problem Relation Age of Onset   • Heart disease Other    • Diabetes Other      Social History     Tobacco Use   • Smoking status: Current Every Day Smoker     Packs/day: 1.00     Types: Cigarettes   • Smokeless tobacco: Never Used   Substance Use Topics   • Alcohol use: No   • Drug use: No     Medications Prior to Admission   Medication Sig Dispense Refill Last Dose   • albuterol (PROVENTIL HFA;VENTOLIN HFA) 108 (90 BASE) MCG/ACT inhaler Inhale 2 puffs Every 4 (Four) Hours As Needed for Wheezing.   Taking   • budesonide-formoterol (Symbicort) 160-4.5 MCG/ACT inhaler Inhale 2 puffs 2 (Two) Times a Day. 6 g 12 Unknown   • busPIRone (BUSPAR) 15 MG tablet Take 15 mg by mouth 2 (two) times a day.   Taking   • ferrous sulfate 325 (65 FE) MG EC tablet Take 1 tablet by mouth Daily With Breakfast. 30 tablet 2 Taking   • loperamide (IMODIUM) 2 MG capsule Take 2 mg by mouth 4 (Four) Times a Day As Needed for Diarrhea.   Taking   • loratadine (CLARITIN) 10 MG tablet Take 10 mg by mouth Daily.  3 Taking   • losartan (COZAAR) 50 MG tablet Take 50 mg by mouth daily.   Taking   • meclizine (ANTIVERT) 25 MG tablet Take 25 mg by mouth 2 (two) times a day.   Taking   • omeprazole (priLOSEC) 20 MG capsule Take 1 capsule by mouth Daily. 30 capsule 5 Taking   • predniSONE (DELTASONE) 10 MG tablet 4 tabs daily x 3 days, then 3 tabs daily x 3 days, then 2 tabs daily x 3 days, then 1 tab daily x 3 days 30 tablet 0 Taking     Allergies:  Codeine and Penicillins     CURRENT MEDICATIONS/OBJECTIVE/VS/PE:     Current Medications:      Current Facility-Administered Medications   Medication Dose Route Frequency Provider Last Rate Last Dose   • acetaminophen (TYLENOL) tablet 650 mg  650 mg Oral Q4H PRN Yoni Simon MD   650 mg at 09/07/20 1445   • acidophilus (FLORANEX) tablet 1 tablet  1 tablet Oral Daily Yoni Simon MD   1 tablet at 09/08/20 0931   • albuterol sulfate HFA (PROVENTIL HFA;VENTOLIN HFA;PROAIR HFA) inhaler 2 puff  2 puff Inhalation Q4H PRN Yoni Simon MD   2 puff at 08/28/20 1940   • budesonide-formoterol (SYMBICORT) 160-4.5 MCG/ACT inhaler 2 puff  2 puff Inhalation BID - RT Yoni Simon MD   2 puff at 09/08/20 0816   • busPIRone (BUSPAR) tablet 15 mg  15 mg Oral Q12H Yoni Simon MD   15 mg at 09/08/20 0931   • cetirizine (zyrTEC) tablet 10 mg  10 mg Oral Daily Yoni Simon MD   10 mg at 09/08/20 0931   • dextrose 5 % and sodium chloride 0.45 % infusion  30 mL/hr Intravenous Continuous PRN Yoni Simon MD       • folic acid (FOLVITE) tablet 1 mg  1 mg Oral Daily Yoni Simon MD   1 mg at 09/08/20 0931   • guaiFENesin (ROBITUSSIN) 100 MG/5ML oral solution 200 mg  200 mg Oral Q4H PRN Yoni Simon MD       • ipratropium-albuterol (DUO-NEB) nebulizer solution 3 mL  3 mL Nebulization 4x Daily - RT Yoni Simon MD   3 mL at 09/08/20 1559   • levoFLOXacin (LEVAQUIN) 750 mg/150 mL D5W (premix) (LEVAQUIN) 750 mg  750 mg Intravenous Q24H Yoni Simon MD   750 mg at 09/08/20 1704   • loperamide (IMODIUM) capsule 2 mg  2 mg Oral 4x Daily PRN Yoni Simon MD       • losartan (COZAAR) tablet 50 mg  50 mg Oral Daily Yoni Simon MD   50 mg at 09/07/20 0834   • magic butt ointment   Topical BID Yoni Simon MD       • ondansetron (ZOFRAN) injection 4 mg  4 mg Intravenous Q6H PRN Yoni Simon MD   4 mg at 09/04/20 0024   • prochlorperazine (COMPAZINE) tablet 10 mg  10 mg Oral Q6H PRN Yoni Simon MD       • sodium chloride 0.9 % bolus 2,217 mL  30  mL/kg Intravenous Once Yoni Simon MD       • sodium chloride 0.9 % flush 10 mL  10 mL Intravenous Q12H Yoni Simon MD   10 mL at 09/08/20 0932   • sodium chloride 0.9 % flush 10 mL  10 mL Intravenous PRN Yoni Simon MD   10 mL at 08/26/20 2106   • vitamin C (ASCORBIC ACID) tablet 1,000 mg  1,000 mg Oral TID With Meals Yoni Simon MD   1,000 mg at 09/08/20 1704       Objective     Review of Systems:   Review of Systems   Constitutional: Negative for chills, fatigue, fever and unexpected weight change.   HENT: Negative for congestion, ear discharge, hearing loss, nosebleeds and sore throat.    Eyes: Negative for pain, discharge and redness.   Respiratory: Positive for shortness of breath. Negative for cough, chest tightness and wheezing.    Cardiovascular: Negative for chest pain and palpitations.   Gastrointestinal: Positive for nausea. Negative for abdominal distention, abdominal pain, blood in stool, constipation, diarrhea and vomiting.   Endocrine: Negative for cold intolerance, polydipsia, polyphagia and polyuria.   Genitourinary: Negative for dysuria, flank pain, frequency, hematuria and urgency.   Musculoskeletal: Negative for arthralgias, back pain, joint swelling and myalgias.   Skin: Negative for color change, pallor and rash.   Neurological: Negative for tremors, seizures, syncope, weakness and headaches.   Hematological: Negative for adenopathy. Does not bruise/bleed easily.   Psychiatric/Behavioral: Negative for behavioral problems, confusion, dysphoric mood, hallucinations and suicidal ideas. The patient is not nervous/anxious.        Physical Exam:   Temp:  [97.6 °F (36.4 °C)-98.8 °F (37.1 °C)] 97.6 °F (36.4 °C)  Heart Rate:  [] 101  Resp:  [16-20] 18  BP: ()/(50-63) 127/60     Physical Exam:  General Appearance:    Alert, cooperative, in no acute distress   Head:    Normocephalic, without obvious abnormality, atraumatic   Eyes:            Lids and lashes normal,  conjunctivae and sclerae normal, no   icterus, no pallor, corneas clear, PERRLA   Ears:    Ears appear intact with no abnormalities noted   Throat:   No oral lesions, no thrush, oral mucosa moist   Neck:   No adenopathy, supple, trachea midline, no thyromegaly, no     carotid bruit, no JVD   Back:     No kyphosis present, no scoliosis present, no skin lesions,       erythema or scars, no tenderness to percussion or                   palpation,   range of motion normal   Lungs:     Clear to auscultation,respirations regular, even and                   unlabored    Heart:    Regular rhythm and normal rate, normal S1 and S2, no            murmur, no gallop, no rub, no click   Breast Exam:    Deferred   Abdomen:     Normal bowel sounds, no masses, no organomegaly, soft        nontender, nondistended, no guarding, no rebound                 tenderness   Genitalia:    Deferred   Extremities:   Moves all extremities well, no edema, no cyanosis, no              redness   Pulses:   Pulses palpable and equal bilaterally   Skin:   No bleeding, bruising or rash   Lymph nodes:   No palpable adenopathy   Neurologic:   Cranial nerves 2 - 12 grossly intact, sensation intact, DTR        present and equal bilaterally      Results Review:     Lab Results (last 24 hours)     Procedure Component Value Units Date/Time    COVID PRE-OP / PRE-PROCEDURE SCREENING ORDER (NO ISOLATION) - Swab, Nasopharynx [[id number]] Collected:  09/08/20 1044    Specimen:  Swab from Nasopharynx Updated:  09/08/20 3167    Narrative:       The following orders were created for panel order COVID PRE-OP / PRE-PROCEDURE SCREENING ORDER (NO ISOLATION) - Swab, Nasopharynx.  Procedure                               Abnormality         Status                     ---------                               -----------         ------                     COVID-19, [facility] IN-HOUS...[[id number]]  Normal              Final result                 Please view results for these tests  on the individual orders.    COVID-19, [facility] IN-HOUSE, NP SWAB IN TRANSPORT MEDIA 8-10 HR TAT - Swab, Nasopharynx [[id number]]  (Normal) Collected:  09/08/20 1044    Specimen:  Swab from Nasopharynx Updated:  09/08/20 1757     COVID19 Not Detected    Narrative:       Testing performed by Real Time RT-PCR  This test has not been approved by the [facility] but is authorized under the Emergency Use Act (EUA)    Fact sheet for providers: https://www.fda.gov/media/875100/download    Fact sheet for patients: https://www.fda.gov/media/856943/download        Tissue Pathology Exam [[id number]] Collected:  09/04/20 1331    Specimen:  Tissue from Small Intestine, Ileum; Tissue from Large Intestine Updated:  09/08/20 1459     Case Report --     Surgical Pathology Report                         Case: [id number]                                  Authorizing Provider:  Yoni Simon MD      Collected:           09/04/2020 01:31 PM          Ordering Location:     [facility]             Received:            09/04/2020 02:32 PM                                 [facility]                                                     Pathologist:           Altagracia Babin DO                                                        Specimens:   1) - Small Intestine, Ileum, ti bx                                                                  2) - Large Intestine, colonic mucosa bx                                                     Final Diagnosis --     SEE SCANNED REPORT        Tissue Pathology Exam [[id number]] Collected:  09/03/20 1526    Specimen:  Tissue from Small Intestine, Duodenum; Tissue from Gastric, Antrum; Tissue from Esophagus Updated:  09/08/20 1458     Case Report --     Surgical Pathology Report                         Case: [id number]                                  Authorizing Provider:  Yoni Simon MD      Collected:           09/03/2020 03:26 PM          Ordering Location:     [facility]              Received:            09/04/2020 07:16 AM                                 [facility]                                                     Pathologist:           Altagracia Babin,                                                         Specimens:   1) - Small Intestine, Duodenum                                                                      2) - Gastric, Antrum                                                                                3) - Esophagus, EGJ                                                                         Final Diagnosis --     SEE SCANNED REPORT        Fungus Culture - Lavage, Lung, Lingula [[id number]] Collected:  09/01/20 1403    Specimen:  Lavage from Lung, Lingula Updated:  09/08/20 1445     Fungus Culture No fungus isolated at 1 week    Fungus Culture - Wash, Lung, L [[id number]] Collected:  09/01/20 1415    Specimen:  Wash from Lung, L Updated:  09/08/20 1445     Fungus Culture No fungus isolated at 1 week    AFB Culture - Lavage, Lung, Lingula [[id number]] Collected:  09/01/20 1403    Specimen:  Lavage from Lung, Lingula Updated:  09/08/20 1445     AFB Culture No AFB isolated at 1 week     AFB Stain No acid fast bacilli seen on concentrated smear    AFB Culture - Wash, Lung, L [[id number]] Collected:  09/01/20 1415    Specimen:  Wash from Lung, L Updated:  09/08/20 1445     AFB Culture No AFB isolated at 1 week     AFB Stain No acid fast bacilli seen on concentrated smear    Extra Tubes [[id number]] Collected:  09/08/20 0549    Specimen:  Blood, Venous Line Updated:  09/08/20 0700    Narrative:       The following orders were created for panel order Extra Tubes.  Procedure                               Abnormality         Status                     ---------                               -----------         ------                     Green Top (Gel)[[id number]]                                  Final result                 Please view results for these tests on the  individual orders.    Green Top (Gel) [[id number]] Collected:  09/08/20 0549    Specimen:  Blood Updated:  09/08/20 0700     Extra Tube Hold for add-ons.     Comment: Auto resulted.       CBC & Differential [[id number]] Collected:  09/08/20 0549    Specimen:  Blood Updated:  09/08/20 0651    Narrative:       The following orders were created for panel order CBC & Differential.  Procedure                               Abnormality         Status                     ---------                               -----------         ------                     CBC Auto Differential[[id number]]        Abnormal            Final result                 Please view results for these tests on the individual orders.    CBC Auto Differential [[id number]]  (Abnormal) Collected:  09/08/20 0549    Specimen:  Blood Updated:  09/08/20 0651     WBC 4.32 10*3/mm3      RBC 2.82 10*6/mm3      Hemoglobin 8.0 g/dL      Hematocrit 25.4 %      MCV 90.1 fL      MCH 28.4 pg      MCHC 31.5 g/dL      RDW 16.8 %      RDW-SD 55.5 fl      MPV 11.5 fL      Platelets 80 10*3/mm3      Neutrophil % 48.8 %      Lymphocyte % 35.0 %      Monocyte % 14.8 %      Eosinophil % 0.7 %      Basophil % 0.2 %      Immature Grans % 0.5 %      Neutrophils, Absolute 2.11 10*3/mm3      Lymphocytes, Absolute 1.51 10*3/mm3      Monocytes, Absolute 0.64 10*3/mm3      Eosinophils, Absolute 0.03 10*3/mm3      Basophils, Absolute 0.01 10*3/mm3      Immature Grans, Absolute 0.02 10*3/mm3      nRBC 0.0 /100 WBC            I reviewed the patient's new clinical results.  I reviewed the patient's new imaging results and agree with the interpretation.     ASSESSMENT/PLAN:   ASSESSMENT: 1.  Epigastric pain with nausea and vomiting , improving  2.  History of chronic hepatitis C with cirrhosis, LFTs normal.  3.  Extensive lymphadenopathy and weight loss work-up is in progress for lymphoma.  4.  Anemia, likely anemia of chronic disease.  Need to rule out GI blood loss.  5.  Diarrhea,  improving  6.  Bacterial pneumonia, improving.  7.colon polyps  PLAN: 1.  Continue PPI and antibiotics  2. Add carafate    The risks, benefits, and alternatives of this procedure have been discussed with the patient or the responsible party- the patient understands and agrees to proceed.         Yoni Simon MD  09/08/20  20:12

## 2020-09-09 NOTE — PLAN OF CARE
Problem: Patient Care Overview  Goal: Plan of Care Review  Outcome: Ongoing (interventions implemented as appropriate)  Flowsheets (Taken 9/9/2020 132)  Progress: improving  Plan of Care Reviewed With: patient  Outcome Summary: pt sat eob for most of tx w/ ue ex b ues. amb to bathroom w/ supervision, conditional I w/ toileting, to tx w/o c/o, reports he is expecting to d/c soon,  declined bathing  cont poc

## 2020-09-09 NOTE — THERAPY PROGRESS REPORT/RE-CERT
Patient Name: Wong Patrick  : 1955    MRN: [id number]                              Today's Date: 2020     Re-Certification  Admit Date: 2020    Visit Dx:     ICD-10-CM ICD-9-CM   1. Pneumonia of both lungs due to infectious organism, unspecified part of lung J18.9 483.8   2. Fever, unspecified fever cause R50.9 780.60   3. Impaired mobility and ADLs Z74.09 V49.89    Z78.9    4. Impaired functional mobility, balance, gait, and endurance Z74.09 V49.89   5. Pneumonia of left upper lobe due to Streptococcus pneumoniae (CMS/HCC) J13 481   6. Mediastinal adenopathy R59.0 785.6   7. Non-intractable vomiting with nausea, unspecified vomiting type R11.2 787.01   8. Diarrhea, unspecified type R19.7 787.91     Patient Active Problem List   Diagnosis   • History of colon polyps   • Nausea and vomiting   • Right lower lobe pneumonia   • Acquired thrombocytopenia (CMS/HCC)   • Chronic hepatitis C (CMS/HCC)   • Hypertension   • Cirrhosis of liver (CMS/HCC)   • Mediastinal adenopathy   • Sepsis due to pneumonia (CMS/HCC)   • COPD with acute exacerbation (CMS/HCC)   • IBRAHIMA (acute kidney injury) (CMS/HCC)   • Acute respiratory failure with hypoxia (CMS/HCC)   • Pneumonia of left upper lobe due to Streptococcus pneumoniae (CMS/HCC)   • Non-intractable vomiting   • CLL (chronic lymphocytic leukemia) (CMS/HCC)   • Bacteremia due to Streptococcus pneumoniae   • Diarrhea   • Thrombocytopenia (CMS/HCC)     Past Medical History:   Diagnosis Date   • Abnormal weight loss    • Acquired thrombocytopenia (CMS/HCC)    • Adenomatous polyp of colon    • Anxiety    • Biliary calculus     denies N/V, abd pain   • Chronic hepatitis C (CMS/HCC)    • Cirrhosis of liver (CMS/HCC)    • Colon polyp    • COPD (chronic obstructive pulmonary disease) (CMS/HCC)    • Elevated levels of transaminase & lactic acid dehydrogenase    • Generalized abdominal pain     known gallstones   • GERD (gastroesophageal reflux disease)    • Hypertension     • IBS (irritable bowel syndrome)    • Impacted cerumen    • Kidney disease     US suggest renal dz, elevated alpha 2 macroglobulins   • Nausea and vomiting    • Patient noncompliance, general    • Rectal hemorrhage    • Tracheal mass    • Villous adenoma of colon    • Wheezing      Past Surgical History:   Procedure Laterality Date   • BRONCHOSCOPY N/A 7/13/2020    Procedure: BRONCHOSCOPY WITH ENDOBRONCHIAL ULTRASOUND;  Surgeon: Karthik Sewell MD;  Location: [facility] OR;  Service: Pulmonary;  Laterality: N/A;  preop; tracheal mass; pneumonia  postop  PCP    • BRONCHOSCOPY N/A 9/1/2020    Procedure: BRONCHOSCOPY WITH ENDOBRONCHIAL ULTRASOUND and needle biopsy, with possible bronchoalveolar lavage, brush, and/or endobronchial biopsy;  Surgeon: Sandra Jacques MD;  Location: [facility] OR;  Service: Pulmonary;  Laterality: N/A;   • COLONOSCOPY  06/03/2013   • COLONOSCOPY N/A 8/22/2018    Procedure: COLONOSCOPY;  Surgeon: Charles Pham MD;  Location: [facility] ENDOSCOPY;  Service: Gastroenterology   • COLONOSCOPY N/A 9/4/2020    Procedure: COLONOSCOPY;  Surgeon: Yoni Simon MD;  Location: [facility] ENDOSCOPY;  Service: Gastroenterology;  Laterality: N/A;   • ENDOSCOPY N/A 7/9/2019    Procedure: ESOPHAGOGASTRODUODENOSCOPY;  Surgeon: Bi Ibarra MD;  Location: [facility] ENDOSCOPY;  Service: General   • ENDOSCOPY N/A 9/3/2020    Procedure: ESOPHAGOGASTRODUODENOSCOPY;  Surgeon: Yoni Simon MD;  Location: [facility] ENDOSCOPY;  Service: Gastroenterology;  Laterality: N/A;   • ENDOSCOPY AND COLONOSCOPY     • ESOPHAGOSCOPY / EGD      with tube   • UPPER GASTROINTESTINAL ENDOSCOPY  06/03/2013   • UPPER GASTROINTESTINAL ENDOSCOPY  07/09/2019     General Information     Row Name 09/09/20 0928          PT Evaluation Time/Intention    Document Type  progress note/recertification  -KW     Mode of Treatment  individual therapy;physical therapy  -KW     Row Name 09/09/20 0928          General Information     Existing Precautions/Restrictions  fall;oxygen therapy device and L/min  -KW     Row Name 09/09/20 0928          Cognitive Assessment/Intervention- PT/OT    Orientation Status (Cognition)  person;place;situation correct year, incorrect month  -KW     Row Name 09/09/20 0928          Safety Issues, Functional Mobility    Safety Issues Affecting Function (Mobility)  awareness of need for assistance;insight into deficits/self awareness;judgment;positioning of assistive device;problem solving;safety precaution awareness;safety precautions follow-through/compliance;sequencing abilities  -KW     Impairments Affecting Function (Mobility)  balance;endurance/activity tolerance;strength  -KW       User Key  (r) = Recorded By, (t) = Taken By, (c) = Cosigned By    Initials Name Provider Type    KW Mo Regan PT Physical Therapist        Mobility     Row Name 09/09/20 0928          Bed Mobility Assessment/Treatment    Bed Mobility Assessment/Treatment  supine-sit-supine  -KW     Supine-Sit [place] (Bed Mobility)  conditional independence  -KW     Sit-Supine [place] (Bed Mobility)  conditional independence  -KW     Assistive Device (Bed Mobility)  head of bed elevated;bed rails  -KW     Row Name 09/09/20 0928          Sit-Stand Transfer    Sit-Stand [place] (Transfers)  supervision  -KW     Assistive Device (Sit-Stand Transfers)  walker, front-wheeled  -KW     Row Name 09/09/20 0928          Gait/Stairs Assessment/Training    Gait/Stairs Assessment/Training  gait/ambulation independence  -KW     [place] Level (Gait)  supervision  -KW     Assistive Device (Gait)  walker, front-wheeled  -KW     Distance in Feet (Gait)  350'  -KW     Comment (Gait/Stairs)  VSS post amb  -KW       User Key  (r) = Recorded By, (t) = Taken By, (c) = Cosigned By    Initials Name Provider Type    KW Mo Regan PT Physical Therapist        Obj/Interventions     Row Name 09/09/20 0928          General ROM    GENERAL ROM  COMMENTS  BLE ROM WFL  -KW     Row Name 09/09/20 0928          MMT (Manual Muscle Testing)    General MMT Comments  BLE MMT 4/5 grossly  -KW     Row Name 09/09/20 0928          Static Sitting Balance    Level of [place] (Unsupported Sitting, Static Balance)  independent  -KW     Sitting Position (Unsupported Sitting, Static Balance)  sitting on edge of bed  -KW     Time Able to Maintain Position (Unsupported Sitting, Static Balance)  more than 5 minutes  -KW     Row Name 09/09/20 0928          Dynamic Sitting Balance    Level of [place], Reaches Outside Midline (Sitting, Dynamic Balance)  supervision  -KW     Sitting Position, Reaches Outside Midline (Sitting, Dynamic Balance)  sitting on edge of bed  -KW     Row Name 09/09/20 0928          Static Standing Balance    Level of [place] (Supported Standing, Static Balance)  supervision  -KW     Time Able to Maintain Position (Supported Standing, Static Balance)  1 to 2 minutes  -KW     Row Name 09/09/20 0928          Dynamic Standing Balance    Level of [place], Reaches Outside Midline (Standing, Dynamic Balance)  supervision  -KW     Time Able to Maintain Position, Reaches Outside Midline (Standing, Dynamic Balance)  1 to 2 minutes  -KW     Row Name 09/09/20 0928          Sensory Assessment/Intervention    Sensory General Assessment  no sensation deficits identified  -KW       User Key  (r) = Recorded By, (t) = Taken By, (c) = Cosigned By    Initials Name Provider Type    Mo Bright, PT Physical Therapist        Goals/Plan     Row Name 09/09/20 0928          Bed Mobility Goal 1 (PT)    Activity/Assistive Device (Bed Mobility Goal 1, PT)  supine to sit;sit to supine  -KW     [place] Level/Cues Needed (Bed Mobility Goal 1, PT)  conditional independence  -KW     Time Frame (Bed Mobility Goal 1, PT)  2 days  -KW     Progress/Outcomes (Bed Mobility Goal 1, PT)  (S) goal met  -KW     Row Name 09/09/20 0928          Transfer Goal 1 (PT)     Activity/Assistive Device (Transfer Goal 1, PT)  sit-to-stand/stand-to-sit;bed-to-chair/chair-to-bed LRAD PRN  -KW     [place] Level/Cues Needed (Transfer Goal 1, PT)  conditional independence  -KW     Time Frame (Transfer Goal 1, PT)  3 days  -KW     Progress/Outcome (Transfer Goal 1, PT)  goal not met  -KW     Row Name 09/09/20 0928          Gait Training Goal 1 (PT)    Activity/Assistive Device (Gait Training Goal 1, PT)  gait (walking locomotion);assistive device use;decrease fall risk;improve balance and speed;increase endurance/gait distance LRAD PRN  -KW     [place] Level (Gait Training Goal 1, PT)  conditional independence  -KW     Distance (Gait Goal 1, PT)  100ft or more  -KW     Time Frame (Gait Training Goal 1, PT)  by discharge  -KW     Progress/Outcome (Gait Training Goal 1, PT)  goal not met  -KW       User Key  (r) = Recorded By, (t) = Taken By, (c) = Cosigned By    Initials Name Provider Type    KW Mo Regan, PT Physical Therapist            Outcome Measures     Row Name 09/09/20 1002          How much help from another person do you currently need...    Turning from your back to your side while in flat bed without using bedrails?  4  -KW     Moving from lying on back to sitting on the side of a flat bed without bedrails?  4  -KW     Moving to and from a bed to a chair (including a wheelchair)?  3  -KW     Standing up from a chair using your arms (e.g., wheelchair, bedside chair)?  3  -KW     Climbing 3-5 steps with a railing?  3  -KW     To walk in hospital room?  3  -KW     AM-PAC 6 Clicks Score (PT)  20  -KW     Row Name 09/09/20 1002          Tinetti Assessment    [place] Assessment  yes  -KW     Sitting Balance  1  -KW     Arises  2  -KW     Attempts to Rise  2  -KW     Immediate Standing Balance (first 5 sec)  1  -KW     Standing Balance  2  -KW     Sternal Nudge (feet close together)  2  -KW     Eyes Closed (feet close together)  1  -KW     Turning 360 Degrees- Steps  1  -KW   "   Turning 360 Degrees- Steadiness  1  -KW     Sitting Down  2  -KW     [place] Balance Score  15  -KW     Gait Initiation (immediate after told \"go\")  1  -KW     Step Length- Right Swing  1  -KW     Step Length- Left Swing  1  -KW     Foot Clearance- Right Foot  1  -KW     Foot Clearance- Left Foot  1  -KW     Step Symmetry  1  -KW     Step Continuity  1  -KW     Path (excursion)  1  -KW     Trunk  0  -KW     Base of Support  1  -KW     Gait Score  9  -KW     Tinetti Total Score  24  -KW     T[place]etti Assistive Device  rolling walker  -KW     T[place]etti Assessment Comments  24/28 = moderate falls risk  -KW     Row Name 09/09/20 1002          Functional Assessment    Outcome Measure Options  AM-PAC 6 Clicks Basic Mobility (PT);[place]  -KW       User Key  (r) = Recorded By, (t) = Taken By, (c) = Cosigned By    Initials Name Provider Type    Mo Bright PT Physical Therapist        Physical Therapy Education                 Title: PT OT SLP Therapies (In Progress)     Topic: Physical Therapy (In Progress)     Point: Mobility training (Done)     Description:   Instruct learner(s) on safety and technique for assisting patient out of bed, chair or wheelchair.  Instruct in the proper use of assistive devices, such as walker, crutches, cane or brace.              Patient Friendly Description:   It's important to get you on your feet again, but we need to do so in a way that is safe for you. Falling has serious consequences, and your personal safety is the most important thing of all.        When it's time to get out of bed, one of us or a family member will sit next to you on the bed to give you support.     If your doctor or nurse tells you to use a walker, crutches, a cane, or a brace, be sure you use it every time you get out of bed, even if you think you don't need it.    Learning Progress Summary           Patient Acceptance, E VU by RYLIE at 9/9/2020 1004    Comment:  PT POC; goal status; safe hand placement    " Acceptance, E,TB, VU,NR by LN at 9/7/2020 1537    Acceptance, E,TB, VU,NR by LN at 9/6/2020 1506    Acceptance, E, VU by KW1 at 8/28/2020 1603    Comment:  Role of PT, POC, use of gait belt, positioning of AD and hands with t/f                   Point: Home exercise program (Done)     Description:   Instruct learner(s) on appropriate technique for monitoring, assisting and/or progressing patient with therapeutic exercises and activities.              Learning Progress Summary           Patient Acceptance, E, VU by KW at 9/9/2020 1004    Comment:  PT POC; goal status; safe hand placement    Acceptance, E,TB, VU,NR by LN at 9/7/2020 1537                   Point: Body mechanics (Not Started)     Description:   Instruct learner(s) on proper positioning and spine alignment for patient and/or caregiver during mobility tasks and/or exercises.              Learner Progress:   Not documented in this visit.          Point: Precautions (Done)     Description:   Instruct learner(s) on prescribed precautions during mobility and gait tasks              Learning Progress Summary           Patient Acceptance, E, VU by KW at 9/9/2020 1004    Comment:  PT POC; goal status; safe hand placement    Acceptance, E,TB, VU,NR by LN at 9/7/2020 1537    Acceptance, E,TB, VU,NR by LN at 9/6/2020 1506    Acceptance, E, VU by KW1 at 8/28/2020 1603    Comment:  Role of PT, POC, use of gait belt, positioning of AD and hands with t/f                               User Key     Initials Effective Dates Name Provider Type Discipline     03/07/18 -  Suni Vasquez PTA Physical Therapy Assistant PT    KW1 08/09/20 -  Paulette Dean, PT Physical Therapist PT     08/09/20 -  Mo Regan PT Physical Therapist PT              PT Recommendation and Plan  Planned Therapy Interventions (PT Eval): balance training, bed mobility training, gait training, neuromuscular re-education, patient/family education, ROM (range of motion), stair training,  strengthening, stretching, transfer training  Outcome Summary/Treatment Plan (PT)  Anticipated Equipment Needs at Discharge (PT): front wheeled walker  Anticipated Discharge Disposition (PT): skilled nursing facility  Plan of Care Reviewed With: patient  Outcome Summary: PT re-cert complete. Pt pleasant and agreeable to PT/OT. Pt performed sup<>sit mod I. Sit>stand supervision assist with RW and VC for safe hand placement. Ambulated 350' with RW and supervision assist; no instability or LOB noted; HR and SpO2 WNL post amb but RN informed PT that pt's HR elevated to 170 briefly towards the end of ambulation but recovered quickly. One goal met; other goals reviewed. Recommend RW for use at home and SNF placement upon d/c as pt lives alone. Continue skilled PT.     Time Calculation:   PT Charges     Row Name 09/09/20 1051             Time Calculation    Start Time  0928  -KW      Stop Time  1009  -KW      Time Calculation (min)  41 min  -KW      PT Received On  09/09/20  -KW      PT Goal Re-Cert Due Date  09/22/20  -KW         Time Calculation- PT    Total Timed Code Minutes- PT  41 minute(s)  -KW         Timed Charges    34792 - Gait Training Minutes   15  -KW      37953 - PT Therapeutic Activity Minutes  26  -KW        User Key  (r) = Recorded By, (t) = Taken By, (c) = Cosigned By    Initials Name Provider Type    oM Bright PT Physical Therapist        Therapy Charges for Today     Code Description Service Date Service Provider Modifiers Qty    93386584367 HC GAIT TRAINING EA 15 MIN 9/9/2020 Mo Regan, PT GP 1    19337993697 HC PT THERAPEUTIC ACT EA 15 MIN 9/9/2020 Mo Regan, PT GP 2          PT G-Codes  Outcome Measure Options: AM-PAC 6 Clicks Basic Mobility (PT), [place]  AM-PAC 6 Clicks Score (PT): 20  AM-PAC 6 Clicks Score (OT): 19  T[place] Total Score: 24    Mo Regan PT  9/9/2020

## 2020-09-09 NOTE — THERAPY TREATMENT NOTE
Acute Care - Occupational Therapy Treatment Note  [facility]     Patient Name: Wong Patrick  : 1955  MRN: [id number]  Today's Date: 2020  Onset of Illness/Injury or Date of Surgery: 20  Date of Referral to OT: 20  Referring Physician: MD Jennifer    Admit Date: 2020       ICD-10-CM ICD-9-CM   1. Pneumonia of both lungs due to infectious organism, unspecified part of lung J18.9 483.8   2. Fever, unspecified fever cause R50.9 780.60   3. Impaired mobility and ADLs Z74.09 V49.89    Z78.9    4. Impaired functional mobility, balance, gait, and endurance Z74.09 V49.89   5. Pneumonia of left upper lobe due to Streptococcus pneumoniae (CMS/HCC) J13 481   6. Mediastinal adenopathy R59.0 785.6   7. Non-intractable vomiting with nausea, unspecified vomiting type R11.2 787.01   8. Diarrhea, unspecified type R19.7 787.91     Patient Active Problem List   Diagnosis   • History of colon polyps   • Nausea and vomiting   • Right lower lobe pneumonia   • Acquired thrombocytopenia (CMS/HCC)   • Chronic hepatitis C (CMS/HCC)   • Hypertension   • Cirrhosis of liver (CMS/HCC)   • Mediastinal adenopathy   • Sepsis due to pneumonia (CMS/HCC)   • COPD with acute exacerbation (CMS/HCC)   • IBRAHIMA (acute kidney injury) (CMS/HCC)   • Acute respiratory failure with hypoxia (CMS/HCC)   • Pneumonia of left upper lobe due to Streptococcus pneumoniae (CMS/HCC)   • Non-intractable vomiting   • CLL (chronic lymphocytic leukemia) (CMS/HCC)   • Bacteremia due to Streptococcus pneumoniae   • Diarrhea   • Thrombocytopenia (CMS/HCC)     Past Medical History:   Diagnosis Date   • Abnormal weight loss    • Acquired thrombocytopenia (CMS/HCC)    • Adenomatous polyp of colon    • Anxiety    • Biliary calculus     denies N/V, abd pain   • Chronic hepatitis C (CMS/HCC)    • Cirrhosis of liver (CMS/HCC)    • Colon polyp    • COPD (chronic obstructive pulmonary disease) (CMS/HCC)    • Elevated levels of transaminase & lactic acid  dehydrogenase    • Generalized abdominal pain     known gallstones   • GERD (gastroesophageal reflux disease)    • Hypertension    • IBS (irritable bowel syndrome)    • Impacted cerumen    • Kidney disease     US suggest renal dz, elevated alpha 2 macroglobulins   • Nausea and vomiting    • Patient noncompliance, general    • Rectal hemorrhage    • Tracheal mass    • Villous adenoma of colon    • Wheezing      Past Surgical History:   Procedure Laterality Date   • BRONCHOSCOPY N/A 7/13/2020    Procedure: BRONCHOSCOPY WITH ENDOBRONCHIAL ULTRASOUND;  Surgeon: Kartihk Sewell MD;  Location: [facility] OR;  Service: Pulmonary;  Laterality: N/A;  preop; tracheal mass; pneumonia  postop  PCP    • BRONCHOSCOPY N/A 9/1/2020    Procedure: BRONCHOSCOPY WITH ENDOBRONCHIAL ULTRASOUND and needle biopsy, with possible bronchoalveolar lavage, brush, and/or endobronchial biopsy;  Surgeon: Sandra Jacques MD;  Location: [facility] system OR;  Service: Pulmonary;  Laterality: N/A;   • COLONOSCOPY  06/03/2013   • COLONOSCOPY N/A 8/22/2018    Procedure: COLONOSCOPY;  Surgeon: Charles Pham MD;  Location: [facility] system ENDOSCOPY;  Service: Gastroenterology   • COLONOSCOPY N/A 9/4/2020    Procedure: COLONOSCOPY;  Surgeon: Yoni Simon MD;  Location: Health system ENDOSCOPY;  Service: Gastroenterology;  Laterality: N/A;   • ENDOSCOPY N/A 7/9/2019    Procedure: ESOPHAGOGASTRODUODENOSCOPY;  Surgeon: Bi Ibarra MD;  Location: [facility] system ENDOSCOPY;  Service: General   • ENDOSCOPY N/A 9/3/2020    Procedure: ESOPHAGOGASTRODUODENOSCOPY;  Surgeon: Yoni Simon MD;  Location: Health system ENDOSCOPY;  Service: Gastroenterology;  Laterality: N/A;   • ENDOSCOPY AND COLONOSCOPY     • ESOPHAGOSCOPY / EGD      with tube   • UPPER GASTROINTESTINAL ENDOSCOPY  06/03/2013   • UPPER GASTROINTESTINAL ENDOSCOPY  07/09/2019       Therapy Treatment    Rehabilitation Treatment Summary     Row Name 09/09/20 0835             Treatment Time/Intention    Discipline   occupational therapy assistant  -RC      Document Type  therapy note (daily note)  -RC      Subjective Information  complains of;fatigue  -RC      Mode of Treatment  occupational therapy;individual therapy  -RC      Patient/Family Observations  no visitor present   -RC      Total Minutes, Occupational Therapy Treatment  40  -RC      Therapy Frequency (OT Eval)  -- 5-7 x week   -RC      Patient Effort  good  -RC      Existing Precautions/Restrictions  fall  -RC      Recorded by [RC] Sarahy Garcia COTA/L 09/09/20 0954      Row Name 09/09/20 0835             Vital Signs    Pre Systolic BP Rehab  101  -RC      Pre Treatment Diastolic BP  64  -RC      Intra Systolic BP Rehab  110  -RC      Intra Treatment Diastolic BP  58  -RC      Post Systolic BP Rehab  111  -RC      Post Treatment Diastolic BP  57  -RC      Pretreatment Heart Rate (beats/min)  100  -RC      Intratreatment Heart Rate (beats/min)  98  -RC      Pre Patient Position  Supine  -RC      Intra Patient Position  Sitting  -RC      Post Patient Position  Supine  -RC      Recorded by [RC] Sarahy Garcia BURKETT/L 09/09/20 1326      Row Name 09/09/20 0835             Cognitive Assessment/Intervention- PT/OT    Orientation Status (Cognition)  person;place;situation  -RC      Recorded by [RC] Sarahy Garcia COTA/L 09/09/20 1326      Row Name 09/09/20 0835             Bed Mobility Assessment/Treatment    Sit-Supine [place] (Bed Mobility)  conditional independence  -RC      Supine-Sit-Supine [place] (Bed Mobility)  conditional independence  -RC      Recorded by [RC] Sarahy Garcia COTA/L 09/09/20 0954      Row Name 09/09/20 0835             Functional Mobility    Functional Mobility- Ind. Level  supervision required  -RC      Functional Mobility- Device  -- no AD  -RC      Functional Mobility-Distance (Feet)  8  -RC      Recorded by [RC] Sarahy Garcia BURKETT/L 09/09/20 0954      Row Name 09/09/20 0835             Sit-Stand Transfer    Sit-Stand  [place] (Transfers)  supervision  -RC      Recorded by [RC] Sarahy Garcia COTA/L 09/09/20 1326      Row Name 09/09/20 0835             Stand-Sit Transfer    Stand-Sit [place] (Transfers)  supervision  -RC      Recorded by [RC] Sarahy Garcia COTA/L 09/09/20 1326      Row Name 09/09/20 0835             Toileting Assessment/Training    [place] Level (Toileting)  supervision  -RC      Assistive Devices (Toileting)  raised toilet seat;grab bar/safety frame  -RC      Recorded by [RC] Sarahy Garcia COTA/L 09/09/20 1326      Row Name 09/09/20 0835             Therapeutic Exercise    Upper Extremity (Therapeutic Exercise)  bicep curl, bilateral  -RC      Upper Extremity Range of Motion (Therapeutic Exercise)  shoulder flexion/extension, bilateral;shoulder internal/external rotation, bilateral;elbow flexion/extension, bilateral  -RC      Weight/Resistance (Therapeutic Exercise)  1 pound  -RC      Exercise Type (Therapeutic Exercise)  resistive exercises;AROM (active range of motion)  -RC      Sets/Reps (Therapeutic Exercise)  15x3  -RC      Expected Outcome (Therapeutic Exercise)  improve functional tolerance, self-care activity;improve performance, BADLs  -RC      Recorded by [RC] Sarahy Garcia COTA/L 09/09/20 1326      Row Name 09/09/20 0835             Static Sitting Balance    Level of [place] (Unsupported Sitting, Static Balance)  conditional independence  -RC      Sitting Position (Unsupported Sitting, Static Balance)  sitting on edge of bed  -RC      Time Able to Maintain Position (Unsupported Sitting, Static Balance)  more than 5 minutes  -RC      Recorded by [RC] Sarahy Garcia COTA/L 09/09/20 1326      Row Name 09/09/20 0835             Dynamic Standing Balance    Level of [place], Reaches Outside Midline (Standing, Dynamic Balance)  supervision  -RC      Time Able to Maintain Position, Reaches Outside Midline (Standing, Dynamic Balance)  3 to 4 minutes  -RC      Recorded  by [RC] Sarahy Garcia COTA/L 09/09/20 1326      Row Name 09/09/20 0835             Positioning and Restraints    Pre-Treatment Position  in bed  -RC      Post Treatment Position  bed  -RC      In Bed  call light within reach;encouraged to call for assist nursing aware and OK'd no exit alarm   -RC      Recorded by [RC] Sarahy Garcia COTA/L 09/09/20 1326      Row Name 09/09/20 0835             Pain Scale: Numbers Pre/Post-Treatment    Pain Scale: Numbers, Pretreatment  0/10 - no pain  -RC      Pain Scale: Numbers, Post-Treatment  0/10 - no pain  -RC      Recorded by [RC] Sarahy Garcia BURKETT/L 09/09/20 1326      Row Name                Wound 08/26/20 1322 Bilateral gluteal MASD (Moisture associated skin damage)    Wound - Properties Group Date first assessed: 08/26/20 [AK] Time first assessed: 1322 [AK] Present on Hospital Admission: Y [AK] Side: Bilateral [AK] Location: gluteal [AK] Primary Wound Type: MASD [AK2], with open areas  Recorded by:  [AK] Tammy Beverly RN 08/26/20 1347 [AK2] Tammy Beverly RN 08/26/20 1348    Row Name 09/09/20 0835             Outcome Summary/Treatment Plan (OT)    Daily Summary of Progress (OT)  progress toward functional goals as expected  -RC      Plan for Continued Treatment (OT)  cont poc   -RC      Recorded by [RC] Sarahy Garcia COTA/L 09/09/20 1326        User Key  (r) = Recorded By, (t) = Taken By, (c) = Cosigned By    Initials Name Effective Dates Discipline    AK Tammy Beverly RN 10/17/16 -  Nurse    RC Sarahy Garcia BURKETT/L 03/07/18 -  OT        Wound 08/26/20 1322 Bilateral gluteal MASD (Moisture associated skin damage) (Active)   Dressing Appearance open to air 9/9/2020  8:00 AM   Closure Open to air 9/9/2020  8:00 AM   Base blanchable 9/9/2020  8:00 AM     Rehab Goal Summary     Row Name 09/09/20 0928 09/09/20 0835          Bed Mobility Goal 1 (PT)    Activity/Assistive Device (Bed Mobility Goal 1, PT)  supine to sit;sit to supine  -KW  --      [place] Level/Cues Needed (Bed Mobility Goal 1, PT)  conditional independence  -KW  --     Time Frame (Bed Mobility Goal 1, PT)  2 days  -KW  --     Progress/Outcomes (Bed Mobility Goal 1, PT)  (S) goal met  -KW  --        Transfer Goal 1 (PT)    Activity/Assistive Device (Transfer Goal 1, PT)  sit-to-stand/stand-to-sit;bed-to-chair/chair-to-bed LRAD PRN  -KW  --     [place] Level/Cues Needed (Transfer Goal 1, PT)  conditional independence  -KW  --     Time Frame (Transfer Goal 1, PT)  3 days  -KW  --     Progress/Outcome (Transfer Goal 1, PT)  goal not met  -KW  --        Gait Training Goal 1 (PT)    Activity/Assistive Device (Gait Training Goal 1, PT)  gait (walking locomotion);assistive device use;decrease fall risk;improve balance and speed;increase endurance/gait distance LRAD PRN  -KW  --     [place] Level (Gait Training Goal 1, PT)  conditional independence  -KW  --     Distance (Gait Goal 1, PT)  100ft or more  -KW  --     Time Frame (Gait Training Goal 1, PT)  by discharge  -KW  --     Progress/Outcome (Gait Training Goal 1, PT)  goal not met  -KW  --        Occupational Therapy Goals    Transfer Goal Selection (OT)  --  transfer, OT goal 1  -RC     Bathing Goal Selection (OT)  --  bathing, OT goal 1  -RC     Dressing Goal Selection (OT)  --  dressing, OT goal 1  -RC     Toileting Goal Selection (OT)  --  toileting, OT goal 1  -RC        Transfer Goal 1 (OT)    Activity/Assistive Device (Transfer Goal 1, OT)  --  bed-to-chair/chair-to-bed;sit-to-stand/stand-to-sit;toilet  -RC     [place] Level/Cues Needed (Transfer Goal 1, OT)  --  conditional independence  -RC     Time Frame (Transfer Goal 1, OT)  --  long term goal (LTG);by discharge  -RC     Progress/Outcome (Transfer Goal 1, OT)  --  goal not met  -RC        Bathing Goal 1 (OT)    Activity/Assistive Device (Bathing Goal 1, OT)  --  bathing skills, all  -RC     [place] Level/Cues Needed (Bathing Goal 1, OT)  --  supervision  required;set-up required  -RC     Time Frame (Bathing Goal 1, OT)  --  long term goal (LTG);by discharge  -RC     Progress/Outcomes (Bathing Goal 1, OT)  --  goal met  -RC        Dressing Goal 1 (OT)    Activity/Assistive Device (Dressing Goal 1, OT)  --  lower body dressing  -RC     [place]/Cues Needed (Dressing Goal 1, OT)  --  supervision required;set-up required  -RC     Time Frame (Dressing Goal 1, OT)  --  long term goal (LTG);by discharge  -RC     Progress/Outcome (Dressing Goal 1, OT)  --  goal met  -RC        Toileting Goal 1 (OT)    Activity/Device (Toileting Goal 1, OT)  --  toileting skills, all  -RC     [place] Level/Cues Needed (Toileting Goal 1, OT)  --  conditional independence  -RC     Time Frame (Toileting Goal 1, OT)  --  long term goal (LTG);by discharge  -RC     Progress/Outcome (Toileting Goal 1, OT)  --  goal not met  -RC       User Key  (r) = Recorded By, (t) = Taken By, (c) = Cosigned By    Initials Name Provider Type Discipline    Sarahy Salinas COTA/L Occupational Therapy Assistant OT    Mo Bright PT Physical Therapist PT        Occupational Therapy Education                 Title: PT OT SLP Therapies (In Progress)     Topic: Occupational Therapy (In Progress)     Point: ADL training (In Progress)     Description:   Instruct learner(s) on proper safety adaptation and remediation techniques during self care or transfers.   Instruct in proper use of assistive devices.              Learning Progress Summary           Patient Acceptance, E, NR by RC at 9/4/2020 1219    Acceptance, E,TB,D, NR by  at 8/31/2020 1140    Acceptance, E, NR by AS at 8/28/2020 1434    Comment:  role of OT, OT POC, ADL training, transfer training, fall precautions, safe use of RW                   Point: Home exercise program (In Progress)     Description:   Instruct learner(s) on appropriate technique for monitoring, assisting and/or progressing therapeutic exercises/activities.               Learning Progress Summary           Patient Acceptance, E,TB,D, NR by CS at 8/31/2020 1140                   Point: Precautions (In Progress)     Description:   Instruct learner(s) on prescribed precautions during self-care and functional transfers.              Learning Progress Summary           Patient Acceptance, E, NR by  at 9/9/2020 1327    Acceptance, E, NR by RC at 9/4/2020 1219    Acceptance, E,TB,D, NR by CS at 8/31/2020 1140    Acceptance, E, NR by AS at 8/28/2020 1434    Comment:  role of OT, OT POC, ADL training, transfer training, fall precautions, safe use of RW                   Point: Body mechanics (In Progress)     Description:   Instruct learner(s) on proper positioning and spine alignment during self-care, functional mobility activities and/or exercises.              Learning Progress Summary           Patient Acceptance, E, NR by  at 9/9/2020 1327    Acceptance, E, VU,DU,NR by SHANNON at 9/7/2020 1550    Comment:  Pt educated on OT and POC. Pt educated on proper body mechanics when performing t/f's.    Acceptance, E, NR by  at 9/4/2020 1219    Acceptance, E,TB,D, NR by CS at 8/31/2020 1140                               User Key     Initials Effective Dates Name Provider Type Discipline     03/07/18 -  Sarahy Garcia COTA/L Occupational Therapy Assistant OT     03/07/18 -  Kamini Heard COTA/SAMANTHA Occupational Therapy Assistant OT    AS 07/05/20 -  Mindy Mark OT Occupational Therapist OT    SHANNON 11/05/19 -  Altagracia Sotelo OTA Occupational Therapy Assistant OT                OT Recommendation and Plan  Outcome Summary/Treatment Plan (OT)  Daily Summary of Progress (OT): progress toward functional goals as expected  Plan for Continued Treatment (OT): cont poc   Anticipated Discharge Disposition (OT): anticipate therapy at next level of care  Therapy Frequency (OT Eval): (5-7 x week )  Daily Summary of Progress (OT): progress toward functional goals as expected  Plan of Care  Review  Plan of Care Reviewed With: patient  Plan of Care Reviewed With: patient  Outcome Summary: pt sat eob for most of tx w/ ue ex b ues. amb to bathroom w/ supervision, conditional I w/ toileting, to tx w/o c/o, reports he is expecting to d/c soon,  declined bathing  cont poc  Outcome Measures     Row Name 09/09/20 0835 09/07/20 1455 09/07/20 1359       How much help from another person do you currently need...    Turning from your back to your side while in flat bed without using bedrails?  --  --  4  -LN    Moving from lying on back to sitting on the side of a flat bed without bedrails?  --  --  4  -LN    Moving to and from a bed to a chair (including a wheelchair)?  --  --  3  -LN    Standing up from a chair using your arms (e.g., wheelchair, bedside chair)?  --  --  3  -LN    Climbing 3-5 steps with a railing?  --  --  3  -LN    To walk in hospital room?  --  --  3  -LN    AM-PAC 6 Clicks Score (PT)  --  --  20  -LN       How much help from another is currently needed...    Putting on and taking off regular lower body clothing?  3  -RC  3  -SHANNON  --    Bathing (including washing, rinsing, and drying)  3  -RC  3  -SHANNON  --    Toileting (which includes using toilet bed pan or urinal)  2  -RC  2  -SHANNON  --    Putting on and taking off regular upper body clothing  3  -RC  3  -SHANNON  --    Taking care of personal grooming (such as brushing teeth)  4  -RC  4  -SHANNON  --    Eating meals  4  -RC  4  -SHANNON  --    AM-PAC 6 Clicks Score (OT)  19  -RC  19  -SHANNON  --       Functional Assessment    Outcome Measure Options  --  --  AM-PAC 6 Clicks Basic Mobility (PT)  -LN    Row Name 09/06/20 1410             How much help from another person do you currently need...    Turning from your back to your side while in flat bed without using bedrails?  3  -LN      Moving from lying on back to sitting on the side of a flat bed without bedrails?  3  -LN      Moving to and from a bed to a chair (including a wheelchair)?  3  -LN      Standing up  from a chair using your arms (e.g., wheelchair, bedside chair)?  3  -LN      Climbing 3-5 steps with a railing?  2  -LN      To walk in hospital room?  3  -LN      AM-PAC 6 Clicks Score (PT)  17  -LN         Functional Assessment    Outcome Measure Options  AM-PAC 6 Clicks Basic Mobility (PT)  -LN        User Key  (r) = Recorded By, (t) = Taken By, (c) = Cosigned By    Initials Name Provider Type    LN Suni Vasquez PTA Physical Therapy Assistant    RC Sarahy Garcia, BURKETT/L Occupational Therapy Assistant    Altagracia Pina OTA Occupational Therapy Assistant           Time Calculation:   Time Calculation- OT     Row Name 09/09/20 1329 09/09/20 1051          Time Calculation- OT    OT Start Time  0835 -  --     OT Stop Time  0915 -  --     OT Time Calculation (min)  40 min  -  --     Total Timed Code Minutes- OT  40 minute(s)  -  --     OT Received On  09/09/20  -  --        Timed Charges    05248 - Gait Training Minutes   --  15  -KW       User Key  (r) = Recorded By, (t) = Taken By, (c) = Cosigned By    Initials Name Provider Type     Sarahy Garcia, BURKETT/L Occupational Therapy Assistant    Mo Bright, PT Physical Therapist        Therapy Charges for Today     Code Description Service Date Service Provider Modifiers Qty    81127387134 HC OT SELF CARE/MGMT/TRAIN EA 15 MIN 9/9/2020 Sarahy Garcia, BURKETT/L GO 1    20849678070 HC OT THERAPEUTIC ACT EA 15 MIN 9/9/2020 Sarahy Garcia, BURKETT/L GO 1    78446633935 HC OT THER PROC EA 15 MIN 9/9/2020 Radha, Sarahy G, BURKETT/L GO 1               Sarahy CHRISTOPHE Garcia, BURKETT/L  9/9/2020

## 2020-09-09 NOTE — PLAN OF CARE
Problem: Patient Care Overview  Goal: Plan of Care Review  9/9/2020 1049 by Mo Regan PT  Outcome: Ongoing (interventions implemented as appropriate)  Flowsheets (Taken 9/9/2020 0906)  Outcome Summary: PT re-cert complete. Pt pleasant and agreeable to PT/OT. Pt performed sup<>sit mod I. Sit>stand supervision assist with RW and VC for safe hand placement. Ambulated 350' with RW and supervision assist; no instability or LOB noted; HR and SpO2 WNL post amb but RN informed PT that pt's HR elevated to 170 briefly towards the end of ambulation but recovered quickly. One goal met; other goals reviewed. Recommend RW for use at home and SNF placement upon d/c as pt lives alone. Continue skilled PT.

## 2020-09-09 NOTE — THERAPY PROGRESS REPORT/RE-CERT
Patient Name: Wong Patrick  : 1955    MRN: [id number]                              Today's Date: 2020       Admit Date: 2020    Visit Dx:     ICD-10-CM ICD-9-CM   1. Pneumonia of both lungs due to infectious organism, unspecified part of lung J18.9 483.8   2. Fever, unspecified fever cause R50.9 780.60   3. Impaired mobility and ADLs Z74.09 V49.89    Z78.9    4. Impaired functional mobility, balance, gait, and endurance Z74.09 V49.89   5. Pneumonia of left upper lobe due to Streptococcus pneumoniae (CMS/HCC) J13 481   6. Mediastinal adenopathy R59.0 785.6   7. Non-intractable vomiting with nausea, unspecified vomiting type R11.2 787.01   8. Diarrhea, unspecified type R19.7 787.91     Patient Active Problem List   Diagnosis   • History of colon polyps   • Nausea and vomiting   • Right lower lobe pneumonia   • Acquired thrombocytopenia (CMS/HCC)   • Chronic hepatitis C (CMS/HCC)   • Hypertension   • Cirrhosis of liver (CMS/HCC)   • Mediastinal adenopathy   • Sepsis due to pneumonia (CMS/HCC)   • COPD with acute exacerbation (CMS/HCC)   • IBRAHIMA (acute kidney injury) (CMS/HCC)   • Acute respiratory failure with hypoxia (CMS/HCC)   • Pneumonia of left upper lobe due to Streptococcus pneumoniae (CMS/HCC)   • Non-intractable vomiting   • CLL (chronic lymphocytic leukemia) (CMS/HCC)   • Bacteremia due to Streptococcus pneumoniae   • Diarrhea   • Thrombocytopenia (CMS/HCC)     Past Medical History:   Diagnosis Date   • Abnormal weight loss    • Acquired thrombocytopenia (CMS/HCC)    • Adenomatous polyp of colon    • Anxiety    • Biliary calculus     denies N/V, abd pain   • Chronic hepatitis C (CMS/HCC)    • Cirrhosis of liver (CMS/HCC)    • Colon polyp    • COPD (chronic obstructive pulmonary disease) (CMS/HCC)    • Elevated levels of transaminase & lactic acid dehydrogenase    • Generalized abdominal pain     known gallstones   • GERD (gastroesophageal reflux disease)    • Hypertension    • IBS  (irritable bowel syndrome)    • Impacted cerumen    • Kidney disease     US suggest renal dz, elevated alpha 2 macroglobulins   • Nausea and vomiting    • Patient noncompliance, general    • Rectal hemorrhage    • Tracheal mass    • Villous adenoma of colon    • Wheezing      Past Surgical History:   Procedure Laterality Date   • BRONCHOSCOPY N/A 7/13/2020    Procedure: BRONCHOSCOPY WITH ENDOBRONCHIAL ULTRASOUND;  Surgeon: Karthik Sewell MD;  Location: [facility] OR;  Service: Pulmonary;  Laterality: N/A;  preop; tracheal mass; pneumonia  postop  PCP    • BRONCHOSCOPY N/A 9/1/2020    Procedure: BRONCHOSCOPY WITH ENDOBRONCHIAL ULTRASOUND and needle biopsy, with possible bronchoalveolar lavage, brush, and/or endobronchial biopsy;  Surgeon: Sandra Jacques MD;  Location: [facility] OR;  Service: Pulmonary;  Laterality: N/A;   • COLONOSCOPY  06/03/2013   • COLONOSCOPY N/A 8/22/2018    Procedure: COLONOSCOPY;  Surgeon: Charles Pham MD;  Location: [facility] ENDOSCOPY;  Service: Gastroenterology   • COLONOSCOPY N/A 9/4/2020    Procedure: COLONOSCOPY;  Surgeon: Yoni Simon MD;  Location: [facility] ENDOSCOPY;  Service: Gastroenterology;  Laterality: N/A;   • ENDOSCOPY N/A 7/9/2019    Procedure: ESOPHAGOGASTRODUODENOSCOPY;  Surgeon: Bi Ibarra MD;  Location: [facility] ENDOSCOPY;  Service: General   • ENDOSCOPY N/A 9/3/2020    Procedure: ESOPHAGOGASTRODUODENOSCOPY;  Surgeon: Yoni Simon MD;  Location: [facility] ENDOSCOPY;  Service: Gastroenterology;  Laterality: N/A;   • ENDOSCOPY AND COLONOSCOPY     • ESOPHAGOSCOPY / EGD      with tube   • UPPER GASTROINTESTINAL ENDOSCOPY  06/03/2013   • UPPER GASTROINTESTINAL ENDOSCOPY  07/09/2019     General Information     Row Name 09/09/20 0928          PT Evaluation Time/Intention    Document Type  progress note/recertification  -KW     Mode of Treatment  individual therapy;physical therapy  -KW     Row Name 09/09/20 0928          General Information    Existing  Precautions/Restrictions  fall;oxygen therapy device and L/min  -KW     Row Name 09/09/20 0928          Cognitive Assessment/Intervention- PT/OT    Orientation Status (Cognition)  person;place;situation correct year, incorrect month  -KW     Row Name 09/09/20 0928          Safety Issues, Functional Mobility    Safety Issues Affecting Function (Mobility)  awareness of need for assistance;insight into deficits/self awareness;judgment;positioning of assistive device;problem solving;safety precaution awareness;safety precautions follow-through/compliance;sequencing abilities  -KW     Impairments Affecting Function (Mobility)  balance;endurance/activity tolerance;strength  -KW       User Key  (r) = Recorded By, (t) = Taken By, (c) = Cosigned By    Initials Name Provider Type    KW Mo Regan PT Physical Therapist        Mobility     Row Name 09/09/20 0928          Bed Mobility Assessment/Treatment    Bed Mobility Assessment/Treatment  supine-sit-supine  -KW     Supine-Sit [place] (Bed Mobility)  conditional independence  -KW     Sit-Supine [place] (Bed Mobility)  conditional independence  -KW     Assistive Device (Bed Mobility)  head of bed elevated;bed rails  -KW     Row Name 09/09/20 0928          Sit-Stand Transfer    Sit-Stand [place] (Transfers)  supervision  -KW     Assistive Device (Sit-Stand Transfers)  walker, front-wheeled  -KW     Row Name 09/09/20 0928          Gait/Stairs Assessment/Training    Gait/Stairs Assessment/Training  gait/ambulation independence  -KW     [place] Level (Gait)  supervision  -KW     Assistive Device (Gait)  walker, front-wheeled  -KW     Distance in Feet (Gait)  350'  -KW     Comment (Gait/Stairs)  VSS post amb  -KW       User Key  (r) = Recorded By, (t) = Taken By, (c) = Cosigned By    Initials Name Provider Type    KW Mo Regan PT Physical Therapist        Obj/Interventions     Row Name 09/09/20 0928          General ROM    GENERAL ROM COMMENTS  BLE  ROM WFL  -KW     Row Name 09/09/20 0928          MMT (Manual Muscle Testing)    General MMT Comments  BLE MMT 4/5 grossly  -KW     Row Name 09/09/20 0928          Static Sitting Balance    Level of [place] (Unsupported Sitting, Static Balance)  independent  -KW     Sitting Position (Unsupported Sitting, Static Balance)  sitting on edge of bed  -KW     Time Able to Maintain Position (Unsupported Sitting, Static Balance)  more than 5 minutes  -KW     Row Name 09/09/20 0928          Dynamic Sitting Balance    Level of [place], Reaches Outside Midline (Sitting, Dynamic Balance)  supervision  -KW     Sitting Position, Reaches Outside Midline (Sitting, Dynamic Balance)  sitting on edge of bed  -KW     Row Name 09/09/20 0928          Static Standing Balance    Level of [place] (Supported Standing, Static Balance)  supervision  -KW     Time Able to Maintain Position (Supported Standing, Static Balance)  1 to 2 minutes  -KW     Row Name 09/09/20 0928          Dynamic Standing Balance    Level of [place], Reaches Outside Midline (Standing, Dynamic Balance)  supervision  -KW     Time Able to Maintain Position, Reaches Outside Midline (Standing, Dynamic Balance)  1 to 2 minutes  -KW     Row Name 09/09/20 0928          Sensory Assessment/Intervention    Sensory General Assessment  no sensation deficits identified  -KW       User Key  (r) = Recorded By, (t) = Taken By, (c) = Cosigned By    Initials Name Provider Type    Mo Bright, PT Physical Therapist        Goals/Plan     Row Name 09/09/20 0928          Bed Mobility Goal 1 (PT)    Activity/Assistive Device (Bed Mobility Goal 1, PT)  supine to sit;sit to supine  -KW     [place] Level/Cues Needed (Bed Mobility Goal 1, PT)  conditional independence  -KW     Time Frame (Bed Mobility Goal 1, PT)  2 days  -KW     Progress/Outcomes (Bed Mobility Goal 1, PT)  (S) goal met  -KW     Row Name 09/09/20 0928          Transfer Goal 1 (PT)     "Activity/Assistive Device (Transfer Goal 1, PT)  sit-to-stand/stand-to-sit;bed-to-chair/chair-to-bed LRAD PRN  -KW     [place] Level/Cues Needed (Transfer Goal 1, PT)  conditional independence  -KW     Time Frame (Transfer Goal 1, PT)  3 days  -KW     Progress/Outcome (Transfer Goal 1, PT)  goal not met  -KW     Row Name 09/09/20 0928          Gait Training Goal 1 (PT)    Activity/Assistive Device (Gait Training Goal 1, PT)  gait (walking locomotion);assistive device use;decrease fall risk;improve balance and speed;increase endurance/gait distance LRAD PRN  -KW     [place] Level (Gait Training Goal 1, PT)  conditional independence  -KW     Distance (Gait Goal 1, PT)  100ft or more  -KW     Time Frame (Gait Training Goal 1, PT)  by discharge  -KW     Progress/Outcome (Gait Training Goal 1, PT)  goal not met  -KW       User Key  (r) = Recorded By, (t) = Taken By, (c) = Cosigned By    Initials Name Provider Type    Mo Bright, PT Physical Therapist        Clinical Impression     Row Name 09/09/20 0928          Pain Assessment    Additional Documentation  Pain Scale: Numbers Pre/Post-Treatment (Group)  -[facility] Name 09/09/20 0928          Pain Scale: Numbers Pre/Post-Treatment    Pain Location  abdomen  -KW     Pre/Post Treatment Pain Comment  stomach pain; pt would not rate; states it has been \"hurting all [his] life\"  -KW     Pain Intervention(s)  Medication (See MAR);Ambulation/increased activity;Repositioned  -     Row Name 09/09/20 0928          Plan of Care Review    Plan of Care Reviewed With  patient  -     Row Name 09/09/20 0928          Physical Therapy Clinical Impression    Criteria for Skilled Interventions Met (PT Clinical Impression)  yes;treatment indicated  -KW     Rehab Potential (PT Clinical Summary)  good, to achieve stated therapy goals  -     Row Name 09/09/20 0928          Vital Signs    Pre Systolic BP Rehab  108  -KW     Pre Treatment Diastolic BP  57  -KW     Post " "Systolic BP Rehab  122  -KW     Post Treatment Diastolic BP  58  -KW     Pretreatment Heart Rate (beats/min)  98  -KW     Posttreatment Heart Rate (beats/min)  107  -KW     Pre SpO2 (%)  95  -KW     O2 Delivery Pre Treatment  room air  -KW     Post SpO2 (%)  94  -KW     O2 Delivery Post Treatment  room air  -KW     Pre Patient Position  Supine  -KW     Post Patient Position  Sitting  -KW     Row Name 09/09/20 0928          Positioning and Restraints    Pre-Treatment Position  in bed  -KW     Post Treatment Position  bed  -KW     In Bed  supine;call light within reach;encouraged to call for assist  -KW       User Key  (r) = Recorded By, (t) = Taken By, (c) = Cosigned By    Initials Name Provider Type    KW Mo Regan, PT Physical Therapist        Outcome Measures     Row Name 09/09/20 1002          How much help from another person do you currently need...    Turning from your back to your side while in flat bed without using bedrails?  4  -KW     Moving from lying on back to sitting on the side of a flat bed without bedrails?  4  -KW     Moving to and from a bed to a chair (including a wheelchair)?  3  -KW     Standing up from a chair using your arms (e.g., wheelchair, bedside chair)?  3  -KW     Climbing 3-5 steps with a railing?  3  -KW     To walk in hospital room?  3  -KW     AM-PAC 6 Clicks Score (PT)  20  -KW     Row Name 09/09/20 1002          Tinetti Assessment    [place] Assessment  yes  -KW     Sitting Balance  1  -KW     Arises  2  -KW     Attempts to Rise  2  -KW     Immediate Standing Balance (first 5 sec)  1  -KW     Standing Balance  2  -KW     Sternal Nudge (feet close together)  2  -KW     Eyes Closed (feet close together)  1  -KW     Turning 360 Degrees- Steps  1  -KW     Turning 360 Degrees- Steadiness  1  -KW     Sitting Down  2  -KW     [place]etti Balance Score  15  -KW     Gait Initiation (immediate after told \"go\")  1  -KW     Step Length- Right Swing  1  -KW     Step Length- Left Swing  1  " -KW     Foot Clearance- Right Foot  1  -KW     Foot Clearance- Left Foot  1  -KW     Step Symmetry  1  -KW     Step Continuity  1  -KW     Path (excursion)  1  -KW     Trunk  0  -KW     Base of Support  1  -KW     Gait Score  9  -KW     T[place]etti Total Score  24  -KW     T[place]etti Assistive Device  rolling walker  -KW     Tinetti Assessment Comments  24/28 = moderate falls risk  -KW     Row Name 09/09/20 1002          Functional Assessment    Outcome Measure Options  AM-PAC 6 Clicks Basic Mobility (PT);[place]  -KW       User Key  (r) = Recorded By, (t) = Taken By, (c) = Cosigned By    Initials Name Provider Type    Mo Bright PT Physical Therapist        Physical Therapy Education                 Title: PT OT SLP Therapies (In Progress)     Topic: Physical Therapy (In Progress)     Point: Mobility training (Done)     Description:   Instruct learner(s) on safety and technique for assisting patient out of bed, chair or wheelchair.  Instruct in the proper use of assistive devices, such as walker, crutches, cane or brace.              Patient Friendly Description:   It's important to get you on your feet again, but we need to do so in a way that is safe for you. Falling has serious consequences, and your personal safety is the most important thing of all.        When it's time to get out of bed, one of us or a family member will sit next to you on the bed to give you support.     If your doctor or nurse tells you to use a walker, crutches, a cane, or a brace, be sure you use it every time you get out of bed, even if you think you don't need it.    Learning Progress Summary           Patient Acceptance, E, VU by KW at 9/9/2020 1004    Comment:  PT POC; goal status; safe hand placement    Acceptance, E,TB, VU,NR by LN at 9/7/2020 1537    Acceptance, E,TB, VU,NR by LN at 9/6/2020 1506    Acceptance, E, VU by KW1 at 8/28/2020 1603    Comment:  Role of PT, POC, use of gait belt, positioning of AD and hands with t/f                    Point: Home exercise program (Done)     Description:   Instruct learner(s) on appropriate technique for monitoring, assisting and/or progressing patient with therapeutic exercises and activities.              Learning Progress Summary           Patient Acceptance, E, VU by KW at 9/9/2020 1004    Comment:  PT POC; goal status; safe hand placement    Acceptance, E,TB, VU,NR by LN at 9/7/2020 1537                   Point: Body mechanics (Not Started)     Description:   Instruct learner(s) on proper positioning and spine alignment for patient and/or caregiver during mobility tasks and/or exercises.              Learner Progress:   Not documented in this visit.          Point: Precautions (Done)     Description:   Instruct learner(s) on prescribed precautions during mobility and gait tasks              Learning Progress Summary           Patient Acceptance, E, VU by KW at 9/9/2020 1004    Comment:  PT POC; goal status; safe hand placement    Acceptance, E,TB, VU,NR by LN at 9/7/2020 1537    Acceptance, E,TB, VU,NR by LN at 9/6/2020 1506    Acceptance, E, VU by KW1 at 8/28/2020 1603    Comment:  Role of PT, POC, use of gait belt, positioning of AD and hands with t/f                               User Key     Initials Effective Dates Name Provider Type Discipline     03/07/18 -  Suni Vasquez, PTA Physical Therapy Assistant PT    KW1 08/09/20 -  Paulette Dean, PT Physical Therapist PT     08/09/20 -  Mo Regan PT Physical Therapist PT              PT Recommendation and Plan  Planned Therapy Interventions (PT Eval): balance training, bed mobility training, gait training, neuromuscular re-education, patient/family education, ROM (range of motion), stair training, strengthening, stretching, transfer training  Outcome Summary/Treatment Plan (PT)  Anticipated Equipment Needs at Discharge (PT): front wheeled walker  Anticipated Discharge Disposition (PT): skilled nursing facility  Plan of Care Reviewed  With: patient  Outcome Summary: PT re-cert complete. Pt pleasant and agreeable to PT/OT. Pt performed sup<>sit mod I. Sit>stand supervision assist with RW and VC for safe hand placement. Ambulated 350' with RW and supervision assist; no instability or LOB noted; HR and SpO2 WNL post amb but RN informed PT that pt's HR elevated to 170 briefly towards the end of ambulation but recovered quickly. One goal met; other goals reviewed. Recommend RW for use at home and SNF placement upon d/c as pt lives alone. Continue skilled PT.     Time Calculation:   PT Charges     Row Name 09/09/20 1051             Time Calculation    Start Time  0928  -KW      Stop Time  1009  -KW      Time Calculation (min)  41 min  -KW      PT Received On  09/09/20  -KW      PT Goal Re-Cert Due Date  09/22/20  -KW         Time Calculation- PT    Total Timed Code Minutes- PT  41 minute(s)  -KW         Timed Charges    54713 - Gait Training Minutes   15  -KW      61253 - PT Therapeutic Activity Minutes  26  -KW        User Key  (r) = Recorded By, (t) = Taken By, (c) = Cosigned By    Initials Name Provider Type    Mo Bright, PT Physical Therapist        Therapy Charges for Today     Code Description Service Date Service Provider Modifiers Qty    35161168224 HC GAIT TRAINING EA 15 MIN 9/9/2020 Mo Regan, PT GP 1    54799432525 HC PT THERAPEUTIC ACT EA 15 MIN 9/9/2020 Mo Regan PT GP 2          PT G-Codes  Outcome Measure Options: AM-PAC 6 Clicks Basic Mobility (PT), Tinetti  AM-PAC 6 Clicks Score (PT): 20  AM-PAC 6 Clicks Score (OT): 19  Tinetti Total Score: 24    Mo Regan PT  9/9/2020

## 2020-09-09 NOTE — PLAN OF CARE
Problem: Patient Care Overview  Goal: Plan of Care Review  Flowsheets (Taken 9/9/2020 8368)  Plan of Care Reviewed With: patient  Outcome Summary: OT re-cert/progress note completed. Pt participated in discussion regarding POC, progress, and goals. Pt feels he is making good progress and has met 2 goals this rehab period. Pt feels his biggest barrier at this time is that he fatigues easily especially during fxnl mobility. Pt performed bed mobility with mod I, transfer with SPV using RW, and in room mobility with RW and SPV. Pt would benefit from continued skilled OT services to promote return to PLOF. Cont OT POC

## 2020-09-09 NOTE — PLAN OF CARE
Problem: Patient Care Overview  Goal: Plan of Care Review  Outcome: Ongoing (interventions implemented as appropriate)   Patient continuing to meet goals. Has plans to d/c tomorrow to facility.

## 2020-09-09 NOTE — PLAN OF CARE
Problem: Patient Care Overview  Goal: Plan of Care Review  Outcome: Ongoing (interventions implemented as appropriate)  Flowsheets  Taken 9/9/2020 0117  Progress: no change  Outcome Summary: Pt has no new complaints this shift. Awaiting rehab placement. Will continue to monitor.  Taken 9/8/2020 2040  Plan of Care Reviewed With: patient

## 2020-09-09 NOTE — PROGRESS NOTES
[facility] Services  INPATIENT PROGRESS NOTE    Length of Stay: 15  Date of Admission: 8/25/2020  Primary Care Physician: Provider, No Known    Subjective   Chief Complaint: Nausea, shortness of breath  HPI:  65 year old male with a history of COPD ,cirrhosis of the liver with ascites, chronic hepatitis C, GERD, HTN, and irritable bowel syndrome who presented with shortness of breath, nausea, vomiting, diarrhea, and fatigue over 4-5 days.  Chest x-ray showed patchy airspace opacity involving almost the entirety of the left lung with air bronchograms. He also has a known tracheal/mediastinal mass.  He met sepsis criteria and was admitted for sepsis from pneumonia and respiratory failure.  He was admitted to CCU on IV antibiotics and IV fluid.  He was initiated on IV solumedrol and bronchodilators.  COVID 19 is negative.  Blood cultures were positive for Streptococcus pneumoniae sensitive to Levaquin.  Antibiotics were targeted. Oncology and pulmonology were consulted. He underwent EBUS.  Peripheral blood flow cytometry is suggestive of SLL/CLL and he is recommended observation at this time and outpatient oncology follow-up. FNA is also consistent with SLL/CLL. GI was consulted for persistent nausea.  He underwent EGD which revealed esophagitis, gastritis, and medium-sized hiatal hernia.  He underwent colonoscopy which revealed non-bleeding hemorrhoids. Biopsies were taken from the colon. He has weaned to room air.  Platelets are stable. SNF precert is pending.     Review of Systems   Constitutional: Negative for chills and fever.   Respiratory: Negative for shortness of breath.    Cardiovascular: Negative for chest pain.   Gastrointestinal: Negative for abdominal pain, nausea and vomiting.   All other systems reviewed and are negative.       All pertinent negatives and positives are as above. All other systems have been reviewed and are negative unless otherwise  stated.     Objective    Temp:  [96.7 °F (35.9 °C)-98.7 °F (37.1 °C)] 98.3 °F (36.8 °C)  Heart Rate:  [] 88  Resp:  [18-20] 18  BP: ()/(55-63) 92/55    Physical Exam   Constitutional: He is oriented to person, place, and time. He appears well-developed and well-nourished. No distress.   HENT:   Head: Normocephalic and atraumatic.   Eyes: Conjunctivae are normal.   Cardiovascular: Normal rate, regular rhythm and intact distal pulses.   Pulmonary/Chest: Effort normal and breath sounds normal. No respiratory distress.   Abdominal: Soft. Bowel sounds are normal. He exhibits no distension. There is no tenderness.   Musculoskeletal: He exhibits no edema or deformity.   Neurological: He is alert and oriented to person, place, and time.   Skin: Skin is warm and dry. He is not diaphoretic.   Vitals reviewed.      Results Review:  I have reviewed the labs, radiology results, and diagnostic studies.    Laboratory Data:   Results from last 7 days   Lab Units 09/07/20 0449 09/06/20  0629 09/05/20  0555   SODIUM mmol/L 136 137 136   POTASSIUM mmol/L 3.9 3.7 4.2   CHLORIDE mmol/L 101 101 101   CO2 mmol/L 27.0 28.0 29.0   BUN mg/dL 10 10 12   CREATININE mg/dL 0.84 0.78 0.75*   GLUCOSE mg/dL 104* 102* 104*   CALCIUM mg/dL 8.2* 8.0* 7.8*   ANION GAP mmol/L 8.0 8.0 6.0     Estimated Creatinine Clearance: 85.3 mL/min (by C-G formula based on SCr of 0.84 mg/dL).          Results from last 7 days   Lab Units 09/08/20  0549 09/07/20  0449 09/06/20  0637 09/05/20  0555 09/04/20  0533   WBC 10*3/mm3 4.32 5.69 5.29 5.64 7.54   HEMOGLOBIN g/dL 8.0* 8.2* 8.1* 8.0* 8.5*   HEMATOCRIT % 25.4* 24.9* 25.5* 24.6* 26.7*   PLATELETS 10*3/mm3 80* 84* 88* 75* 109*           Culture Data:   No results found for: BLOODCX  No results found for: URINECX  No results found for: RESPCX  No results found for: WOUNDCX  No results found for: STOOLCX  No components found for: BODYFLD    Radiology Data:   Imaging Results (Last 24 Hours)     ** No  results found for the last 24 hours. **          I have reviewed the patient's current medications.     Assessment/Plan     Active Hospital Problems    Diagnosis   • **Non-intractable vomiting     Added automatically from request for surgery [id number]     • Thrombocytopenia (CMS/HCC)   • CLL (chronic lymphocytic leukemia) (CMS/HCC)     Likely based on flow cytometry     • Bacteremia due to Streptococcus pneumoniae   • Sepsis due to pneumonia (CMS/HCC)   • COPD with acute exacerbation (CMS/HCC)   • IBRAHIMA (acute kidney injury) (CMS/HCC)   • Acute respiratory failure with hypoxia (CMS/HCC)   • Pneumonia of left upper lobe due to Streptococcus pneumoniae (CMS/HCC)     Added automatically from request for surgery [id number]     • Diarrhea     Added automatically from request for surgery [id number]     • Mediastinal adenopathy     Added automatically from request for surgery [id number]     • Hypertension   • Cirrhosis of liver (CMS/HCC)       Plan:    Wean O2, weaned to baseline of room air, maintain SaO2 >88%  Levaquin completed  Symbicort, Duonebs  Pulmonology consultation appreciated  Outpatient evaluation of SLL/CLL, Follow-up 4 weeks with heme/onc  Heme/Onc consultation appreciated  S/P EGD  S/P colonoscopy, biopsies pending  Antiemetics PRN  GI consultation appreciated  IBRAHIMA resolved  Thrombocytopenia is suspected secondary to cirrhosis with splenomegaly and consumption from infection. HIT antibodies in reference range.  BP control: Cozaar  PT/OT  VTE PPx: SCDs   Discharge planning: rehab to home, precert pending        The patient was evaluated during the global COVID-19 pandemic, and the diagnosis was suspected/considered upon their initial presentation.  Evaluation, treatment, and testing were consistent with current guidelines for patients who present with complaints or symptoms that may be related to COVID-19.        This document has been electronically signed by ANA Edmonds on September 9, 2020 12:53

## 2020-09-09 NOTE — THERAPY PROGRESS REPORT/RE-CERT
Acute Care - Occupational Therapy Progress Note  [facility]     Patient Name: Wong Patrick  : 1955  MRN: [id number]  Today's Date: 2020  Onset of Illness/Injury or Date of Surgery: 20  Date of Referral to OT: 20  Referring Physician: MD Jennifer    Admit Date: 2020       ICD-10-CM ICD-9-CM   1. Pneumonia of both lungs due to infectious organism, unspecified part of lung J18.9 483.8   2. Fever, unspecified fever cause R50.9 780.60   3. Impaired mobility and ADLs Z74.09 V49.89    Z78.9    4. Impaired functional mobility, balance, gait, and endurance Z74.09 V49.89   5. Pneumonia of left upper lobe due to Streptococcus pneumoniae (CMS/HCC) J13 481   6. Mediastinal adenopathy R59.0 785.6   7. Non-intractable vomiting with nausea, unspecified vomiting type R11.2 787.01   8. Diarrhea, unspecified type R19.7 787.91     Patient Active Problem List   Diagnosis   • History of colon polyps   • Nausea and vomiting   • Right lower lobe pneumonia   • Acquired thrombocytopenia (CMS/HCC)   • Chronic hepatitis C (CMS/HCC)   • Hypertension   • Cirrhosis of liver (CMS/HCC)   • Mediastinal adenopathy   • Sepsis due to pneumonia (CMS/HCC)   • COPD with acute exacerbation (CMS/HCC)   • IBRAHIMA (acute kidney injury) (CMS/HCC)   • Acute respiratory failure with hypoxia (CMS/HCC)   • Pneumonia of left upper lobe due to Streptococcus pneumoniae (CMS/HCC)   • Non-intractable vomiting   • CLL (chronic lymphocytic leukemia) (CMS/HCC)   • Bacteremia due to Streptococcus pneumoniae   • Diarrhea   • Thrombocytopenia (CMS/HCC)     Past Medical History:   Diagnosis Date   • Abnormal weight loss    • Acquired thrombocytopenia (CMS/HCC)    • Adenomatous polyp of colon    • Anxiety    • Biliary calculus     denies N/V, abd pain   • Chronic hepatitis C (CMS/HCC)    • Cirrhosis of liver (CMS/HCC)    • Colon polyp    • COPD (chronic obstructive pulmonary disease) (CMS/HCC)    • Elevated levels of transaminase & lactic acid  dehydrogenase    • Generalized abdominal pain     known gallstones   • GERD (gastroesophageal reflux disease)    • Hypertension    • IBS (irritable bowel syndrome)    • Impacted cerumen    • Kidney disease     US suggest renal dz, elevated alpha 2 macroglobulins   • Nausea and vomiting    • Patient noncompliance, general    • Rectal hemorrhage    • Tracheal mass    • Villous adenoma of colon    • Wheezing      Past Surgical History:   Procedure Laterality Date   • BRONCHOSCOPY N/A 7/13/2020    Procedure: BRONCHOSCOPY WITH ENDOBRONCHIAL ULTRASOUND;  Surgeon: Karthik Sewell MD;  Location: [facility] OR;  Service: Pulmonary;  Laterality: N/A;  preop; tracheal mass; pneumonia  postop  PCP    • BRONCHOSCOPY N/A 9/1/2020    Procedure: BRONCHOSCOPY WITH ENDOBRONCHIAL ULTRASOUND and needle biopsy, with possible bronchoalveolar lavage, brush, and/or endobronchial biopsy;  Surgeon: Sandra Jacques MD;  Location: [facility] OR;  Service: Pulmonary;  Laterality: N/A;   • COLONOSCOPY  06/03/2013   • COLONOSCOPY N/A 8/22/2018    Procedure: COLONOSCOPY;  Surgeon: Charles Pham MD;  Location: [facility] ENDOSCOPY;  Service: Gastroenterology   • COLONOSCOPY N/A 9/4/2020    Procedure: COLONOSCOPY;  Surgeon: Yoni Simon MD;  Location: [facility] ENDOSCOPY;  Service: Gastroenterology;  Laterality: N/A;   • ENDOSCOPY N/A 7/9/2019    Procedure: ESOPHAGOGASTRODUODENOSCOPY;  Surgeon: Bi Ibarra MD;  Location: [facility] ENDOSCOPY;  Service: General   • ENDOSCOPY N/A 9/3/2020    Procedure: ESOPHAGOGASTRODUODENOSCOPY;  Surgeon: Yoni Simon MD;  Location: [facility] ENDOSCOPY;  Service: Gastroenterology;  Laterality: N/A;   • ENDOSCOPY AND COLONOSCOPY     • ESOPHAGOSCOPY / EGD      with tube   • UPPER GASTROINTESTINAL ENDOSCOPY  06/03/2013   • UPPER GASTROINTESTINAL ENDOSCOPY  07/09/2019       Therapy Treatment    Rehabilitation Treatment Summary     Row Name 09/09/20 1504 09/09/20 0835          Treatment Time/Intention     Discipline  occupational therapist  -AS  occupational therapy assistant  -RC     Document Type  progress note/recertification  -AS  therapy note (daily note)  -RC     Subjective Information  no complaints  -AS  complains of;fatigue  -RC     Mode of Treatment  individual therapy;occupational therapy  -AS  occupational therapy;individual therapy  -RC     Patient/Family Observations  no family present  -AS  no visitor present   -RC     Care Plan Review  evaluation/treatment results reviewed;care plan/treatment goals reviewed;risks/benefits reviewed;current/potential barriers reviewed;patient/other agree to care plan  -AS  --     Total Minutes, Occupational Therapy Treatment  10  -AS  40  -RC     Therapy Frequency (OT Eval)  other (see comments) 5-7 days/wk  -AS  -- 5-7 x week   -RC     Patient Effort  good  -AS  good  -RC     Existing Precautions/Restrictions  fall  -AS  fall  -RC     Recorded by [AS] Mindy Mark, OT 09/09/20 1538 [RC] Sarahy Garcia COTA/L 09/09/20 0954     Row Name 09/09/20 0835             Vital Signs    Pre Systolic BP Rehab  101  -RC      Pre Treatment Diastolic BP  64  -RC      Intra Systolic BP Rehab  110  -RC      Intra Treatment Diastolic BP  58  -RC      Post Systolic BP Rehab  111  -RC      Post Treatment Diastolic BP  57  -RC      Pretreatment Heart Rate (beats/min)  100  -RC      Intratreatment Heart Rate (beats/min)  98  -RC      Pre Patient Position  Supine  -RC      Intra Patient Position  Sitting  -RC      Post Patient Position  Supine  -RC      Recorded by [RC] Sarahy Garcia COTA/L 09/09/20 1326      Row Name 09/09/20 1504 09/09/20 0835          Cognitive Assessment/Intervention- PT/OT    Orientation Status (Cognition)  oriented x 4  -AS  person;place;situation  -RC     Recorded by [AS] Mindy Mark, OT 09/09/20 1538 [RC] Sarahy Garcia COTA/L 09/09/20 1326     Row Name 09/09/20 1504 09/09/20 0835          Bed Mobility Assessment/Treatment    Supine-Sit [place]  (Bed Mobility)  conditional independence  -AS  --     Sit-Supine [place] (Bed Mobility)  conditional independence  -AS  conditional independence  -RC     Supine-Sit-Supine [place] (Bed Mobility)  --  conditional independence  -RC     Assistive Device (Bed Mobility)  bed rails;head of bed elevated  -AS  --     Recorded by [AS] Mindy Mark, OT 09/09/20 1538 [RC] Sarahy Garcia BURKETT/L 09/09/20 0954     Row Name 09/09/20 1504 09/09/20 0835          Functional Mobility    Functional Mobility- Ind. Level  supervision required  -AS  supervision required  -RC     Functional Mobility- Device  rolling walker  -AS  -- no AD  -RC     Functional Mobility-Distance (Feet)  --  8  -RC     Functional Mobility- Comment  in room mobility  -AS  --     Recorded by [AS] Mindy Mark, OT 09/09/20 1538 [RC] Sarahy Garcia COTA/L 09/09/20 0954     Row Name 09/09/20 1504             Transfer Assessment/Treatment    Transfer Assessment/Treatment  sit-stand transfer;stand-sit transfer  -AS      Recorded by [AS] Mnidy Mark, OT 09/09/20 1538      Row Name 09/09/20 1504 09/09/20 0835          Sit-Stand Transfer    Sit-Stand [place] (Transfers)  supervision  -AS  supervision  -RC     Assistive Device (Sit-Stand Transfers)  walker, front-wheeled  -AS  --     Recorded by [AS] Mindy Mark, OT 09/09/20 1538 [RC] Sarahy Garcia COTA/L 09/09/20 1326     Row Name 09/09/20 1504 09/09/20 0835          Stand-Sit Transfer    Stand-Sit [place] (Transfers)  supervision  -AS  supervision  -RC     Assistive Device (Stand-Sit Transfers)  walker, front-wheeled  -AS  --     Recorded by [AS] Mindy Mark, OT 09/09/20 1538 [RC] Sarahy Garcia COTA/L 09/09/20 1326     Row Name 09/09/20 0835             Toileting Assessment/Training    [place] Level (Toileting)  supervision  -RC      Assistive Devices (Toileting)  raised toilet seat;grab bar/safety frame  -RC      Recorded by [RC] Sarahy Garcia BURKETT/L  09/09/20 1326      Row Name 09/09/20 1504             General ROM    GENERAL ROM COMMENTS  BUE WFL  -AS      Recorded by [AS] Mindy Mark, OT 09/09/20 1538      Row Name 09/09/20 1504             MMT (Manual Muscle Testing)    General MMT Comments  BUE grossly 4/5 throughout  -AS      Recorded by [AS] Mindy Mark, OT 09/09/20 1538      Row Name 09/09/20 0835             Therapeutic Exercise    Upper Extremity (Therapeutic Exercise)  bicep curl, bilateral  -RC      Upper Extremity Range of Motion (Therapeutic Exercise)  shoulder flexion/extension, bilateral;shoulder internal/external rotation, bilateral;elbow flexion/extension, bilateral  -RC      Weight/Resistance (Therapeutic Exercise)  1 pound  -RC      Exercise Type (Therapeutic Exercise)  resistive exercises;AROM (active range of motion)  -RC      Sets/Reps (Therapeutic Exercise)  15x3  -RC      Expected Outcome (Therapeutic Exercise)  improve functional tolerance, self-care activity;improve performance, BADLs  -RC      Recorded by [RC] Sarahy Garcia COTA/L 09/09/20 1326      Row Name 09/09/20 0835             Static Sitting Balance    Level of [place] (Unsupported Sitting, Static Balance)  conditional independence  -RC      Sitting Position (Unsupported Sitting, Static Balance)  sitting on edge of bed  -RC      Time Able to Maintain Position (Unsupported Sitting, Static Balance)  more than 5 minutes  -RC      Recorded by [RC] Sarahy Garcia COTA/L 09/09/20 1326      Row Name 09/09/20 0835             Dynamic Standing Balance    Level of [place], Reaches Outside Midline (Standing, Dynamic Balance)  supervision  -RC      Time Able to Maintain Position, Reaches Outside Midline (Standing, Dynamic Balance)  3 to 4 minutes  -RC      Recorded by [RC] Sarahy Garcia COTA/L 09/09/20 1326      Row Name 09/09/20 1504 09/09/20 0835          Positioning and Restraints    Pre-Treatment Position  in bed  -AS  in bed  -RC     Post Treatment  Position  bed  -AS  bed  -RC     In Bed  supine;call light within reach  -AS  call light within reach;encouraged to call for assist nursing aware and OK'd no exit alarm   -RC     Recorded by [AS] Mindy Mark, OT 09/09/20 1538 [RC] Sarahy Garcia BURKETT/L 09/09/20 1326     Row Name 09/09/20 1504 09/09/20 0835          Pain Scale: Numbers Pre/Post-Treatment    Pain Scale: Numbers, Pretreatment  0/10 - no pain  -AS  0/10 - no pain  -RC     Pain Scale: Numbers, Post-Treatment  0/10 - no pain  -AS  0/10 - no pain  -RC     Recorded by [AS] Mindy Mark OT 09/09/20 1538 [RC] Sarahy Garcia COTA/L 09/09/20 1326     Row Name                Wound 08/26/20 1322 Bilateral gluteal MASD (Moisture associated skin damage)    Wound - Properties Group Date first assessed: 08/26/20 [AK] Time first assessed: 1322 [AK] Present on Hospital Admission: Y [AK] Side: Bilateral [AK] Location: gluteal [AK] Primary Wound Type: MASD [AK2], with open areas  Recorded by:  [AK] Tammy Beverly RN 08/26/20 1347 [AK2] Tammy Beverly RN 08/26/20 1348    Row Name 09/09/20 1504             Plan of Care Review    Plan of Care Reviewed With  patient  -AS      Recorded by [AS] Mindy Mark OT 09/09/20 1538      Row Name 09/09/20 1504 09/09/20 0835          Outcome Summary/Treatment Plan (OT)    Daily Summary of Progress (OT)  progress toward functional goals is good  -AS  progress toward functional goals as expected  -RC     Plan for Continued Treatment (OT)  Cont OT POC  -AS  cont poc   -RC     Anticipated Discharge Disposition (OT)  anticipate therapy at next level of care  -AS  --     Recorded by [AS] Mindy Mark, OT 09/09/20 1538 [RC] Sarahy Garcia BURKETT/L 09/09/20 1326       User Key  (r) = Recorded By, (t) = Taken By, (c) = Cosigned By    Initials Name Effective Dates Discipline    AK Tammy Beverly, RN 10/17/16 -  Nurse    Sarahy Salinas COTA/L 03/07/18 -  OT    AS Mindy Mark OT 07/05/20 -  OT        Wound  08/26/20 1322 Bilateral gluteal MASD (Moisture associated skin damage) (Active)   Dressing Appearance open to air 9/9/2020  8:00 AM   Closure Open to air 9/9/2020  8:00 AM   Base blanchable 9/9/2020  8:00 AM     Rehab Goal Summary     Row Name 09/09/20 1504 09/09/20 0928 09/09/20 0835       Bed Mobility Goal 1 (PT)    Activity/Assistive Device (Bed Mobility Goal 1, PT)  --  supine to sit;sit to supine  -KW  --    [place] Level/Cues Needed (Bed Mobility Goal 1, PT)  --  conditional independence  -KW  --    Time Frame (Bed Mobility Goal 1, PT)  --  2 days  -KW  --    Progress/Outcomes (Bed Mobility Goal 1, PT)  --  (S) goal met  -KW  --       Transfer Goal 1 (PT)    Activity/Assistive Device (Transfer Goal 1, PT)  --  sit-to-stand/stand-to-sit;bed-to-chair/chair-to-bed LRAD PRN  -KW  --    [place] Level/Cues Needed (Transfer Goal 1, PT)  --  conditional independence  -KW  --    Time Frame (Transfer Goal 1, PT)  --  3 days  -KW  --    Progress/Outcome (Transfer Goal 1, PT)  --  goal not met  -KW  --       Gait Training Goal 1 (PT)    Activity/Assistive Device (Gait Training Goal 1, PT)  --  gait (walking locomotion);assistive device use;decrease fall risk;improve balance and speed;increase endurance/gait distance LRAD PRN  -KW  --    [place] Level (Gait Training Goal 1, PT)  --  conditional independence  -KW  --    Distance (Gait Goal 1, PT)  --  100ft or more  -KW  --    Time Frame (Gait Training Goal 1, PT)  --  by discharge  -KW  --    Progress/Outcome (Gait Training Goal 1, PT)  --  goal not met  -KW  --       Occupational Therapy Goals    Transfer Goal Selection (OT)  transfer, OT goal 1  -AS  --  transfer, OT goal 1  -RC    Bathing Goal Selection (OT)  bathing, OT goal 1  -AS  --  bathing, OT goal 1  -RC    Dressing Goal Selection (OT)  dressing, OT goal 1  -AS  --  dressing, OT goal 1  -RC    Toileting Goal Selection (OT)  toileting, OT goal 1  -AS  --  toileting, OT goal 1  -RC    Activity  Tolerance Goal Selection (OT)  activity tolerance, OT goal 1  -AS  --  --    Additional Documentation  Activity Tolerance Goal Selection (OT) (Row)  -AS  --  --       Transfer Goal 1 (OT)    Activity/Assistive Device (Transfer Goal 1, OT)  bed-to-chair/chair-to-bed;sit-to-stand/stand-to-sit;toilet  -AS  --  bed-to-chair/chair-to-bed;sit-to-stand/stand-to-sit;toilet  -RC    [place] Level/Cues Needed (Transfer Goal 1, OT)  conditional independence  -AS  --  conditional independence  -RC    Time Frame (Transfer Goal 1, OT)  long term goal (LTG);by discharge  -AS  --  long term goal (LTG);by discharge  -RC    Progress/Outcome (Transfer Goal 1, OT)  goal not met  -AS  --  goal not met  -RC       Bathing Goal 1 (OT)    Activity/Assistive Device (Bathing Goal 1, OT)  bathing skills, all  -AS  --  bathing skills, all  -RC    [place] Level/Cues Needed (Bathing Goal 1, OT)  supervision required;set-up required  -AS  --  supervision required;set-up required  -RC    Time Frame (Bathing Goal 1, OT)  long term goal (LTG);by discharge  -AS  --  long term goal (LTG);by discharge  -RC    Progress/Outcomes (Bathing Goal 1, OT)  goal met  -AS  --  goal met  -RC       Dressing Goal 1 (OT)    Activity/Assistive Device (Dressing Goal 1, OT)  lower body dressing  -AS  --  lower body dressing  -RC    [place]/Cues Needed (Dressing Goal 1, OT)  supervision required;set-up required  -AS  --  supervision required;set-up required  -RC    Time Frame (Dressing Goal 1, OT)  long term goal (LTG);by discharge  -AS  --  long term goal (LTG);by discharge  -RC    Progress/Outcome (Dressing Goal 1, OT)  goal met  -AS  --  goal met  -RC       Toileting Goal 1 (OT)    Activity/Device (Toileting Goal 1, OT)  toileting skills, all  -AS  --  toileting skills, all  -RC    [place] Level/Cues Needed (Toileting Goal 1, OT)  conditional independence  -AS  --  conditional independence  -RC    Time Frame (Toileting Goal 1, OT)  long term goal  (LTG);by discharge  -AS  --  long term goal (LTG);by discharge  -    Progress/Outcome (Toileting Goal 1, OT)  goal not met  -AS  --  goal not met  -        Activity Tolerance Goal 1 (OT)    Activity Level (Endurance Goal 1, OT)  20 min activity functional/therpeautic activities  -AS  --  --    Time Frame (Activity Tolerance Goal 1, OT)  long term goal (LTG);by discharge  -AS  --  --    Progress/Outcome (Activity Tolerance Goal 1, OT)  goal not met  -AS  --  --      User Key  (r) = Recorded By, (t) = Taken By, (c) = Cosigned By    Initials Name Provider Type Discipline    RC Sarahy Garcia, BURKETT/L Occupational Therapy Assistant OT    AS Mindy Mark, OT Occupational Therapist OT    Mo Bright, PT Physical Therapist PT        Occupational Therapy Education                 Title: PT OT SLP Therapies (In Progress)     Topic: Occupational Therapy (In Progress)     Point: ADL training (In Progress)     Description:   Instruct learner(s) on proper safety adaptation and remediation techniques during self care or transfers.   Instruct in proper use of assistive devices.              Learning Progress Summary           Patient Acceptance, E, NR by  at 9/4/2020 1219    Acceptance, E,TB,D, NR by CS at 8/31/2020 1140    Acceptance, E, NR by AS at 8/28/2020 1434    Comment:  role of OT, OT POC, ADL training, transfer training, fall precautions, safe use of RW                   Point: Home exercise program (In Progress)     Description:   Instruct learner(s) on appropriate technique for monitoring, assisting and/or progressing therapeutic exercises/activities.              Learning Progress Summary           Patient Acceptance, E,TB,D, NR by  at 8/31/2020 1140                   Point: Precautions (Done)     Description:   Instruct learner(s) on prescribed precautions during self-care and functional transfers.              Learning Progress Summary           Patient Acceptance, E, VU by AS at 9/9/2020 6080     Comment:  OT POC, progress, goals; fxnl mobility, UE assessment    Acceptance, E, NR by  at 9/9/2020 1327    Acceptance, E, NR by  at 9/4/2020 1219    Acceptance, E,TB,D, NR by CS at 8/31/2020 1140    Acceptance, E, NR by AS at 8/28/2020 1434    Comment:  role of OT, OT POC, ADL training, transfer training, fall precautions, safe use of RW                   Point: Body mechanics (In Progress)     Description:   Instruct learner(s) on proper positioning and spine alignment during self-care, functional mobility activities and/or exercises.              Learning Progress Summary           Patient Acceptance, E, NR by  at 9/9/2020 1327    Acceptance, E, VU,DU,NR by SHANNON at 9/7/2020 1550    Comment:  Pt educated on OT and POC. Pt educated on proper body mechanics when performing t/f's.    Acceptance, E, NR by  at 9/4/2020 1219    Acceptance, E,TB,D, NR by CS at 8/31/2020 1140                               User Key     Initials Effective Dates Name Provider Type Discipline     03/07/18 -  Sarahy Garcia COTA/L Occupational Therapy Assistant OT     03/07/18 -  Kamini Heard COTA/SAMANTHA Occupational Therapy Assistant OT    AS 07/05/20 -  Mindy Mark OT Occupational Therapist OT    SHANNON 11/05/19 -  Altagracia Sotelo OTA Occupational Therapy Assistant OT                OT Recommendation and Plan  Outcome Summary/Treatment Plan (OT)  Daily Summary of Progress (OT): progress toward functional goals is good  Plan for Continued Treatment (OT): Cont OT POC  Anticipated Discharge Disposition (OT): anticipate therapy at next level of care  Planned Therapy Interventions (OT Eval): adaptive equipment training, activity tolerance training, BADL retraining, functional balance retraining, occupation/activity based interventions, patient/caregiver education/training, ROM/therapeutic exercise, strengthening exercise, transfer/mobility retraining  Therapy Frequency (OT Eval): other (see comments)(5-7 days/wk)  Daily Summary  of Progress (OT): progress toward functional goals is good  Plan of Care Review  Plan of Care Reviewed With: patient  Plan of Care Reviewed With: patient  Outcome Summary: OT re-cert/progress note completed. Pt participated in discussion regarding POC, progress, and goals. Pt feels he is making good progress and has met 2 goals this rehab period. Pt feels his biggest barrier at this time is that he fatigues easily especially during fxnl mobility. Pt performed bed mobility with mod I, transfer with SPV using RW, and in room mobility with RW and SPV. Pt would benefit from continued skilled OT services to promote return to PLOF. Cont OT POC  Outcome Measures     Row Name 09/09/20 1504 09/09/20 0835 09/07/20 1455       How much help from another is currently needed...    Putting on and taking off regular lower body clothing?  3  -AS  3  -RC  3  -SHANNON    Bathing (including washing, rinsing, and drying)  3  -AS  3  -RC  3  -SHANNON    Toileting (which includes using toilet bed pan or urinal)  3  -AS  2  -RC  2  -SHANNON    Putting on and taking off regular upper body clothing  3  -AS  3  -RC  3  -SHANNON    Taking care of personal grooming (such as brushing teeth)  4  -AS  4  -RC  4  -SHANNON    Eating meals  4  -AS  4  -RC  4  -SHANNON    AM-PAC 6 Clicks Score (OT)  20  -AS  19  -RC  19  -SHANNON       Functional Assessment    Outcome Measure Options  AM-PAC 6 Clicks Daily Activity (OT)  -AS  --  --    Row Name 09/07/20 1353             How much help from another person do you currently need...    Turning from your back to your side while in flat bed without using bedrails?  4  -LN      Moving from lying on back to sitting on the side of a flat bed without bedrails?  4  -LN      Moving to and from a bed to a chair (including a wheelchair)?  3  -LN      Standing up from a chair using your arms (e.g., wheelchair, bedside chair)?  3  -LN      Climbing 3-5 steps with a railing?  3  -LN      To walk in hospital room?  3  -LN      AM-PAC 6 Clicks Score (PT)   20  -LN         Functional Assessment    Outcome Measure Options  AM-PAC 6 Clicks Basic Mobility (PT)  -LN        User Key  (r) = Recorded By, (t) = Taken By, (c) = Cosigned By    Initials Name Provider Type    LN Suni Vasquez, PTA Physical Therapy Assistant    Sarahy Salinas, BURKETT/L Occupational Therapy Assistant    AS Mindy Mark, OT Occupational Therapist    Altagracia Pina OTA Occupational Therapy Assistant           Time Calculation:   Time Calculation- OT     Row Name 09/09/20 1543 09/09/20 1329 09/09/20 1051       Time Calculation- OT    OT Start Time  1504  -AS  0835  -RC  --    OT Stop Time  1514  -AS  0915  -RC  --    OT Time Calculation (min)  10 min  -AS  40 min  -RC  --    Total Timed Code Minutes- OT  --  40 minute(s)  -RC  --    OT Received On  09/09/20  -AS  09/09/20  -  --    OT Goal Re-Cert Due Date  09/22/20  -AS  --  --       Timed Charges    55113 - Gait Training Minutes   --  --  15  -KW      User Key  (r) = Recorded By, (t) = Taken By, (c) = Cosigned By    Initials Name Provider Type    Sarahy Salinas, BURKETT/L Occupational Therapy Assistant    AS Mindy Mark, OT Occupational Therapist    Mo Bright, CHELSI Physical Therapist        Therapy Charges for Today     Code Description Service Date Service Provider Modifiers Qty    [id number]  OT THERAPEUTIC ACT EA 15 MIN 9/9/2020 Mindy Mark OT GO 1               Mindy Mark OT  9/9/2020

## 2020-09-10 NOTE — PLAN OF CARE
Problem: Patient Care Overview  Goal: Plan of Care Review  Flowsheets (Taken 9/10/2020 1023)  Plan of Care Reviewed With: patient  Outcome Summary: UB/LB bathing/dressing-SBA, Grooming activity (shaving)-SBA standing sink side. Pt completed BUE ther ex in all planes w/ good tolerance. Fxl mobility-SBA w/ RW ~10'. Pt met 1 goal this date. Cont OT POC.

## 2020-09-10 NOTE — THERAPY TREATMENT NOTE
Acute Care - Occupational Therapy Treatment Note  [facility] [place]     Patient Name: Wong Patrick  : 1955  MRN: [id number]  Today's Date: 9/10/2020  Onset of Illness/Injury or Date of Surgery: 20  Date of Referral to OT: 20  Referring Physician: MD Jennifer    Admit Date: 2020       ICD-10-CM ICD-9-CM   1. Pneumonia of both lungs due to infectious organism, unspecified part of lung J18.9 483.8   2. Fever, unspecified fever cause R50.9 780.60   3. Impaired mobility and ADLs Z74.09 V49.89    Z78.9    4. Impaired functional mobility, balance, gait, and endurance Z74.09 V49.89   5. Pneumonia of left upper lobe due to Streptococcus pneumoniae (CMS/HCC) J13 481   6. Mediastinal adenopathy R59.0 785.6   7. Non-intractable vomiting with nausea, unspecified vomiting type R11.2 787.01   8. Diarrhea, unspecified type R19.7 787.91     Patient Active Problem List   Diagnosis   • History of colon polyps   • Nausea and vomiting   • Right lower lobe pneumonia   • Acquired thrombocytopenia (CMS/HCC)   • Chronic hepatitis C (CMS/HCC)   • Hypertension   • Cirrhosis of liver (CMS/HCC)   • Mediastinal adenopathy   • Sepsis due to pneumonia (CMS/HCC)   • COPD with acute exacerbation (CMS/HCC)   • IBRAHIMA (acute kidney injury) (CMS/HCC)   • Acute respiratory failure with hypoxia (CMS/HCC)   • Pneumonia of left upper lobe due to Streptococcus pneumoniae (CMS/HCC)   • Non-intractable vomiting   • CLL (chronic lymphocytic leukemia) (CMS/HCC)   • Bacteremia due to Streptococcus pneumoniae   • Diarrhea   • Thrombocytopenia (CMS/HCC)     Past Medical History:   Diagnosis Date   • Abnormal weight loss    • Acquired thrombocytopenia (CMS/HCC)    • Adenomatous polyp of colon    • Anxiety    • Biliary calculus     denies N/V, abd pain   • Chronic hepatitis C (CMS/HCC)    • Cirrhosis of liver (CMS/HCC)    • Colon polyp    • COPD (chronic obstructive pulmonary disease) (CMS/HCC)    • Elevated levels of transaminase & lactic  acid dehydrogenase    • Generalized abdominal pain     known gallstones   • GERD (gastroesophageal reflux disease)    • Hypertension    • IBS (irritable bowel syndrome)    • Impacted cerumen    • Kidney disease     US suggest renal dz, elevated alpha 2 macroglobulins   • Nausea and vomiting    • Patient noncompliance, general    • Rectal hemorrhage    • Tracheal mass    • Villous adenoma of colon    • Wheezing      Past Surgical History:   Procedure Laterality Date   • BRONCHOSCOPY N/A 7/13/2020    Procedure: BRONCHOSCOPY WITH ENDOBRONCHIAL ULTRASOUND;  Surgeon: Karthik Sewell MD;  Location: [facility] OR;  Service: Pulmonary;  Laterality: N/A;  preop; tracheal mass; pneumonia  postop  PCP    • BRONCHOSCOPY N/A 9/1/2020    Procedure: BRONCHOSCOPY WITH ENDOBRONCHIAL ULTRASOUND and needle biopsy, with possible bronchoalveolar lavage, brush, and/or endobronchial biopsy;  Surgeon: Sandra Jacques MD;  Location: [facility] OR;  Service: Pulmonary;  Laterality: N/A;   • COLONOSCOPY  06/03/2013   • COLONOSCOPY N/A 8/22/2018    Procedure: COLONOSCOPY;  Surgeon: Charles Pham MD;  Location: [facility] ENDOSCOPY;  Service: Gastroenterology   • COLONOSCOPY N/A 9/4/2020    Procedure: COLONOSCOPY;  Surgeon: Yoni Simon MD;  Location: [facility] ENDOSCOPY;  Service: Gastroenterology;  Laterality: N/A;   • ENDOSCOPY N/A 7/9/2019    Procedure: ESOPHAGOGASTRODUODENOSCOPY;  Surgeon: Bi Ibarra MD;  Location: [facility] ENDOSCOPY;  Service: General   • ENDOSCOPY N/A 9/3/2020    Procedure: ESOPHAGOGASTRODUODENOSCOPY;  Surgeon: Yoni Simon MD;  Location: [facility] ENDOSCOPY;  Service: Gastroenterology;  Laterality: N/A;   • ENDOSCOPY AND COLONOSCOPY     • ESOPHAGOSCOPY / EGD      with tube   • UPPER GASTROINTESTINAL ENDOSCOPY  06/03/2013   • UPPER GASTROINTESTINAL ENDOSCOPY  07/09/2019       Therapy Treatment    Rehabilitation Treatment Summary     Row Name 09/10/20 1023             Treatment Time/Intention    Discipline   occupational therapy assistant  -KD      Document Type  therapy note (daily note)  -KD      Subjective Information  no complaints  -KD      Mode of Treatment  occupational therapy  -KD      Patient/Family Observations  No family present  -KD      Therapy Frequency (OT Eval)  other (see comments) 5-7 days/wk  -KD      Equipment Issued to Patient  gait belt  -KD      Recorded by [KD] Toya Varma COTA/L 09/10/20 1505      Row Name 09/10/20 1023             Vital Signs    Pre Systolic BP Rehab  105  -KD      Pre Treatment Diastolic BP  60  -KD      Pretreatment Heart Rate (beats/min)  98  -KD      Posttreatment Heart Rate (beats/min)  99  -KD      Pre SpO2 (%)  95  -KD      O2 Delivery Pre Treatment  room air  -KD      Post SpO2 (%)  94  -KD      O2 Delivery Post Treatment  room air  -KD      Pre Patient Position  Supine  -KD      Intra Patient Position  Standing  -KD      Post Patient Position  Sitting  -KD      Recorded by [KD] Toya Varma BURKETT/L 09/10/20 1505      Row Name 09/10/20 1023             Cognitive Assessment/Intervention- PT/OT    Orientation Status (Cognition)  oriented x 4  -KD      Follows Commands (Cognition)  WFL  -KD      Recorded by [KD] Toya Varma COTA/L 09/10/20 1505      Row Name 09/10/20 1023             Bed Mobility Assessment/Treatment    Supine-Sit [place] (Bed Mobility)  conditional independence  -KD      Bed Mobility, Safety Issues  decreased use of arms for pushing/pulling  -KD      Assistive Device (Bed Mobility)  bed rails;head of bed elevated  -KD      Recorded by [KD] Toya Varma COTA/L 09/10/20 1505      Row Name 09/10/20 1023             Functional Mobility    Functional Mobility- Ind. Level  supervision required  -KD      Functional Mobility- Device  rolling walker  -KD      Functional Mobility-Distance (Feet)  10  -KD      Recorded by [KD] Toya Varma BURKETT/L 09/10/20 1505      Row Name 09/10/20 1023             Transfer Assessment/Treatment     Transfer Assessment/Treatment  sit-stand transfer;stand-sit transfer  -KD      Recorded by [KD] Toya Varma BURKETT/L 09/10/20 1505      Row Name 09/10/20 1023             Bed-Chair Transfer    Bed-Chair [place] (Transfers)  supervision  -KD      Assistive Device (Bed-Chair Transfers)  walker, front-wheeled  -KD      Recorded by [KD] Toya Varma BURKETT/L 09/10/20 1505      Row Name 09/10/20 1023             Sit-Stand Transfer    Sit-Stand [place] (Transfers)  supervision  -KD      Assistive Device (Sit-Stand Transfers)  walker, front-wheeled  -KD      Recorded by [KD] Toya Varma BURKETT/L 09/10/20 1505      Row Name 09/10/20 1023             Stand-Sit Transfer    Stand-Sit [place] (Transfers)  supervision  -KD      Assistive Device (Stand-Sit Transfers)  walker, front-wheeled  -KD      Recorded by [KD] Toya Varma BURKETT/L 09/10/20 1505      Row Name 09/10/20 1023             Bathing Assessment/Intervention    Bathing [place] Level  bathing skills;upper body;lower body;supervision  -KD      Assistive Devices (Bathing)  bath mitt  -KD      Bathing Position  supported sitting  -KD      Recorded by [KD] Toya Varma BURKETT/L 09/10/20 1505      Row Name 09/10/20 1023             Upper Body Dressing Assessment/Training    Upper Body Dressing [place] Level  upper body dressing skills;doff;don;supervision  -KD      Upper Body Dressing Position  edge of bed sitting  -KD      Comment (Upper Body Dressing)  HG  -KD      Recorded by [KD] Toya Varma BURKETT/L 09/10/20 1505      Row Name 09/10/20 1023             Lower Body Dressing Assessment/Training    Lower Body Dressing [place] Level  lower body dressing skills;doff;don;socks  -KD      Lower Body Dressing Position  edge of bed sitting  -KD      Recorded by [KD] Toya Varma BURKETT/L 09/10/20 1505      Row Name 09/10/20 1023             Grooming Assessment/Training    [place] Level (Grooming)  grooming skills;hair care,  combing/brushing;wash face, hands;shave face;supervision  -KD      Grooming Position  edge of bed sitting  -KD      Recorded by [KD] Toya Varma COTA/L 09/10/20 1505      Row Name 09/10/20 1023             Therapeutic Exercise    Upper Extremity Range of Motion (Therapeutic Exercise)  wrist flexion/extension, bilateral;forearm supination/pronation, bilateral;elbow flexion/extension, bilateral;shoulder internal/external rotation, bilateral;shoulder horizontal abduction/adduction, bilateral;shoulder abduction/adduction, bilateral;shoulder flexion/extension, bilateral  -KD      Weight/Resistance (Therapeutic Exercise)  2 pounds  -KD      Exercise Type (Therapeutic Exercise)  AROM (active range of motion)  -KD      Position (Therapeutic Exercise)  seated  -KD      Sets/Reps (Therapeutic Exercise)  1/20  -KD      Equipment (Therapeutic Exercise)  free weight, barbell  -KD      Expected Outcome (Therapeutic Exercise)  improve functional tolerance, self-care activity;improve performance, transfer skills  -KD      Recorded by [KD] Toya Varma COTA/L 09/10/20 1505      Row Name 09/10/20 1023             Dynamic Sitting Balance    Level of [place], Reaches Outside Midline (Sitting, Dynamic Balance)  supervision  -KD      Sitting Position, Reaches Outside Midline (Sitting, Dynamic Balance)  sitting on edge of bed  -KD      Recorded by [KD] Toya Varma COTA/L 09/10/20 1505      Row Name 09/10/20 1023             Dynamic Standing Balance    Level of [place], Reaches Outside Midline (Standing, Dynamic Balance)  supervision  -KD      Time Able to Maintain Position, Reaches Outside Midline (Standing, Dynamic Balance)  3 to 4 minutes  -KD      Recorded by [KD] oTya Varma COTA/L 09/10/20 1505      Row Name 09/10/20 1023             Positioning and Restraints    Pre-Treatment Position  in bed  -KD      Post Treatment Position  chair  -KD      In Chair  sitting;call light within reach;encouraged to call  for assist;exit alarm on  -KD      Recorded by [KD] Toya Varma COTA/L 09/10/20 1505      Row Name 09/10/20 1023             Pain Scale: Numbers Pre/Post-Treatment    Pain Scale: Numbers, Pretreatment  0/10 - no pain  -KD      Pain Scale: Numbers, Post-Treatment  0/10 - no pain  -KD      Recorded by [KD] Toya Varma BURKETT/L 09/10/20 1505      Row Name                Wound 08/26/20 1322 Bilateral gluteal MASD (Moisture associated skin damage)    Wound - Properties Group Date first assessed: 08/26/20 [AK] Time first assessed: 1322 [AK] Present on Hospital Admission: Y [AK] Side: Bilateral [AK] Location: gluteal [AK] Primary Wound Type: MASD [AK2], with open areas  Recorded by:  [AK] Tammy Beverly RN 08/26/20 1347 [AK2] Tammy Beverly RN 08/26/20 1348    Row Name 09/10/20 1023             Outcome Summary/Treatment Plan (OT)    Daily Summary of Progress (OT)  progress toward functional goals as expected  -KD      Plan for Continued Treatment (OT)  Cont POC  -KD      Anticipated Discharge Disposition (OT)  anticipate therapy at next level of care  -KD      Recorded by [KD] Toya Varma COTA/L 09/10/20 1505        User Key  (r) = Recorded By, (t) = Taken By, (c) = Cosigned By    Initials Name Effective Dates Discipline    AK Tammy Beverly RN 10/17/16 -  Nurse    KD Toya Varma COTA/L 03/07/18 -  OT        Wound 08/26/20 1322 Bilateral gluteal MASD (Moisture associated skin damage) (Active)   Dressing Appearance open to air 9/10/2020  9:22 AM   Closure Open to air 9/10/2020  9:22 AM   Base blanchable 9/10/2020  9:22 AM   Periwound redness 9/10/2020  9:22 AM   Periwound Temperature warm 9/10/2020  9:22 AM     Rehab Goal Summary     Row Name 09/10/20 1023             Occupational Therapy Goals    Transfer Goal Selection (OT)  transfer, OT goal 1  -KD      Bathing Goal Selection (OT)  bathing, OT goal 1  -KD      Dressing Goal Selection (OT)  dressing, OT goal 1  -KD      Toileting Goal Selection  (OT)  toileting, OT goal 1  -KD      Activity Tolerance Goal Selection (OT)  activity tolerance, OT goal 1  -KD      Additional Documentation  Activity Tolerance Goal Selection (OT) (Row)  -KD         Transfer Goal 1 (OT)    Activity/Assistive Device (Transfer Goal 1, OT)  bed-to-chair/chair-to-bed;sit-to-stand/stand-to-sit;toilet  -KD      [place] Level/Cues Needed (Transfer Goal 1, OT)  conditional independence  -KD      Time Frame (Transfer Goal 1, OT)  long term goal (LTG);by discharge  -KD      Progress/Outcome (Transfer Goal 1, OT)  goal not met  -KD         Bathing Goal 1 (OT)    Activity/Assistive Device (Bathing Goal 1, OT)  bathing skills, all  -KD      [place] Level/Cues Needed (Bathing Goal 1, OT)  supervision required;set-up required  -KD      Time Frame (Bathing Goal 1, OT)  long term goal (LTG);by discharge  -KD      Progress/Outcomes (Bathing Goal 1, OT)  goal met  -KD         Dressing Goal 1 (OT)    Activity/Assistive Device (Dressing Goal 1, OT)  lower body dressing  -KD      [place]/Cues Needed (Dressing Goal 1, OT)  supervision required;set-up required  -KD      Time Frame (Dressing Goal 1, OT)  long term goal (LTG);by discharge  -KD      Progress/Outcome (Dressing Goal 1, OT)  goal met  -KD         Toileting Goal 1 (OT)    Activity/Device (Toileting Goal 1, OT)  toileting skills, all  -KD      [place] Level/Cues Needed (Toileting Goal 1, OT)  conditional independence  -KD      Time Frame (Toileting Goal 1, OT)  long term goal (LTG);by discharge  -KD      Progress/Outcome (Toileting Goal 1, OT)  goal not met  -KD          Activity Tolerance Goal 1 (OT)    Activity Level (Endurance Goal 1, OT)  20 min activity functional/therpeautic activities  -KD      Time Frame (Activity Tolerance Goal 1, OT)  long term goal (LTG)  -KD      Progress/Outcome (Activity Tolerance Goal 1, OT)  goal met  -KD        User Key  (r) = Recorded By, (t) = Taken By, (c) = Cosigned By    Initials Name  Provider Type Discipline    Toya Marshall COTA/L Occupational Therapy Assistant OT        Occupational Therapy Education                 Title: PT OT SLP Therapies (In Progress)     Topic: Occupational Therapy (In Progress)     Point: ADL training (In Progress)     Description:   Instruct learner(s) on proper safety adaptation and remediation techniques during self care or transfers.   Instruct in proper use of assistive devices.              Learning Progress Summary           Patient Acceptance, E, NR by RC at 9/4/2020 1219    Acceptance, E,TB,D, NR by CS at 8/31/2020 1140    Acceptance, E, NR by AS at 8/28/2020 1434    Comment:  role of OT, OT POC, ADL training, transfer training, fall precautions, safe use of RW                   Point: Home exercise program (In Progress)     Description:   Instruct learner(s) on appropriate technique for monitoring, assisting and/or progressing therapeutic exercises/activities.              Learning Progress Summary           Patient Acceptance, E,TB,D, NR by CS at 8/31/2020 1140                   Point: Precautions (Done)     Description:   Instruct learner(s) on prescribed precautions during self-care and functional transfers.              Learning Progress Summary           Patient Acceptance, E, VU by AS at 9/9/2020 1539    Comment:  OT POC, progress, goals; fxnl mobility, UE assessment    Acceptance, E, NR by RC at 9/9/2020 1327    Acceptance, E, NR by  at 9/4/2020 1219    Acceptance, E,TB,D, NR by CS at 8/31/2020 1140    Acceptance, E, NR by AS at 8/28/2020 1434    Comment:  role of OT, OT POC, ADL training, transfer training, fall precautions, safe use of RW                   Point: Body mechanics (In Progress)     Description:   Instruct learner(s) on proper positioning and spine alignment during self-care, functional mobility activities and/or exercises.              Learning Progress Summary           Patient Acceptance, E, NR by  at 9/9/2020 1327     Acceptance, E, VU,DU,NR by SHANNON at 9/7/2020 1550    Comment:  Pt educated on OT and POC. Pt educated on proper body mechanics when performing t/f's.    Acceptance, E, NR by  at 9/4/2020 1219    Acceptance, E,TB,D, NR by  at 8/31/2020 1140                               User Key     Initials Effective Dates Name Provider Type Discipline     03/07/18 -  Sarahy Garcia COTA/L Occupational Therapy Assistant OT    CORNELIUS 03/07/18 -  Kamini Heard COTA/SAMANTHA Occupational Therapy Assistant OT    AS 07/05/20 -  Mindy Mark, OT Occupational Therapist OT    SHANNON 11/05/19 -  Altagracia Sotelo OTA Occupational Therapy Assistant OT                OT Recommendation and Plan  Outcome Summary/Treatment Plan (OT)  Daily Summary of Progress (OT): progress toward functional goals as expected  Plan for Continued Treatment (OT): Cont POC  Anticipated Discharge Disposition (OT): anticipate therapy at next level of care  Therapy Frequency (OT Eval): other (see comments)(5-7 days/wk)  Daily Summary of Progress (OT): progress toward functional goals as expected  Plan of Care Review  Plan of Care Reviewed With: patient  Plan of Care Reviewed With: patient  Outcome Summary: UB/LB bathing/dressing-SBA, Grooming activity (shaving)-SBA standing sink side. Pt completed BUE ther ex in all planes w/ good tolerance. Fxl mobility-SBA w/ RW ~10'. Pt met 1 goal this date. Cont OT POC.  Outcome Measures     Row Name 09/10/20 1023 09/09/20 1504 09/09/20 0835       How much help from another is currently needed...    Putting on and taking off regular lower body clothing?  3  -KD  3  -AS  3  -RC    Bathing (including washing, rinsing, and drying)  3  -KD  3  -AS  3  -RC    Toileting (which includes using toilet bed pan or urinal)  3  -KD  3  -AS  2  -RC    Putting on and taking off regular upper body clothing  3  -KD  3  -AS  3  -RC    Taking care of personal grooming (such as brushing teeth)  4  -KD  4  -AS  4  -RC    Eating meals  4  -KD  4  -AS  4   -    AM-PAC 6 Clicks Score (OT)  20  -KD  20  -AS  19  -       Functional Assessment    Outcome Measure Options  --  AM-PAC 6 Clicks Daily Activity (OT)  -AS  --      User Key  (r) = Recorded By, (t) = Taken By, (c) = Cosigned By    Initials Name Provider Type     Sarahy Garcia BURKETT/L Occupational Therapy Assistant    Toya Marshall COTA/L Occupational Therapy Assistant    AS Mindy Mark, OT Occupational Therapist           Time Calculation:   Time Calculation- OT     Row Name 09/10/20 1509             Time Calculation- OT    OT Start Time  1023  -KD      OT Stop Time  1102  -KD      OT Time Calculation (min)  39 min  -KD      Total Timed Code Minutes- OT  39 minute(s)  -KD      OT Received On  09/10/20  -        User Key  (r) = Recorded By, (t) = Taken By, (c) = Cosigned By    Initials Name Provider Type    Toya Marshall COTA/L Occupational Therapy Assistant        Therapy Charges for Today     Code Description Service Date Service Provider Modifiers Qty    01131624196 HC OT SELF CARE/MGMT/TRAIN EA 15 MIN 9/10/2020 Toya Varma COTA/L GO 2    43163046781 HC OT THER PROC EA 15 MIN 9/10/2020 Toya Varma COTA/L GO 1               MADELAINE Cerrato/SAMANTHA  9/10/2020

## 2020-09-10 NOTE — SIGNIFICANT NOTE
09/10/20 0955   Rehab Treatment   Discipline physical therapy assistant   Reason Treatment Not Performed patient/family declined treatment  (attempted pt twice this AM, first time pt was eating and second time pt states he walked enough yesterday and is going to pass today)

## 2020-09-10 NOTE — PROGRESS NOTES
[facility] Medicine Services  INPATIENT PROGRESS NOTE    Length of Stay: 16  Date of Admission: 8/25/2020  Primary Care Physician: Provider, No Known    Subjective   Chief Complaint: nausea, shortness of breath   HPI:  65 year old male with a history of COPD ,cirrhosis of the liver with ascites, chronic hepatitis C, GERD, HTN, and irritable bowel syndrome who presented on 8/25/2020 with shortness of breath, nausea, vomiting, diarrhea, and fatigue.  Chest x-ray showed patchy airspace opacity involving almost the entirety of the left lung with air bronchograms. He also has a known tracheal/mediastinal mass.  He met sepsis criteria and was admitted for sepsis from pneumonia and respiratory failure.  He was admitted to CCU on IV antibiotics and IV fluid.  He was initiated on IV solumedrol and bronchodilators.  COVID 19 is negative.  Blood cultures were positive for Streptococcus pneumoniae sensitive to Levaquin.  Antibiotics were targeted. Oncology and pulmonology were consulted.  Peripheral blood flow cytometry is suggestive of SLL/CLL and he is recommended observation at this time and outpatient oncology follow-up. FNA is also consistent with SLL/CLL. GI was consulted for persistent nausea.  He underwent EGD which revealed esophagitis, gastritis, and medium-sized hiatal hernia.  He underwent colonoscopy which revealed non-bleeding hemorrhoids. Biopsies were taken from the colon. During today's visit, patient denies any new complaints.  He reports mild abdominal pain continues.  He is awaiting precert to return for SNF placement.     Review of Systems   Constitutional: Negative for chills and fever.   HENT: Negative for congestion, rhinorrhea and sore throat.    Respiratory: Negative for cough, chest tightness, shortness of breath and wheezing.    Cardiovascular: Negative for chest pain, palpitations and leg swelling.   Gastrointestinal: Positive for abdominal pain. Negative  for constipation, diarrhea, nausea and vomiting.   Musculoskeletal: Negative for back pain and neck pain.   Skin: Negative for pallor.   Neurological: Negative for dizziness, weakness and light-headedness.   Psychiatric/Behavioral: Negative for confusion. The patient is not nervous/anxious.         All pertinent negatives and positives are as above. All other systems have been reviewed and are negative unless otherwise stated.     Objective    Temp:  [96.7 °F (35.9 °C)-98.6 °F (37 °C)] 98.4 °F (36.9 °C)  Heart Rate:  [83-96] 94  Resp:  [17-20] 18  BP: ()/(48-65) 108/65    Physical Exam   Constitutional: He is oriented to person, place, and time. He appears well-developed and well-nourished. No distress.   HENT:   Head: Normocephalic and atraumatic.   Right Ear: External ear normal.   Left Ear: External ear normal.   Nose: Nose normal.   Eyes: Conjunctivae are normal. Right eye exhibits no discharge. Left eye exhibits no discharge. No scleral icterus.   Neck: Normal range of motion. Neck supple. No JVD present.   Cardiovascular: Normal rate, regular rhythm, normal heart sounds and intact distal pulses. Exam reveals no gallop and no friction rub.   No murmur heard.  Pulmonary/Chest: Effort normal and breath sounds normal. No stridor. No respiratory distress. He has no wheezes. He has no rales.   Abdominal: Soft. Bowel sounds are normal. He exhibits no distension. There is no tenderness.   Musculoskeletal: Normal range of motion. He exhibits no edema.   Neurological: He is alert and oriented to person, place, and time.   Skin: Skin is warm and dry. He is not diaphoretic.   Psychiatric: He has a normal mood and affect. His behavior is normal.   Nursing note and vitals reviewed.          Results Review:  I have reviewed the labs, radiology results, and diagnostic studies.    Laboratory Data:   Results from last 7 days   Lab Units 09/10/20  0607 09/07/20  0449 09/06/20  0629   SODIUM mmol/L 138 136 137   POTASSIUM  mmol/L 4.2 3.9 3.7   CHLORIDE mmol/L 99 101 101   CO2 mmol/L 28.0 27.0 28.0   BUN mg/dL 13 10 10   CREATININE mg/dL 0.80 0.84 0.78   GLUCOSE mg/dL 96 104* 102*   CALCIUM mg/dL 8.5* 8.2* 8.0*   ANION GAP mmol/L 11.0 8.0 8.0     Estimated Creatinine Clearance: 87.8 mL/min (by C-G formula based on SCr of 0.8 mg/dL).          Results from last 7 days   Lab Units 09/10/20  0607 09/08/20  0549 09/07/20  0449 09/06/20  0637 09/05/20  0555   WBC 10*3/mm3 5.29 4.32 5.69 5.29 5.64   HEMOGLOBIN g/dL 9.2* 8.0* 8.2* 8.1* 8.0*   HEMATOCRIT % 28.9* 25.4* 24.9* 25.5* 24.6*   PLATELETS 10*3/mm3 97* 80* 84* 88* 75*           Culture Data:   No results found for: BLOODCX  No results found for: URINECX  No results found for: RESPCX  No results found for: WOUNDCX  No results found for: STOOLCX  No components found for: BODYFLD    Radiology Data:   Imaging Results (Last 24 Hours)     ** No results found for the last 24 hours. **          I have reviewed the patient's current medications.     Assessment/Plan     Active Hospital Problems    Diagnosis   • **Non-intractable vomiting   • Thrombocytopenia (CMS/[facility])   • CLL (chronic lymphocytic leukemia) (CMS/[facility])     Likely based on flow cytometry     • Bacteremia due to Streptococcus pneumoniae   • Sepsis due to pneumonia (CMS/[facility])   • COPD with acute exacerbation (CMS/[facility])   • IBRAHIMA (acute kidney injury) (CMS/[facility])   • Acute respiratory failure with hypoxia (CMS/[facility])   • Pneumonia of left upper lobe due to Streptococcus pneumoniae (CMS/[facility])   • Diarrhea   • Mediastinal adenopathy   • Hypertension   • Cirrhosis of liver (CMS/[facility])       Plan:    1. Sepsis: resolved.  Afebrile.  WBC normal.  R/t strep pneumonia and bacteremia.    2. Strep bacteremia: completed course of Levaquin.    3. Strep pneumonia: weaned back to room air.  Levaquin course completed.  Continue Duonebs PRN and Symbicort.  Pulmonology has signed off.   4. CLL: outpatient hem/onc follow up at discharge.  Currently stable.   5.  Gastritis/esophagitis/hiatal hernia: Dr. Simon following.  Continue PPI, Carafate.   6. COPD: no acute exacerbation.  Continue Symbicort, PRN Duonebs.   7. HTN: continue Losartan.   8. Liver cirrhosis: outpatient GI follow up at discharge.    9. IBRAHIMA: resolved.       Discharge Planning: awaiting SNF placement.  Precert pending.           This document has been electronically signed by ANA Tovar on September 10, 2020 14:25

## 2020-09-10 NOTE — PLAN OF CARE
Pt has been quiet, but had no complaints today. VSS; will continue to monitor.     Problem: Patient Care Overview  Goal: Plan of Care Review  Flowsheets (Taken 9/10/2020 1501)  Progress: improving

## 2020-09-10 NOTE — PLAN OF CARE
Problem: Patient Care Overview  Goal: Plan of Care Review  Outcome: Ongoing (interventions implemented as appropriate)  Flowsheets  Taken 9/10/2020 0321  Progress: no change  Outcome Summary: Pt has no new complaints this shift. Awaiting SNF placement for rehab. Will continue to monitor.  Taken 9/9/2020 2035  Plan of Care Reviewed With: patient

## 2020-09-11 NOTE — DISCHARGE SUMMARY
[facility] Medicine Services  DISCHARGE SUMMARY       Date of Admission: 8/25/2020  Date of Discharge:  9/11/2020  Primary Care Physician: Cherelle Spivey APRN    Presenting Problem/History of Present Illness:  Fever, unspecified fever cause [R50.9]  Pneumonia of both lungs due to infectious organism, unspecified part of lung [J18.9]     Final Discharge Diagnoses:  Active Hospital Problems    Diagnosis   • **Non-intractable vomiting   • Thrombocytopenia (CMS/HCC)   • CLL (chronic lymphocytic leukemia) (CMS/[facility])     Likely based on flow cytometry     • Bacteremia due to Streptococcus pneumoniae   • Sepsis due to pneumonia (CMS/HCC)   • COPD with acute exacerbation (CMS/HCC)   • IBRAHIMA (acute kidney injury) (CMS/HCC)   • Acute respiratory failure with hypoxia (CMS/HCC)   • Pneumonia of left upper lobe due to Streptococcus pneumoniae (CMS/HCC)   • Diarrhea   • Mediastinal adenopathy   • Hypertension   • Cirrhosis of liver (CMS/HCC)       Consults:   Consults     Date and Time Order Name Status Description    9/2/2020 1039 Inpatient Gastroenterology Consult Completed     8/29/2020 1616 Inpatient Pulmonology Consult Completed     8/27/2020 1757 Hematology & Oncology Inpatient Consult Completed     8/25/2020 1344 Hospitalist (on-call MD unless specified)            Procedures Performed: Procedure(s):  COLONOSCOPY                Pertinent Test Results:   Lab Results (last 24 hours)     Procedure Component Value Units Date/Time    COVID-19, [facility] IN-HOUSE, NP SWAB IN TRANSPORT MEDIA 8-10 HR TAT - Swab, Nasopharynx [[id number]]  (Normal) Collected:  09/11/20 0953    Specimen:  Swab from Nasopharynx Updated:  09/11/20 1352     COVID19 Not Detected    Narrative:       Testing performed by Real Time RT-PCR  This test has not been approved by the [employer] but is authorized under the Emergency Use Act (EUA)    Fact sheet for providers: https://www.fda.gov/media/633530/download    Fact  sheet for patients: https://www.[employer].gov/media/473587/download            Imaging Results (All)     Procedure Component Value Units Date/Time    US Abdomen Limited [[id number]] Collected:  08/31/20 0937     Updated:  08/31/20 1121    Narrative:       EXAM: US ABDOMEN LIMITED    INDICATION: assess amount of ascites, J18.9 Pneumonia,  unspecified organism R50.9 Fever, unspecified Z74.09 Other  reduced mobility Z78.9 Other specified health status Z74.09 Other  reduced mobility J13 Pneumonia due to Streptococcus pneumoniae  R59.0 Localized enlarged lymph nodes    COMPARISONS: CT abdomen and pelvis 8/28/2020    FINDINGS:  Small amount of perihepatic and perisplenic fluid is seen within  the right and left upper quadrants.    Small amount of fluid is seen within the pelvis.      Impression:       Small amount of ascites within the upper abdomen and pelvis.    Electronically signed by:  Jose Guadalupe Farley MD  8/31/2020  11:20 AM CDT Workstation: [id number]    CT Abdomen Pelvis With & Without Contrast [[id number]] Collected:  08/28/20 1423     Updated:  08/28/20 1840    Narrative:       PROCEDURE: CT ABDOMEN PELVIS W WO CONTRAST    INDICATION: Adenopathy, pneumonia, fever    HISTORY: Bilious adenoma:, COPD, hep C, IBS    COMPARISON: CT of chest dated 8/26/2020 and ultrasound liver  dated 6/17/2020     TECHNIQUE:   - reconstructions:  Axial, coronal, sagittal   - contrast:  oral:  No      intravenous:  Images acquired prior to and after the  intravenous administration of 90 mL of Isovue-300     This exam was performed according to our departmental  dose-optimization program, which includes automated exposure  control, adjustment of the mA and/or kV according to patient size  and/or use of iterative reconstruction technique.  DLP is 1090.6    FINDINGS:      - - - CT ABDOMEN - - -      THORAX (INFERIOR):   - LUNG BASES:  Increasing infiltrates in both lung bases  compared to the previous study. Small bilateral pleural  effusions  and pleural calcifications are present, as before. Multiple blebs  are seen in the lingula, which may be slightly improved from  prior   - PLEURA:  No fluid or mass   - HEART: Normal size, no pericardial fluid   - MISC:  N/A      ABDOMEN:   - LIVER:  Normal size. Nodular contour, compatible with  cirrhosis. No ductal dilatation, no focal lesion. The main portal  vein appears dilated at 2.0 cm in greatest diameter.   - GB: Numerous partially calcified gallstones are present.   - CBD:  Grossly negative   - SPLEEN:  The spleen is enlarged measuring 17.5 cm in greatest  dimension. There are perisplenic varices.   - PANCREAS:  Normal in size, contour, no focal mass    - VISCERA:  Normal caliber, though there are some borderline  loops of small bowel measuring up to 3.0 cm in diameter in the  left upper quadrant. There is limited evaluation for wall  thickening due to ascites, but probably some wall thickening in  the left abdominal small bowel loops is present. No frankly  dilated bowel is identified. There is postoperative change of the  stomach   - MESENTERY: There are enlarged lymph nodes in the karin hepatis  measuring up to 3.5 cm in greatest dimension.   - CAVITY:  Small amount of ascites, no free intraperitoneal air   - BODY WALL:  Asymmetric subcutaneous stranding, right greater  than left overlying the abdomen.   - OSSEOUS:  Grossly negative for age   - MISC:   RETROPERITONEUM:   - KIDNEYS:Normal size/contour, no collecting system dilation                    No evidence of an enhancing mass. Probable cyst  in the right renal upper pole measuring approximately 9 mm in  greatest dimension this is too small to fully characterize   - URETERS:  Normal course, caliber   - ADRENALS:  Normal size, contour   - MISC:  Numerous enlarged retroperitoneal lymph nodes measuring  up to 3.8 cm in the left periaortic space just inferior to the  left renal vein.  Aortocaval lymph node measures up to 2.3 cm  just at the  level of the aortic bifurcation.  - VASCULAR:  Aorta / iliacs: Atherosclerotic vascular  calcification     - - - CT PELVIS - - -      - VISCERA:  Normal caliber small/large bowel, no focal  thickening/mass        - APPENDIX: Identified with certainty.   - MESENTERY:  Enlarged pelvic sidewall lymph nodes include right  extradural iliac node measuring 1.8 cm in greatest dimension.  Left external iliac nodes measure up to 2.0 cm in greatest  dimension. There are enlarged lymph nodes in the inguinal regions  as described below.   - VASCULAR:  Atherosclerotic vascular calcification   - CAVITY:  Moderate amount ascites. No free air   - BLADDER:  Unremarkable   - OSSEOUS:  Within normal limits    - MISC:  Is suspected small hydroceles   - PROSTATE:  Grossly wnl  Bilateral inguinal lymphadenopathy with nodes measuring up to 3.8  cm in greatest dimension on the right, and up to 3.1 cm on the  left       Impression:       1. Increasing infiltrates in bilateral lung bases, likely  reflecting pneumonia, possibly with some element of pulmonary  edema. There are also bilateral pleural effusions, similar to  previous study   2. Cirrhotic appearance of liver.  3. There is evidence of portal hypertension with enlargement of  the portal and splenic veins and perisplenic varices, as well as  severe splenomegaly  4. Mesenteric and retroperitoneal lymphadenopathy.  5. Evidence of third spacing with subcutaneous stranding in the  abdominal body wall, right greater than left, bilateral pleural  effusions, and ascites, as above  6. Cholelithiasis  7. Pelvic sidewall or inguinal lymphadenopathy  8. Please see findings section above for further details.    Electronically signed by:  Brittney Bourgeois MD  8/28/2020 6:39 PM CDT  Workstation: [id number]    CT Chest Without Contrast [[id number]] Collected:  08/26/20 1526     Updated:  08/26/20 1631    Narrative:       PROCEDURE: CT CHEST WO CONTRAST    INDICATION: Pneumonia, shortness of  breath    COMPARISON: Chest x-ray dated 8/26/2020     TECHNIQUE:    - reconstructions: Axial, coronal, sagittal    - contrast:  oral:  None                       intravenous: None    This exam was performed according to our departmental  dose-optimization program, which includes automated exposure  control, adjustment of the mA and/or kV according to patient size  and/or use of iterative reconstruction technique.  DLP is 670    FINDINGS:    PULMONARY PARENCHYMA:      -There are emphysematous changes. There is near complete  opacification of the left lung upper from blebs and bullae at the  left lung apex, one of which contains a small air-fluid level.  There is a small amount of aerated lung in the lingula and  anterior left lower lobe. A calcified granuloma in the each lung  base is present, as noted on previous chest x-rays. Dependent  atelectasis is seen in the right lung.     MEDIASTINUM / RENETTA:      - heart: Normal size, no pericardial fluid    - aorta/great vessels: Normal caliber and configuration for age    - misc: Enlarged mediastinal lymph nodes are present, measuring  up to 1.9 cm in the right paratracheal space. Mildly enlarged  prevascular nodes are also present. There is limited evaluation  of hilar nodes due to noncontrast technique.     PLEURAL COMPARTMENT:      - misc: Bilateral pleural effusions are present, right greater  than left. There appear to be pleural calcifications in the left  lower lung zone.      MISC:      - inferior neck: Mildly enlarged lymph nodes are seen in the  visualized portion of the inferior neck as well as the  supraclavicular region.    - osseous/body wall: Enlarged bilateral axillary lymph nodes  are present.    - subdiaphramatic: Large partially calcified gallstones are  present. The spleen is markedly enlarged, measuring 17.5 cm in  greatest dimension. The liver appears nodular, concerning for  cirrhosis. Trace ascites is present. There appear to be  retroperitoneal  enlarged lymph nodes and there may be enlarged  mesenteric nodes as well      Impression:       1. Near complete opacification of the left lung superimposed on  emphysema. Numerous blebs and bulla are present in the left lung  apex, including one with a small air-fluid level. Interval  follow-up to evaluate for resolution suggested  2. Bilateral pleural effusions, right greater than left  3. Calcification in the posterior left pleura, which appears to  largely spare the diaphragmatic surface. This may be the result  of previous hemorrhage, empyema, or less likely asbestos  exposure.  4. Multifocal lymphadenopathy including both axillary regions,  visualized portion of the cervical nodes and supraclavicular  nodes, mediastinal lymph nodes, retroperitoneal nodes, and  probably mesenteric nodes. Possible etiologies include  metastases, lymphoma, and reactive nodes. These can be followed  up is deemed clinically warranted.  5. Splenomegaly  6. Cirrhotic appearance of the liver  7. Please see findings section above for further details    Electronically signed by:  Brittney Bourgeois MD  8/26/2020 4:30 PM CDT  Workstation: [id number]    XR Chest 1 View [[id number]] Collected:  08/25/20 1153     Updated:  08/25/20 1226    Narrative:       PROCEDURE: XR CHEST 1 VW    VIEWS:Single    INDICATION: Fever, cough    COMPARISON: CXR: 1/8/2020    FINDINGS:       - lines/tubes: None    - cardiac: Size within normal limits.    - mediastinum: Contour within normal limits.     - lungs: Patchy airspace opacification of almost the entirety  of the left lung, with air bronchograms. There is also  ill-defined opacity in the right lung base. Calcified granulomata  in each lower lung zone are present    - pleura: Mild blunting of the right costophrenic angle..      - osseous: Unremarkable for age.      Impression:       Patchy airspace opacification involving almost the entirety of  the left lung with air bronchograms, likely  representing  pneumonia, and new from prior study. There is also ill-defined  opacity in the right lung base which may represent atelectasis,  consolidation, effusion, or likely some combination of these.  Follow-up to complete radiographic resolution is recommended      Electronically signed by:  Brittney Bourgeois MD  8/25/2020 12:25 PM  CDT Workstation: [id number]            Chief Complaint on Day of Discharge: none    Hospital Course:  65-year-old  male with past medical history of COPD, cirrhosis of the liver with ascites, chronic hepatitis C, gastroesophageal reflux disease, hypertension, irritable bowel syndrome who presented on 8/25/2020 with shortness of breath, nausea, vomiting, diarrhea, and fatigue.  Chest x-ray showed patchy airspace opacity involving almost the entirety of the left lung with air bronchograms.  He also has a known tracheal/mediastinal mass.  He met sepsis criteria and was admitted for sepsis with pneumonia and respiratory failure.  He was admitted to ICU initially on IV antibiotics and IV fluids.  He was initiated on IV Solu-Medrol and bronchodilators as well.  COVID-19 testing was performed and was negative.  Blood cultures were positive for Streptococcus pneumoniae responsive to Levaquin.    Oncology and pulmonology services followed during the hospital stay.  Peripheral flow cytometry and FNA were suggestive for SLL/CLL and he has been recommended outpatient oncology follow up.  GI service also followed patient during hospital stay due to persistent nausea.  He underwent EGD which revealed esophagitis, gastritis, and medium sized hiatal hernia.  He underwent colonoscopy which revealed nonbleeding hemorrhoids as well.  He is currently on PPI therapy as well as Carafate for treatment of his gastritis.  Nausea and vomiting is resolved and patient is tolerating oral intake of discharge.  Patient was pursuing skilled nursing facility placement at discharge, however pre-CERT was denied  "by insurance as patient progressed well with therapy and was too high functioning to have SNF benefit covered.  Patient has opted to return home.  He will be discharged home in stable condition with instructions for 1 week follow-up with PCP, oncology, and gastroenterology.  Patient was offered home health services, however he has declined home health on day of discharge.  Condition on Discharge:  Stable     Physical Exam on Discharge:  /65 (BP Location: Left arm, Patient Position: Lying)   Pulse 97   Temp 97.8 °F (36.6 °C) (Oral)   Resp 18   Ht 165.1 cm (65\")   Wt 65.7 kg (144 lb 12.8 oz)   SpO2 95%   BMI 24.10 kg/m²   Physical Exam   Constitutional: He is oriented to person, place, and time. He appears well-developed and well-nourished. No distress.   HENT:   Head: Normocephalic and atraumatic.   Right Ear: External ear normal.   Left Ear: External ear normal.   Nose: Nose normal.   Eyes: Conjunctivae are normal. Right eye exhibits no discharge. Left eye exhibits no discharge. No scleral icterus.   Neck: Normal range of motion. Neck supple. No JVD present.   Cardiovascular: Normal rate, regular rhythm, normal heart sounds and intact distal pulses. Exam reveals no gallop and no friction rub.   No murmur heard.  Pulmonary/Chest: Effort normal and breath sounds normal. No stridor. No respiratory distress. He has no wheezes. He has no rales.   Abdominal: Soft. Bowel sounds are normal. He exhibits no distension. There is no tenderness.   Musculoskeletal: Normal range of motion. He exhibits no edema.   Neurological: He is alert and oriented to person, place, and time.   Skin: Skin is warm and dry. He is not diaphoretic.   Psychiatric: He has a normal mood and affect. His behavior is normal.   Nursing note and vitals reviewed.        Discharge Disposition:  Home or Self Care    Discharge Medications:     Discharge Medications      New Medications      Instructions Start Date   acidophilus tablet tablet   1 " tablet, Oral, Daily   Start Date:  September 12, 2020     folic acid 1 MG tablet  Commonly known as:  FOLVITE   1 mg, Oral, Daily   Start Date:  September 12, 2020        Continue These Medications      Instructions Start Date   albuterol sulfate  (90 Base) MCG/ACT inhaler  Commonly known as:  PROVENTIL HFA;VENTOLIN HFA;PROAIR HFA   2 puffs, Inhalation, Every 4 Hours PRN      budesonide-formoterol 160-4.5 MCG/ACT inhaler  Commonly known as:  Symbicort   2 puffs, Inhalation, 2 Times Daily - RT      busPIRone 15 MG tablet  Commonly known as:  BUSPAR   15 mg, Oral, 2 Times Daily      ferrous sulfate 325 (65 FE) MG EC tablet   325 mg, Oral, Daily With Breakfast      loperamide 2 MG capsule  Commonly known as:  IMODIUM   2 mg, Oral, 4 Times Daily PRN      loratadine 10 MG tablet  Commonly known as:  CLARITIN   10 mg, Oral, Daily      losartan 50 MG tablet  Commonly known as:  COZAAR   50 mg, Oral, Daily      meclizine 25 MG tablet  Commonly known as:  ANTIVERT   25 mg, Oral, 2 times daily      omeprazole 20 MG capsule  Commonly known as:  priLOSEC   20 mg, Oral, Daily         Stop These Medications    predniSONE 10 MG tablet  Commonly known as:  DELTASONE            Discharge Diet:   Diet Instructions     Diet: Regular      Discharge Diet:  Regular          Activity at Discharge:   Activity Instructions     Activity as Tolerated            Discharge Care Plan/Instructions: Follow-up with PCP within 1 week of discharge.  Follow-up with Dr. Conner in 1 week.    Follow-up Appointments:   Additional Instructions for the Follow-ups that You Need to Schedule     Discharge Follow-up with PCP   As directed       Currently Documented PCP:    Provider, No Known    PCP Phone Number:    None     Follow Up Details:  one week         Discharge Follow-up with Specified Provider: [facility]; 1 Week   As directed      To:  [facility]    Follow Up:  1 Week           Follow-up Information     Cherelle Spivey APRN Follow up on  9/22/2020.    Specialty:  Family Medicine  Why:  Tuesday September 22nd 3:30 pm  Contact information:  [address] [facility] [address] [phone]             Yoni Simon MD Follow up in 1 week(s).    Specialty:  Gastroenterology  Contact information:  [address] [place] [address] [address]  [facility] [address] [phone]                   Test Results Pending at Discharge:    Order Current Status    Flow Cytometry, Blood Collected (09/01/20 1407)    Flow Cytometry In process    AFB Culture - Lavage, Lung, Lingula Preliminary result    AFB Culture - Wash, Lung, L Preliminary result    Fungus Culture - Lavage, Lung, Lingula Preliminary result    Fungus Culture - Wash, Lung, L Preliminary result                This document has been electronically signed by ANA Tovar on September 11, 2020 16:19        Time: 30 minutes spent on assessment, discussion, management, discharge planning for this patient.

## 2020-09-11 NOTE — PLAN OF CARE
Problem: Patient Care Overview  Goal: Plan of Care Review  Outcome: Ongoing (interventions implemented as appropriate)  Flowsheets (Taken 9/11/2020 1119)  Outcome Summary: pt sup<>sit with independence, sit<>stand with SBA, pt ambulated 450` with RW & SBA. pt would benefit from 24/7 care facility & continued PT services

## 2020-09-11 NOTE — PLAN OF CARE
Problem: Patient Care Overview  Goal: Plan of Care Review  Outcome: Ongoing (interventions implemented as appropriate)  Flowsheets  Taken 9/11/2020 0134  Progress: improving  Outcome Summary: Patient currently resting with no complaints. VSS. Will continue to monitor.  Taken 9/10/2020 2000  Plan of Care Reviewed With: patient

## 2020-09-11 NOTE — PAYOR COMM NOTE
"Clinical updates on auth # [id number]. We have been waiting approval from insurance to go to a skilled nursing facility. They initiated the precert on 9/8/20.    Idalia Ohara RN   Phone [phone] Fax [phone].    Mabel Patrick (65 y.o. Male)     Date of Birth Social Security Number Address Home Phone MRN    1955  [address]  [facility] [address] [phone] [id number]    Voodoo Marital Status          None Single       Admission Date Admission Type Admitting Provider Attending Provider Department, Room/Bed    8/25/20 Emergency Nader Mauro MD Gerlach, Elizabeth T, MD [address], 323/1    Discharge Date Discharge Disposition Discharge Destination                       Attending Provider:  Cherelle Romeo MD    Allergies:  Codeine, Penicillins    Isolation:  None   Infection:  COVID Screen (preop/placement) (09/11/20)   Code Status:  CPR    Ht:  165.1 cm (65\")   Wt:  65.7 kg (144 lb 12.8 oz)    Admission Cmt:  None   Principal Problem:  Non-intractable vomiting [R11.10]                 Active Insurance as of 8/25/2020     Primary Coverage     Payor Plan Insurance Group Employer/Plan Group    [facility]IC[facility]PLAC[facility] [id number]     Payor Plan Address Payor Plan Phone Number Payor Plan Fax Number Effective Dates    [address] [address] [phone]  4/1/2020 - None Entered    [facility] [address]       Subscriber Name Subscriber Birth Date Member ID       Mabel Patrick 1955 [id number]           Secondary Coverage     Payor Plan Insurance Group Employer/Plan Group    [place]      Payor Plan Address Payor Plan Phone Number Payor Plan Fax Number Effective Dates    [address] [address]   1/1/2014 - None Entered    [facility] [id number]       Subscriber Name Subscriber Birth Date Member ID       MABEL PATRICK 1955 [id number]                 Emergency Contacts      " (Rel.) Home Phone Work Phone Mobile Phone    Yinka Shelton (Friend) [phone] -- [phone]    Volodymyr Patrick (Relative) [phone] -- [phone]    Jose Luis Pak [phone] -- --            Insurance Information                [facility] MEDICARE REPLACEMENT/[employer] MEDICARE REPLACEMENT Phone: [phone]    Subscriber: Wong Patrick Subscriber#: [id number]    Group#: [id number] Precert#:         [place] Phone:     Subscriber: Wong Patrick Subscriber#: [id number]    Group#:  Precert#:              Physician Progress Notes (last 48 hours) (Notes from 09/09/20 0906 through 09/11/20 0906)      Yoni Simon MD at 09/10/20 2300                  SUBJECTIVE:   9/10/2020  Chief Complaint:     Subjective      Patient feels some better today. No further nausea or emesis. Abdominal pain, diarrhea and dyspnea improving. Tolerating diet. hgb improved to 9.2. Path was C/W reactive gastropathy and reflux esophagitis. Colon and TI path negative.     History:  Past Medical History:   Diagnosis Date   • Abnormal weight loss    • Acquired thrombocytopenia (CMS/HCC)    • Adenomatous polyp of colon    • Anxiety    • Biliary calculus     denies N/V, abd pain   • Chronic hepatitis C (CMS/HCC)    • Cirrhosis of liver (CMS/HCC)    • Colon polyp    • COPD (chronic obstructive pulmonary disease) (CMS/HCC)    • Elevated levels of transaminase & lactic acid dehydrogenase    • Generalized abdominal pain     known gallstones   • GERD (gastroesophageal reflux disease)    • Hypertension    • IBS (irritable bowel syndrome)    • Impacted cerumen    • Kidney disease     US suggest renal dz, elevated alpha 2 macroglobulins   • Nausea and vomiting    • Patient noncompliance, general    • Rectal hemorrhage    • Tracheal mass    • Villous adenoma of colon    • Wheezing      Past Surgical History:   Procedure Laterality Date   • BRONCHOSCOPY N/A 7/13/2020    Procedure: BRONCHOSCOPY WITH  ENDOBRONCHIAL ULTRASOUND;  Surgeon: Karthik Sewell MD;  Location: [facility] OR;  Service: Pulmonary;  Laterality: N/A;  preop; tracheal mass; pneumonia  postop  PCP    • BRONCHOSCOPY N/A 9/1/2020    Procedure: BRONCHOSCOPY WITH ENDOBRONCHIAL ULTRASOUND and needle biopsy, with possible bronchoalveolar lavage, brush, and/or endobronchial biopsy;  Surgeon: Sandra Jacques MD;  Location: [facility] OR;  Service: Pulmonary;  Laterality: N/A;   • COLONOSCOPY  06/03/2013   • COLONOSCOPY N/A 8/22/2018    Procedure: COLONOSCOPY;  Surgeon: Charles Pham MD;  Location: [facility] ENDOSCOPY;  Service: Gastroenterology   • COLONOSCOPY N/A 9/4/2020    Procedure: COLONOSCOPY;  Surgeon: Yoni Simon MD;  Location: [facility] ENDOSCOPY;  Service: Gastroenterology;  Laterality: N/A;   • ENDOSCOPY N/A 7/9/2019    Procedure: ESOPHAGOGASTRODUODENOSCOPY;  Surgeon: Bi Ibarra MD;  Location: [facility] ENDOSCOPY;  Service: General   • ENDOSCOPY N/A 9/3/2020    Procedure: ESOPHAGOGASTRODUODENOSCOPY;  Surgeon: Yoni Simon MD;  Location: [facility] ENDOSCOPY;  Service: Gastroenterology;  Laterality: N/A;   • ENDOSCOPY AND COLONOSCOPY     • ESOPHAGOSCOPY / EGD      with tube   • UPPER GASTROINTESTINAL ENDOSCOPY  06/03/2013   • UPPER GASTROINTESTINAL ENDOSCOPY  07/09/2019     Family History   Problem Relation Age of Onset   • Heart disease Other    • Diabetes Other      Social History     Tobacco Use   • Smoking status: Current Every Day Smoker     Packs/day: 1.00     Types: Cigarettes   • Smokeless tobacco: Never Used   Substance Use Topics   • Alcohol use: No   • Drug use: No     Medications Prior to Admission   Medication Sig Dispense Refill Last Dose   • albuterol (PROVENTIL HFA;VENTOLIN HFA) 108 (90 BASE) MCG/ACT inhaler Inhale 2 puffs Every 4 (Four) Hours As Needed for Wheezing.   Taking   • budesonide-formoterol (Symbicort) 160-4.5 MCG/ACT inhaler Inhale 2 puffs 2 (Two) Times a Day. 6 g 12 Unknown   • busPIRone (BUSPAR) 15  MG tablet Take 15 mg by mouth 2 (two) times a day.   Taking   • ferrous sulfate 325 (65 FE) MG EC tablet Take 1 tablet by mouth Daily With Breakfast. 30 tablet 2 Taking   • loperamide (IMODIUM) 2 MG capsule Take 2 mg by mouth 4 (Four) Times a Day As Needed for Diarrhea.   Taking   • loratadine (CLARITIN) 10 MG tablet Take 10 mg by mouth Daily.  3 Taking   • losartan (COZAAR) 50 MG tablet Take 50 mg by mouth daily.   Taking   • meclizine (ANTIVERT) 25 MG tablet Take 25 mg by mouth 2 (two) times a day.   Taking   • omeprazole (priLOSEC) 20 MG capsule Take 1 capsule by mouth Daily. 30 capsule 5 Taking   • predniSONE (DELTASONE) 10 MG tablet 4 tabs daily x 3 days, then 3 tabs daily x 3 days, then 2 tabs daily x 3 days, then 1 tab daily x 3 days 30 tablet 0 Taking     Allergies:  Codeine and Penicillins     CURRENT MEDICATIONS/OBJECTIVE/VS/PE:     Current Medications:     Current Facility-Administered Medications   Medication Dose Route Frequency Provider Last Rate Last Dose   • acetaminophen (TYLENOL) tablet 650 mg  650 mg Oral Q4H PRN Yoni Simon MD   650 mg at 09/07/20 1445   • acidophilus (FLORANEX) tablet 1 tablet  1 tablet Oral Daily Yoni Simon MD   1 tablet at 09/10/20 0922   • albuterol sulfate HFA (PROVENTIL HFA;VENTOLIN HFA;PROAIR HFA) inhaler 2 puff  2 puff Inhalation Q4H PRN Yoni Simon MD   2 puff at 08/28/20 1940   • budesonide-formoterol (SYMBICORT) 160-4.5 MCG/ACT inhaler 2 puff  2 puff Inhalation BID - RT Yoni Simon MD   2 puff at 09/10/20 2035   • busPIRone (BUSPAR) tablet 15 mg  15 mg Oral Q12H Yoni Simon MD   15 mg at 09/10/20 2024   • cetirizine (zyrTEC) tablet 10 mg  10 mg Oral Daily Yoni Simon MD   10 mg at 09/10/20 0922   • dextrose 5 % and sodium chloride 0.45 % infusion  30 mL/hr Intravenous Continuous PRN Yoni Simon MD       • folic acid (FOLVITE) tablet 1 mg  1 mg Oral Daily Yoni Simon MD   1 mg at 09/10/20 0920   • guaiFENesin  (ROBITUSSIN) 100 MG/5ML oral solution 200 mg  200 mg Oral Q4H PRN Yoni Simon MD       • ipratropium-albuterol (DUO-NEB) nebulizer solution 3 mL  3 mL Nebulization 4x Daily - RT Yoni Simon MD   3 mL at 09/10/20 2035   • loperamide (IMODIUM) capsule 2 mg  2 mg Oral 4x Daily PRN Yoni Simon MD       • losartan (COZAAR) tablet 50 mg  50 mg Oral Daily Yoni Simon MD   50 mg at 09/09/20 0932   • magic butt ointment   Topical BID Yoni Simon MD       • ondansetron (ZOFRAN) injection 4 mg  4 mg Intravenous Q6H PRN Yoni Simon MD   4 mg at 09/09/20 0937   • prochlorperazine (COMPAZINE) tablet 10 mg  10 mg Oral Q6H PRN Yoni Simon MD       • sodium chloride 0.9 % bolus 2,217 mL  30 mL/kg Intravenous Once Yoni Simon MD       • sodium chloride 0.9 % flush 10 mL  10 mL Intravenous Q12H Yoni Simon MD   10 mL at 09/10/20 2024   • sodium chloride 0.9 % flush 10 mL  10 mL Intravenous PRN Yoni Simon MD   10 mL at 08/26/20 2106   • vitamin C (ASCORBIC ACID) tablet 1,000 mg  1,000 mg Oral TID With Meals Yoni Simon MD   1,000 mg at 09/10/20 1637       Objective     Review of Systems:   Review of Systems   Constitutional: Negative for chills, fatigue, fever and unexpected weight change.   HENT: Negative for congestion, ear discharge, hearing loss, nosebleeds and sore throat.    Eyes: Negative for pain, discharge and redness.   Respiratory: Negative for cough, chest tightness and wheezing.    Cardiovascular: Negative for chest pain and palpitations.   Gastrointestinal: Negative for abdominal distention, abdominal pain, blood in stool, constipation, diarrhea and vomiting.   Endocrine: Negative for cold intolerance, polydipsia, polyphagia and polyuria.   Genitourinary: Negative for dysuria, flank pain, frequency, hematuria and urgency.   Musculoskeletal: Negative for arthralgias, back pain, joint swelling and myalgias.   Skin: Negative for color change,  pallor and rash.   Neurological: Negative for tremors, seizures, syncope, weakness and headaches.   Hematological: Negative for adenopathy. Does not bruise/bleed easily.   Psychiatric/Behavioral: Negative for behavioral problems, confusion, dysphoric mood, hallucinations and suicidal ideas. The patient is not nervous/anxious.        Physical Exam:   Temp:  [96.7 °F (35.9 °C)-98.7 °F (37.1 °C)] 97.9 °F (36.6 °C)  Heart Rate:  [] 91  Resp:  [18-22] 18  BP: ()/(58-65) 109/58     Physical Exam:  General Appearance:    Alert, cooperative, in no acute distress   Head:    Normocephalic, without obvious abnormality, atraumatic   Eyes:            Lids and lashes normal, conjunctivae and sclerae normal, no   icterus, no pallor, corneas clear, PERRLA   Ears:    Ears appear intact with no abnormalities noted   Throat:   No oral lesions, no thrush, oral mucosa moist   Neck:   No adenopathy, supple, trachea midline, no thyromegaly, no     carotid bruit, no JVD   Back:     No kyphosis present, no scoliosis present, no skin lesions,       erythema or scars, no tenderness to percussion or                   palpation,   range of motion normal   Lungs:     Clear to auscultation,respirations regular, even and                   unlabored    Heart:    Regular rhythm and normal rate, normal S1 and S2, no            murmur, no gallop, no rub, no click   Breast Exam:    Deferred   Abdomen:     Normal bowel sounds, no masses, no organomegaly, soft        nontender, nondistended, no guarding, no rebound                 tenderness   Genitalia:    Deferred   Extremities:   Moves all extremities well, no edema, no cyanosis, no              redness   Pulses:   Pulses palpable and equal bilaterally   Skin:   No bleeding, bruising or rash   Lymph nodes:   No palpable adenopathy   Neurologic:   Cranial nerves 2 - 12 grossly intact, sensation intact, DTR        present and equal bilaterally      Results Review:     Lab Results (last 24  hours)     Procedure Component Value Units Date/Time    Basic Metabolic Panel [[id number]]  (Abnormal) Collected:  09/10/20 0607    Specimen:  Blood Updated:  09/10/20 0707     Glucose 96 mg/dL      BUN 13 mg/dL      Creatinine 0.80 mg/dL      Sodium 138 mmol/L      Potassium 4.2 mmol/L      Chloride 99 mmol/L      CO2 28.0 mmol/L      Calcium 8.5 mg/dL      eGFR Non African Amer 98 mL/min/1.73      BUN/Creatinine Ratio 16.3     Anion Gap 11.0 mmol/L     Narrative:       GFR Normal >60  Chronic Kidney Disease <60  Kidney Failure <15      CBC & Differential [[id number]] Collected:  09/10/20 0607    Specimen:  Blood Updated:  09/10/20 0641    Narrative:       The following orders were created for panel order CBC & Differential.  Procedure                               Abnormality         Status                     ---------                               -----------         ------                     CBC Auto Differential[[id number]]        Abnormal            Final result                 Please view results for these tests on the individual orders.    CBC Auto Differential [[id number]]  (Abnormal) Collected:  09/10/20 0607    Specimen:  Blood Updated:  09/10/20 0641     WBC 5.29 10*3/mm3      RBC 3.21 10*6/mm3      Hemoglobin 9.2 g/dL      Hematocrit 28.9 %      MCV 90.0 fL      MCH 28.7 pg      MCHC 31.8 g/dL      RDW 16.7 %      RDW-SD 55.5 fl      MPV 11.1 fL      Platelets 97 10*3/mm3      Neutrophil % 41.4 %      Lymphocyte % 44.4 %      Monocyte % 11.9 %      Eosinophil % 1.7 %      Basophil % 0.2 %      Immature Grans % 0.4 %      Neutrophils, Absolute 2.19 10*3/mm3      Lymphocytes, Absolute 2.35 10*3/mm3      Monocytes, Absolute 0.63 10*3/mm3      Eosinophils, Absolute 0.09 10*3/mm3      Basophils, Absolute 0.01 10*3/mm3      Immature Grans, Absolute 0.02 10*3/mm3      nRBC 0.0 /100 WBC            I reviewed the patient's new clinical results.  I reviewed the patient's new imaging results and agree with the  interpretation.     ASSESSMENT/PLAN:   ASSESSMENT: 1.  Epigastric pain with nausea and vomiting , improving  2.  History of chronic hepatitis C with cirrhosis, LFTs normal.  3.  Extensive lymphadenopathy and weight loss work-up is in progress for lymphoma.  4.  Anemia, likely anemia of chronic disease.   improving.   5.  Diarrhea, improving  6.  Bacterial pneumonia, improving.  7.colon polyps  PLAN: 1.  Continue PPI and antibiotics  2. cont carafate  3. Cont current care  4.capsule endoscopy as out patient  The risks, benefits, and alternatives of this procedure have been discussed with the patient or the responsible party- the patient understands and agrees to proceed.         Yoni Simon MD  09/10/20  23:00          Electronically signed by Yoni Simon MD at 09/10/20 2301     Perlita Cordon APRN at 09/10/20 1005              [facility] Medicine Services  INPATIENT PROGRESS NOTE    Length of Stay: 16  Date of Admission: 8/25/2020  Primary Care Physician: Provider, No Known    Subjective   Chief Complaint: nausea, shortness of breath   HPI:  65 year old male with a history of COPD ,cirrhosis of the liver with ascites, chronic hepatitis C, GERD, HTN, and irritable bowel syndrome who presented on 8/25/2020 with shortness of breath, nausea, vomiting, diarrhea, and fatigue.  Chest x-ray showed patchy airspace opacity involving almost the entirety of the left lung with air bronchograms. He also has a known tracheal/mediastinal mass.  He met sepsis criteria and was admitted for sepsis from pneumonia and respiratory failure.  He was admitted to CCU on IV antibiotics and IV fluid.  He was initiated on IV solumedrol and bronchodilators.  COVID 19 is negative.  Blood cultures were positive for Streptococcus pneumoniae sensitive to Levaquin.  Antibiotics were targeted. Oncology and pulmonology were consulted.  Peripheral blood flow cytometry is suggestive of SLL/CLL and he is  recommended observation at this time and outpatient oncology follow-up. FNA is also consistent with SLL/CLL. GI was consulted for persistent nausea.  He underwent EGD which revealed esophagitis, gastritis, and medium-sized hiatal hernia.  He underwent colonoscopy which revealed non-bleeding hemorrhoids. Biopsies were taken from the colon. During today's visit, patient denies any new complaints.  He reports mild abdominal pain continues.  He is awaiting precert to return for SNF placement.     Review of Systems   Constitutional: Negative for chills and fever.   HENT: Negative for congestion, rhinorrhea and sore throat.    Respiratory: Negative for cough, chest tightness, shortness of breath and wheezing.    Cardiovascular: Negative for chest pain, palpitations and leg swelling.   Gastrointestinal: Positive for abdominal pain. Negative for constipation, diarrhea, nausea and vomiting.   Musculoskeletal: Negative for back pain and neck pain.   Skin: Negative for pallor.   Neurological: Negative for dizziness, weakness and light-headedness.   Psychiatric/Behavioral: Negative for confusion. The patient is not nervous/anxious.         All pertinent negatives and positives are as above. All other systems have been reviewed and are negative unless otherwise stated.     Objective    Temp:  [96.7 °F (35.9 °C)-98.6 °F (37 °C)] 98.4 °F (36.9 °C)  Heart Rate:  [83-96] 94  Resp:  [17-20] 18  BP: ()/(48-65) 108/65    Physical Exam   Constitutional: He is oriented to person, place, and time. He appears well-developed and well-nourished. No distress.   HENT:   Head: Normocephalic and atraumatic.   Right Ear: External ear normal.   Left Ear: External ear normal.   Nose: Nose normal.   Eyes: Conjunctivae are normal. Right eye exhibits no discharge. Left eye exhibits no discharge. No scleral icterus.   Neck: Normal range of motion. Neck supple. No JVD present.   Cardiovascular: Normal rate, regular rhythm, normal heart sounds and  intact distal pulses. Exam reveals no gallop and no friction rub.   No murmur heard.  Pulmonary/Chest: Effort normal and breath sounds normal. No stridor. No respiratory distress. He has no wheezes. He has no rales.   Abdominal: Soft. Bowel sounds are normal. He exhibits no distension. There is no tenderness.   Musculoskeletal: Normal range of motion. He exhibits no edema.   Neurological: He is alert and oriented to person, place, and time.   Skin: Skin is warm and dry. He is not diaphoretic.   Psychiatric: He has a normal mood and affect. His behavior is normal.   Nursing note and vitals reviewed.          Results Review:  I have reviewed the labs, radiology results, and diagnostic studies.    Laboratory Data:   Results from last 7 days   Lab Units 09/10/20  0607 09/07/20 0449 09/06/20  0629   SODIUM mmol/L 138 136 137   POTASSIUM mmol/L 4.2 3.9 3.7   CHLORIDE mmol/L 99 101 101   CO2 mmol/L 28.0 27.0 28.0   BUN mg/dL 13 10 10   CREATININE mg/dL 0.80 0.84 0.78   GLUCOSE mg/dL 96 104* 102*   CALCIUM mg/dL 8.5* 8.2* 8.0*   ANION GAP mmol/L 11.0 8.0 8.0     Estimated Creatinine Clearance: 87.8 mL/min (by C-G formula based on SCr of 0.8 mg/dL).          Results from last 7 days   Lab Units 09/10/20  0607 09/08/20  0549 09/07/20  0449 09/06/20  0637 09/05/20  0555   WBC 10*3/mm3 5.29 4.32 5.69 5.29 5.64   HEMOGLOBIN g/dL 9.2* 8.0* 8.2* 8.1* 8.0*   HEMATOCRIT % 28.9* 25.4* 24.9* 25.5* 24.6*   PLATELETS 10*3/mm3 97* 80* 84* 88* 75*           Culture Data:   No results found for: BLOODCX  No results found for: URINECX  No results found for: RESPCX  No results found for: WOUNDCX  No results found for: STOOLCX  No components found for: BODYFLD    Radiology Data:   Imaging Results (Last 24 Hours)     ** No results found for the last 24 hours. **          I have reviewed the patient's current medications.     Assessment/Plan     Active Hospital Problems    Diagnosis   • **Non-intractable vomiting   • Thrombocytopenia (CMS/HCC)    • CLL (chronic lymphocytic leukemia) (CMS/HCC)     Likely based on flow cytometry     • Bacteremia due to Streptococcus pneumoniae   • Sepsis due to pneumonia (CMS/HCC)   • COPD with acute exacerbation (CMS/HCC)   • IBRAHIMA (acute kidney injury) (CMS/HCC)   • Acute respiratory failure with hypoxia (CMS/HCC)   • Pneumonia of left upper lobe due to Streptococcus pneumoniae (CMS/HCC)   • Diarrhea   • Mediastinal adenopathy   • Hypertension   • Cirrhosis of liver (CMS/HCC)       Plan:    1. Sepsis: resolved.  Afebrile.  WBC normal.  R/t strep pneumonia and bacteremia.    2. Strep bacteremia: completed course of Levaquin.    3. Strep pneumonia: weaned back to room air.  Levaquin course completed.  Continue Duonebs PRN and Symbicort.  Pulmonology has signed off.   4. CLL: outpatient hem/onc follow up at discharge.  Currently stable.   5. Gastritis/esophagitis/hiatal hernia: Dr. Simon following.  Continue PPI, Carafate.   6. COPD: no acute exacerbation.  Continue Symbicort, PRN Duonebs.   7. HTN: continue Losartan.   8. Liver cirrhosis: outpatient GI follow up at discharge.    9. IBRAHIMA: resolved.       Discharge Planning: awaiting SNF placement.  Precert pending.           This document has been electronically signed by ANA Tovar on September 10, 2020 14:25            Electronically signed by Perlita Cordon APRN at 09/10/20 1430     Yoni Simon MD at 09/09/20 1712                  SUBJECTIVE:   9/9/2020  Chief Complaint:     Subjective      Patient feels some better today. No further nausea or emesis. Abdominal pain, diarrhea and dyspnea improving. Tolerating diet. hgb 8.0. Path was C/W reactive gastropathy and reflux esophagitis. Colon and TI path negative.     History:  Past Medical History:   Diagnosis Date   • Abnormal weight loss    • Acquired thrombocytopenia (CMS/HCC)    • Adenomatous polyp of colon    • Anxiety    • Biliary calculus     denies N/V, abd pain   • Chronic hepatitis C (CMS/HCC)    •  Cirrhosis of liver (CMS/HCC)    • Colon polyp    • COPD (chronic obstructive pulmonary disease) (CMS/HCC)    • Elevated levels of transaminase & lactic acid dehydrogenase    • Generalized abdominal pain     known gallstones   • GERD (gastroesophageal reflux disease)    • Hypertension    • IBS (irritable bowel syndrome)    • Impacted cerumen    • Kidney disease     US suggest renal dz, elevated alpha 2 macroglobulins   • Nausea and vomiting    • Patient noncompliance, general    • Rectal hemorrhage    • Tracheal mass    • Villous adenoma of colon    • Wheezing      Past Surgical History:   Procedure Laterality Date   • BRONCHOSCOPY N/A 7/13/2020    Procedure: BRONCHOSCOPY WITH ENDOBRONCHIAL ULTRASOUND;  Surgeon: Karthik Sewell MD;  Location: [facility] OR;  Service: Pulmonary;  Laterality: N/A;  preop; tracheal mass; pneumonia  postop  PCP    • BRONCHOSCOPY N/A 9/1/2020    Procedure: BRONCHOSCOPY WITH ENDOBRONCHIAL ULTRASOUND and needle biopsy, with possible bronchoalveolar lavage, brush, and/or endobronchial biopsy;  Surgeon: Sandra Jacques MD;  Location: [facility] OR;  Service: Pulmonary;  Laterality: N/A;   • COLONOSCOPY  06/03/2013   • COLONOSCOPY N/A 8/22/2018    Procedure: COLONOSCOPY;  Surgeon: Charles Pham MD;  Location: [facility] ENDOSCOPY;  Service: Gastroenterology   • COLONOSCOPY N/A 9/4/2020    Procedure: COLONOSCOPY;  Surgeon: Yoni Simon MD;  Location: [facility] ENDOSCOPY;  Service: Gastroenterology;  Laterality: N/A;   • ENDOSCOPY N/A 7/9/2019    Procedure: ESOPHAGOGASTRODUODENOSCOPY;  Surgeon: Bi Ibarra MD;  Location: [facility] ENDOSCOPY;  Service: General   • ENDOSCOPY N/A 9/3/2020    Procedure: ESOPHAGOGASTRODUODENOSCOPY;  Surgeon: Yoni Simon MD;  Location: [facility] ENDOSCOPY;  Service: Gastroenterology;  Laterality: N/A;   • ENDOSCOPY AND COLONOSCOPY     • ESOPHAGOSCOPY / EGD      with tube   • UPPER GASTROINTESTINAL ENDOSCOPY  06/03/2013   • UPPER GASTROINTESTINAL  ENDOSCOPY  07/09/2019     Family History   Problem Relation Age of Onset   • Heart disease Other    • Diabetes Other      Social History     Tobacco Use   • Smoking status: Current Every Day Smoker     Packs/day: 1.00     Types: Cigarettes   • Smokeless tobacco: Never Used   Substance Use Topics   • Alcohol use: No   • Drug use: No     Medications Prior to Admission   Medication Sig Dispense Refill Last Dose   • albuterol (PROVENTIL HFA;VENTOLIN HFA) 108 (90 BASE) MCG/ACT inhaler Inhale 2 puffs Every 4 (Four) Hours As Needed for Wheezing.   Taking   • budesonide-formoterol (Symbicort) 160-4.5 MCG/ACT inhaler Inhale 2 puffs 2 (Two) Times a Day. 6 g 12 Unknown   • busPIRone (BUSPAR) 15 MG tablet Take 15 mg by mouth 2 (two) times a day.   Taking   • ferrous sulfate 325 (65 FE) MG EC tablet Take 1 tablet by mouth Daily With Breakfast. 30 tablet 2 Taking   • loperamide (IMODIUM) 2 MG capsule Take 2 mg by mouth 4 (Four) Times a Day As Needed for Diarrhea.   Taking   • loratadine (CLARITIN) 10 MG tablet Take 10 mg by mouth Daily.  3 Taking   • losartan (COZAAR) 50 MG tablet Take 50 mg by mouth daily.   Taking   • meclizine (ANTIVERT) 25 MG tablet Take 25 mg by mouth 2 (two) times a day.   Taking   • omeprazole (priLOSEC) 20 MG capsule Take 1 capsule by mouth Daily. 30 capsule 5 Taking   • predniSONE (DELTASONE) 10 MG tablet 4 tabs daily x 3 days, then 3 tabs daily x 3 days, then 2 tabs daily x 3 days, then 1 tab daily x 3 days 30 tablet 0 Taking     Allergies:  Codeine and Penicillins     CURRENT MEDICATIONS/OBJECTIVE/VS/PE:     Current Medications:     Current Facility-Administered Medications   Medication Dose Route Frequency Provider Last Rate Last Dose   • acetaminophen (TYLENOL) tablet 650 mg  650 mg Oral Q4H PRN Yoni Simon MD   650 mg at 09/07/20 1445   • acidophilus (FLORANEX) tablet 1 tablet  1 tablet Oral Daily Yoni Simon MD   1 tablet at 09/09/20 0932   • albuterol sulfate HFA (PROVENTIL  HFA;VENTOLIN HFA;PROAIR HFA) inhaler 2 puff  2 puff Inhalation Q4H PRN Yoni Simon MD   2 puff at 08/28/20 1940   • budesonide-formoterol (SYMBICORT) 160-4.5 MCG/ACT inhaler 2 puff  2 puff Inhalation BID - RT Yoni Simon MD   2 puff at 09/08/20 2055   • busPIRone (BUSPAR) tablet 15 mg  15 mg Oral Q12H Yoni Simon MD   15 mg at 09/09/20 0932   • cetirizine (zyrTEC) tablet 10 mg  10 mg Oral Daily Yoni Simon MD   10 mg at 09/09/20 0932   • dextrose 5 % and sodium chloride 0.45 % infusion  30 mL/hr Intravenous Continuous PRN Yoni Simon MD       • folic acid (FOLVITE) tablet 1 mg  1 mg Oral Daily Yoni Simon MD   1 mg at 09/09/20 0932   • guaiFENesin (ROBITUSSIN) 100 MG/5ML oral solution 200 mg  200 mg Oral Q4H PRN Yoni Simon MD       • ipratropium-albuterol (DUO-NEB) nebulizer solution 3 mL  3 mL Nebulization 4x Daily - RT Yoni Simon MD   3 mL at 09/09/20 1450   • loperamide (IMODIUM) capsule 2 mg  2 mg Oral 4x Daily PRN Yoni Simon MD       • losartan (COZAAR) tablet 50 mg  50 mg Oral Daily Yoni Simon MD   50 mg at 09/09/20 0932   • magic butt ointment   Topical BID Yoni Simon MD       • ondansetron (ZOFRAN) injection 4 mg  4 mg Intravenous Q6H PRN Yoni Simon MD   4 mg at 09/09/20 0937   • prochlorperazine (COMPAZINE) tablet 10 mg  10 mg Oral Q6H PRN Yoni Simon MD       • sodium chloride 0.9 % bolus 2,217 mL  30 mL/kg Intravenous Once Yoni Simon MD       • sodium chloride 0.9 % flush 10 mL  10 mL Intravenous Q12H Yoni Simon MD   10 mL at 09/09/20 0932   • sodium chloride 0.9 % flush 10 mL  10 mL Intravenous PRN Yoni Simon MD   10 mL at 08/26/20 2106   • vitamin C (ASCORBIC ACID) tablet 1,000 mg  1,000 mg Oral TID With Meals Yoni Simon MD   1,000 mg at 09/09/20 1135       Objective     Review of Systems:   Review of Systems   Constitutional: Negative for chills, fatigue, fever and unexpected  weight change.   HENT: Negative for congestion, ear discharge, hearing loss, nosebleeds and sore throat.    Eyes: Negative for pain, discharge and redness.   Respiratory: Positive for shortness of breath. Negative for cough, chest tightness and wheezing.    Cardiovascular: Negative for chest pain and palpitations.   Gastrointestinal: Negative for abdominal distention, abdominal pain, blood in stool, constipation, diarrhea and vomiting.   Endocrine: Negative for cold intolerance, polydipsia, polyphagia and polyuria.   Genitourinary: Negative for dysuria, flank pain, frequency, hematuria and urgency.   Musculoskeletal: Negative for arthralgias, back pain, joint swelling and myalgias.   Skin: Negative for color change, pallor and rash.   Neurological: Negative for tremors, seizures, syncope, weakness and headaches.   Hematological: Negative for adenopathy. Does not bruise/bleed easily.   Psychiatric/Behavioral: Negative for behavioral problems, confusion, dysphoric mood, hallucinations and suicidal ideas. The patient is not nervous/anxious.        Physical Exam:   Temp:  [96.7 °F (35.9 °C)-98.7 °F (37.1 °C)] 98.6 °F (37 °C)  Heart Rate:  [] 90  Resp:  [18-20] 18  BP: ()/(48-62) 102/48     Physical Exam:  General Appearance:    Alert, cooperative, in no acute distress   Head:    Normocephalic, without obvious abnormality, atraumatic   Eyes:            Lids and lashes normal, conjunctivae and sclerae normal, no   icterus, no pallor, corneas clear, PERRLA   Ears:    Ears appear intact with no abnormalities noted   Throat:   No oral lesions, no thrush, oral mucosa moist   Neck:   No adenopathy, supple, trachea midline, no thyromegaly, no     carotid bruit, no JVD   Back:     No kyphosis present, no scoliosis present, no skin lesions,       erythema or scars, no tenderness to percussion or                   palpation,   range of motion normal   Lungs:     Clear to auscultation,respirations regular, even and                    unlabored    Heart:    Regular rhythm and normal rate, normal S1 and S2, no            murmur, no gallop, no rub, no click   Breast Exam:    Deferred   Abdomen:     Normal bowel sounds, no masses, no organomegaly, soft        nontender, nondistended, no guarding, no rebound                 tenderness   Genitalia:    Deferred   Extremities:   Moves all extremities well, no edema, no cyanosis, no              redness   Pulses:   Pulses palpable and equal bilaterally   Skin:   No bleeding, bruising or rash   Lymph nodes:   No palpable adenopathy   Neurologic:   Cranial nerves 2 - 12 grossly intact, sensation intact, DTR        present and equal bilaterally      Results Review:     Lab Results (last 24 hours)     Procedure Component Value Units Date/Time    COVID PRE-OP / PRE-PROCEDURE SCREENING ORDER (NO ISOLATION) - Swab, Nasopharynx [[id number]] Collected:  09/08/20 1044    Specimen:  Swab from Nasopharynx Updated:  09/08/20 1757    Narrative:       The following orders were created for panel order COVID PRE-OP / PRE-PROCEDURE SCREENING ORDER (NO ISOLATION) - Swab, Nasopharynx.  Procedure                               Abnormality         Status                     ---------                               -----------         ------                     COVID-19, [facility] IN-HOUS...[[id number]]  Normal              Final result                 Please view results for these tests on the individual orders.    COVID-19, [facility] IN-HOUSE, NP SWAB IN TRANSPORT MEDIA 8-10 HR TAT - Swab, Nasopharynx [[id number]]  (Normal) Collected:  09/08/20 1044    Specimen:  Swab from Nasopharynx Updated:  09/08/20 1757     COVID19 Not Detected    Narrative:       Testing performed by Real Time RT-PCR  This test has not been approved by the [facility] but is authorized under the Emergency Use Act (EUA)    Fact sheet for providers: https://www.fda.gov/media/933665/download    Fact sheet for patients:  https://www.fda.gov/media/657383/download               I reviewed the patient's new clinical results.  I reviewed the patient's new imaging results and agree with the interpretation.     ASSESSMENT/PLAN:   ASSESSMENT: 1.  Epigastric pain with nausea and vomiting , improving  2.  History of chronic hepatitis C with cirrhosis, LFTs normal.  3.  Extensive lymphadenopathy and weight loss work-up is in progress for lymphoma.  4.  Anemia, likely anemia of chronic disease.  Need to rule out GI blood loss.  5.  Diarrhea, improving  6.  Bacterial pneumonia, improving.  7.colon polyps  PLAN: 1.  Continue PPI and antibiotics  2. Add carafate  3. Cont current care  4. OK to D/C from GI standpoint  The risks, benefits, and alternatives of this procedure have been discussed with the patient or the responsible party- the patient understands and agrees to proceed.         Yoni Simon MD  09/09/20  17:12          Electronically signed by Yoni Simon MD at 09/09/20 1714     Kenny Fournier APRN at 09/09/20 1253     Attestation signed by Aldo Avitia MD at 09/09/20 6810    I personally evaluated and examined the patient in conjunction with ANA Sprague and agree with the assessment, treatment plan, and disposition of the patient as recorded.     Pt no distress.  Agree with plan.                       [facility]  INPATIENT PROGRESS NOTE    Length of Stay: 15  Date of Admission: 8/25/2020  Primary Care Physician: Provider, No Known    Subjective   Chief Complaint: Nausea, shortness of breath  HPI:  65 year old male with a history of COPD ,cirrhosis of the liver with ascites, chronic hepatitis C, GERD, HTN, and irritable bowel syndrome who presented with shortness of breath, nausea, vomiting, diarrhea, and fatigue over 4-5 days.  Chest x-ray showed patchy airspace opacity involving almost the entirety of the left lung with air bronchograms.  He also has a known tracheal/mediastinal mass.  He met sepsis criteria and was admitted for sepsis from pneumonia and respiratory failure.  He was admitted to CCU on IV antibiotics and IV fluid.  He was initiated on IV solumedrol and bronchodilators.  COVID 19 is negative.  Blood cultures were positive for Streptococcus pneumoniae sensitive to Levaquin.  Antibiotics were targeted. Oncology and pulmonology were consulted. He underwent EBUS.  Peripheral blood flow cytometry is suggestive of SLL/CLL and he is recommended observation at this time and outpatient oncology follow-up. FNA is also consistent with SLL/CLL. GI was consulted for persistent nausea.  He underwent EGD which revealed esophagitis, gastritis, and medium-sized hiatal hernia.  He underwent colonoscopy which revealed non-bleeding hemorrhoids. Biopsies were taken from the colon. He has weaned to room air.  Platelets are stable. SNF precert is pending.     Review of Systems   Constitutional: Negative for chills and fever.   Respiratory: Negative for shortness of breath.    Cardiovascular: Negative for chest pain.   Gastrointestinal: Negative for abdominal pain, nausea and vomiting.   All other systems reviewed and are negative.       All pertinent negatives and positives are as above. All other systems have been reviewed and are negative unless otherwise stated.     Objective    Temp:  [96.7 °F (35.9 °C)-98.7 °F (37.1 °C)] 98.3 °F (36.8 °C)  Heart Rate:  [] 88  Resp:  [18-20] 18  BP: ()/(55-63) 92/55    Physical Exam   Constitutional: He is oriented to person, place, and time. He appears well-developed and well-nourished. No distress.   HENT:   Head: Normocephalic and atraumatic.   Eyes: Conjunctivae are normal.   Cardiovascular: Normal rate, regular rhythm and intact distal pulses.   Pulmonary/Chest: Effort normal and breath sounds normal. No respiratory distress.   Abdominal: Soft. Bowel sounds are normal. He exhibits no distension. There is  no tenderness.   Musculoskeletal: He exhibits no edema or deformity.   Neurological: He is alert and oriented to person, place, and time.   Skin: Skin is warm and dry. He is not diaphoretic.   Vitals reviewed.      Results Review:  I have reviewed the labs, radiology results, and diagnostic studies.    Laboratory Data:   Results from last 7 days   Lab Units 09/07/20 0449 09/06/20  0629 09/05/20  0555   SODIUM mmol/L 136 137 136   POTASSIUM mmol/L 3.9 3.7 4.2   CHLORIDE mmol/L 101 101 101   CO2 mmol/L 27.0 28.0 29.0   BUN mg/dL 10 10 12   CREATININE mg/dL 0.84 0.78 0.75*   GLUCOSE mg/dL 104* 102* 104*   CALCIUM mg/dL 8.2* 8.0* 7.8*   ANION GAP mmol/L 8.0 8.0 6.0     Estimated Creatinine Clearance: 85.3 mL/min (by C-G formula based on SCr of 0.84 mg/dL).          Results from last 7 days   Lab Units 09/08/20  0549 09/07/20  0449 09/06/20  0637 09/05/20  0555 09/04/20  0533   WBC 10*3/mm3 4.32 5.69 5.29 5.64 7.54   HEMOGLOBIN g/dL 8.0* 8.2* 8.1* 8.0* 8.5*   HEMATOCRIT % 25.4* 24.9* 25.5* 24.6* 26.7*   PLATELETS 10*3/mm3 80* 84* 88* 75* 109*           Culture Data:   No results found for: BLOODCX  No results found for: URINECX  No results found for: RESPCX  No results found for: WOUNDCX  No results found for: STOOLCX  No components found for: BODYFLD    Radiology Data:   Imaging Results (Last 24 Hours)     ** No results found for the last 24 hours. **          I have reviewed the patient's current medications.     Assessment/Plan     Active Hospital Problems    Diagnosis   • **Non-intractable vomiting     Added automatically from request for surgery [id number]     • Thrombocytopenia (CMS/HCC)   • CLL (chronic lymphocytic leukemia) (CMS/[facility])     Likely based on flow cytometry     • Bacteremia due to Streptococcus pneumoniae   • Sepsis due to pneumonia (CMS/[facility])   • COPD with acute exacerbation (CMS/HCC)   • IBRAHIMA (acute kidney injury) (CMS/HCC)   • Acute respiratory failure with hypoxia (CMS/HCC)   • Pneumonia of left upper  lobe due to Streptococcus pneumoniae (CMS/HCC)     Added automatically from request for surgery [id number]     • Diarrhea     Added automatically from request for surgery [id number]     • Mediastinal adenopathy     Added automatically from request for surgery [id number]     • Hypertension   • Cirrhosis of liver (CMS/HCC)       Plan:    Wean O2, weaned to baseline of room air, maintain SaO2 >88%  Levaquin completed  Symbicort, Duonebs  Pulmonology consultation appreciated  Outpatient evaluation of SLL/CLL, Follow-up 4 weeks with heme/onc  Heme/Onc consultation appreciated  S/P EGD  S/P colonoscopy, biopsies pending  Antiemetics PRN  GI consultation appreciated  IBRAHIMA resolved  Thrombocytopenia is suspected secondary to cirrhosis with splenomegaly and consumption from infection. HIT antibodies in reference range.  BP control: Cozaar  PT/OT  VTE PPx: SCDs   Discharge planning: rehab to home, precert pending        The patient was evaluated during the global COVID-19 pandemic, and the diagnosis was suspected/considered upon their initial presentation.  Evaluation, treatment, and testing were consistent with current guidelines for patients who present with complaints or symptoms that may be related to COVID-19.        This document has been electronically signed by ANA Edmonds on September 9, 2020 12:53        Electronically signed by Aldo Avitia MD at 09/09/20 6852

## 2020-09-11 NOTE — PROGRESS NOTES
SUBJECTIVE:   9/10/2020  Chief Complaint:     Subjective      Patient feels some better today. No further nausea or emesis. Abdominal pain, diarrhea and dyspnea improving. Tolerating diet. hgb improved to 9.2. Path was C/W reactive gastropathy and reflux esophagitis. Colon and TI path negative.     History:  Past Medical History:   Diagnosis Date   • Abnormal weight loss    • Acquired thrombocytopenia (CMS/HCC)    • Adenomatous polyp of colon    • Anxiety    • Biliary calculus     denies N/V, abd pain   • Chronic hepatitis C (CMS/HCC)    • Cirrhosis of liver (CMS/HCC)    • Colon polyp    • COPD (chronic obstructive pulmonary disease) (CMS/HCC)    • Elevated levels of transaminase & lactic acid dehydrogenase    • Generalized abdominal pain     known gallstones   • GERD (gastroesophageal reflux disease)    • Hypertension    • IBS (irritable bowel syndrome)    • Impacted cerumen    • Kidney disease     US suggest renal dz, elevated alpha 2 macroglobulins   • Nausea and vomiting    • Patient noncompliance, general    • Rectal hemorrhage    • Tracheal mass    • Villous adenoma of colon    • Wheezing      Past Surgical History:   Procedure Laterality Date   • BRONCHOSCOPY N/A 7/13/2020    Procedure: BRONCHOSCOPY WITH ENDOBRONCHIAL ULTRASOUND;  Surgeon: Karthik Sewell MD;  Location: [facility] OR;  Service: Pulmonary;  Laterality: N/A;  preop; tracheal mass; pneumonia  postop  PCP    • BRONCHOSCOPY N/A 9/1/2020    Procedure: BRONCHOSCOPY WITH ENDOBRONCHIAL ULTRASOUND and needle biopsy, with possible bronchoalveolar lavage, brush, and/or endobronchial biopsy;  Surgeon: Sandra Jacques MD;  Location: [facility] OR;  Service: Pulmonary;  Laterality: N/A;   • COLONOSCOPY  06/03/2013   • COLONOSCOPY N/A 8/22/2018    Procedure: COLONOSCOPY;  Surgeon: Charles Pham MD;  Location: [facility] ENDOSCOPY;  Service: Gastroenterology   • COLONOSCOPY N/A 9/4/2020    Procedure: COLONOSCOPY;  Surgeon: Yoni Simon MD;   Location: [facility] ENDOSCOPY;  Service: Gastroenterology;  Laterality: N/A;   • ENDOSCOPY N/A 7/9/2019    Procedure: ESOPHAGOGASTRODUODENOSCOPY;  Surgeon: Bi Ibarra MD;  Location: [facility] ENDOSCOPY;  Service: General   • ENDOSCOPY N/A 9/3/2020    Procedure: ESOPHAGOGASTRODUODENOSCOPY;  Surgeon: Yoni Simon MD;  Location: [facility] ENDOSCOPY;  Service: Gastroenterology;  Laterality: N/A;   • ENDOSCOPY AND COLONOSCOPY     • ESOPHAGOSCOPY / EGD      with tube   • UPPER GASTROINTESTINAL ENDOSCOPY  06/03/2013   • UPPER GASTROINTESTINAL ENDOSCOPY  07/09/2019     Family History   Problem Relation Age of Onset   • Heart disease Other    • Diabetes Other      Social History     Tobacco Use   • Smoking status: Current Every Day Smoker     Packs/day: 1.00     Types: Cigarettes   • Smokeless tobacco: Never Used   Substance Use Topics   • Alcohol use: No   • Drug use: No     Medications Prior to Admission   Medication Sig Dispense Refill Last Dose   • albuterol (PROVENTIL HFA;VENTOLIN HFA) 108 (90 BASE) MCG/ACT inhaler Inhale 2 puffs Every 4 (Four) Hours As Needed for Wheezing.   Taking   • budesonide-formoterol (Symbicort) 160-4.5 MCG/ACT inhaler Inhale 2 puffs 2 (Two) Times a Day. 6 g 12 Unknown   • busPIRone (BUSPAR) 15 MG tablet Take 15 mg by mouth 2 (two) times a day.   Taking   • ferrous sulfate 325 (65 FE) MG EC tablet Take 1 tablet by mouth Daily With Breakfast. 30 tablet 2 Taking   • loperamide (IMODIUM) 2 MG capsule Take 2 mg by mouth 4 (Four) Times a Day As Needed for Diarrhea.   Taking   • loratadine (CLARITIN) 10 MG tablet Take 10 mg by mouth Daily.  3 Taking   • losartan (COZAAR) 50 MG tablet Take 50 mg by mouth daily.   Taking   • meclizine (ANTIVERT) 25 MG tablet Take 25 mg by mouth 2 (two) times a day.   Taking   • omeprazole (priLOSEC) 20 MG capsule Take 1 capsule by mouth Daily. 30 capsule 5 Taking   • predniSONE (DELTASONE) 10 MG tablet 4 tabs daily x 3 days, then 3 tabs daily x 3 days, then 2  tabs daily x 3 days, then 1 tab daily x 3 days 30 tablet 0 Taking     Allergies:  Codeine and Penicillins     CURRENT MEDICATIONS/OBJECTIVE/VS/PE:     Current Medications:     Current Facility-Administered Medications   Medication Dose Route Frequency Provider Last Rate Last Dose   • acetaminophen (TYLENOL) tablet 650 mg  650 mg Oral Q4H PRN Yoni Simon MD   650 mg at 09/07/20 1445   • acidophilus (FLORANEX) tablet 1 tablet  1 tablet Oral Daily Yoni Simon MD   1 tablet at 09/10/20 0922   • albuterol sulfate HFA (PROVENTIL HFA;VENTOLIN HFA;PROAIR HFA) inhaler 2 puff  2 puff Inhalation Q4H PRN Yoni Simon MD   2 puff at 08/28/20 1940   • budesonide-formoterol (SYMBICORT) 160-4.5 MCG/ACT inhaler 2 puff  2 puff Inhalation BID - RT Yoni Simon MD   2 puff at 09/10/20 2035   • busPIRone (BUSPAR) tablet 15 mg  15 mg Oral Q12H Yoni Simon MD   15 mg at 09/10/20 2024   • cetirizine (zyrTEC) tablet 10 mg  10 mg Oral Daily Yoni Simon MD   10 mg at 09/10/20 0922   • dextrose 5 % and sodium chloride 0.45 % infusion  30 mL/hr Intravenous Continuous PRN Yoni Simon MD       • folic acid (FOLVITE) tablet 1 mg  1 mg Oral Daily Yoni Simon MD   1 mg at 09/10/20 0920   • guaiFENesin (ROBITUSSIN) 100 MG/5ML oral solution 200 mg  200 mg Oral Q4H PRN Yoni Simon MD       • ipratropium-albuterol (DUO-NEB) nebulizer solution 3 mL  3 mL Nebulization 4x Daily - RT Yoni Simon MD   3 mL at 09/10/20 2035   • loperamide (IMODIUM) capsule 2 mg  2 mg Oral 4x Daily PRN Yoni Simon MD       • losartan (COZAAR) tablet 50 mg  50 mg Oral Daily Yoni Simon MD   50 mg at 09/09/20 0932   • magic butt ointment   Topical BID Yoni Simon MD       • ondansetron (ZOFRAN) injection 4 mg  4 mg Intravenous Q6H PRN Yoni Simon MD   4 mg at 09/09/20 0937   • prochlorperazine (COMPAZINE) tablet 10 mg  10 mg Oral Q6H PRN Yoni Simon MD       • sodium chloride  0.9 % bolus 2,217 mL  30 mL/kg Intravenous Once Yoni Simon MD       • sodium chloride 0.9 % flush 10 mL  10 mL Intravenous Q12H Yoni Simon MD   10 mL at 09/10/20 2024   • sodium chloride 0.9 % flush 10 mL  10 mL Intravenous PRN Yoni Simon MD   10 mL at 08/26/20 2106   • vitamin C (ASCORBIC ACID) tablet 1,000 mg  1,000 mg Oral TID With Meals Yoni Smion MD   1,000 mg at 09/10/20 1637       Objective     Review of Systems:   Review of Systems   Constitutional: Negative for chills, fatigue, fever and unexpected weight change.   HENT: Negative for congestion, ear discharge, hearing loss, nosebleeds and sore throat.    Eyes: Negative for pain, discharge and redness.   Respiratory: Negative for cough, chest tightness and wheezing.    Cardiovascular: Negative for chest pain and palpitations.   Gastrointestinal: Negative for abdominal distention, abdominal pain, blood in stool, constipation, diarrhea and vomiting.   Endocrine: Negative for cold intolerance, polydipsia, polyphagia and polyuria.   Genitourinary: Negative for dysuria, flank pain, frequency, hematuria and urgency.   Musculoskeletal: Negative for arthralgias, back pain, joint swelling and myalgias.   Skin: Negative for color change, pallor and rash.   Neurological: Negative for tremors, seizures, syncope, weakness and headaches.   Hematological: Negative for adenopathy. Does not bruise/bleed easily.   Psychiatric/Behavioral: Negative for behavioral problems, confusion, dysphoric mood, hallucinations and suicidal ideas. The patient is not nervous/anxious.        Physical Exam:   Temp:  [96.7 °F (35.9 °C)-98.7 °F (37.1 °C)] 97.9 °F (36.6 °C)  Heart Rate:  [] 91  Resp:  [18-22] 18  BP: ()/(58-65) 109/58     Physical Exam:  General Appearance:    Alert, cooperative, in no acute distress   Head:    Normocephalic, without obvious abnormality, atraumatic   Eyes:            Lids and lashes normal, conjunctivae and sclerae  normal, no   icterus, no pallor, corneas clear, PERRLA   Ears:    Ears appear intact with no abnormalities noted   Throat:   No oral lesions, no thrush, oral mucosa moist   Neck:   No adenopathy, supple, trachea midline, no thyromegaly, no     carotid bruit, no JVD   Back:     No kyphosis present, no scoliosis present, no skin lesions,       erythema or scars, no tenderness to percussion or                   palpation,   range of motion normal   Lungs:     Clear to auscultation,respirations regular, even and                   unlabored    Heart:    Regular rhythm and normal rate, normal S1 and S2, no            murmur, no gallop, no rub, no click   Breast Exam:    Deferred   Abdomen:     Normal bowel sounds, no masses, no organomegaly, soft        nontender, nondistended, no guarding, no rebound                 tenderness   Genitalia:    Deferred   Extremities:   Moves all extremities well, no edema, no cyanosis, no              redness   Pulses:   Pulses palpable and equal bilaterally   Skin:   No bleeding, bruising or rash   Lymph nodes:   No palpable adenopathy   Neurologic:   Cranial nerves 2 - 12 grossly intact, sensation intact, DTR        present and equal bilaterally      Results Review:     Lab Results (last 24 hours)     Procedure Component Value Units Date/Time    Basic Metabolic Panel [[id number]]  (Abnormal) Collected:  09/10/20 0607    Specimen:  Blood Updated:  09/10/20 0707     Glucose 96 mg/dL      BUN 13 mg/dL      Creatinine 0.80 mg/dL      Sodium 138 mmol/L      Potassium 4.2 mmol/L      Chloride 99 mmol/L      CO2 28.0 mmol/L      Calcium 8.5 mg/dL      eGFR Non African Amer 98 mL/min/1.73      BUN/Creatinine Ratio 16.3     Anion Gap 11.0 mmol/L     Narrative:       GFR Normal >60  Chronic Kidney Disease <60  Kidney Failure <15      CBC & Differential [[id number]] Collected:  09/10/20 0607    Specimen:  Blood Updated:  09/10/20 0641    Narrative:       The following orders were created for panel  order CBC & Differential.  Procedure                               Abnormality         Status                     ---------                               -----------         ------                     CBC Auto Differential[[id number]]        Abnormal            Final result                 Please view results for these tests on the individual orders.    CBC Auto Differential [[id number]]  (Abnormal) Collected:  09/10/20 0607    Specimen:  Blood Updated:  09/10/20 0641     WBC 5.29 10*3/mm3      RBC 3.21 10*6/mm3      Hemoglobin 9.2 g/dL      Hematocrit 28.9 %      MCV 90.0 fL      MCH 28.7 pg      MCHC 31.8 g/dL      RDW 16.7 %      RDW-SD 55.5 fl      MPV 11.1 fL      Platelets 97 10*3/mm3      Neutrophil % 41.4 %      Lymphocyte % 44.4 %      Monocyte % 11.9 %      Eosinophil % 1.7 %      Basophil % 0.2 %      Immature Grans % 0.4 %      Neutrophils, Absolute 2.19 10*3/mm3      Lymphocytes, Absolute 2.35 10*3/mm3      Monocytes, Absolute 0.63 10*3/mm3      Eosinophils, Absolute 0.09 10*3/mm3      Basophils, Absolute 0.01 10*3/mm3      Immature Grans, Absolute 0.02 10*3/mm3      nRBC 0.0 /100 WBC            I reviewed the patient's new clinical results.  I reviewed the patient's new imaging results and agree with the interpretation.     ASSESSMENT/PLAN:   ASSESSMENT: 1.  Epigastric pain with nausea and vomiting , improving  2.  History of chronic hepatitis C with cirrhosis, LFTs normal.  3.  Extensive lymphadenopathy and weight loss work-up is in progress for lymphoma.  4.  Anemia, likely anemia of chronic disease.  improving.   5.  Diarrhea, improving  6.  Bacterial pneumonia, improving.  7.colon polyps  PLAN: 1.  Continue PPI and antibiotics  2. cont carafate  3. Cont current care  4.capsule endoscopy as out patient  The risks, benefits, and alternatives of this procedure have been discussed with the patient or the responsible party- the patient understands and agrees to proceed.         Yoni Simon,  MD  09/10/20  23:00

## 2020-09-11 NOTE — THERAPY TREATMENT NOTE
Acute Care - Occupational Therapy Treatment Note  [facility]     Patient Name: Wong Patrick  : 1955  MRN: [id number]  Today's Date: 2020  Onset of Illness/Injury or Date of Surgery: 20  Date of Referral to OT: 20  Referring Physician: MD Jennifer    Admit Date: 2020       ICD-10-CM ICD-9-CM   1. Pneumonia of both lungs due to infectious organism, unspecified part of lung J18.9 483.8   2. Fever, unspecified fever cause R50.9 780.60   3. Impaired mobility and ADLs Z74.09 V49.89    Z78.9    4. Impaired functional mobility, balance, gait, and endurance Z74.09 V49.89   5. Pneumonia of left upper lobe due to Streptococcus pneumoniae (CMS/HCC) J13 481   6. Mediastinal adenopathy R59.0 785.6   7. Non-intractable vomiting with nausea, unspecified vomiting type R11.2 787.01   8. Diarrhea, unspecified type R19.7 787.91     Patient Active Problem List   Diagnosis   • History of colon polyps   • Nausea and vomiting   • Right lower lobe pneumonia   • Acquired thrombocytopenia (CMS/HCC)   • Chronic hepatitis C (CMS/HCC)   • Hypertension   • Cirrhosis of liver (CMS/HCC)   • Mediastinal adenopathy   • Sepsis due to pneumonia (CMS/HCC)   • COPD with acute exacerbation (CMS/HCC)   • IBRAHIMA (acute kidney injury) (CMS/HCC)   • Acute respiratory failure with hypoxia (CMS/HCC)   • Pneumonia of left upper lobe due to Streptococcus pneumoniae (CMS/HCC)   • Non-intractable vomiting   • CLL (chronic lymphocytic leukemia) (CMS/HCC)   • Bacteremia due to Streptococcus pneumoniae   • Diarrhea   • Thrombocytopenia (CMS/HCC)     Past Medical History:   Diagnosis Date   • Abnormal weight loss    • Acquired thrombocytopenia (CMS/HCC)    • Adenomatous polyp of colon    • Anxiety    • Biliary calculus     denies N/V, abd pain   • Chronic hepatitis C (CMS/HCC)    • Cirrhosis of liver (CMS/HCC)    • Colon polyp    • COPD (chronic obstructive pulmonary disease) (CMS/HCC)    • Elevated levels of transaminase & lactic  acid dehydrogenase    • Generalized abdominal pain     known gallstones   • GERD (gastroesophageal reflux disease)    • Hypertension    • IBS (irritable bowel syndrome)    • Impacted cerumen    • Kidney disease     US suggest renal dz, elevated alpha 2 macroglobulins   • Nausea and vomiting    • Patient noncompliance, general    • Rectal hemorrhage    • Tracheal mass    • Villous adenoma of colon    • Wheezing      Past Surgical History:   Procedure Laterality Date   • BRONCHOSCOPY N/A 7/13/2020    Procedure: BRONCHOSCOPY WITH ENDOBRONCHIAL ULTRASOUND;  Surgeon: Karthik Sewell MD;  Location: [facility] OR;  Service: Pulmonary;  Laterality: N/A;  preop; tracheal mass; pneumonia  postop  PCP    • BRONCHOSCOPY N/A 9/1/2020    Procedure: BRONCHOSCOPY WITH ENDOBRONCHIAL ULTRASOUND and needle biopsy, with possible bronchoalveolar lavage, brush, and/or endobronchial biopsy;  Surgeon: Sandra Jacques MD;  Location: [facility] OR;  Service: Pulmonary;  Laterality: N/A;   • COLONOSCOPY  06/03/2013   • COLONOSCOPY N/A 8/22/2018    Procedure: COLONOSCOPY;  Surgeon: Charles Pham MD;  Location: [facility] ENDOSCOPY;  Service: Gastroenterology   • COLONOSCOPY N/A 9/4/2020    Procedure: COLONOSCOPY;  Surgeon: Yoni Simon MD;  Location: [facility] ENDOSCOPY;  Service: Gastroenterology;  Laterality: N/A;   • ENDOSCOPY N/A 7/9/2019    Procedure: ESOPHAGOGASTRODUODENOSCOPY;  Surgeon: Bi Ibarra MD;  Location: [facility] ENDOSCOPY;  Service: General   • ENDOSCOPY N/A 9/3/2020    Procedure: ESOPHAGOGASTRODUODENOSCOPY;  Surgeon: Yoni Simon MD;  Location: [facility] ENDOSCOPY;  Service: Gastroenterology;  Laterality: N/A;   • ENDOSCOPY AND COLONOSCOPY     • ESOPHAGOSCOPY / EGD      with tube   • UPPER GASTROINTESTINAL ENDOSCOPY  06/03/2013   • UPPER GASTROINTESTINAL ENDOSCOPY  07/09/2019       Therapy Treatment    Rehabilitation Treatment Summary     Row Name 09/11/20 1345 09/11/20 1119          Treatment Time/Intention     Discipline  occupational therapy assistant  -KD  physical therapy assistant  -TA     Document Type  therapy note (daily note)  -KD  therapy note (daily note)  -TA     Subjective Information  no complaints  -KD  no complaints  -TA     Mode of Treatment  occupational therapy  -KD  individual therapy;physical therapy  -TA     Patient/Family Observations  No family present  -KD  --     Therapy Frequency (PT Clinical Impression)  --  other (see comments) 6-11x/wk  -TA     Therapy Frequency (OT Eval)  other (see comments) 5-7 days/wk  -KD  --     Patient Effort  good  -KD  --     Existing Precautions/Restrictions  fall  -KD  fall  -TA     Equipment Issued to Patient  gait belt  -KD  --     Recorded by [KD] Toya Varma BURKETT/L 09/11/20 1503 [TA] Maritza Reese, PTA 09/11/20 1345     Row Name 09/11/20 1345 09/11/20 1119          Vital Signs    Pre Systolic BP Rehab  110  -KD  104  -TA     Pre Treatment Diastolic BP  62  -KD  56  -TA     Post Systolic BP Rehab  --  110  -TA     Post Treatment Diastolic BP  --  62  -TA     Pretreatment Heart Rate (beats/min)  78  -KD  88  -TA     Intratreatment Heart Rate (beats/min)  --  106  -TA     Posttreatment Heart Rate (beats/min)  82  -KD  100  -TA     Pre SpO2 (%)  98  -KD  100  -TA     O2 Delivery Pre Treatment  room air  -KD  room air  -TA     Intra SpO2 (%)  --  93  -TA     O2 Delivery Intra Treatment  --  room air  -TA     Post SpO2 (%)  98  -KD  93  -TA     O2 Delivery Post Treatment  room air  -KD  room air  -TA     Pre Patient Position  Supine  -KD  Supine  -TA     Intra Patient Position  Standing  -KD  --     Post Patient Position  Sitting  -KD  Supine  -TA     Recorded by [KD] Toya Varma BURKETT/L 09/11/20 1503 [TA] Maritza Reese, PTA 09/11/20 1345     Row Name 09/11/20 1345 09/11/20 1119          Cognitive Assessment/Intervention- PT/OT    Orientation Status (Cognition)  oriented x 3  -KD  person;place;situation correct year, incorrect month  -TA     Personal  Safety Interventions  fall prevention program maintained  -KD  fall prevention program maintained;gait belt;nonskid shoes/slippers when out of bed;supervised activity  -TA     Recorded by [KD] Toya Varma BURKETT/L 09/11/20 1503 [TA] Maritza Reese, PTA 09/11/20 1345     Row Name 09/11/20 1345 09/11/20 1119          Safety Issues, Functional Mobility    Impairments Affecting Function (Mobility)  --  balance;endurance/activity tolerance;strength  -TA     Comment, Safety Issues/Impairments (Mobility)  Pt educated and literature given on HS  -KD  --     Recorded by [KD] Toya Varma BURKETT/L 09/11/20 1503 [TA] Maritza Reese, PTA 09/11/20 1345     Row Name 09/11/20 1345 09/11/20 1119          Bed Mobility Assessment/Treatment    Bed Mobility Assessment/Treatment  --  supine-sit-supine  -TA     Supine-Sit [place] (Bed Mobility)  independent  -KD  independent  -TA     Sit-Supine [place] (Bed Mobility)  independent  -KD  independent  -TA     Assistive Device (Bed Mobility)  bed rails  -KD  --  -TA     Recorded by [KD] Toya Varma BURKETT/L 09/11/20 1503 [TA] Maritza Reese, PTA 09/11/20 1345     Row Name 09/11/20 1345             Functional Mobility    Functional Mobility- Ind. Level  supervision required  -KD      Functional Mobility- Device  rolling walker  -KD      Functional Mobility-Distance (Feet)  8  -KD      Functional Mobility- Comment  gathering items in room  -KD      Recorded by [KD] Toya Varma BURKETT/L 09/11/20 1503      Row Name 09/11/20 1345 09/11/20 1119          Sit-Stand Transfer    Sit-Stand [place] (Transfers)  supervision  -KD  supervision  -TA     Assistive Device (Sit-Stand Transfers)  walker, front-wheeled  -KD  walker, front-wheeled  -TA     Recorded by [KD] Toya Varma BURKETT/L 09/11/20 1503 [TA] Maritza Reese, PTA 09/11/20 1345     Row Name 09/11/20 1345 09/11/20 1119          Stand-Sit Transfer    Stand-Sit [place] (Transfers)  supervision  -KD  supervision   -TA     Assistive Device (Stand-Sit Transfers)  walker, front-wheeled  -KD  walker, front-wheeled  -TA     Recorded by [KD] Toya Varma COTA/L 09/11/20 1503 [TA] Maritza Reese, PTA 09/11/20 1345     Row Name 09/11/20 1119             Gait/Stairs Assessment/Training    Gait/Stairs Assessment/Training  gait/ambulation independence  -TA      [place] Level (Gait)  supervision  -TA      Assistive Device (Gait)  walker, front-wheeled  -TA      Distance in Feet (Gait)  450`  -TA      Recorded by [TA] Maritza Reese, PTA 09/11/20 1345      Row Name 09/11/20 1119             Lower Extremity Seated Therapeutic Exercise    Performed, Seated Lower Extremity (Therapeutic Exercise)  hip abduction/adduction;ankle dorsiflexion/plantarflexion;LAQ (long arc quad), knee extension;hip flexion/extension  -TA      Exercise Type, Seated Lower Extremity (Therapeutic Exercise)  AROM (active range of motion)  -TA      Expected Outcomes, Seated Lower Extremity (Therapeutic Exercise)  improve functional tolerance, self-care activity;improve motor control;improve functional stability;strengthen normal movement patterns  -TA      Sets/Reps Detail, Seated Lower Extremity (Therapeutic Exercise)  20  -TA      Recorded by [TA] Maritza Reese, PTA 09/11/20 1345      Row Name 09/11/20 1345             Therapeutic Exercise    Upper Extremity Range of Motion (Therapeutic Exercise)  wrist flexion/extension, bilateral;forearm supination/pronation, bilateral;elbow flexion/extension, bilateral;shoulder internal/external rotation, bilateral;shoulder horizontal abduction/adduction, bilateral;shoulder abduction/adduction, bilateral  -KD      Weight/Resistance (Therapeutic Exercise)  2 pounds  -KD      Exercise Type (Therapeutic Exercise)  AROM (active range of motion)  -KD      Position (Therapeutic Exercise)  seated  -KD      Sets/Reps (Therapeutic Exercise)  1/20  -KD      Equipment (Therapeutic Exercise)  free weight, barbell  -KD       Expected Outcome (Therapeutic Exercise)  facilitate normal movement patterns;improve performance, transfer skills;improve functional tolerance, self-care activity  -KD      Comment (Therapeutic Exercise)  Pt demonstrated and literature given on HEP  -KD      Recorded by [KD] Toya Varma COTA/L 09/11/20 1503      Row Name 09/11/20 1345             Dynamic Sitting Balance    Level of [place], Reaches Outside Midline (Sitting, Dynamic Balance)  supervision  -KD      Sitting Position, Reaches Outside Midline (Sitting, Dynamic Balance)  sitting on edge of bed  -KD      Recorded by [KD] Toya Varma COTA/L 09/11/20 1503      Row Name 09/11/20 1345 09/11/20 1119          Positioning and Restraints    Pre-Treatment Position  in bed  -KD  in bed  -TA     Post Treatment Position  bed  -KD  bed  -TA     In Bed  sitting EOB;call light within reach;encouraged to call for assist Nsg in room  -KD  supine;call light within reach;exit alarm on  -TA     Recorded by [KD] Toya Varma COTA/L 09/11/20 1503 [TA] Maritza Reese, PTA 09/11/20 1345     Row Name 09/11/20 1345 09/11/20 1119          Pain Scale: Numbers Pre/Post-Treatment    Pain Scale: Numbers, Pretreatment  0/10 - no pain  -KD  0/10 - no pain  -TA     Pain Scale: Numbers, Post-Treatment  0/10 - no pain  -KD  0/10 - no pain  -TA     Pain Location  --  --  -TA     Recorded by [KD] Toya Varma COTA/L 09/11/20 1503 [TA] Maritza Reese, PTA 09/11/20 1345     Row Name 09/11/20 1119             Sensory Assessment/Intervention    Sensory General Assessment  no sensation deficits identified  -TA      Recorded by [TA] Maritza Reese, PTA 09/11/20 1345      Row Name                Wound 08/26/20 1322 Bilateral gluteal MASD (Moisture associated skin damage)    Wound - Properties Group Date first assessed: 08/26/20 [AK] Time first assessed: 1322 [AK] Present on Hospital Admission: Y [AK] Side: Bilateral [AK] Location: gluteal [AK] Primary Wound Type: MASD [AK2],  with open areas  Recorded by:  [AK] Tammy Beverly RN 08/26/20 1347 [AK2] Tammy Beverly RN 08/26/20 1348    Row Name 09/11/20 1119             Outcome Summary/Treatment Plan (PT)    Daily Summary of Progress (PT)  progress toward functional goals is good  -TA      Plan for Continued Treatment (PT)  continue  -TA      Anticipated Equipment Needs at Discharge (PT)  front wheeled walker  -TA      Anticipated Discharge Disposition (PT)  skilled nursing facility  -TA      Recorded by [TA] Maritza Reese, PTA 09/11/20 1345        User Key  (r) = Recorded By, (t) = Taken By, (c) = Cosigned By    Initials Name Effective Dates Discipline    AK Tammy Beverly, RN 10/17/16 -  Nurse    TA Maritza Reese, PTA 03/07/18 -  PT    KD Toya Varma BURKETT/L 03/07/18 -  OT        Wound 08/26/20 1322 Bilateral gluteal MASD (Moisture associated skin damage) (Active)   Dressing Appearance open to air 9/11/2020  9:12 AM   Closure Open to air 9/11/2020  9:12 AM   Base blanchable 9/11/2020  9:12 AM   Periwound redness 9/11/2020  9:12 AM   Periwound Temperature warm 9/11/2020  9:12 AM   Care, Wound barrier applied 9/10/2020  8:00 PM     Rehab Goal Summary     Row Name 09/11/20 1345 09/11/20 1119          Bed Mobility Goal 1 (PT)    Activity/Assistive Device (Bed Mobility Goal 1, PT)  --  supine to sit;sit to supine  -TA     [place] Level/Cues Needed (Bed Mobility Goal 1, PT)  --  conditional independence  -TA     Time Frame (Bed Mobility Goal 1, PT)  --  2 days  -TA     Progress/Outcomes (Bed Mobility Goal 1, PT)  --  (S) goal met  -TA        Transfer Goal 1 (PT)    Activity/Assistive Device (Transfer Goal 1, PT)  --  sit-to-stand/stand-to-sit;bed-to-chair/chair-to-bed LRAD PRN  -TA     [place] Level/Cues Needed (Transfer Goal 1, PT)  --  conditional independence  -TA     Time Frame (Transfer Goal 1, PT)  --  3 days  -TA     Progress/Outcome (Transfer Goal 1, PT)  --  goal not met  -TA        Gait Training Goal 1 (PT)     Activity/Assistive Device (Gait Training Goal 1, PT)  --  gait (walking locomotion);assistive device use;decrease fall risk;improve balance and speed;increase endurance/gait distance LRAD PRN  -TA     [place] Level (Gait Training Goal 1, PT)  --  conditional independence  -TA     Distance (Gait Goal 1, PT)  --  100ft or more  -TA     Time Frame (Gait Training Goal 1, PT)  --  by discharge  -TA     Progress/Outcome (Gait Training Goal 1, PT)  --  goal not met  -TA        Occupational Therapy Goals    Transfer Goal Selection (OT)  transfer, OT goal 1  -KD  --     Bathing Goal Selection (OT)  bathing, OT goal 1  -KD  --     Dressing Goal Selection (OT)  dressing, OT goal 1  -KD  --     Toileting Goal Selection (OT)  toileting, OT goal 1  -KD  --     Activity Tolerance Goal Selection (OT)  activity tolerance, OT goal 1  -KD  --     Additional Documentation  Activity Tolerance Goal Selection (OT) (Row)  -KD  --        Transfer Goal 1 (OT)    Activity/Assistive Device (Transfer Goal 1, OT)  bed-to-chair/chair-to-bed;sit-to-stand/stand-to-sit;toilet  -KD  --     [place] Level/Cues Needed (Transfer Goal 1, OT)  conditional independence  -KD  --     Time Frame (Transfer Goal 1, OT)  long term goal (LTG);by discharge  -KD  --     Progress/Outcome (Transfer Goal 1, OT)  goal not met  -KD  --        Bathing Goal 1 (OT)    Activity/Assistive Device (Bathing Goal 1, OT)  bathing skills, all  -KD  --     [place] Level/Cues Needed (Bathing Goal 1, OT)  supervision required;set-up required  -KD  --     Time Frame (Bathing Goal 1, OT)  long term goal (LTG);by discharge  -KD  --     Progress/Outcomes (Bathing Goal 1, OT)  goal met  -KD  --        Dressing Goal 1 (OT)    Activity/Assistive Device (Dressing Goal 1, OT)  lower body dressing  -KD  --     [place]/Cues Needed (Dressing Goal 1, OT)  supervision required;set-up required  -KD  --     Time Frame (Dressing Goal 1, OT)  long term goal (LTG);by discharge  -KD   --     Progress/Outcome (Dressing Goal 1, OT)  goal met  -KD  --        Toileting Goal 1 (OT)    Activity/Device (Toileting Goal 1, OT)  toileting skills, all  -KD  --     [place] Level/Cues Needed (Toileting Goal 1, OT)  conditional independence  -KD  --     Time Frame (Toileting Goal 1, OT)  long term goal (LTG);by discharge  -KD  --     Progress/Outcome (Toileting Goal 1, OT)  goal not met  -KD  --         Activity Tolerance Goal 1 (OT)    Activity Level (Endurance Goal 1, OT)  20 min activity functional/therpeautic activities  -KD  --     Time Frame (Activity Tolerance Goal 1, OT)  long term goal (LTG)  -KD  --     Progress/Outcome (Activity Tolerance Goal 1, OT)  goal met  -KD  --       User Key  (r) = Recorded By, (t) = Taken By, (c) = Cosigned By    Initials Name Provider Type Discipline    TA Maritza Reese, PTA Physical Therapy Assistant PT    Toya Marshall, MADELAINE/L Occupational Therapy Assistant OT        Occupational Therapy Education                 Title: PT OT SLP Therapies (In Progress)     Topic: Occupational Therapy (In Progress)     Point: ADL training (In Progress)     Description:   Instruct learner(s) on proper safety adaptation and remediation techniques during self care or transfers.   Instruct in proper use of assistive devices.              Learning Progress Summary           Patient Acceptance, E, NR by RC at 9/4/2020 1219    Acceptance, E,TB,D, NR by CS at 8/31/2020 1140    Acceptance, E, NR by AS at 8/28/2020 1434    Comment:  role of OT, OT POC, ADL training, transfer training, fall precautions, safe use of RW                   Point: Home exercise program (In Progress)     Description:   Instruct learner(s) on appropriate technique for monitoring, assisting and/or progressing therapeutic exercises/activities.              Learning Progress Summary           Patient Acceptance, E,TB,D, NR by  at 8/31/2020 1140                   Point: Precautions (Done)     Description:    Instruct learner(s) on prescribed precautions during self-care and functional transfers.              Learning Progress Summary           Patient Acceptance, E, VU by AS at 9/9/2020 1539    Comment:  OT POC, progress, goals; fxnl mobility, UE assessment    Acceptance, E, NR by  at 9/9/2020 1327    Acceptance, E, NR by  at 9/4/2020 1219    Acceptance, E,TB,D, NR by CS at 8/31/2020 1140    Acceptance, E, NR by AS at 8/28/2020 1434    Comment:  role of OT, OT POC, ADL training, transfer training, fall precautions, safe use of RW                   Point: Body mechanics (In Progress)     Description:   Instruct learner(s) on proper positioning and spine alignment during self-care, functional mobility activities and/or exercises.              Learning Progress Summary           Patient Acceptance, E, NR by  at 9/9/2020 1327    Acceptance, E, VU,DU,NR by SHANNON at 9/7/2020 1550    Comment:  Pt educated on OT and POC. Pt educated on proper body mechanics when performing t/f's.    Acceptance, E, NR by  at 9/4/2020 1219    Acceptance, E,TB,D, NR by  at 8/31/2020 1140                               User Key     Initials Effective Dates Name Provider Type Discipline     03/07/18 -  Sarahy Garcia COTA/L Occupational Therapy Assistant OT     03/07/18 -  Kamini Heard COTA/SAMANTHA Occupational Therapy Assistant OT    AS 07/05/20 -  Mindy Mark OT Occupational Therapist OT    SHANNON 11/05/19 -  Altagracia Sotelo OTA Occupational Therapy Assistant OT                OT Recommendation and Plan  Outcome Summary/Treatment Plan (OT)  Daily Summary of Progress (OT): progress toward functional goals as expected  Plan for Continued Treatment (OT): Cont POC  Anticipated Discharge Disposition (OT): anticipate therapy at next level of care  Therapy Frequency (OT Eval): other (see comments)(5-7 days/wk)  Daily Summary of Progress (OT): progress toward functional goals as expected  Plan of Care Review  Plan of Care Reviewed With:  patient  Plan of Care Reviewed With: patient  Outcome Summary: UB/LB bathing/dressing-SBA, Grooming activity (shaving)-SBA standing sink side. Pt completed BUE ther ex in all planes w/ good tolerance. Fxl mobility-SBA w/ RW ~10'. Pt met 1 goal this date. Cont OT POC.  Outcome Measures     Row Name 09/11/20 1345 09/11/20 1300 09/10/20 1023       How much help from another person do you currently need...    Turning from your back to your side while in flat bed without using bedrails?  --  4  -TA  --    Moving from lying on back to sitting on the side of a flat bed without bedrails?  --  4  -TA  --    Moving to and from a bed to a chair (including a wheelchair)?  --  3  -TA  --    Standing up from a chair using your arms (e.g., wheelchair, bedside chair)?  --  3  -TA  --    Climbing 3-5 steps with a railing?  --  3  -TA  --    To walk in hospital room?  --  3  -TA  --    AM-PAC 6 Clicks Score (PT)  --  20  -TA  --       How much help from another is currently needed...    Putting on and taking off regular lower body clothing?  3  -KD  --  3  -KD    Bathing (including washing, rinsing, and drying)  3  -KD  --  3  -KD    Toileting (which includes using toilet bed pan or urinal)  3  -KD  --  3  -KD    Putting on and taking off regular upper body clothing  3  -KD  --  3  -KD    Taking care of personal grooming (such as brushing teeth)  4  -KD  --  4  -KD    Eating meals  4  -KD  --  4  -KD    AM-PAC 6 Clicks Score (OT)  20  -KD  --  20  -KD       Functional Assessment    Outcome Measure Options  --  AM-PAC 6 Clicks Basic Mobility (PT)  -TA  --    Row Name 09/09/20 1504 09/09/20 0835          How much help from another is currently needed...    Putting on and taking off regular lower body clothing?  3  -AS  3  -RC     Bathing (including washing, rinsing, and drying)  3  -AS  3  -RC     Toileting (which includes using toilet bed pan or urinal)  3  -AS  2  -RC     Putting on and taking off regular upper body clothing  3  -AS   3  -RC     Taking care of personal grooming (such as brushing teeth)  4  -AS  4  -RC     Eating meals  4  -AS  4  -RC     AM-PAC 6 Clicks Score (OT)  20  -AS  19  -RC        Functional Assessment    Outcome Measure Options  AM-PAC 6 Clicks Daily Activity (OT)  -AS  --       User Key  (r) = Recorded By, (t) = Taken By, (c) = Cosigned By    Initials Name Provider Type    TA Maritza Reese, PTA Physical Therapy Assistant    Sarahy Salinas, BURKETT/L Occupational Therapy Assistant    Toya Marshall COTA/L Occupational Therapy Assistant    AS Mindy Mark, OT Occupational Therapist           Time Calculation:   Time Calculation- OT     Row Name 09/11/20 1503             Time Calculation- OT    OT Start Time  1345  -KD      OT Stop Time  1409  -KD      OT Time Calculation (min)  24 min  -KD      Total Timed Code Minutes- OT  24 minute(s)  -KD      OT Received On  09/11/20  -KD        User Key  (r) = Recorded By, (t) = Taken By, (c) = Cosigned By    Initials Name Provider Type    Toya Marshall COTA/L Occupational Therapy Assistant        Therapy Charges for Today     Code Description Service Date Service Provider Modifiers Qty    68909884291 HC OT SELF CARE/MGMT/TRAIN EA 15 MIN 9/10/2020 Toya Varma COTA/L GO 2    58162075792 HC OT THER PROC EA 15 MIN 9/10/2020 Toya Varma COTA/L GO 1    41288034761 HC OT SELF CARE/MGMT/TRAIN EA 15 MIN 9/11/2020 Toya Varma COTA/L GO 2               MADELAINE Cerrato/SAMANTHA  9/11/2020

## 2020-09-11 NOTE — CONSULTS
Nutrition Services    Patient Name:  Wong Patrick  YOB: 1955  MRN: [id number]  Admit Date:  8/25/2020    Pt is eating well with intake 100% for the last 6 meals.  He reports a good appetite and denies any N/V/D at this time.  He is having some mild abd pain.  Platelet count 97, depressed.  Pt on Folic Acid; Acidophilus; & Vit C.  He has been treated for sepsis from pneumonia.  He has COPD, Cirrhosis of the liver with Ascites; Chronic Hep C; and a tracheal/Mediastinal Mass.  Extensive Lymphadenopathy--workup in progress for Lymphoma.   He has been seen by GI and evaluated due to nausea and vomiting along with hx of diarrhea.  No significant findings.  His wt has trending down since admit--from 163#--144#, but I am unsure if the wt on admit was correct.  RD has encouraged pt to eat well at home, nutrient dense foods and beverages.  Wt trended down during admisstion.  Admit wt 163# current wt 144#.  I am unsure of the accuracy of the admit wt.  Pt could not tell me his UBW and/or how much if any wt he has lost .       Electronically signed by:  Christen Wallace RD  09/11/20 15:40

## 2020-09-11 NOTE — PROGRESS NOTES
SUBJECTIVE:   9/11/2020  Chief Complaint:     Subjective      Patient feels some better today. No further nausea or emesis. Abdominal pain, diarrhea and dyspnea improving. Tolerating diet. hgb improved to 9.2. Path was C/W reactive gastropathy and reflux esophagitis. Colon and TI path negative.     History:  Past Medical History:   Diagnosis Date   • Abnormal weight loss    • Acquired thrombocytopenia (CMS/HCC)    • Adenomatous polyp of colon    • Anxiety    • Biliary calculus     denies N/V, abd pain   • Chronic hepatitis C (CMS/HCC)    • Cirrhosis of liver (CMS/HCC)    • Colon polyp    • COPD (chronic obstructive pulmonary disease) (CMS/HCC)    • Elevated levels of transaminase & lactic acid dehydrogenase    • Generalized abdominal pain     known gallstones   • GERD (gastroesophageal reflux disease)    • Hypertension    • IBS (irritable bowel syndrome)    • Impacted cerumen    • Kidney disease     US suggest renal dz, elevated alpha 2 macroglobulins   • Nausea and vomiting    • Patient noncompliance, general    • Rectal hemorrhage    • Tracheal mass    • Villous adenoma of colon    • Wheezing      Past Surgical History:   Procedure Laterality Date   • BRONCHOSCOPY N/A 7/13/2020    Procedure: BRONCHOSCOPY WITH ENDOBRONCHIAL ULTRASOUND;  Surgeon: Karthik Sewell MD;  Location: [facility] OR;  Service: Pulmonary;  Laterality: N/A;  preop; tracheal mass; pneumonia  postop  PCP    • BRONCHOSCOPY N/A 9/1/2020    Procedure: BRONCHOSCOPY WITH ENDOBRONCHIAL ULTRASOUND and needle biopsy, with possible bronchoalveolar lavage, brush, and/or endobronchial biopsy;  Surgeon: Sandra Jacques MD;  Location: [facility] OR;  Service: Pulmonary;  Laterality: N/A;   • COLONOSCOPY  06/03/2013   • COLONOSCOPY N/A 8/22/2018    Procedure: COLONOSCOPY;  Surgeon: Charles Pham MD;  Location: [facility] ENDOSCOPY;  Service: Gastroenterology   • COLONOSCOPY N/A 9/4/2020    Procedure: COLONOSCOPY;  Surgeon: Yoni Simon MD;   Location: [facility] ENDOSCOPY;  Service: Gastroenterology;  Laterality: N/A;   • ENDOSCOPY N/A 7/9/2019    Procedure: ESOPHAGOGASTRODUODENOSCOPY;  Surgeon: Bi Ibarra MD;  Location: [facility] ENDOSCOPY;  Service: General   • ENDOSCOPY N/A 9/3/2020    Procedure: ESOPHAGOGASTRODUODENOSCOPY;  Surgeon: Yoni Simon MD;  Location: [facility] ENDOSCOPY;  Service: Gastroenterology;  Laterality: N/A;   • ENDOSCOPY AND COLONOSCOPY     • ESOPHAGOSCOPY / EGD      with tube   • UPPER GASTROINTESTINAL ENDOSCOPY  06/03/2013   • UPPER GASTROINTESTINAL ENDOSCOPY  07/09/2019     Family History   Problem Relation Age of Onset   • Heart disease Other    • Diabetes Other      Social History     Tobacco Use   • Smoking status: Current Every Day Smoker     Packs/day: 1.00     Types: Cigarettes   • Smokeless tobacco: Never Used   Substance Use Topics   • Alcohol use: No   • Drug use: No     Medications Prior to Admission   Medication Sig Dispense Refill Last Dose   • albuterol (PROVENTIL HFA;VENTOLIN HFA) 108 (90 BASE) MCG/ACT inhaler Inhale 2 puffs Every 4 (Four) Hours As Needed for Wheezing.   Taking   • budesonide-formoterol (Symbicort) 160-4.5 MCG/ACT inhaler Inhale 2 puffs 2 (Two) Times a Day. 6 g 12 Unknown   • busPIRone (BUSPAR) 15 MG tablet Take 15 mg by mouth 2 (two) times a day.   Taking   • ferrous sulfate 325 (65 FE) MG EC tablet Take 1 tablet by mouth Daily With Breakfast. 30 tablet 2 Taking   • loperamide (IMODIUM) 2 MG capsule Take 2 mg by mouth 4 (Four) Times a Day As Needed for Diarrhea.   Taking   • loratadine (CLARITIN) 10 MG tablet Take 10 mg by mouth Daily.  3 Taking   • losartan (COZAAR) 50 MG tablet Take 50 mg by mouth daily.   Taking   • meclizine (ANTIVERT) 25 MG tablet Take 25 mg by mouth 2 (two) times a day.   Taking   • omeprazole (priLOSEC) 20 MG capsule Take 1 capsule by mouth Daily. 30 capsule 5 Taking   • predniSONE (DELTASONE) 10 MG tablet 4 tabs daily x 3 days, then 3 tabs daily x 3 days, then 2  tabs daily x 3 days, then 1 tab daily x 3 days 30 tablet 0 Taking     Allergies:  Codeine and Penicillins     CURRENT MEDICATIONS/OBJECTIVE/VS/PE:     Current Medications:     Current Facility-Administered Medications   Medication Dose Route Frequency Provider Last Rate Last Dose   • acetaminophen (TYLENOL) tablet 650 mg  650 mg Oral Q4H PRN Yoni Simon MD   650 mg at 09/07/20 1445   • acidophilus (FLORANEX) tablet 1 tablet  1 tablet Oral Daily Yoni Simon MD   1 tablet at 09/11/20 0908   • albuterol sulfate HFA (PROVENTIL HFA;VENTOLIN HFA;PROAIR HFA) inhaler 2 puff  2 puff Inhalation Q4H PRN Yoni Simon MD   2 puff at 08/28/20 1940   • budesonide-formoterol (SYMBICORT) 160-4.5 MCG/ACT inhaler 2 puff  2 puff Inhalation BID - RT Yoni Simon MD   2 puff at 09/11/20 0802   • busPIRone (BUSPAR) tablet 15 mg  15 mg Oral Q12H Yoni Simon MD   15 mg at 09/11/20 0906   • cetirizine (zyrTEC) tablet 10 mg  10 mg Oral Daily Yoni Simon MD   10 mg at 09/11/20 0906   • dextrose 5 % and sodium chloride 0.45 % infusion  30 mL/hr Intravenous Continuous PRN Yoni Simon MD       • folic acid (FOLVITE) tablet 1 mg  1 mg Oral Daily Yoni Simon MD   1 mg at 09/11/20 0906   • guaiFENesin (ROBITUSSIN) 100 MG/5ML oral solution 200 mg  200 mg Oral Q4H PRN Yoni Simon MD       • ipratropium-albuterol (DUO-NEB) nebulizer solution 3 mL  3 mL Nebulization 4x Daily - RT Yoni Simon MD   3 mL at 09/11/20 0802   • loperamide (IMODIUM) capsule 2 mg  2 mg Oral 4x Daily PRN Yoni Simon MD       • losartan (COZAAR) tablet 50 mg  50 mg Oral Daily Yoni Simon MD   50 mg at 09/09/20 0932   • magic butt ointment   Topical BID Yoni Simon MD       • ondansetron (ZOFRAN) injection 4 mg  4 mg Intravenous Q6H PRN Yoni Simon MD   4 mg at 09/09/20 0937   • prochlorperazine (COMPAZINE) tablet 10 mg  10 mg Oral Q6H PRN Yoni Simon MD       • sodium chloride  0.9 % bolus 2,217 mL  30 mL/kg Intravenous Once Yoni Simon MD       • sodium chloride 0.9 % flush 10 mL  10 mL Intravenous Q12H Yoni Simon MD   10 mL at 09/11/20 0906   • sodium chloride 0.9 % flush 10 mL  10 mL Intravenous PRN Yoni Simon MD   10 mL at 08/26/20 2106   • vitamin C (ASCORBIC ACID) tablet 1,000 mg  1,000 mg Oral TID With Meals Yoni Simon MD   1,000 mg at 09/11/20 1111       Objective     Review of Systems:   Review of Systems   Constitutional: Negative for chills, fatigue, fever and unexpected weight change.   HENT: Negative for congestion, ear discharge, hearing loss, nosebleeds and sore throat.    Eyes: Negative for pain, discharge and redness.   Respiratory: Negative for cough, chest tightness and wheezing.    Cardiovascular: Negative for chest pain and palpitations.   Gastrointestinal: Negative for abdominal distention, abdominal pain, blood in stool, constipation, diarrhea and vomiting.   Endocrine: Negative for cold intolerance, polydipsia, polyphagia and polyuria.   Genitourinary: Negative for dysuria, flank pain, frequency, hematuria and urgency.   Musculoskeletal: Negative for arthralgias, back pain, joint swelling and myalgias.   Skin: Negative for color change, pallor and rash.   Neurological: Negative for tremors, seizures, syncope, weakness and headaches.   Hematological: Negative for adenopathy. Does not bruise/bleed easily.   Psychiatric/Behavioral: Negative for behavioral problems, confusion, dysphoric mood, hallucinations and suicidal ideas. The patient is not nervous/anxious.        Physical Exam:   Temp:  [97.2 °F (36.2 °C)-98 °F (36.7 °C)] 97.8 °F (36.6 °C)  Heart Rate:  [] 97  Resp:  [18-22] 18  BP: ()/(54-72) 108/65     Physical Exam:  General Appearance:    Alert, cooperative, in no acute distress   Head:    Normocephalic, without obvious abnormality, atraumatic   Eyes:            Lids and lashes normal, conjunctivae and sclerae normal,  no   icterus, no pallor, corneas clear, PERRLA   Ears:    Ears appear intact with no abnormalities noted   Throat:   No oral lesions, no thrush, oral mucosa moist   Neck:   No adenopathy, supple, trachea midline, no thyromegaly, no     carotid bruit, no JVD   Back:     No kyphosis present, no scoliosis present, no skin lesions,       erythema or scars, no tenderness to percussion or                   palpation,   range of motion normal   Lungs:     Clear to auscultation,respirations regular, even and                   unlabored    Heart:    Regular rhythm and normal rate, normal S1 and S2, no            murmur, no gallop, no rub, no click   Breast Exam:    Deferred   Abdomen:     Normal bowel sounds, no masses, no organomegaly, soft        nontender, nondistended, no guarding, no rebound                 tenderness   Genitalia:    Deferred   Extremities:   Moves all extremities well, no edema, no cyanosis, no              redness   Pulses:   Pulses palpable and equal bilaterally   Skin:   No bleeding, bruising or rash   Lymph nodes:   No palpable adenopathy   Neurologic:   Cranial nerves 2 - 12 grossly intact, sensation intact, DTR        present and equal bilaterally      Results Review:     Lab Results (last 24 hours)     Procedure Component Value Units Date/Time    COVID-19, [facility] IN-HOUSE, NP SWAB IN TRANSPORT MEDIA 8-10 HR TAT - Swab, Nasopharynx [[id number]]  (Normal) Collected:  09/11/20 0953    Specimen:  Swab from Nasopharynx Updated:  09/11/20 1352     COVID19 Not Detected    Narrative:       Testing performed by Real Time RT-PCR  This test has not been approved by the [employer] but is authorized under the Emergency Use Act (EUA)    Fact sheet for providers: https://www.fda.gov/media/331645/download    Fact sheet for patients: https://www.fda.gov/media/791023/download               I reviewed the patient's new clinical results.  I reviewed the patient's new imaging results and agree with the interpretation.      ASSESSMENT/PLAN:   ASSESSMENT: 1.  Epigastric pain with nausea and vomiting , improving  2.  History of chronic hepatitis C with cirrhosis, LFTs normal.  3.  Extensive lymphadenopathy and weight loss work-up is in progress for lymphoma.  4.  Anemia, likely anemia of chronic disease.  improving.   5.  Diarrhea, improving  6.  Bacterial pneumonia, improving.  7.colon polyps  PLAN: 1.  Continue PPI and antibiotics  2. cont carafate  3. Cont current care  4.capsule endoscopy as out patient  The risks, benefits, and alternatives of this procedure have been discussed with the patient or the responsible party- the patient understands and agrees to proceed.         Yoni Simon MD  09/11/20  14:58

## 2020-09-11 NOTE — THERAPY TREATMENT NOTE
Acute Care - Physical Therapy Treatment Note  [facility]     Patient Name: Wong Patrick  : 1955  MRN: [id number]  Today's Date: 2020  Onset of Illness/Injury or Date of Surgery: 20     Referring Physician: MD Jennifer    Admit Date: 2020    Visit Dx:    ICD-10-CM ICD-9-CM   1. Pneumonia of both lungs due to infectious organism, unspecified part of lung J18.9 483.8   2. Fever, unspecified fever cause R50.9 780.60   3. Impaired mobility and ADLs Z74.09 V49.89    Z78.9    4. Impaired functional mobility, balance, gait, and endurance Z74.09 V49.89   5. Pneumonia of left upper lobe due to Streptococcus pneumoniae (CMS/HCC) J13 481   6. Mediastinal adenopathy R59.0 785.6   7. Non-intractable vomiting with nausea, unspecified vomiting type R11.2 787.01   8. Diarrhea, unspecified type R19.7 787.91     Patient Active Problem List   Diagnosis   • History of colon polyps   • Nausea and vomiting   • Right lower lobe pneumonia   • Acquired thrombocytopenia (CMS/HCC)   • Chronic hepatitis C (CMS/HCC)   • Hypertension   • Cirrhosis of liver (CMS/HCC)   • Mediastinal adenopathy   • Sepsis due to pneumonia (CMS/HCC)   • COPD with acute exacerbation (CMS/HCC)   • IBRAHIMA (acute kidney injury) (CMS/HCC)   • Acute respiratory failure with hypoxia (CMS/HCC)   • Pneumonia of left upper lobe due to Streptococcus pneumoniae (CMS/HCC)   • Non-intractable vomiting   • CLL (chronic lymphocytic leukemia) (CMS/HCC)   • Bacteremia due to Streptococcus pneumoniae   • Diarrhea   • Thrombocytopenia (CMS/HCC)       Therapy Treatment    Rehabilitation Treatment Summary     Row Name 20 1119             Treatment Time/Intention    Discipline  physical therapy assistant  -TA      Document Type  therapy note (daily note)  -TA      Subjective Information  no complaints  -TA      Mode of Treatment  individual therapy;physical therapy  -TA      Therapy Frequency (PT Clinical Impression)  other (see comments) 6-11x/wk  -TA       Existing Precautions/Restrictions  fall  -TA      Recorded by [TA] Maritza Reese, PTA 09/11/20 1345      Row Name 09/11/20 1119             Vital Signs    Pre Systolic BP Rehab  104  -TA      Pre Treatment Diastolic BP  56  -TA      Post Systolic BP Rehab  110  -TA      Post Treatment Diastolic BP  62  -TA      Pretreatment Heart Rate (beats/min)  88  -TA      Intratreatment Heart Rate (beats/min)  106  -TA      Posttreatment Heart Rate (beats/min)  100  -TA      Pre SpO2 (%)  100  -TA      O2 Delivery Pre Treatment  room air  -TA      Intra SpO2 (%)  93  -TA      O2 Delivery Intra Treatment  room air  -TA      Post SpO2 (%)  93  -TA      O2 Delivery Post Treatment  room air  -TA      Pre Patient Position  Supine  -TA      Post Patient Position  Supine  -TA      Recorded by [TA] Maritza Reese, PTA 09/11/20 1345      Row Name 09/11/20 1119             Cognitive Assessment/Intervention- PT/OT    Orientation Status (Cognition)  person;place;situation correct year, incorrect month  -TA      Personal Safety Interventions  fall prevention program maintained;gait belt;nonskid shoes/slippers when out of bed;supervised activity  -TA      Recorded by [TA] Maritza Reese, PTA 09/11/20 1345      Row Name 09/11/20 1119             Safety Issues, Functional Mobility    Impairments Affecting Function (Mobility)  balance;endurance/activity tolerance;strength  -TA      Recorded by [TA] Mairtza Reese, PTA 09/11/20 1345      Row Name 09/11/20 1119             Bed Mobility Assessment/Treatment    Bed Mobility Assessment/Treatment  supine-sit-supine  -TA      Supine-Sit [place] (Bed Mobility)  independent  -TA      Sit-Supine [place] (Bed Mobility)  independent  -TA      Assistive Device (Bed Mobility)  --  -TA      Recorded by [TA] Maritza Reese, PTA 09/11/20 1345      Row Name 09/11/20 1119             Sit-Stand Transfer    Sit-Stand [place] (Transfers)  supervision  -TA      Assistive Device (Sit-Stand  Transfers)  walker, front-wheeled  -TA      Recorded by [TA] Maritza Reese, [facility] 09/11/20 1345      Row Name 09/11/20 1119             Stand-Sit Transfer    Stand-Sit [place] (Transfers)  supervision  -TA      Assistive Device (Stand-Sit Transfers)  walker, front-wheeled  -TA      Recorded by [TA] Maritza Reese, [facility] 09/11/20 1345      Row Name 09/11/20 1119             Gait/Stairs Assessment/Training    Gait/Stairs Assessment/Training  gait/ambulation independence  -TA      [place] Level (Gait)  supervision  -TA      Assistive Device (Gait)  walker, front-wheeled  -TA      Distance in Feet (Gait)  450`  -TA      Recorded by [TA] Maritza Reese, [facility] 09/11/20 1345      Row Name 09/11/20 1119             Lower Extremity Seated Therapeutic Exercise    Performed, Seated Lower Extremity (Therapeutic Exercise)  hip abduction/adduction;ankle dorsiflexion/plantarflexion;LAQ (long arc quad), knee extension;hip flexion/extension  -TA      Exercise Type, Seated Lower Extremity (Therapeutic Exercise)  AROM (active range of motion)  -TA      Expected Outcomes, Seated Lower Extremity (Therapeutic Exercise)  improve functional tolerance, self-care activity;improve motor control;improve functional stability;strengthen normal movement patterns  -TA      Sets/Reps Detail, Seated Lower Extremity (Therapeutic Exercise)  20  -TA      Recorded by [TA] Maritza Reese, [facility] 09/11/20 1345      Row Name 09/11/20 1119             Positioning and Restraints    Pre-Treatment Position  in bed  -TA      Post Treatment Position  bed  -TA      In Bed  supine;call light within reach;exit alarm on  -TA      Recorded by [TA] Maritza Reese, [facility] 09/11/20 1345      Row Name 09/11/20 1119             Pain Scale: Numbers Pre/Post-Treatment    Pain Scale: Numbers, Pretreatment  0/10 - no pain  -TA      Pain Scale: Numbers, Post-Treatment  0/10 - no pain  -TA      Pain Location  --  -TA      Recorded by [TA] Maritza Reese, [facility] 09/11/20 1344       Row Name 09/11/20 1119             Sensory Assessment/Intervention    Sensory General Assessment  no sensation deficits identified  -TA      Recorded by [TA] Maritza Reese, PTA 09/11/20 1345      Row Name                Wound 08/26/20 1322 Bilateral gluteal MASD (Moisture associated skin damage)    Wound - Properties Group Date first assessed: 08/26/20 [AK] Time first assessed: 1322 [AK] Present on Hospital Admission: Y [AK] Side: Bilateral [AK] Location: gluteal [AK] Primary Wound Type: MASD [AK2], with open areas  Recorded by:  [AK] Tammy Beverly RN 08/26/20 1347 [AK2] Tammy Beverly RN 08/26/20 1348    Row Name 09/11/20 1119             Outcome Summary/Treatment Plan (PT)    Daily Summary of Progress (PT)  progress toward functional goals is good  -TA      Plan for Continued Treatment (PT)  continue  -TA      Anticipated Equipment Needs at Discharge (PT)  front wheeled walker  -TA      Anticipated Discharge Disposition (PT)  skilled nursing facility  -TA      Recorded by [TA] Maritza Reese, PTA 09/11/20 1345        User Key  (r) = Recorded By, (t) = Taken By, (c) = Cosigned By    Initials Name Effective Dates Discipline    AK Tammy Beverly, RN 10/17/16 -  Nurse    TA Maritza Reese, PTA 03/07/18 -  PT          Wound 08/26/20 1322 Bilateral gluteal MASD (Moisture associated skin damage) (Active)   Dressing Appearance open to air 9/11/2020  9:12 AM   Closure Open to air 9/11/2020  9:12 AM   Base blanchable 9/11/2020  9:12 AM   Periwound redness 9/11/2020  9:12 AM   [place] Temperature warm 9/11/2020  9:12 AM   Care, Wound barrier applied 9/10/2020  8:00 PM       Rehab Goal Summary     Row Name 09/11/20 1119             Bed Mobility Goal 1 (PT)    Activity/Assistive Device (Bed Mobility Goal 1, PT)  supine to sit;sit to supine  -TA      [place] Level/Cues Needed (Bed Mobility Goal 1, PT)  conditional independence  -TA      Time Frame (Bed Mobility Goal 1, PT)  2 days  -TA       Progress/Outcomes (Bed Mobility Goal 1, PT)  (S) goal met  -TA         Transfer Goal 1 (PT)    Activity/Assistive Device (Transfer Goal 1, PT)  sit-to-stand/stand-to-sit;bed-to-chair/chair-to-bed LRAD PRN  -TA      [place] Level/Cues Needed (Transfer Goal 1, PT)  conditional independence  -TA      Time Frame (Transfer Goal 1, PT)  3 days  -TA      Progress/Outcome (Transfer Goal 1, PT)  goal not met  -TA         Gait Training Goal 1 (PT)    Activity/Assistive Device (Gait Training Goal 1, PT)  gait (walking locomotion);assistive device use;decrease fall risk;improve balance and speed;increase endurance/gait distance LRAD PRN  -TA      [place] Level (Gait Training Goal 1, PT)  conditional independence  -TA      Distance (Gait Goal 1, PT)  100ft or more  -TA      Time Frame (Gait Training Goal 1, PT)  by discharge  -TA      Progress/Outcome (Gait Training Goal 1, PT)  goal not met  -TA        User Key  (r) = Recorded By, (t) = Taken By, (c) = Cosigned By    Initials Name Provider Type Discipline    Maritza Navarrete, PTA Physical Therapy Assistant PT          Physical Therapy Education                 Title: PT OT SLP Therapies (In Progress)     Topic: Physical Therapy (In Progress)     Point: Mobility training (Done)     Description:   Instruct learner(s) on safety and technique for assisting patient out of bed, chair or wheelchair.  Instruct in the proper use of assistive devices, such as walker, crutches, cane or brace.              Patient Friendly Description:   It's important to get you on your feet again, but we need to do so in a way that is safe for you. Falling has serious consequences, and your personal safety is the most important thing of all.        When it's time to get out of bed, one of us or a family member will sit next to you on the bed to give you support.     If your doctor or nurse tells you to use a walker, crutches, a cane, or a brace, be sure you use it every time you get out of  bed, even if you think you don't need it.    Learning Progress Summary           Patient Acceptance, E, VU by KW at 9/9/2020 1004    Comment:  PT POC; goal status; safe hand placement    Acceptance, E,TB, VU,NR by LN at 9/7/2020 1537    Acceptance, E,TB, VU,NR by LN at 9/6/2020 1506    Acceptance, E, VU by KW1 at 8/28/2020 1603    Comment:  Role of PT, POC, use of gait belt, positioning of AD and hands with t/f                   Point: Home exercise program (Done)     Description:   Instruct learner(s) on appropriate technique for monitoring, assisting and/or progressing patient with therapeutic exercises and activities.              Learning Progress Summary           Patient Acceptance, E, VU by KW at 9/9/2020 1004    Comment:  PT POC; goal status; safe hand placement    Acceptance, E,TB, VU,NR by LN at 9/7/2020 1537                   Point: Body mechanics (Not Started)     Description:   Instruct learner(s) on proper positioning and spine alignment for patient and/or caregiver during mobility tasks and/or exercises.              Learner Progress:   Not documented in this visit.          Point: Precautions (Done)     Description:   Instruct learner(s) on prescribed precautions during mobility and gait tasks              Learning Progress Summary           Patient Acceptance, E, VU by KW at 9/9/2020 1004    Comment:  PT POC; goal status; safe hand placement    Acceptance, E,TB, VU,NR by LN at 9/7/2020 1537    Acceptance, E,TB, VU,NR by LN at 9/6/2020 1506    Acceptance, E, VU by KW1 at 8/28/2020 1603    Comment:  Role of PT, POC, use of gait belt, positioning of AD and hands with t/f                               User Key     Initials Effective Dates Name Provider Type Discipline     03/07/18 -  Suni Vasquez, PTA Physical Therapy Assistant PT    KW1 08/09/20 -  Paulette Dean, PT Physical Therapist PT    KW 08/09/20 -  Mo Regan, PT Physical Therapist PT                PT Recommendation and Plan  Anticipated  Discharge Disposition (PT): skilled nursing facility  Therapy Frequency (PT Clinical Impression): other (see comments)(6-11x/wk)  Outcome Summary/Treatment Plan (PT)  Daily Summary of Progress (PT): progress toward functional goals is good  Plan for Continued Treatment (PT): continue  Anticipated Equipment Needs at Discharge (PT): front wheeled walker  Anticipated Discharge Disposition (PT): skilled nursing facility  Outcome Summary: pt sup<>sit with independence, sit<>stand with SBA, pt ambulated 450` with RW & SBA. pt would benefit from 24/7 care facility & continued PT services  Outcome Measures     Row Name 09/11/20 1300 09/10/20 1023 09/09/20 1504       How much help from another person do you currently need...    Turning from your back to your side while in flat bed without using bedrails?  4  -TA  --  --    Moving from lying on back to sitting on the side of a flat bed without bedrails?  4  -TA  --  --    Moving to and from a bed to a chair (including a wheelchair)?  3  -TA  --  --    Standing up from a chair using your arms (e.g., wheelchair, bedside chair)?  3  -TA  --  --    Climbing 3-5 steps with a railing?  3  -TA  --  --    To walk in hospital room?  3  -TA  --  --    AM-PAC 6 Clicks Score (PT)  20  -TA  --  --       How much help from another is currently needed...    Putting on and taking off regular lower body clothing?  --  3  -KD  3  -AS    Bathing (including washing, rinsing, and drying)  --  3  -KD  3  -AS    Toileting (which includes using toilet bed pan or urinal)  --  3  -KD  3  -AS    Putting on and taking off regular upper body clothing  --  3  -KD  3  -AS    Taking care of personal grooming (such as brushing teeth)  --  4  -KD  4  -AS    Eating meals  --  4  -KD  4  -AS    AM-PAC 6 Clicks Score (OT)  --  20  -KD  20  -AS       Functional Assessment    Outcome Measure Options  AM-PAC 6 Clicks Basic Mobility (PT)  -TA  --  AM-PAC 6 Clicks Daily Activity (OT)  -AS    Row Name 09/09/20 9206              How much help from another is currently needed...    Putting on and taking off regular lower body clothing?  3  -RC      Bathing (including washing, rinsing, and drying)  3  -RC      Toileting (which includes using toilet bed pan or urinal)  2  -RC      Putting on and taking off regular upper body clothing  3  -RC      Taking care of personal grooming (such as brushing teeth)  4  -RC      Eating meals  4  -RC      AM-PAC 6 Clicks Score (OT)  19  -RC        User Key  (r) = Recorded By, (t) = Taken By, (c) = Cosigned By    Initials Name Provider Type    Maritza Navarrete PTA Physical Therapy Assistant    Sarahy Salinas, BURKETT/L Occupational Therapy Assistant    Toya Marshall, MADELAINE/L Occupational Therapy Assistant    AS Mindy Mark, OT Occupational Therapist         Time Calculation:   PT Charges     Row Name 09/11/20 1348             Time Calculation    Start Time  1119  -TA      Stop Time  1145  -TA      Time Calculation (min)  26 min  -TA      PT Received On  09/11/20  -TA         Time Calculation- PT    Total Timed Code Minutes- PT  26 minute(s)  -TA        User Key  (r) = Recorded By, (t) = Taken By, (c) = Cosigned By    Initials Name Provider Type    Maritza Navarrete PTA Physical Therapy Assistant        Therapy Charges for Today     Code Description Service Date Service Provider Modifiers Qty    78007486734 HC GAIT TRAINING EA 15 MIN 9/11/2020 Maritza Reese PTA GP 1    03006898618 HC PT THER PROC EA 15 MIN 9/11/2020 Maritza Reese PTA GP 1          PT G-Codes  Outcome Measure Options: AM-PAC 6 Clicks Basic Mobility (PT)  AM-PAC 6 Clicks Score (PT): 20  AM-PAC 6 Clicks Score (OT): 20  [place] Total Score: 24    Maritza Reese PTA  9/11/2020

## 2020-09-12 NOTE — OUTREACH NOTE
Prep Survey      Responses   [facility] facility patient discharged from?  [facility]   Is LACE score < 7 ?  No   Eligibility  Readm Mgmt   Discharge diagnosis  PNA   COVID-19 Test Status  Negative   Does the patient have one of the following disease processes/diagnoses(primary or secondary)?  COPD/Pneumonia   Does the patient have Home health ordered?  No   Is there a DME ordered?  No   Prep survey completed?  Yes          Luana Saldivar RN

## 2020-09-14 NOTE — PAYOR COMM NOTE
"Ana Marion  [facility]  P :[phone]  F: [phone]      [facility]#[id number]    Mabel Patrick (65 y.o. Male)     Date of Birth Social Security Number Address Home Phone MRN    1955  [address]  [place] [facility] [address] [phone] [id number]    Anabaptist Marital Status          None Single       Admission Date Admission Type Admitting Provider Attending Provider Department, Room/Bed    8/25/20 Emergency Nader Mauro MD  [facility] 3 [place], 323/1    Discharge Date Discharge Disposition Discharge Destination        9/11/2020 Home or Self Care              Attending Provider: (none)   Allergies: Codeine, Penicillins    Isolation: None   Infection: COVID Screen (preop/placement) (09/11/20)   Code Status: Prior    Ht: 165.1 cm (65\")   Wt: 65.7 kg (144 lb 12.8 oz)    Admission Cmt: None   Principal Problem: Non-intractable vomiting [R11.10]                 Active Insurance as of 8/25/2020     Primary Coverage     Payor Plan Insurance Group Employer/Plan Group    [facility] MEDICARE REPLACEMENT [employer]LL[employer] MEDICARE REPLACEMENT [id number]     Payor Plan Address Payor Plan Phone Number Payor Plan Fax Number Effective Dates    [address] [address] [phone]  4/1/2020 - None Entered    [facility] [id number]       Subscriber Name Subscriber Birth Date Member ID       Mabel Patrick 1955 [id number]           Secondary Coverage     Payor Plan Insurance Group Employer/Plan Group    [place]      Payor Plan Address Payor Plan Phone Number Payor Plan Fax Number Effective Dates    [address] [address]   1/1/2014 - None Entered    [facility] [place] [employer] [place] [address]       Subscriber Name Subscriber Birth Date Member ID       MABEL PATRICK 1955 [id number]                 Emergency Contacts      (Rel.) Home Phone Work Phone Mobile Phone    Yinka Shelton (Friend) [phone] -- [phone]    Volodymyr Patrick (Relative) [phone] -- [phone] "    Pak Jose Luis [phone] -- --               Discharge Summary      Perlita Cordon APRN at 09/11/20 1313     Attestation signed by Aldo Avitia MD at 09/12/20 1816    I personally evaluated and examined the patient in conjunction with ANA Tovar and agree with the assessment, treatment plan, and disposition of the patient as recorded.     Pt no distress.  Agree with plan.                        [facility] Medicine Services  DISCHARGE SUMMARY       Date of Admission: 8/25/2020  Date of Discharge:  9/11/2020  Primary Care Physician: Cherelle Spivey APRN    Presenting Problem/History of Present Illness:  Fever, unspecified fever cause [R50.9]  Pneumonia of both lungs due to infectious organism, unspecified part of lung [J18.9]     Final Discharge Diagnoses:  Active Hospital Problems    Diagnosis   • **Non-intractable vomiting   • Thrombocytopenia (CMS/HCC)   • CLL (chronic lymphocytic leukemia) (CMS/HCC)     Likely based on flow cytometry     • Bacteremia due to Streptococcus pneumoniae   • Sepsis due to pneumonia (CMS/HCC)   • COPD with acute exacerbation (CMS/HCC)   • IBRAHIMA (acute kidney injury) (CMS/HCC)   • Acute respiratory failure with hypoxia (CMS/HCC)   • Pneumonia of left upper lobe due to Streptococcus pneumoniae (CMS/HCC)   • Diarrhea   • Mediastinal adenopathy   • Hypertension   • Cirrhosis of liver (CMS/HCC)       Consults:   Consults     Date and Time Order Name Status Description    9/2/2020 1039 Inpatient Gastroenterology Consult Completed     8/29/2020 1616 Inpatient Pulmonology Consult Completed     8/27/2020 1757 Hematology & Oncology Inpatient Consult Completed     8/25/2020 1344 Hospitalist (on-call MD unless specified)            Procedures Performed: Procedure(s):  COLONOSCOPY                Pertinent Test Results:   Lab Results (last 24 hours)     Procedure Component Value Units Date/Time    COVID-19, [facility] IN-HOUSE, NP SWAB IN  TRANSPORT MEDIA 8-10 HR TAT - Swab, Nasopharynx [[id number]]  (Normal) Collected:  09/11/20 0953    Specimen:  Swab from Nasopharynx Updated:  09/11/20 1352     COVID19 Not Detected    Narrative:       Testing performed by Real Time RT-PCR  This test has not been approved by the [facility] but is authorized under the Emergency Use Act (EUA)    Fact sheet for providers: https://www.fda.gov/media/718484/download    Fact sheet for patients: https://www.fda.gov/media/873285/download            Imaging Results (All)     Procedure Component Value Units Date/Time    US Abdomen Limited [[id number]] Collected:  08/31/20 0937     Updated:  08/31/20 1121    Narrative:       EXAM: US ABDOMEN LIMITED    INDICATION: assess amount of ascites, J18.9 Pneumonia,  unspecified organism R50.9 Fever, unspecified Z74.09 Other  reduced mobility Z78.9 Other specified health status Z74.09 Other  reduced mobility J13 Pneumonia due to Streptococcus pneumoniae  R59.0 Localized enlarged lymph nodes    COMPARISONS: CT abdomen and pelvis 8/28/2020    FINDINGS:  Small amount of perihepatic and perisplenic fluid is seen within  the right and left upper quadrants.    Small amount of fluid is seen within the pelvis.      Impression:       Small amount of ascites within the upper abdomen and pelvis.    Electronically signed by:  Jose Guadalupe Farley MD  8/31/2020  11:20 AM CDT Workstation: [id number]    CT Abdomen Pelvis With & Without Contrast [[id number]] Collected:  08/28/20 1423     Updated:  08/28/20 1840    Narrative:       PROCEDURE: CT ABDOMEN PELVIS W WO CONTRAST    INDICATION: Adenopathy, pneumonia, fever    HISTORY: Bilious adenoma:, COPD, hep C, IBS    COMPARISON: CT of chest dated 8/26/2020 and ultrasound liver  dated 6/17/2020     TECHNIQUE:   - reconstructions:  Axial, coronal, sagittal   - contrast:  oral:  No      intravenous:  Images acquired prior to and after the  intravenous administration of 90 mL of Isovue-300     This exam was performed  according to our departmental  dose-optimization program, which includes automated exposure  control, adjustment of the mA and/or kV according to patient size  and/or use of iterative reconstruction technique.  DLP is 1090.6    FINDINGS:      - - - CT ABDOMEN - - -      THORAX (INFERIOR):   - LUNG BASES:  Increasing infiltrates in both lung bases  compared to the previous study. Small bilateral pleural effusions  and pleural calcifications are present, as before. Multiple blebs  are seen in the lingula, which may be slightly improved from  prior   - PLEURA:  No fluid or mass   - HEART: Normal size, no pericardial fluid   - MISC:  N/A      ABDOMEN:   - LIVER:  Normal size. Nodular contour, compatible with  cirrhosis. No ductal dilatation, no focal lesion. The main portal  vein appears dilated at 2.0 cm in greatest diameter.   - GB: Numerous partially calcified gallstones are present.   - CBD:  Grossly negative   - SPLEEN:  The spleen is enlarged measuring 17.5 cm in greatest  dimension. There are perisplenic varices.   - PANCREAS:  Normal in size, contour, no focal mass    - VISCERA:  Normal caliber, though there are some borderline  loops of small bowel measuring up to 3.0 cm in diameter in the  left upper quadrant. There is limited evaluation for wall  thickening due to ascites, but probably some wall thickening in  the left abdominal small bowel loops is present. No frankly  dilated bowel is identified. There is postoperative change of the  stomach   - MESENTERY: There are enlarged lymph nodes in the karin hepatis  measuring up to 3.5 cm in greatest dimension.   - CAVITY:  Small amount of ascites, no free intraperitoneal air   - BODY WALL:  Asymmetric subcutaneous stranding, right greater  than left overlying the abdomen.   - OSSEOUS:  Grossly negative for age   - MISC:   RETROPERITONEUM:   - KIDNEYS:Normal size/contour, no collecting system dilation                    No evidence of an enhancing mass. Probable  cyst  in the right renal upper pole measuring approximately 9 mm in  greatest dimension this is too small to fully characterize   - URETERS:  Normal course, caliber   - ADRENALS:  Normal size, contour   - MISC:  Numerous enlarged retroperitoneal lymph nodes measuring  up to 3.8 cm in the left periaortic space just inferior to the  left renal vein.  Aortocaval lymph node measures up to 2.3 cm  just at the level of the aortic bifurcation.  - VASCULAR:  Aorta / iliacs: Atherosclerotic vascular  calcification     - - - CT PELVIS - - -      - VISCERA:  Normal caliber small/large bowel, no focal  thickening/mass        - APPENDIX: Identified with certainty.   - MESENTERY:  Enlarged pelvic sidewall lymph nodes include right  extradural iliac node measuring 1.8 cm in greatest dimension.  Left external iliac nodes measure up to 2.0 cm in greatest  dimension. There are enlarged lymph nodes in the inguinal regions  as described below.   - VASCULAR:  Atherosclerotic vascular calcification   - CAVITY:  Moderate amount ascites. No free air   - BLADDER:  Unremarkable   - OSSEOUS:  Within normal limits    - MISC:  Is suspected small hydroceles   - PROSTATE:  Grossly wnl  Bilateral inguinal lymphadenopathy with nodes measuring up to 3.8  cm in greatest dimension on the right, and up to 3.1 cm on the  left       Impression:       1. Increasing infiltrates in bilateral lung bases, likely  reflecting pneumonia, possibly with some element of pulmonary  edema. There are also bilateral pleural effusions, similar to  previous study   2. Cirrhotic appearance of liver.  3. There is evidence of portal hypertension with enlargement of  the portal and splenic veins and perisplenic varices, as well as  severe splenomegaly  4. Mesenteric and retroperitoneal lymphadenopathy.  5. Evidence of third spacing with subcutaneous stranding in the  abdominal body wall, right greater than left, bilateral pleural  effusions, and ascites, as above  6.  Cholelithiasis  7. Pelvic sidewall or inguinal lymphadenopathy  8. Please see findings section above for further details.    Electronically signed by:  Brittney Bourgeois MD  8/28/2020 6:39 PM CDT  Workstation: [id number]    CT Chest Without Contrast [[id number]] Collected:  08/26/20 1526     Updated:  08/26/20 1631    Narrative:       PROCEDURE: CT CHEST WO CONTRAST    INDICATION: Pneumonia, shortness of breath    COMPARISON: Chest x-ray dated 8/26/2020     TECHNIQUE:    - reconstructions: Axial, coronal, sagittal    - contrast:  oral:  None                       intravenous: None    This exam was performed according to our departmental  dose-optimization program, which includes automated exposure  control, adjustment of the mA and/or kV according to patient size  and/or use of iterative reconstruction technique.  DLP is 670    FINDINGS:    PULMONARY PARENCHYMA:      -There are emphysematous changes. There is near complete  opacification of the left lung upper from blebs and bullae at the  left lung apex, one of which contains a small air-fluid level.  There is a small amount of aerated lung in the lingula and  anterior left lower lobe. A calcified granuloma in the each lung  base is present, as noted on previous chest x-rays. Dependent  atelectasis is seen in the right lung.     MEDIASTINUM / RENETTA:      - heart: Normal size, no pericardial fluid    - aorta/great vessels: Normal caliber and configuration for age    - misc: Enlarged mediastinal lymph nodes are present, measuring  up to 1.9 cm in the right paratracheal space. Mildly enlarged  prevascular nodes are also present. There is limited evaluation  of hilar nodes due to noncontrast technique.     PLEURAL COMPARTMENT:      - misc: Bilateral pleural effusions are present, right greater  than left. There appear to be pleural calcifications in the left  lower lung zone.      MISC:      - inferior neck: Mildly enlarged lymph nodes are seen in the  visualized portion of  the inferior neck as well as the  supraclavicular region.    - osseous/body wall: Enlarged bilateral axillary lymph nodes  are present.    - subdiaphramatic: Large partially calcified gallstones are  present. The spleen is markedly enlarged, measuring 17.5 cm in  greatest dimension. The liver appears nodular, concerning for  cirrhosis. Trace ascites is present. There appear to be  retroperitoneal enlarged lymph nodes and there may be enlarged  mesenteric nodes as well      Impression:       1. Near complete opacification of the left lung superimposed on  emphysema. Numerous blebs and bulla are present in the left lung  apex, including one with a small air-fluid level. Interval  follow-up to evaluate for resolution suggested  2. Bilateral pleural effusions, right greater than left  3. Calcification in the posterior left pleura, which appears to  largely spare the diaphragmatic surface. This may be the result  of previous hemorrhage, empyema, or less likely asbestos  exposure.  4. Multifocal lymphadenopathy including both axillary regions,  visualized portion of the cervical nodes and supraclavicular  nodes, mediastinal lymph nodes, retroperitoneal nodes, and  probably mesenteric nodes. Possible etiologies include  metastases, lymphoma, and reactive nodes. These can be followed  up is deemed clinically warranted.  5. Splenomegaly  6. Cirrhotic appearance of the liver  7. Please see findings section above for further details    Electronically signed by:  Brittney Bourgeois MD  8/26/2020 4:30 PM CDT  Workstation: [id number]    XR Chest 1 View [[id number]] Collected:  08/25/20 1153     Updated:  08/25/20 1226    Narrative:       PROCEDURE: XR CHEST 1 VW    VIEWS:Single    INDICATION: Fever, cough    COMPARISON: CXR: 1/8/2020    FINDINGS:       - lines/tubes: None    - cardiac: Size within normal limits.    - mediastinum: Contour within normal limits.     - lungs: Patchy airspace opacification of almost the entirety  of the  left lung, with air bronchograms. There is also  ill-defined opacity in the right lung base. Calcified granulomata  in each lower lung zone are present    - pleura: Mild blunting of the right costophrenic angle..      - osseous: Unremarkable for age.      Impression:       Patchy airspace opacification involving almost the entirety of  the left lung with air bronchograms, likely representing  pneumonia, and new from prior study. There is also ill-defined  opacity in the right lung base which may represent atelectasis,  consolidation, effusion, or likely some combination of these.  Follow-up to complete radiographic resolution is recommended      Electronically signed by:  Brittney Bourgeois MD  8/25/2020 12:25 PM  CDT Workstation: [id number]            Chief Complaint on Day of Discharge: none    Hospital Course:  65-year-old  male with past medical history of COPD, cirrhosis of the liver with ascites, chronic hepatitis C, gastroesophageal reflux disease, hypertension, irritable bowel syndrome who presented on 8/25/2020 with shortness of breath, nausea, vomiting, diarrhea, and fatigue.  Chest x-ray showed patchy airspace opacity involving almost the entirety of the left lung with air bronchograms.  He also has a known tracheal/mediastinal mass.  He met sepsis criteria and was admitted for sepsis with pneumonia and respiratory failure.  He was admitted to ICU initially on IV antibiotics and IV fluids.  He was initiated on IV Solu-Medrol and bronchodilators as well.  COVID-19 testing was performed and was negative.  Blood cultures were positive for Streptococcus pneumoniae responsive to Levaquin.    Oncology and pulmonology services followed during the hospital stay.  Peripheral flow cytometry and FNA were suggestive for SLL/CLL and he has been recommended outpatient oncology follow up.  GI service also followed patient during hospital stay due to persistent nausea.  He underwent EGD which revealed esophagitis,  "gastritis, and medium sized hiatal hernia.  He underwent colonoscopy which revealed nonbleeding hemorrhoids as well.  He is currently on PPI therapy as well as Carafate for treatment of his gastritis.  Nausea and vomiting is resolved and patient is tolerating oral intake of discharge.  Patient was pursuing skilled nursing facility placement at discharge, however pre-CERT was denied by insurance as patient progressed well with therapy and was too high functioning to have SNF benefit covered.  Patient has opted to return home.  He will be discharged home in stable condition with instructions for 1 week follow-up with PCP, oncology, and gastroenterology.  Patient was offered home health services, however he has declined home health on day of discharge.  Condition on Discharge:  Stable     Physical Exam on Discharge:  /65 (BP Location: Left arm, Patient Position: Lying)   Pulse 97   Temp 97.8 °F (36.6 °C) (Oral)   Resp 18   Ht 165.1 cm (65\")   Wt 65.7 kg (144 lb 12.8 oz)   SpO2 95%   BMI 24.10 kg/m²   Physical Exam   Constitutional: He is oriented to person, place, and time. He appears well-developed and well-nourished. No distress.   HENT:   Head: Normocephalic and atraumatic.   Right Ear: External ear normal.   Left Ear: External ear normal.   Nose: Nose normal.   Eyes: Conjunctivae are normal. Right eye exhibits no discharge. Left eye exhibits no discharge. No scleral icterus.   Neck: Normal range of motion. Neck supple. No JVD present.   Cardiovascular: Normal rate, regular rhythm, normal heart sounds and intact distal pulses. Exam reveals no gallop and no friction rub.   No murmur heard.  Pulmonary/Chest: Effort normal and breath sounds normal. No stridor. No respiratory distress. He has no wheezes. He has no rales.   Abdominal: Soft. Bowel sounds are normal. He exhibits no distension. There is no tenderness.   Musculoskeletal: Normal range of motion. He exhibits no edema.   Neurological: He is alert " and oriented to person, place, and time.   Skin: Skin is warm and dry. He is not diaphoretic.   Psychiatric: He has a normal mood and affect. His behavior is normal.   Nursing note and vitals reviewed.        Discharge Disposition:  Home or Self Care    Discharge Medications:     Discharge Medications      New Medications      Instructions Start Date   acidophilus tablet tablet   1 tablet, Oral, Daily   Start Date:  September 12, 2020     folic acid 1 MG tablet  Commonly known as:  FOLVITE   1 mg, Oral, Daily   Start Date:  September 12, 2020        Continue These Medications      Instructions Start Date   albuterol sulfate  (90 Base) MCG/ACT inhaler  Commonly known as:  PROVENTIL HFA;VENTOLIN HFA;PROAIR HFA   2 puffs, Inhalation, Every 4 Hours PRN      budesonide-formoterol 160-4.5 MCG/ACT inhaler  Commonly known as:  Symbicort   2 puffs, Inhalation, 2 Times Daily - RT      busPIRone 15 MG tablet  Commonly known as:  BUSPAR   15 mg, Oral, 2 Times Daily      ferrous sulfate 325 (65 FE) MG EC tablet   325 mg, Oral, Daily With Breakfast      loperamide 2 MG capsule  Commonly known as:  IMODIUM   2 mg, Oral, 4 Times Daily PRN      loratadine 10 MG tablet  Commonly known as:  CLARITIN   10 mg, Oral, Daily      losartan 50 MG tablet  Commonly known as:  COZAAR   50 mg, Oral, Daily      meclizine 25 MG tablet  Commonly known as:  ANTIVERT   25 mg, Oral, 2 times daily      omeprazole 20 MG capsule  Commonly known as:  priLOSEC   20 mg, Oral, Daily         Stop These Medications    predniSONE 10 MG tablet  Commonly known as:  DELTASONE            Discharge Diet:   Diet Instructions     Diet: Regular      Discharge Diet:  Regular          Activity at Discharge:   Activity Instructions     Activity as Tolerated            Discharge Care Plan/Instructions: Follow-up with PCP within 1 week of discharge.  Follow-up with Dr. Conner in 1 week.    Follow-up Appointments:   Additional Instructions for the Follow-ups that You  Need to Schedule     Discharge Follow-up with PCP   As directed       Currently Documented PCP:    Provider, No Known    PCP Phone Number:    None     Follow Up Details:  one week         Discharge Follow-up with Specified Provider: [facility]; 1 Week   As directed      To:  [facility]    Follow Up:  1 Week           Follow-up Information     Cherelle Spivey APRN Follow up on 9/22/2020.    Specialty:  Family Medicine  Why:  Tuesday September 22nd 3:30 pm  Contact information:  [address] [place] [id number] [phone]             Yoni Simon MD Follow up in 1 week(s).    Specialty:  Gastroenterology  Contact information:  [address] [place] [address] DR YAP FLFEDE  [facility] [id number] [phone]                   Test Results Pending at Discharge:    Order Current Status    Flow Cytometry, Blood Collected (09/01/20 1407)    Flow Cytometry In process    AFB Culture - Lavage, Lung, Lingula Preliminary result    AFB Culture - Wash, Lung, L Preliminary result    Fungus Culture - Lavage, Lung, Lingula Preliminary result    Fungus Culture - Wash, Lung, L Preliminary result                This document has been electronically signed by ANA Tovar on September 11, 2020 16:19        Time: 30 minutes spent on assessment, discussion, management, discharge planning for this patient.                Electronically signed by Reji Avitia MD at 09/12/20 1816       Discharge Order (From admission, onward)     Start     Ordered    09/11/20 1310  Discharge patient  Once     Expected Discharge Date: 09/11/20    Discharge Disposition: Home or Self Care    Physician of Record for Attribution - Please select from Treatment Team: REJI AVITIA [[id number]]    Review needed by CMO to determine Physician of Record: No    Please choose which facility the patient is currently admitted if they are being discharged to another facility or unit.: [facility]    Mode: Family       Question Answer Comment   Physician of Record  for Attribution - Please select from Treatment Team REJI HIGGINS    Review needed by CMO to determine Physician of Record No    Please choose which facility the patient is currently admitted if they are being discharged to another facility or unit. [facility]    Mode: Family        09/11/20 8359

## 2020-09-14 NOTE — PROGRESS NOTES
Chief Complaint   Patient presents with   • Cirrhosis   • Heartburn   • Anemia       ENDO PROCEDURE ORDERED:    Subjective    Wong Patrick is a 65 y.o. male. he is here today for follow-up.    History of Present Illness    Patient is seen on a recheck of his cirrhosis, GERD, iron deficiency anemia. Last seen 08/03/2020. The patient was hospitalized 08/25/2020-09/11/2020 with pneumonia, fever, vomiting. He was negative for COVID-19. Abdominal ultrasound showed a small amount of perihepatic and pelvic fluid. A CT scan of abdomen and pelvis showed gallstones, nodular liver, dilated portal vein and large spleen with varices, dilated bowel and enlarged nodes. Chest x-ray was abnormal. He was diagnosed with CLL. A bronchoscopy was done on 09/01/2020 for mediastinal lymphadenopathy. EGD was done on 09/03/2020 that showed a medium hiatal hernia, grade 1 esophagitis with mixed changes on biopsy, gastritis with reactive changes, negative for H. pylori. Random colon biopsy was negative. He did have a colonoscopy on 09/04/2020 that showed nonbleeding hemorrhoids, negative biopsies from the terminal ileum and random colon. Laboratories closer to discharge, BMP showed a calcium of 8.5, otherwise normal. CBC showed a hemoglobin of 9.2, hematocrit 28.9, platelets 97,000.     Patient currently denies abdominal pain. GERD is doing well on the Prilosec. He denied nausea, vomiting or dysphagia. Bowels are moving without blood or mucus. Weight is down 10 pounds since last visit.     ASSESSMENT/PLAN:  Patient with extensive findings as noted above with apparent new onset of CLL. He was encouraged to follow up with his other care providers. It is not clear if he is going back to [place]. He does appear to continue to have cirrhosis with possible portal hypertension. No evidence for varices on endoscopy. I did recommend followup in 1 month with repeat laboratories prior to include CBC, CMP, AFP, INR, serum ammonia. We will also do iron  studies for the anemia. We will plan further pending clinical course and the results of the above. The patient was agreeable.    The following portions of the patient's history were reviewed and updated as appropriate:   Past Medical History:   Diagnosis Date   • Abnormal weight loss    • Acquired thrombocytopenia (CMS/HCC)    • Adenomatous polyp of colon    • Anxiety    • Biliary calculus     denies N/V, abd pain   • Chronic hepatitis C (CMS/HCC)    • Cirrhosis of liver (CMS/HCC)    • Colon polyp    • COPD (chronic obstructive pulmonary disease) (CMS/HCC)    • Elevated levels of transaminase & lactic acid dehydrogenase    • Generalized abdominal pain     known gallstones   • GERD (gastroesophageal reflux disease)    • Hypertension    • IBS (irritable bowel syndrome)    • Impacted cerumen    • Kidney disease     US suggest renal dz, elevated alpha 2 macroglobulins   • Nausea and vomiting    • Patient noncompliance, general    • Rectal hemorrhage    • Tracheal mass    • Villous adenoma of colon    • Wheezing      Past Surgical History:   Procedure Laterality Date   • BRONCHOSCOPY N/A 7/13/2020    Procedure: BRONCHOSCOPY WITH ENDOBRONCHIAL ULTRASOUND;  Surgeon: Karthik Sewell MD;  Location: [facility] OR;  Service: Pulmonary;  Laterality: N/A;  preop; tracheal mass; pneumonia  postop  PCP    • BRONCHOSCOPY N/A 9/1/2020    Procedure: BRONCHOSCOPY WITH ENDOBRONCHIAL ULTRASOUND and needle biopsy, with possible bronchoalveolar lavage, brush, and/or endobronchial biopsy;  Surgeon: Sandra Jacques MD;  Location: [facility] OR;  Service: Pulmonary;  Laterality: N/A;   • COLONOSCOPY  06/03/2013   • COLONOSCOPY N/A 8/22/2018    Procedure: COLONOSCOPY;  Surgeon: Charles Pham MD;  Location: [facility] ENDOSCOPY;  Service: Gastroenterology   • COLONOSCOPY N/A 9/4/2020    Procedure: COLONOSCOPY;  Surgeon: Yoni Simon MD;  Location: [facility] ENDOSCOPY;  Service: Gastroenterology;  Laterality: N/A;   • ENDOSCOPY N/A 7/9/2019     Procedure: ESOPHAGOGASTRODUODENOSCOPY;  Surgeon: Bi Ibarra MD;  Location: [facility] ENDOSCOPY;  Service: General   • ENDOSCOPY N/A 9/3/2020    Procedure: ESOPHAGOGASTRODUODENOSCOPY;  Surgeon: Yoni Simon MD;  Location: [facility] ENDOSCOPY;  Service: Gastroenterology;  Laterality: N/A;   • ENDOSCOPY AND COLONOSCOPY     • ESOPHAGOSCOPY / EGD      with tube   • UPPER GASTROINTESTINAL ENDOSCOPY  06/03/2013   • UPPER GASTROINTESTINAL ENDOSCOPY  07/09/2019     Family History   Problem Relation Age of Onset   • Heart disease Other    • Diabetes Other        Allergies   Allergen Reactions   • Codeine Unknown - High Severity     Pt stated it caused swelling in arms and legs; caused skin irritation; excessive sleepiness   • Penicillins Hives     Social History     Socioeconomic History   • Marital status: Single     Spouse name: Not on file   • Number of children: Not on file   • Years of education: Not on file   • Highest education level: Not on file   Tobacco Use   • Smoking status: Former Smoker     Packs/day: 1.00     Types: Cigarettes   • Smokeless tobacco: Never Used   Substance and Sexual Activity   • Alcohol use: No   • Drug use: No   • Sexual activity: Defer     Current Medications:  Prior to Admission medications    Medication Sig Start Date End Date Taking? Authorizing Provider   acidophilus (FLORANEX) tablet tablet Take 1 tablet by mouth Daily. 9/12/20  Yes Perlita Cordon APRN   albuterol (PROVENTIL HFA;VENTOLIN HFA) 108 (90 BASE) MCG/ACT inhaler Inhale 2 puffs Every 4 (Four) Hours As Needed for Wheezing.   Yes Rafael Wang MD   budesonide-formoterol (Symbicort) 160-4.5 MCG/ACT inhaler Inhale 2 puffs 2 (Two) Times a Day. 7/14/20  Yes Jonathan Hurst MD   busPIRone (BUSPAR) 15 MG tablet Take 15 mg by mouth 2 (two) times a day.   Yes Rafael Wang MD   ferrous sulfate 325 (65 FE) MG EC tablet Take 1 tablet by mouth Daily With Breakfast. 7/27/20 7/27/21 Yes Deny Heller  "TE   fluticasone (FLONASE) 50 MCG/ACT nasal spray  8/17/20  Yes Provider, MD Rafael   folic acid (FOLVITE) 1 MG tablet Take 1 tablet by mouth Daily. 9/12/20  Yes Perlita Cordon APRN   loperamide (IMODIUM) 2 MG capsule Take 2 mg by mouth 4 (Four) Times a Day As Needed for Diarrhea.   Yes Provider, MD Rafael   loratadine (CLARITIN) 10 MG tablet Take 10 mg by mouth Daily. 5/11/17  Yes Provider, MD Rafael   losartan (COZAAR) 50 MG tablet Take 50 mg by mouth daily.   Yes Provider, MD Rafael   meclizine (ANTIVERT) 25 MG tablet Take 25 mg by mouth 2 (two) times a day.   Yes Provider, MD Rafael   omeprazole (priLOSEC) 20 MG capsule Take 1 capsule by mouth Daily. 6/20/19  Yes Deny Heller PA-C   QUEtiapine (SEROquel) 100 MG tablet  8/17/20  Yes Provider, MD Rafael     Review of Systems  Review of Systems       Objective    /60   Pulse 96   Ht 162.6 cm (64\")   Wt 68.9 kg (152 lb)   BMI 26.09 kg/m²   Physical Exam  Vitals signs and nursing note reviewed.   Constitutional:       General: He is not in acute distress.     Appearance: He is well-developed.   HENT:      Head: Normocephalic and atraumatic.   Eyes:      Pupils: Pupils are equal, round, and reactive to light.   Neck:      Musculoskeletal: Normal range of motion.   Cardiovascular:      Rate and Rhythm: Normal rate and regular rhythm.      Heart sounds: Normal heart sounds.   Pulmonary:      Effort: Pulmonary effort is normal.      Breath sounds: Normal breath sounds.      Comments: coarse  Abdominal:      General: Bowel sounds are normal. There is no distension or abdominal bruit.      Palpations: Abdomen is soft. Abdomen is not rigid. There is no shifting dullness or mass.      Tenderness: There is abdominal tenderness. There is no guarding or rebound.      Hernia: No hernia is present. There is no hernia in the ventral area.      Comments: mild   Musculoskeletal: Normal range of motion.   Skin:     General: Skin is warm " and dry.   Neurological:      Mental Status: He is alert and oriented to person, place, and time.   Psychiatric:         Behavior: Behavior normal.         Thought Content: Thought content normal.         Judgment: Judgment normal.       Assessment/Plan      1. End stage liver disease (CMS/HCC)    2. Portal hypertension (CMS/HCC)    3. Hiatal hernia    4. Gallstones    5. Gastroesophageal reflux disease with esophagitis    6. Acute anemia    7. Hepatic fibrosis     .   Wong was seen today for cirrhosis, heartburn and anemia.    Diagnoses and all orders for this visit:    End stage liver disease (CMS/HCC)  -     Ammonia; Future  -     CBC & Differential; Future  -     Comprehensive Metabolic Panel; Future  -     Ferritin; Future  -     Iron Profile; Future  -     Protime-INR; Future  -     AFP Tumor Marker; Future    Portal hypertension (CMS/HCC)  -     Ammonia; Future  -     CBC & Differential; Future  -     Comprehensive Metabolic Panel; Future  -     Ferritin; Future  -     Iron Profile; Future  -     Protime-INR; Future  -     AFP Tumor Marker; Future    Hiatal hernia  -     Ammonia; Future  -     CBC & Differential; Future  -     Comprehensive Metabolic Panel; Future  -     Ferritin; Future  -     Iron Profile; Future  -     Protime-INR; Future  -     AFP Tumor Marker; Future    Gallstones    Gastroesophageal reflux disease with esophagitis  -     Ammonia; Future  -     CBC & Differential; Future  -     Comprehensive Metabolic Panel; Future  -     Ferritin; Future  -     Iron Profile; Future  -     Protime-INR; Future  -     AFP Tumor Marker; Future    Acute anemia  -     Ammonia; Future  -     CBC & Differential; Future  -     Comprehensive Metabolic Panel; Future  -     Ferritin; Future  -     Iron Profile; Future  -     Protime-INR; Future  -     AFP Tumor Marker; Future    Hepatic fibrosis   -     AFP Tumor Marker; Future        Orders placed during this encounter include:  Orders Placed This Encounter    Procedures   • Ammonia     Due prior to follow up in OCt     Standing Status:   Future     Standing Expiration Date:   11/6/2020   • Comprehensive Metabolic Panel     Due prior to follow up in OCt     Standing Status:   Future     Standing Expiration Date:   11/6/2020   • Ferritin     Due prior to follow up in OCt     Standing Status:   Future     Standing Expiration Date:   11/6/2020   • Iron Profile     Due prior to follow up in OCt     Standing Status:   Future     Standing Expiration Date:   11/6/2020   • Protime-INR     Due prior to follow up in OCt     Standing Status:   Future     Standing Expiration Date:   11/6/2020   • AFP Tumor Marker     Due prior to follow up in OCt     Standing Status:   Future     Standing Expiration Date:   11/6/2020   • CBC & Differential     Due prior to follow up in OCt     Standing Status:   Future     Standing Expiration Date:   11/6/2020     Order Specific Question:   Manual Differential     Answer:   No       Medications prescribed:  No orders of the defined types were placed in this encounter.      Requested Prescriptions      No prescriptions requested or ordered in this encounter       Review and/or summary of lab tests, radiology, procedures, medications. Review and summary of old records and obtaining of history. The risks and benefits of my recommendations, as well as other treatment options were discussed with the patient today. Questions were answered.    Follow-up: Return in about 1 month (around 10/14/2020), or if symptoms worsen or fail to improve.     * Surgery not found *      This document has been electronically signed by Deny Heller PA-C on September 23, 2020 18:34 CDT      Results for orders placed or performed during the hospital encounter of 08/25/20   Gastrointestinal Panel, PCR - Stool, Per Rectum    Specimen: Per Rectum; Stool   Result Value Ref Range    Campylobacter Not Detected Not Detected    Plesiomonas shigelloides Not Detected Not Detected     Salmonella Not Detected Not Detected    Vibrio Not Detected Not Detected    Vibrio cholerae Not Detected Not Detected    Yersinia enterocolitica Not Detected Not Detected    Enteroaggregative E. coli (EAEC) Not Detected Not Detected    Enteropathogenic E. coli (EPEC) Not Detected Not Detected    Enterotoxigenic E. coli (ETEC) lt/st Not Detected Not Detected    Shiga-like toxin-producing E. coli (STEC) stx1/stx2 Not Detected Not Detected    E. coli O157 Not Detected Not Detected    Shigella/Enteroinvasive E. coli (EIEC) Not Detected Not Detected    Cryptosporidium Not Detected Not Detected    Cyclospora cayetanensis Not Detected Not Detected    Entamoeba histolytica Not Detected Not Detected    Giardia lamblia Not Detected Not Detected    Adenovirus F40/41 Not Detected Not Detected    Astrovirus Not Detected Not Detected    Norovirus GI/GII Not Detected Not Detected    Rotavirus A Not Detected Not Detected    Sapovirus (I, II, IV or V) Not Detected Not Detected   Lactic Acid, Reflex Timer (This will reflex a repeat order 3-3:15 hours after ordered.)    Specimen: Blood   Result Value Ref Range    Hold Tube Hold for add-ons.    Blood Culture ID, PCR - Blood, Arm, Left    Specimen: Arm, Left; Blood   Result Value Ref Range    BCID, PCR (C) No organism detected by BCID PCR.     Streptococcus pneumoniae. Identification by BCID PCR.   Lactic Acid, Reflex    Specimen: Blood   Result Value Ref Range    Lactate 1.8 0.5 - 2.0 mmol/L   Protein Elec + Interp, Serum    Specimen: Blood   Result Value Ref Range    Total Protein 5.7 (L) 6.0 - 8.5 g/dL    Albumin 2.1 (L) 2.9 - 4.4 g/dL    Alpha-1-Globulin 0.5 (H) 0.0 - 0.4 g/dL    Alpha-2-Globulin 1.1 (H) 0.4 - 1.0 g/dL    Beta Globulin 0.5 (L) 0.7 - 1.3 g/dL    Gamma Globulin 1.4 0.4 - 1.8 g/dL    M-Sebastian Not Observed Not Observed g/dL    Globulin 3.6 2.2 - 3.9 g/dL    A/G Ratio 0.6 (L) 0.7 - 1.7    Please note Comment     SPE Interpretation Comment    Gold Top - SST   Result  Value Ref Range    Extra Tube Hold for add-ons.    Green Top (Gel)   Result Value Ref Range    Extra Tube Hold for add-ons.    Green Top (Gel)   Result Value Ref Range    Extra Tube Hold for add-ons.    Scan Slide    Specimen: Blood   Result Value Ref Range    Hypochromia Slight/1+ None Seen    WBC Morphology Normal Normal    Platelet Estimate Decreased Normal   Scan Slide    Specimen: Blood   Result Value Ref Range    Hypochromia Slight/1+ None Seen    WBC Morphology Normal Normal    Platelet Estimate Adequate Normal   COVID-19, [facility] IN-HOUSE, NP SWAB IN TRANSPORT MEDIA 8-10 HR TAT - Swab, Nasopharynx    Specimen: Nasopharynx; Swab   Result Value Ref Range    COVID19 Not Detected Not Detected - Ref. Range   COVID-19, [facility] IN-HOUSE, NP SWAB IN TRANSPORT MEDIA 8-10 HR TAT - Swab, Nasopharynx    Specimen: Nasopharynx; Swab   Result Value Ref Range    COVID19 Not Detected Not Detected - Ref. Range   COVID-19, [facility] IN-HOUSE, NP SWAB IN TRANSPORT MEDIA 8-10 HR TAT - Swab, Nasopharynx    Specimen: Nasopharynx; Swab   Result Value Ref Range    COVID19 Not Detected Not Detected - Ref. Range   COVID-19, [facility] IN-HOUSE, NP SWAB IN TRANSPORT MEDIA 8-10 HR TAT - Swab, Nasopharynx    Specimen: Nasopharynx; Swab   Result Value Ref Range    COVID19 Not Detected Not Detected - Ref. Range   COVID-19, [facility] IN-HOUSE, NP SWAB IN TRANSPORT MEDIA 8-10 HR TAT - Swab, Nasopharynx    Specimen: Nasopharynx; Swab   Result Value Ref Range    COVID19 Not Detected Not Detected - Ref. Range   Urinalysis, Microscopic Only - Urine, Clean Catch    Specimen: Urine, Clean Catch   Result Value Ref Range    RBC, UA 13-20 (A) None Seen /HPF    WBC, UA 0-2 None Seen, 0-2, 3-5 /HPF    Bacteria, UA 1+ (A) None Seen /HPF    Squamous Epithelial Cells, UA 0-2 None Seen, 0-2 /HPF    Hyaline Casts, UA 13-20 None Seen /LPF    Amorphous Crystals, UA Small/1+ None Seen /HPF    Methodology Manual Light Microscopy    Urinalysis With Microscopic If Indicated  (No Culture) - Urine, Clean Catch    Specimen: Urine, Clean Catch   Result Value Ref Range    Color, UA Dark Yellow Yellow, Straw, Dark Yellow, Taylor    Appearance, UA Turbid (A) Clear    pH, UA 5.5 5.0 - 9.0    Specific Gravity, UA 1.024 1.003 - 1.030    Glucose, UA Negative Negative    Ketones, UA Negative Negative    Bilirubin, UA Small (1+) (A) Negative    Blood, UA Large (3+) (A) Negative    Protein, UA 30 mg/dL (1+) (A) Negative    Leuk Esterase, UA Negative Negative    Nitrite, UA Negative Negative    Urobilinogen, UA 1.0 E.U./dL 0.2 - 1.0 E.U./dL   S. Pneumo Ag Urine or CSF - Urine, Urine, Clean Catch    Specimen: Urine, Clean Catch   Result Value Ref Range    Strep Pneumo Ag Negative Negative   Respiratory Culture - Wash, Lung, L    Specimen: Lung, L; Wash   Result Value Ref Range    Respiratory Culture       Scant growth (1+) Normal Respiratory Trell: NO S.aureus/MRSA or Pseudomonas aeruginosa    Gram Stain Few (2+) WBCs per low power field     Gram Stain Rare (1+) Epithelial cells per low power field     Gram Stain Mixed bacterial trell    Respiratory Culture - Lavage, Lung, Lingula    Specimen: Lung, Lingula; Lavage   Result Value Ref Range    Respiratory Culture       Scant growth (1+) Normal Respiratory Trell: NO S.aureus/MRSA or Pseudomonas aeruginosa    Gram Stain Rare (1+) WBCs per low power field     Gram Stain No epithelial cells seen     Gram Stain No organisms seen    Respiratory Culture - Sputum, Cough    Specimen: Cough; Sputum   Result Value Ref Range    Respiratory Culture       Scant growth (1+) Normal Respiratory Trell: NO S.aureus/MRSA or Pseudomonas aeruginosa    Gram Stain Few (2+) WBCs per low power field     Gram Stain Rare (1+) Epithelial cells per low power field     Gram Stain Mixed bacterial trell    Retic With IRF & RET-He    Specimen: Blood   Result Value Ref Range    Immature Reticulocyte Fraction 14.6 3.0 - 15.8 %    Reticulocyte % 1.57 0.70 - 1.90 %    Reticulocyte Absolute  0.0556 0.0200 - 0.1300 10*6/mm3    Reticulocyte Hgb 21.9 (L) 29.8 - 36.1 pg   Procalcitonin    Specimen: Blood   Result Value Ref Range    Procalcitonin 1.76 (H) 0.00 - 0.25 ng/mL   Immunoglobulins A/E/G/M Serum    Specimen: Blood   Result Value Ref Range    IgG 1353 603 - 1613 mg/dL    IgA 44 (L) 61 - 437 mg/dL    IgM 82 20 - 172 mg/dL    IgE 1035 (H) 6 - 495 IU/mL   Immunofixation, Serum    Specimen: Blood   Result Value Ref Range    Immunofixation Result, Serum Comment     IgG 1572 603 - 1613 mg/dL    IgA 43 (L) 61 - 437 mg/dL    IgM 97 20 - 172 mg/dL   Flow Cytometry, Blood    Specimen: Blood   Result Value Ref Range    Reference Lab Report     CBC Auto Differential    Specimen: Blood   Result Value Ref Range    WBC 5.29 3.40 - 10.80 10*3/mm3    RBC 3.21 (L) 4.14 - 5.80 10*6/mm3    Hemoglobin 9.2 (L) 13.0 - 17.7 g/dL    Hematocrit 28.9 (L) 37.5 - 51.0 %    MCV 90.0 79.0 - 97.0 fL    MCH 28.7 26.6 - 33.0 pg    MCHC 31.8 31.5 - 35.7 g/dL    RDW 16.7 (H) 12.3 - 15.4 %    RDW-SD 55.5 (H) 37.0 - 54.0 fl    MPV 11.1 6.0 - 12.0 fL    Platelets 97 (L) 140 - 450 10*3/mm3    Neutrophil % 41.4 (L) 42.7 - 76.0 %    Lymphocyte % 44.4 19.6 - 45.3 %    Monocyte % 11.9 5.0 - 12.0 %    Eosinophil % 1.7 0.3 - 6.2 %    Basophil % 0.2 0.0 - 1.5 %    Immature Grans % 0.4 0.0 - 0.5 %    Neutrophils, Absolute 2.19 1.70 - 7.00 10*3/mm3    Lymphocytes, Absolute 2.35 0.70 - 3.10 10*3/mm3    Monocytes, Absolute 0.63 0.10 - 0.90 10*3/mm3    Eosinophils, Absolute 0.09 0.00 - 0.40 10*3/mm3    Basophils, Absolute 0.01 0.00 - 0.20 10*3/mm3    Immature Grans, Absolute 0.02 0.00 - 0.05 10*3/mm3    nRBC 0.0 0.0 - 0.2 /100 WBC     *Note: Due to a large number of results and/or encounters for the requested time period, some results have not been displayed. A complete set of results can be found in Results Review.       Some portions of this note have been dictated using voice recognition software and may contain errors and/or omissions.

## 2020-09-15 NOTE — OUTREACH NOTE
COPD/PN Week 1 Survey      Responses   [facility] patient discharged from?  [place]   COVID-19 Test Status  Negative   Does the patient have one of the following disease processes/diagnoses(primary or secondary)?  COPD/Pneumonia   Is there a successful TCM telephone encounter documented?  No   Was the primary reason for admission:  Pneumonia   Week 1 attempt successful?  Yes   Call start time  0917   Call end time  0923   Discharge diagnosis  PNA   Person spoke with today (if not patient) and relationship  Yinka-friend    Meds reviewed with patient/caregiver?  Yes   Is the patient having any side effects they believe may be caused by any medication additions or changes?  No   Does the patient have all medications ordered at discharge?  Yes   Is the patient taking all medications as directed (includes completed medication regime)?  Yes   Comments regarding appointments  Appt with GI on 9/14/20   Does the patient have a primary care provider?   Yes   Does the patient have an appointment with their PCP or pulmonologist within 7 days of discharge?  Greater than 7 days   Comments regarding PCP  9/22/20   What is preventing the patient from scheduling follow up appointments within 7 days of discharge?  Earlier appointment not available   Nursing Interventions  Verified appointment date/time/provider   Has the patient kept scheduled appointments due by today?  Yes   Pulse Ox monitoring  -- [Friend is unsure]   Psychosocial issues?  No   Did the patient receive a copy of their discharge instructions?  Yes   Nursing interventions  Reviewed instructions with patient   What is the patient's perception of their health status since discharge?  Improving   Nursing Interventions  Nurse provided patient education   Are the patient's immunizations up to date?   -- [Friend is unsure]   Nursing interventions  Advised patient to discuss with provider at next visit   If the patient is a current smoker, are they able to teach  back resources for cessation?  Smoking cessation medications [Smoker]   Is the patient/caregiver able to teach back the hierarchy of who to call/visit for symptoms/problems? PCP, Specialist, Home health nurse, Urgent Care, ED, 911  Yes   Is the patient/caregiver able to teach back signs and symptoms of worsening condition:  Fever/chills, Shortness of breath, Chest pain   Is the patient/caregiver able to teach back importance of completing antibiotic course of treatment?  -- [Pt was not sent home with ABX]   Week 1 call completed?  Yes          Areli Zabala RN

## 2020-09-25 NOTE — PROGRESS NOTES
Chief Complaint   Patient presents with   • Nausea   • Vomiting   • Cirrhosis   • Heartburn       ENDO PROCEDURE ORDERED:    Subjective    Wong Patrick is a 64 y.o. male. he is here today for follow-up.    History of Present Illness    Patient seen on a recheck of his abdominal pain, nausea, vomiting, GERD, history of hepatitis C with cirrhosis, gallstones. Last seen 06/13/2019. F3. He did see Dr. Ibarra for the nausea, vomiting, and gallstones. He did an EGD on 07/09/2019, which showed gastritis with biopsy showing reactive change. A lot of bile was noted in the stomach. He saw him back on 07/16/2019 and gave him Actigall for bile reflux. I am not certain how this would help his bile reflux, but would likely help with microlithiasis.     Nevertheless, patient states he primarily has coughing in the mornings, but does not vomit. He has a lot of sinus drainage. He denied significant abdominal pain. He is eating without difficulty. Weight is up 8.7 pounds since last visit. It is not clear if he is still taking Actigall. He would have run out of refills by now. He is on Prilosec apparently for the heartburn. Denied nausea or vomiting. Last colonoscopy showed tubular adenoma on 08/22/2018.     Laboratory on 12/13/2019: INR 1.11. CMP showed glucose 100, creatinine 1.34, C02 31.3, GFR was 54. He may have been dehydrated that day. CBC showed white count of 11.59, otherwise fairly normal. He is a heavy smoker. He is not aware of previous renal problems.      A/P: Patient was strongly encouraged to quit smoking. He appears to be asymptomatic currently from his gallstones. Questionable bile reflux. He may continue on the Prilosec. Given his persistent count and longstanding smoking, I did suggest a chest xray. Will also repeat a BMP given his elevated renal function, and he may need further evaluation. As long as he is doing well, will plan follow up in June when he will be due for hepatoma screening to include right  upper quadrant ultrasound, AFP, CBC, CMP, INR, FibroSure. Will plan further pending clinical course and results of the above.        The following portions of the patient's history were reviewed and updated as appropriate:   Past Medical History:   Diagnosis Date   • Abnormal weight loss    • Acquired thrombocytopenia (CMS/HCC)    • Adenomatous polyp of colon    • Anxiety    • Biliary calculus     denies N/V, abd pain   • Chronic hepatitis C (CMS/HCC)    • Cirrhosis of liver (CMS/HCC)    • Colon polyp    • Elevated levels of transaminase & lactic acid dehydrogenase    • Generalized abdominal pain     known gallstones   • GERD (gastroesophageal reflux disease)    • Hypertension    • IBS (irritable bowel syndrome)    • Impacted cerumen    • Kidney disease     US suggest renal dz, elevated alpha 2 macroglobulins   • Nausea and vomiting    • Patient noncompliance, general    • Rectal hemorrhage    • Villous adenoma of colon    • Wheezing      Past Surgical History:   Procedure Laterality Date   • COLONOSCOPY  06/03/2013   • COLONOSCOPY N/A 8/22/2018    Procedure: COLONOSCOPY;  Surgeon: Charles Pham MD;  Location: [facility] ENDOSCOPY;  Service: Gastroenterology   • ENDOSCOPY N/A 7/9/2019    Procedure: ESOPHAGOGASTRODUODENOSCOPY;  Surgeon: Bi Ibarra MD;  Location: [facility] ENDOSCOPY;  Service: General   • ENDOSCOPY AND COLONOSCOPY     • ESOPHAGOSCOPY / EGD      with tube   • UPPER GASTROINTESTINAL ENDOSCOPY  06/03/2013   • UPPER GASTROINTESTINAL ENDOSCOPY  07/09/2019     Family History   Problem Relation Age of Onset   • Heart disease Other    • Diabetes Other        Allergies   Allergen Reactions   • Penicillins Hives     Social History     Socioeconomic History   • Marital status: Single     Spouse name: Not on file   • Number of children: Not on file   • Years of education: Not on file   • Highest education level: Not on file   Tobacco Use   • Smoking status: Current Every Day Smoker     Packs/day: 1.00  "    Types: Cigarettes   • Smokeless tobacco: Never Used   Substance and Sexual Activity   • Alcohol use: No   • Drug use: No     Current Medications:  Prior to Admission medications    Medication Sig Start Date End Date Taking? Authorizing Provider   albuterol (PROVENTIL HFA;VENTOLIN HFA) 108 (90 BASE) MCG/ACT inhaler Inhale 2 puffs 2 (two) times a day as needed for wheezing.   Yes Rafael Wang MD   busPIRone (BUSPAR) 15 MG tablet Take 15 mg by mouth 2 (two) times a day.   Yes Rafael Wang MD   fluticasone (FLONASE) 50 MCG/ACT nasal spray 2 sprays into each nostril Daily.   Yes Rafael Wang MD   loratadine (CLARITIN) 10 MG tablet TAKE 1 TABLET BY MOUTH EVERY DAY 5/11/17  Yes Rafael Wang MD   losartan (COZAAR) 50 MG tablet Take 50 mg by mouth daily.   Yes Rafael Wang MD   meclizine (ANTIVERT) 25 MG tablet Take 25 mg by mouth 2 (Two) Times a Day As Needed.   Yes Rafael Wang MD   omeprazole (priLOSEC) 20 MG capsule Take 1 capsule by mouth Daily. 6/20/19  Yes Deny Heller PA-C   omeprazole (priLOSEC) 20 MG capsule TAKE 1 CAPSULE BY MOUTH DAILY. 7/30/19  Yes Deny Heller PA-C   ondansetron (ZOFRAN) 4 MG tablet Take 1 tablet by mouth Daily As Needed for Nausea or Vomiting. 6/20/19 6/19/20 Yes Bi Ibarra MD   QUEtiapine (SEROquel) 100 MG tablet Take 100 mg by mouth every night.   Yes ProviderRafael MD   ursodiol (ACTIGALL) 300 MG capsule Take 1 capsule by mouth 3 (Three) Times a Day With Meals. 7/9/19 7/8/20 Yes Bi Ibarra MD     Review of Systems  Review of Systems       Objective    /68 (BP Location: Left arm)   Pulse 76   Ht 162.6 cm (64\")   Wt 81.2 kg (179 lb)   BMI 30.73 kg/m²   Physical Exam   Constitutional: He is oriented to person, place, and time. He appears well-developed and well-nourished. No distress.   HENT:   Head: Normocephalic and atraumatic.   Eyes: Pupils are equal, round, and reactive to " light. EOM are normal.   Neck: Normal range of motion.   Cardiovascular: Normal rate, regular rhythm and normal heart sounds.   Pulmonary/Chest: Effort normal and breath sounds normal.   Abdominal: Soft. Bowel sounds are normal. He exhibits no shifting dullness, no distension, no abdominal bruit, no ascites and no mass. There is no hepatosplenomegaly. There is tenderness. There is no rigidity, no rebound, no guarding and no CVA tenderness. No hernia. Hernia confirmed negative in the ventral area.   obese   Musculoskeletal: Normal range of motion.   Neurological: He is alert and oriented to person, place, and time.   Skin: Skin is warm and dry.   Psychiatric: He has a normal mood and affect. His behavior is normal. Judgment and thought content normal.   Nursing note and vitals reviewed.    Assessment/Plan      1. Cirrhosis of liver without ascites, unspecified hepatic cirrhosis type (CMS/HCC)    2. Gallstones    3. Gastroesophageal reflux disease with esophagitis    4. Cough    5. Elevated serum creatinine    .   Wong was seen today for nausea, vomiting, cirrhosis and heartburn.    Diagnoses and all orders for this visit:    Cirrhosis of liver without ascites, unspecified hepatic cirrhosis type (CMS/HCC)  Comments:  F3  Orders:  -     CBC & Differential; Future  -     Comprehensive Metabolic Panel; Future  -     AFP Tumor Marker; Future  -     HCV FibroSURE; Future  -     Protime-INR; Future  -     US Liver; Future    Gallstones  -     CBC & Differential; Future  -     Comprehensive Metabolic Panel; Future  -     AFP Tumor Marker; Future  -     HCV FibroSURE; Future  -     Protime-INR; Future  -     US Liver; Future    Gastroesophageal reflux disease with esophagitis  -     CBC & Differential; Future  -     Comprehensive Metabolic Panel; Future  -     AFP Tumor Marker; Future  -     HCV FibroSURE; Future  -     Protime-INR; Future  -     US Liver; Future    Cough  -     XR Chest PA & Lateral    Elevated serum  creatinine  -     Basic Metabolic Panel  -     CBC & Differential; Future  -     Comprehensive Metabolic Panel; Future  -     AFP Tumor Marker; Future  -     HCV FibroSURE; Future  -     Protime-INR; Future  -     US Liver; Future        Orders placed during this encounter include:  Orders Placed This Encounter   Procedures   • XR Chest PA & Lateral     Order Specific Question:   Reason for Exam:     Answer:   cough. long term smoker   • US Liver     Due in June before follow up.     Standing Status:   Future     Standing Expiration Date:   6/30/2020     Scheduling Instructions:      RUQ     Order Specific Question:   Reason for Exam:     Answer:   hx HCV, cirrhosis, gallstones     Order Specific Question:   Will this be performed with Elastography? ([facility]IL[facility] and DORINDA ONLY)     Answer:   No   • Basic Metabolic Panel   • Comprehensive Metabolic Panel     Due in June before follow up.     Standing Status:   Future     Standing Expiration Date:   6/30/2020   • AFP Tumor Marker     Due in June before follow up.     Standing Status:   Future     Standing Expiration Date:   6/30/2020   • HCV FibroSURE     Due in June before follow up.     Standing Status:   Future     Standing Expiration Date:   6/30/2020   • Protime-INR     Due in June before follow up.     Standing Status:   Future     Standing Expiration Date:   6/30/2020   • CBC & Differential     Due in June before follow up.     Standing Status:   Future     Standing Expiration Date:   6/30/2020     Order Specific Question:   Manual Differential     Answer:   No       Medications prescribed:  No orders of the defined types were placed in this encounter.      Requested Prescriptions      No prescriptions requested or ordered in this encounter       Review and/or summary of lab tests, radiology, procedures, medications. Review and summary of old records and obtaining of history. The risks and benefits of my recommendations, as well as other treatment options were  discussed with the patient today. Questions were answered.    Follow-up: Return in about 6 months (around 7/8/2020), or if symptoms worsen or fail to improve, for lab/CXR today. LAB/US prior to follow up.     * Surgery not found *      This document has been electronically signed by Deny Heller PA-C on January 12, 2020 5:35 PM      Results for orders placed or performed in visit on 01/08/20   Basic Metabolic Panel   Result Value Ref Range    Glucose 96 65 - 99 mg/dL    BUN 16 8 - 23 mg/dL    Creatinine 1.25 0.76 - 1.27 mg/dL    Sodium 143 136 - 145 mmol/L    Potassium 4.2 3.5 - 5.2 mmol/L    Chloride 101 98 - 107 mmol/L    CO2 32.0 (H) 22.0 - 29.0 mmol/L    Calcium 9.7 8.6 - 10.5 mg/dL    eGFR Non African Amer 59 (L) >60 mL/min/1.73    BUN/Creatinine Ratio 12.8 7.0 - 25.0    Anion Gap 10.0 5.0 - 15.0 mmol/L   Results for orders placed or performed in visit on 12/13/19   CBC Auto Differential   Result Value Ref Range    WBC 11.59 (H) 3.40 - 10.80 10*3/mm3    RBC 5.32 4.14 - 5.80 10*6/mm3    Hemoglobin 17.3 13.0 - 17.7 g/dL    Hematocrit 48.0 37.5 - 51.0 %    MCV 90.2 79.0 - 97.0 fL    MCH 32.5 26.6 - 33.0 pg    MCHC 36.0 (H) 31.5 - 35.7 g/dL    RDW 13.0 12.3 - 15.4 %    RDW-SD 42.2 37.0 - 54.0 fl    MPV 12.0 6.0 - 12.0 fL    Platelets 177 140 - 450 10*3/mm3   Manual Differential   Result Value Ref Range    Neutrophil % 42.0 (L) 42.7 - 76.0 %    Lymphocyte % 46.0 (H) 19.6 - 45.3 %    Monocyte % 11.0 5.0 - 12.0 %    Eosinophil % 1.0 0.3 - 6.2 %    Neutrophils Absolute 4.87 1.70 - 7.00 10*3/mm3    Lymphocytes Absolute 5.33 (H) 0.70 - 3.10 10*3/mm3    Monocytes Absolute 1.27 (H) 0.10 - 0.90 10*3/mm3    Eosinophils Absolute 0.12 0.00 - 0.40 10*3/mm3    Anisocytosis Slight/1+ None Seen    Microcytes Slight/1+ None Seen    WBC Morphology Normal Normal    Platelet Morphology Normal Normal   Protime-INR   Result Value Ref Range    Protime 14.1 11.1 - 15.3 Seconds    INR 1.11 0.80 - 1.20   Comprehensive Metabolic Panel  "  Result Value Ref Range    Glucose 100 (H) 65 - 99 mg/dL    BUN 12 8 - 23 mg/dL    Creatinine 1.34 (H) 0.76 - 1.27 mg/dL    Sodium 141 136 - 145 mmol/L    Potassium 4.9 3.5 - 5.2 mmol/L    Chloride 100 98 - 107 mmol/L    CO2 31.3 (H) 22.0 - 29.0 mmol/L    Calcium 9.7 8.6 - 10.5 mg/dL    Total Protein 8.0 6.0 - 8.5 g/dL    Albumin 4.90 3.50 - 5.20 g/dL    ALT (SGPT) 15 1 - 41 U/L    AST (SGOT) 19 1 - 40 U/L    Alkaline Phosphatase 83 39 - 117 U/L    Total Bilirubin 0.8 0.2 - 1.2 mg/dL    eGFR Non African Amer 54 (L) >60 mL/min/1.73    Globulin 3.1 gm/dL    A/G Ratio 1.6 g/dL    BUN/Creatinine Ratio 9.0 7.0 - 25.0    Anion Gap 9.7 5.0 - 15.0 mmol/L   Results for orders placed or performed during the hospital encounter of 07/09/19   Tissue Pathology Exam   Result Value Ref Range    Case Report       Surgical Pathology Report                         Case: [id number]                                  Authorizing Provider:  Bi Ibarra,   Collected:           07/09/2019 08:42 AM                                 MD                                                                           Ordering Location:     [facility]             Received:            07/09/2019 10:44 AM                                 [facility]                                                     Pathologist:           Jericho Fragoso MD                                                         Specimen:    Gastric, Antrum                                                                            Final Diagnosis       MUCOSA, ANTRUM OF STOMACH:  REACTIVE GASTROPATHY.      Gross Description       The container is labeled \"antrum of stomach\" and has nodular bits of white soft tissue measuring 0.4 cc in aggregate.  The entire specimen is embedded as 1A.     Results for orders placed or performed in visit on 06/03/19   Scan Slide   Result Value Ref Range    RBC Morphology Normal Normal    WBC Morphology Normal Normal    Platelet " Morphology Normal Normal   HCV FibroSURE   Result Value Ref Range    Fibrosis Score 0.60 (H) 0.00 - 0.21    Fibrosis Stage Comment     Necroinflammat Activity Score 0.06 0.00 - 0.17    Necroinflammat Activity Grade A0-No activity     Alpha 2-Macroglobulins, Qn 376 (H) 110 - 276 mg/dL    Haptoglobin 183 34 - 200 mg/dL    Apolipoprotein A-1 105 101 - 178 mg/dL    Total Bilirubin 0.7 0.0 - 1.2 mg/dL    GGT 11 0 - 65 IU/L    ALT (SGPT) 13 0 - 55 IU/L    HCV Qual Interp Comment     Fibrosis Scoring: Comment     Necroinflamm Activity Scoring: Comment     Limitations: Comment     Comment Comment    CBC Auto Differential   Result Value Ref Range    WBC 12.28 (H) 3.40 - 10.80 10*3/mm3    RBC 4.95 4.14 - 5.80 10*6/mm3    Hemoglobin 15.4 13.0 - 17.7 g/dL    Hematocrit 45.0 37.5 - 51.0 %    MCV 90.9 79.0 - 97.0 fL    MCH 31.1 26.6 - 33.0 pg    MCHC 34.2 31.5 - 35.7 g/dL    RDW 13.1 12.3 - 15.4 %    RDW-SD 43.9 37.0 - 54.0 fl    MPV 11.0 6.0 - 12.0 fL    Platelets 199 140 - 450 10*3/mm3    Neutrophil % 53.0 42.7 - 76.0 %    Lymphocyte % 33.9 19.6 - 45.3 %    Monocyte % 11.2 5.0 - 12.0 %    Eosinophil % 0.9 0.3 - 6.2 %    Basophil % 0.7 0.0 - 1.5 %    Immature Grans % 0.3 0.0 - 0.5 %    Neutrophils, Absolute 6.52 1.70 - 7.00 10*3/mm3    Lymphocytes, Absolute 4.16 (H) 0.70 - 3.10 10*3/mm3    Monocytes, Absolute 1.37 (H) 0.10 - 0.90 10*3/mm3    Eosinophils, Absolute 0.11 0.00 - 0.40 10*3/mm3    Basophils, Absolute 0.08 0.00 - 0.20 10*3/mm3    Immature Grans, Absolute 0.04 0.00 - 0.05 10*3/mm3    nRBC 0.0 0.0 - 0.2 /100 WBC     *Note: Due to a large number of results and/or encounters for the requested time period, some results have not been displayed. A complete set of results can be found in Results Review.       Some portions of this note have been dictated using voice recognition software and may contain errors and/or omissions.    English

## 2020-10-01 PROBLEM — R16.1 SPLENOMEGALY: Status: ACTIVE | Noted: 2020-01-01

## 2020-10-01 NOTE — OUTREACH NOTE
COPD/PN Week 3 Survey      Responses   [facility] patient discharged from?  [place]   Does the patient have one of the following disease processes/diagnoses(primary or secondary)?  COPD/Pneumonia   Week 3 attempt successful?  Yes   Call start time  [phone]   Call end time  [id number]   Meds reviewed with patient/caregiver?  Yes   Is the patient taking all medications as directed (includes completed medication regime)?  Yes   Has the patient kept scheduled appointments due by today?  Yes   Pulse Ox monitoring  None   What is the patient's perception of their health status since discharge?  Improving   Week 3 call completed?  Yes   Revoked  No further contact(revokes)-requires comment   Graduated/Revoked comments  He says is is doing good, he says, will call  if needed, he is real [place]   Wrap up additional comments  He is doing beter he says, no real need for calls          Brittany Xiao, RN

## 2020-12-12 NOTE — ED NOTES
Left eye brow lac, bilateral elbow skin tears. Patient bathed in decon room, c-collar switched out prior to taking over care.      Ronit Mayorga RN  12/11/20 [id number]

## 2020-12-12 NOTE — ED PROVIDER NOTES
Subjective   65-year-old male was brought to the emergency department via EMS from scene of a fall.  He fell down some stairs.  He cannot state how many stairs he fell down and EMS reports he was at the bottom of about 10-12 stairs.  Complaining of head pain, neck pain, thoracic and lumbar pain.  Also complaining of bilateral elbow and knee pain.  Skin tears and abrasions.  History of cirrhosis.  History of COPD.  Denies loss of consciousness.  Does not take blood thinners.    Family history, surgical history, social history, current medications and allergies are reviewed with the patient and triage documentation and vitals are reviewed.      History provided by:  Patient, EMS personnel and medical records   used: No        Review of Systems   Constitutional: Negative for chills and fever.   HENT: Negative for congestion and sore throat.    Eyes: Negative for photophobia and visual disturbance.   Respiratory: Positive for shortness of breath. Negative for cough.    Cardiovascular: Positive for chest pain. Negative for palpitations.   Gastrointestinal: Negative for diarrhea, nausea and vomiting.   Endocrine: Negative for polydipsia, polyphagia and polyuria.   Genitourinary: Negative for dysuria, frequency and urgency.   Musculoskeletal: Positive for arthralgias, back pain and neck pain.   Skin: Positive for wound.   Allergic/Immunologic: Negative.    Neurological: Negative for headaches.   Hematological: Negative.    Psychiatric/Behavioral: Positive for confusion.       Past Medical History:   Diagnosis Date   • Abnormal weight loss    • Acquired thrombocytopenia (CMS/HCC)    • Adenomatous polyp of colon    • Anxiety    • Biliary calculus     denies N/V, abd pain   • Chronic hepatitis C (CMS/HCC)    • Cirrhosis of liver (CMS/HCC)    • Colon polyp    • COPD (chronic obstructive pulmonary disease) (CMS/HCC)    • Elevated levels of transaminase & lactic acid dehydrogenase    • Generalized abdominal  pain     known gallstones   • GERD (gastroesophageal reflux disease)    • Hypertension    • IBS (irritable bowel syndrome)    • Impacted cerumen    • Kidney disease     US suggest renal dz, elevated alpha 2 macroglobulins   • Nausea and vomiting    • Patient noncompliance, general    • Rectal hemorrhage    • Tracheal mass    • Villous adenoma of colon    • Wheezing        Allergies   Allergen Reactions   • Codeine Unknown - High Severity     Pt stated it caused swelling in arms and legs; caused skin irritation; excessive sleepiness   • Penicillins Hives       Past Surgical History:   Procedure Laterality Date   • BRONCHOSCOPY N/A 7/13/2020    Procedure: BRONCHOSCOPY WITH ENDOBRONCHIAL ULTRASOUND;  Surgeon: Karthik Sewell MD;  Location: [facility] OR;  Service: Pulmonary;  Laterality: N/A;  preop; tracheal mass; pneumonia  postop  PCP    • BRONCHOSCOPY N/A 9/1/2020    Procedure: BRONCHOSCOPY WITH ENDOBRONCHIAL ULTRASOUND and needle biopsy, with possible bronchoalveolar lavage, brush, and/or endobronchial biopsy;  Surgeon: Sandra Jacques MD;  Location: [facility] OR;  Service: Pulmonary;  Laterality: N/A;   • COLONOSCOPY  06/03/2013   • COLONOSCOPY N/A 8/22/2018    Procedure: COLONOSCOPY;  Surgeon: Charles Pham MD;  Location: [facility] ENDOSCOPY;  Service: Gastroenterology   • COLONOSCOPY N/A 9/4/2020    Procedure: COLONOSCOPY;  Surgeon: Yoni Simon MD;  Location: [facility] ENDOSCOPY;  Service: Gastroenterology;  Laterality: N/A;   • ENDOSCOPY N/A 7/9/2019    Procedure: ESOPHAGOGASTRODUODENOSCOPY;  Surgeon: Bi Ibarra MD;  Location: [facility] ENDOSCOPY;  Service: General   • ENDOSCOPY N/A 9/3/2020    Procedure: ESOPHAGOGASTRODUODENOSCOPY;  Surgeon: Yoni Simon MD;  Location: [facility] ENDOSCOPY;  Service: Gastroenterology;  Laterality: N/A;   • ENDOSCOPY AND COLONOSCOPY     • ESOPHAGOSCOPY / EGD      with tube   • UPPER GASTROINTESTINAL ENDOSCOPY  06/03/2013   • UPPER GASTROINTESTINAL ENDOSCOPY   07/09/2019       Family History   Problem Relation Age of Onset   • Heart disease Other    • Diabetes Other        Social History     Socioeconomic History   • Marital status: Single     Spouse name: Not on file   • Number of children: Not on file   • Years of education: Not on file   • Highest education level: Not on file   Tobacco Use   • Smoking status: Former Smoker     Packs/day: 1.00     Types: Cigarettes   • Smokeless tobacco: Never Used   Substance and Sexual Activity   • Alcohol use: No   • Drug use: No   • Sexual activity: Defer           Objective   Physical Exam  Vitals signs and nursing note reviewed.       Trauma Exam-   Primary Survey:  A: Airway intact, Trachea midline  B: Breath sounds present and equal bilaterally but decreased at the bases and intermittent wheeze, Spontaneous respirations, No respiratory distress  C: Peripheral pulses present, No significant/active bleeding noted  D: GCS 14, Able to move arms/legs  E: Body fully exposed/clothing removed    Secondary Survey:  General: GCS 14, Vital signs stable, Arrives in cervical collar  Head: No palpable skull defects, Midface stable, No Mcnally's sign bilaterally 2 right temporal abrasions  Eyes: PERRL, EOMI, No proptosis, No orbital rim tenderness, No obvious hyphema, No periorbital ecchymosis  Ears: TMs intact bilaterally, No hemotympanum bilaterally  Nose: No septal hematoma, No blood visible in nares, No nasal deformity   Mouth/Throat: Teeth intact, No malocclusion, No oral wounds/lacerations  Neck: C-collar in place, midline tenderness no stepoffs, No paraspinal tenderness, Trachea midline  Chest: Chest wall right sided tenderness, No crepitus or deformity noted, No external sign of chest wall trauma  Lungs: Breath sounds present and equal bilaterally, but diminished at the bases, intermittent wheeze, No respiratory distress  Abdomen: No tenderness, No distention, No external sign of abdominal trauma  Pelvis: Stable, Nontender   Back:  midline T-spine tenderness no stepoffs, midline L-spine tenderness no stepoffs  Skin:bilateral posterior large skin tears of the elbows, right sided temporal abrasions, no active bleeding Warm/dry/pink  Neuro: GCS 14, PERRL, Moves arms/legs spontaneously,  strength 5/5 bilaterally, Foot dorsi/plantarflexion strength 5/5 bilaterally, Sensation intact to light touch throughout  Extremities: moving all four extremities with out deficit or pain but complains of tenderness of the bilateral elbows, knees and hips    Procedures  none         ED Course      Labs Reviewed   COMPREHENSIVE METABOLIC PANEL   PROTIME-INR   CBC WITH AUTO DIFFERENTIAL   AMMONIA   CBC AND DIFFERENTIAL    Narrative:     The following orders were created for panel order CBC & Differential.  Procedure                               Abnormality         Status                     ---------                               -----------         ------                     CBC Auto Differential[[id number]]                                                         Please view results for these tests on the individual orders.     Ct Head Without Contrast    Result Date: 12/12/2020  Narrative: CRANIAL CT SCAN WITHOUT CONTRAST CLINICAL HISTORY: fall, hit head COMPARISON: None. TECHNIQUE: Radiation dose reduction techniques were utilized, including automated exposure control and exposure modulation based on body size. Multiple axial images of the head were obtained without contrast. FINDINGS:  There are no abnormal areas of increased density or mass effect. Ventricles, sulci, and cisterns appear normal. Bone window images are unremarkable.     Impression: 1. No acute intracranial abnormality. Electronically signed by:  Tanvir Scott MD  12/12/2020 12:34 AM [facility] Workstation: [id number]    Ct Cervical Spine Without Contrast    Result Date: 12/12/2020  Narrative: NONCONTRAST CT SCAN CERVICAL SPINE CLINICAL HISTORY: fall, hit head COMPARISON: None. TECHNIQUE: Radiation  dose reduction techniques were utilized, including automated exposure control and exposure modulation based on body size. Axial noncontrast images of the cervical spine were obtained without contrast. Sagittal reformatted images were supplemented. FINDINGS:  No acute vertebral fracture identified on the axial series. . The vertebrae are well aligned and well maintained in height and stature on the sagittal reformatted images.  No significant compression deformity or retropulsion. Multilevel degenerative and arthritic changes are present. Osteophytic spurring contributes to multilevel canal and foraminal narrowing. There are emphysematous and fibrotic changes noted in the imaged portion of the lung apices with some left apical pleural thickening which is likely chronic.     Impression: 1. No acute fracture Electronically signed by:  Tanvir Scott MD  12/12/2020 12:37 AM CST Workstation: [id number]    Ct Thoracic Spine Without Contrast    Result Date: 12/12/2020  Narrative: EXAM DESCRIPTION: CT thoracic spine and lumbar spine without contrast CLINICAL HISTORY: Fall with pain COMPARISON: 8/26/2020 TECHNIQUE: Contiguous axial images of the thoracic spine and lumbar spine were obtained followed by reconstruction images. This exam was performed according to our departmental dose-optimization program, which includes automated exposure control, adjustment of the mA and/or kV according to patient size and/or use of iterative reconstruction technique. FINDINGS: There is anatomic alignment of the thoracic spine and lumbar spine. Vertebral body height is preserved without evidence of acute fracture or spondylolisthesis. No visualized rib fractures . There is a loculated right lower lobe pleural effusion with posterior right lower lobe atelectasis, increased compared with prior exam. There is calcified pleural plaque along the posterior left lower lobe, unchanged. Unchanged bullous formation at the bilateral apices. There is  cholelithiasis within the gallbladder.     Impression: No acute osseous abnormalities. Loculated right pleural effusion with posterior right lower lobe atelectasis, increased compared with prior exam. Electronically signed by:  Carlyle Sanders MD  12/12/2020 12:43 AM CST Workstation: [id number]    Xr Knee 4+ View Bilateral    Result Date: 12/12/2020  Narrative: EXAM DESCRIPTION: XR KNEE 4+ VW BILATERAL RadLex: XR KNEE 4 OR MORE VIEWS BILATERAL Views:  4 of each knee CLINICAL HISTORY: 65 years  Male;  fall, pain; COMPARISON: None. FINDINGS: Left:  Negative for acute fracture, dislocation, or radiopaque foreign body. No joint effusion. Right: Negative for acute fracture, dislocation, or radiopaque foreign body. No joint effusion.     Impression: 1.  No acute findings. Electronically signed by:  Huey Lopez MD  12/12/2020 12:47 AM CST Workstation: [id number]    Xr Chest 1 View    Result Date: 12/12/2020  Narrative: EXAM DESCRIPTION: Site:  XR CHEST 1 VW RP: XR CHEST 1 VIEW CLINICAL HISTORY: 65 years  Male;  fall down stairs; COMPARISON: 08/25/2020 FINDINGS: Lungs: Dense airspace infiltrate involving much of the right lower lobe. There is blunting of the right costophrenic angle, although this is similar to the prior study and likely chronic. Left lung is clear, improved since prior study. 6 monitor calcified granuloma in the left midlung field is unchanged. Mediastinum: Mediastinum is within normal limits for this positioning. Bones: Bony structures are unremarkable.     Impression: 1.  Right lower lobe pneumonia versus pulmonary contusions/hemorrhage (in the setting of trauma) Electronically signed by:  Huey Lopez MD  12/12/2020 12:36 AM CST Workstation: [id number]    Xr Elbow 3+ View Bilateral    Result Date: 12/12/2020  Narrative: EXAM:   XR Bilateral Elbows Complete, 3 or More Views CLINICAL HISTORY:   The patient is 65 years old and is Male; fall, pain TECHNIQUE:   Frontal, lateral and oblique views of the  bilateral elbows. COMPARISON:   No relevant prior studies available. FINDINGS:   BONES/JOINTS:  Unremarkable.  No acute fracture.  No dislocation.   SOFT TISSUES:  Unremarkable.     Impression:   No acute fracture or dislocation. Electronically signed by:  Annel Good MD  12/12/2020 12:35 AM CST Workstation: [id number]    Xr Hips Bilateral With Or Without Pelvis 5 View    Result Date: 12/12/2020  Narrative: EXAM DESCRIPTION: XR HIPS BILATERAL W OR WO PELVIS 5 VIEW RadLex: XR HIP 2 OR MORE VIEWS BILATERAL WITH ANTEROPOSTERIOR PELVIS Views:  5 CLINICAL HISTORY: 65 years  Male;  fall, pain; COMPARISON: None. FINDINGS: AP Pelvis: No acute fracture or dislocation. No significant diastasis of the sacroiliac joints or symphysis pubis. Right hip: Negative for acute fracture, dislocation, or radiopaque foreign body. Left hip: Negative for acute fracture, dislocation, or radiopaque foreign body.     Impression: 1.  No acute findings. Electronically signed by:  Huey Lopez MD  12/12/2020 12:40 AM [facility] Workstation: [id number]          MDM  Number of Diagnoses or Management Options     Amount and/or Complexity of Data Reviewed  Clinical lab tests: reviewed  Tests in the radiology section of CPT®: reviewed  Discuss the patient with other providers: yes    Patient Progress  Patient progress: stable    Head CT, cervical, thoracic, lumbar spine CT revealed no acute osseous abnormalities or intracranial abnormality.  C-collar was removed.  X-ray imaging of the bilateral elbows, knees, hips reveal no osseous abnormality.  Chest x-ray reveals right-sided pleural effusion appears worse.  On CT scan of the lumbar and thoracic spine there is evidence of pleural effusion that is worse from previous and is loculated and concern for possible traumatic bleeding component.  Patient reports pain in that area and reports he is more short of breath than usual.  Upon pulse oximetry he is 89% on room air and is placed on 2 L nasal cannula.   There is no evidence of pneumothorax.  Initially called [facility] who is on trauma diversion.  Spoke with Dr. Payne at [facility] who accepts the patient for further evaluation of his traumatic injuries.    With results of the CT imaging laboratory studies were obtained.  Prior to patient leaving the emergency department labs resulted with a hemoglobin of 3.9.  This was rechecked.  He was 7.0 2 weeks ago.  Vital signs remained stable with oxygen 100% on 2 L nasal cannula and blood pressure 112 systolic.  2 units uncrossed matched trauma blood is obtained and transfused prior to transport to [facility].  Attempt was made to contact [facility] for transfer but they declined due to weather.    Final diagnoses:   Pleural effusion   Fall down stairs, initial encounter   Multiple skin tears   Abrasion of scalp with infection, initial encounter            Shravan Mauro, DO  12/12/20 0301       Shravan Mauro, DO  12/12/20 0424

## 2022-05-26 NOTE — ADDENDUM NOTE
Addendum  created 09/01/20 143 by David Anguiano MD    Intraprocedure Staff edited      
Additional Notes: We did discuss if it comes back to f/u and maybe cauterize the base.   He was educated today about this type of lesion
Detail Level: Simple
Render Risk Assessment In Note?: no

## 2024-03-12 NOTE — OUTREACH NOTE
----- Message from Eli Melara CMA sent at 3/8/2024  1:04 PM CST -----  Contact: [phone]    ----- Message -----  From: Austin Rico  Sent: 3/8/2024  12:58 PM CST  To: Remy Rogers Staff    Patient is calling in regards to getting a prior authorization for her adalimumab (HUMIRA PEN) PnKt injection. Pt insurance has been updated in the system.          [address] [address]  Phone: [phone] Fax: [phone]        Pt contact number is [phone]. Thanks KB        COPD/PN Week 2 Survey      Responses   [facility] patient discharged from?  [place]   COVID-19 Test Status  Negative   Does the patient have one of the following disease processes/diagnoses(primary or secondary)?  COPD/Pneumonia   Week 2 attempt successful?  Yes   Call start time  1641   Call end time  1642   Meds reviewed with patient/caregiver?  Yes   Is the patient taking all medications as directed (includes completed medication regime)?  Yes   Has the patient kept scheduled appointments due by today?  Yes   Pulse Ox monitoring  None   What is the patient's perception of their health status since discharge?  Improving   Week 2 call completed?  Yes   Wrap up additional comments  He  is so [place] some construction going on in house, was short call, he said I am doing ok          Brittany Xiao RN

## 2024-04-04 NOTE — PROGRESS NOTES
[facility] Medicine Services  INPATIENT PROGRESS NOTE    Patient Name: Wong Patrick  Date of Admission: 7/8/2020  Today's Date: 07/13/20  Length of Stay: 5  Primary Care Physician: Provider, No Known    Subjective   Chief Complaint: SOA  HPI   Doiong ok.  Feels better today.  Less SOA.  On 2L NC.  No wheezing.  Coughing up some mucus.          Review of Systems   Constitutional: Negative for fatigue and fever.   HENT: Negative for congestion and ear pain.    Eyes: Negative for redness.   Respiratory: Positive for cough.    Cardiovascular: Negative for chest pain and palpitations.   Gastrointestinal: Negative for abdominal pain, diarrhea, nausea and vomiting.   Endocrine: Negative for cold intolerance and heat intolerance.   Genitourinary: Negative for dysuria and frequency.   Musculoskeletal: Negative for arthralgias and back pain.   Skin: Negative for rash and wound.   Neurological: Negative for dizziness and headaches.   Psychiatric/Behavioral: Negative for confusion. The patient is not nervous/anxious.         All pertinent negatives and positives are as above. All other systems have been reviewed and are negative unless otherwise stated.     Objective    Temp:  [97.3 °F (36.3 °C)-98.5 °F (36.9 °C)] 97.8 °F (36.6 °C)  Heart Rate:  [] 91  Resp:  [16-30] 20  BP: (106-133)/(52-81) 118/59  Physical Exam   Constitutional: He is oriented to person, place, and time. He appears well-developed and well-nourished.   HENT:   Head: Normocephalic and atraumatic.   Right Ear: External ear normal.   Left Ear: External ear normal.   Nose: Nose normal.   Mouth/Throat: Oropharynx is clear and moist.   Eyes: Pupils are equal, round, and reactive to light. Conjunctivae and EOM are normal. Right eye exhibits no discharge. Left eye exhibits no discharge. No scleral icterus.   Neck: Normal range of motion. Neck supple. No tracheal deviation present. No thyromegaly present.    Cardiovascular: Normal rate, regular rhythm, normal heart sounds and intact distal pulses. Exam reveals no gallop and no friction rub.   No murmur heard.  Pulmonary/Chest: Effort normal. No stridor. No respiratory distress. He has decreased breath sounds. He has no rales. He exhibits no tenderness.   Abdominal: Soft. Bowel sounds are normal. He exhibits no distension and no mass. There is no tenderness. There is no rebound and no guarding. No hernia.   Musculoskeletal: Normal range of motion. He exhibits no edema or deformity.   Lymphadenopathy:     He has no cervical adenopathy.   Neurological: He is alert and oriented to person, place, and time. He has normal reflexes. He displays normal reflexes. No cranial nerve deficit. He exhibits normal muscle tone. Coordination normal.   Skin: Skin is warm and dry. No rash noted. No erythema. No pallor.   Psychiatric: He has a normal mood and affect. His behavior is normal. Judgment and thought content normal.   Vitals reviewed.          Results Review:  I have reviewed the labs, radiology results, and diagnostic studies.    Laboratory Data:   Results from last 7 days   Lab Units 07/13/20  0226 07/12/20  0200 07/11/20  0518   WBC 10*3/mm3 8.27 11.14* 9.96   HEMOGLOBIN g/dL 8.3* 8.1* 7.8*   HEMATOCRIT % 26.5* 26.2* 24.4*   PLATELETS 10*3/mm3 151 190 173        Results from last 7 days   Lab Units 07/13/20  0226 07/12/20  0200 07/11/20  0518   SODIUM mmol/L 138 138 136   POTASSIUM mmol/L 4.6 4.5 4.9   CHLORIDE mmol/L 103 104 103   CO2 mmol/L 26.0 25.0 26.0   BUN mg/dL 29* 25* 22   CREATININE mg/dL 1.07 0.86 0.84   CALCIUM mg/dL 7.8* 7.9* 8.1*   BILIRUBIN mg/dL 0.2 0.2 0.2   ALK PHOS U/L 82 83 79   ALT (SGPT) U/L 40 33 30   AST (SGOT) U/L 63* 53* 46*   GLUCOSE mg/dL 120* 116* 137*       Culture Data:   No results found for: BLOODCX, URINECX, WOUNDCX, MRSACX, RESPCX, STOOLCX    Radiology Data:   Imaging Results (Last 24 Hours)     ** No results found for the last 24 hours. **           I have reviewed the patient's current medications.     Assessment/Plan     Active Hospital Problems    Diagnosis   • **Right lower lobe pneumonia   • Tracheal mass   • Acquired thrombocytopenia (CMS/HCC)   • Chronic hepatitis C (CMS/HCC)   • Hypertension   • Cirrhosis of liver (CMS/HCC)     Labs reviewed:  H&H stable, Plts stable  Electrolyes WNL    Bronchoscopy report reviewed.    Bronchoscopy report discussed with Dr. Sewell of Pulmonology        1.  PNA--stable  -IV abx completed  -Pulm following     2.  COPD AE--stable  -IV abx completed  -Pulm following  -IV steroids to PO  -Brovana  -Pulmicort  -duonebs     3.  Tracheal Mass  -Pulm following, bronch monday     4.  Cirrhosis  -monitor     5.  Thrombocytopenia  -resolved     6.  HTN  -Losartan                     Discharge Planning: I expect the patient to be discharged to home in 1 day    Jonathan Hurst MD   07/13/20   16:40     alert

## (undated) DEVICE — Device: Brand: BALLOON

## (undated) DEVICE — Device: Brand: DISPOSABLE ELECTROSURGICAL SNARE

## (undated) DEVICE — TBG PRESS EXT

## (undated) DEVICE — BITEBLOCK ENDO W/STRAP 60F A/ LF DISP

## (undated) DEVICE — KT ASP VIZISHOT 5SYRG 5BIOPSY/VLV 22G

## (undated) DEVICE — TRAP,MUCUS SPECIMEN,40CC: Brand: MEDLINE

## (undated) DEVICE — CANN SMPL SOFTECH BIFLO ETCO2 A/M 7FT

## (undated) DEVICE — THE CHANNEL CLEANING BRUSH IS A NYLON FLEXI BRUSH ATTACHED TO A FLEXIBLE PLASTIC SHEATH DESIGNED TO SAFELY REMOVE DEBRIS FROM FLEXIBLE ENDOSCOPES.

## (undated) DEVICE — SINGLE USE BIOPSY VALVE MAJ-210: Brand: SINGLE USE BIOPSY VALVE (STERILE)

## (undated) DEVICE — SINGLE USE SUCTION VALVE MAJ-209: Brand: SINGLE USE SUCTION VALVE (STERILE)

## (undated) DEVICE — PAD GRND REM POLYHESIVE A/ DISP

## (undated) DEVICE — SENSR O2 OXIMAX FNGR A/ 18IN NONSTR

## (undated) DEVICE — TRAP,MUCUS SPECIMEN, 80CC: Brand: MEDLINE

## (undated) DEVICE — TBG SMPL FLTR LINE NASL 02/C02 A/ BX/100

## (undated) DEVICE — SINGLE-USE BIOPSY FORCEPS: Brand: RADIAL JAW 4

## (undated) DEVICE — TRAP SXN POLYP QUICKCATCH LF

## (undated) DEVICE — SINGLE USE ASPIRATION NEEDLE: Brand: SINGLE USE ASPIRATION NEEDLE

## (undated) DEVICE — ADAPT SWVL FIBROPTIC BRONCH